# Patient Record
Sex: FEMALE | Race: WHITE | Employment: OTHER | URBAN - METROPOLITAN AREA
[De-identification: names, ages, dates, MRNs, and addresses within clinical notes are randomized per-mention and may not be internally consistent; named-entity substitution may affect disease eponyms.]

---

## 2017-01-11 ENCOUNTER — HOSPITAL ENCOUNTER (OUTPATIENT)
Dept: PREADMISSION TESTING | Age: 77
Discharge: HOME | End: 2017-01-11
Payer: MEDICARE

## 2017-01-11 VITALS
BODY MASS INDEX: 22.04 KG/M2 | DIASTOLIC BLOOD PRESSURE: 74 MMHG | WEIGHT: 132.28 LBS | SYSTOLIC BLOOD PRESSURE: 138 MMHG | HEIGHT: 65 IN | OXYGEN SATURATION: 99 % | RESPIRATION RATE: 17 BRPM | TEMPERATURE: 97.6 F

## 2017-01-11 LAB
ABO + RH BLD: NORMAL
ALBUMIN SERPL BCP-MCNC: 3.4 G/DL (ref 3.5–5)
ALBUMIN/GLOB SERPL: 0.9 {RATIO} (ref 1.1–2.2)
ALP SERPL-CCNC: 93 U/L (ref 45–117)
ALT SERPL-CCNC: 13 U/L (ref 12–78)
ANION GAP BLD CALC-SCNC: 9 MMOL/L (ref 5–15)
APPEARANCE UR: ABNORMAL
APTT PPP: 29.1 SEC (ref 22.1–32.5)
AST SERPL W P-5'-P-CCNC: 14 U/L (ref 15–37)
ATRIAL RATE: 67 BPM
BACTERIA URNS QL MICRO: NEGATIVE /HPF
BASOPHILS # BLD AUTO: 0 K/UL (ref 0–0.1)
BASOPHILS # BLD: 0 % (ref 0–1)
BILIRUB SERPL-MCNC: 0.3 MG/DL (ref 0.2–1)
BILIRUB UR QL CFM: NEGATIVE
BLOOD GROUP ANTIBODIES SERPL: NORMAL
BUN SERPL-MCNC: 17 MG/DL (ref 6–20)
BUN/CREAT SERPL: 22 (ref 12–20)
CALCIUM SERPL-MCNC: 9.3 MG/DL (ref 8.5–10.1)
CALCULATED P AXIS, ECG09: -25 DEGREES
CALCULATED R AXIS, ECG10: -21 DEGREES
CALCULATED T AXIS, ECG11: 158 DEGREES
CHLORIDE SERPL-SCNC: 102 MMOL/L (ref 97–108)
CO2 SERPL-SCNC: 28 MMOL/L (ref 21–32)
COLOR UR: ABNORMAL
CREAT SERPL-MCNC: 0.77 MG/DL (ref 0.55–1.02)
CRP SERPL HS-MCNC: 4.7 MG/L
DIAGNOSIS, 93000: NORMAL
EOSINOPHIL # BLD: 0.1 K/UL (ref 0–0.4)
EOSINOPHIL NFR BLD: 2 % (ref 0–7)
EPITH CASTS URNS QL MICRO: ABNORMAL /LPF
ERYTHROCYTE [DISTWIDTH] IN BLOOD BY AUTOMATED COUNT: 14.3 % (ref 11.5–14.5)
EST. AVERAGE GLUCOSE BLD GHB EST-MCNC: 103 MG/DL
GLOBULIN SER CALC-MCNC: 3.7 G/DL (ref 2–4)
GLUCOSE SERPL-MCNC: 81 MG/DL (ref 65–100)
GLUCOSE UR STRIP.AUTO-MCNC: NEGATIVE MG/DL
HBA1C MFR BLD: 5.2 % (ref 4.2–6.3)
HCT VFR BLD AUTO: 44.8 % (ref 35–47)
HGB BLD-MCNC: 15 G/DL (ref 11.5–16)
HGB UR QL STRIP: ABNORMAL
INR PPP: 1 (ref 0.9–1.1)
KETONES UR QL STRIP.AUTO: ABNORMAL MG/DL
LEUKOCYTE ESTERASE UR QL STRIP.AUTO: NEGATIVE
LYMPHOCYTES # BLD AUTO: 26 % (ref 12–49)
LYMPHOCYTES # BLD: 1.7 K/UL (ref 0.8–3.5)
MCH RBC QN AUTO: 29.9 PG (ref 26–34)
MCHC RBC AUTO-ENTMCNC: 33.5 G/DL (ref 30–36.5)
MCV RBC AUTO: 89.2 FL (ref 80–99)
MONOCYTES # BLD: 0.5 K/UL (ref 0–1)
MONOCYTES NFR BLD AUTO: 8 % (ref 5–13)
NEUTS SEG # BLD: 4.1 K/UL (ref 1.8–8)
NEUTS SEG NFR BLD AUTO: 64 % (ref 32–75)
NITRITE UR QL STRIP.AUTO: NEGATIVE
P-R INTERVAL, ECG05: 126 MS
PH UR STRIP: 5.5 [PH] (ref 5–8)
PLATELET # BLD AUTO: 203 K/UL (ref 150–400)
POTASSIUM SERPL-SCNC: 4.5 MMOL/L (ref 3.5–5.1)
PROT SERPL-MCNC: 7.1 G/DL (ref 6.4–8.2)
PROT UR STRIP-MCNC: NEGATIVE MG/DL
PROTHROMBIN TIME: 9.7 SEC (ref 9–11.1)
Q-T INTERVAL, ECG07: 486 MS
QRS DURATION, ECG06: 164 MS
QTC CALCULATION (BEZET), ECG08: 513 MS
RBC # BLD AUTO: 5.02 M/UL (ref 3.8–5.2)
RBC #/AREA URNS HPF: ABNORMAL /HPF (ref 0–5)
SODIUM SERPL-SCNC: 139 MMOL/L (ref 136–145)
SP GR UR REFRACTOMETRY: 1.02 (ref 1–1.03)
SPECIMEN EXP DATE BLD: NORMAL
THERAPEUTIC RANGE,PTTT: NORMAL SECS (ref 58–77)
UA: UC IF INDICATED,UAUC: ABNORMAL
UROBILINOGEN UR QL STRIP.AUTO: 0.2 EU/DL (ref 0.2–1)
VENTRICULAR RATE, ECG03: 67 BPM
WBC # BLD AUTO: 6.4 K/UL (ref 3.6–11)
WBC URNS QL MICRO: ABNORMAL /HPF (ref 0–4)

## 2017-01-11 PROCEDURE — 83036 HEMOGLOBIN GLYCOSYLATED A1C: CPT | Performed by: ORTHOPAEDIC SURGERY

## 2017-01-11 PROCEDURE — 36415 COLL VENOUS BLD VENIPUNCTURE: CPT | Performed by: ORTHOPAEDIC SURGERY

## 2017-01-11 PROCEDURE — 81001 URINALYSIS AUTO W/SCOPE: CPT | Performed by: ORTHOPAEDIC SURGERY

## 2017-01-11 PROCEDURE — 85730 THROMBOPLASTIN TIME PARTIAL: CPT | Performed by: ORTHOPAEDIC SURGERY

## 2017-01-11 PROCEDURE — 84466 ASSAY OF TRANSFERRIN: CPT | Performed by: ORTHOPAEDIC SURGERY

## 2017-01-11 PROCEDURE — 85025 COMPLETE CBC W/AUTO DIFF WBC: CPT | Performed by: ORTHOPAEDIC SURGERY

## 2017-01-11 PROCEDURE — 87086 URINE CULTURE/COLONY COUNT: CPT | Performed by: ORTHOPAEDIC SURGERY

## 2017-01-11 PROCEDURE — 86141 C-REACTIVE PROTEIN HS: CPT | Performed by: ORTHOPAEDIC SURGERY

## 2017-01-11 PROCEDURE — 93005 ELECTROCARDIOGRAM TRACING: CPT

## 2017-01-11 PROCEDURE — 80053 COMPREHEN METABOLIC PANEL: CPT | Performed by: ORTHOPAEDIC SURGERY

## 2017-01-11 PROCEDURE — 86900 BLOOD TYPING SEROLOGIC ABO: CPT | Performed by: ORTHOPAEDIC SURGERY

## 2017-01-11 PROCEDURE — 85610 PROTHROMBIN TIME: CPT | Performed by: ORTHOPAEDIC SURGERY

## 2017-01-11 RX ORDER — TRAMADOL HYDROCHLORIDE 50 MG/1
50 TABLET ORAL
COMMUNITY

## 2017-01-11 RX ORDER — PROMETHAZINE HYDROCHLORIDE 25 MG/1
25 TABLET ORAL
COMMUNITY

## 2017-01-11 RX ORDER — LANOLIN ALCOHOL/MO/W.PET/CERES
1000 CREAM (GRAM) TOPICAL DAILY
COMMUNITY

## 2017-01-11 RX ORDER — ZOLPIDEM TARTRATE 5 MG/1
5 TABLET ORAL
COMMUNITY

## 2017-01-11 RX ORDER — SUMATRIPTAN 50 MG/1
50 TABLET, FILM COATED ORAL
COMMUNITY

## 2017-01-11 NOTE — PERIOP NOTES
[facility]  PREOPERATIVE INSTRUCTIONS    Surgery Date:   1/30/2017  Surgery arrival time given by surgeon: NO   If [facility] staff will call you between 4 PM- 8 PM the day before surgery with your arrival time. If your surgery is on a Monday, we will call you the preceding Friday. Please call [phone] after 8 PM if you did not receive your arrival time. 1. Please report at the designated time to the [address] [place] [address] [facility]. Bring your insurance card, photo identification, and any copayment ( if applicable). 2. You must have a responsible adult to drive you home. You need to have a responsible adult to stay with you the first 24 hours after surgery if you are going home the same day of your surgery and you should not drive a car for 24 hours following your surgery. 3. Nothing to eat or drink after midnight the night before surgery. This includes no water, gum, mints, coffee, juice, etc.  Please note special instructions, if applicable, below for medications. 4. MEDICATIONS TO TAKE THE MORNING OF SURGERY WITH A SIP OF WATER: _______albuterol inhaler, coreg, Nexium,methadone, spiriva_______        If needed, your prescription pain medicine may be taken with a sip of water the morning of surgery  5. No alcoholic beverages 24 hours before or after your surgery. 6. If you are being admitted to the hospital, please leave personal belongings/luggage in your car until you have an assigned hospital room number. 7. Stop Aspirin and/or any non-steroidal anti-inflammatory drugs (i.e. Ibuprofen, Naproxen, Advil, Aleve) as directed by your surgeon. You may take Tylenol. 8. Stop herbal supplements 1 week prior to surgery. 9. If you are currently taking Plavix, Coumadin,or any other blood-thinning/anticoagulant medication contact your surgeon for instructions. 10. Please wear comfortable clothes. Wear your glasses instead of contacts. We ask that all money, jewelry and valuables be left at home.  Wear no make-up, particularly yoel, the day of surgery. 11.  All body piercings, rings and jewelry need to be removed and left at home. Please wear your hair loose or down. Please no pony-tails, buns, or any metal hair accessories. If you shower the morning of surgery, please do not apply any lotions, powders, or deodorants afterwards. Do not shave any body area within 24 hours of your surgery. 12. Please follow all instructions to avoid any potential surgical cancellation. 13. Should your physical condition change, (i.e. fever, cold, flu, etc.) please notify your surgeon as soon as possible. 14. It is important to be on time. If a situation occurs where you may be delayed, please call:  [phone] / [phone] on the day of surgery. 15. The Preadmission Testing staff can be reached at [phone]. Heydi Zaragoza 12. Bring your completed Medication Reconciliation sheet with your the morning of surgery  Special instructions:  See your lung doctor before surgery and fax notes from office visit to us  · Free  Parking between [address]  The patient was contacted  in person. She  verbalize  understanding of all instructions   Medications reviewed and med reconciliation sheets for prescriptions given to patient for review & return day of surgery. Special Instructions:  · Use Chlorhexidine Care Fusion wash and sponges 3 days prior to surgery as instructed. · Incentive spirometer given with instructions to practice at home and bring back to the hospital on the day of surgery. · [facility] will contact you if your Hemoglobin A1C is greater than 7.5. · Ensure/Glucerna  sample, nutritional information, and Ensure/Glucerna coupon given. · Pain pamphlet and Call Don't Fall reminder reviewed with patient.   ·  parking is complimentary Monday - Friday 7 am - 5 pm  · Bring PTA Medication list day of surgery with the last doses taken documented

## 2017-01-12 LAB
BACTERIA SPEC CULT: NORMAL
BACTERIA SPEC CULT: NORMAL
SERVICE CMNT-IMP: NORMAL

## 2017-01-12 NOTE — PERIOP NOTES
Left message on Lizzette's voice mail in Dr Leonardo Ware office requesting her to check order against surgical  posting as they appear different  Requested new orders to match the surgical posting

## 2017-01-12 NOTE — PERIOP NOTES
Faxed request for ASA plan to Dr Claudia Lawler fax    Phone [phone]      [facility] to [facility] address his recommendations for staying on methadone during beatriz-operative/post op course of care or other recommendation for pain management per request of Dr Patrizia Mojica  Fax   Phone   Also called office and left message for Dr Cherry aBrney nurse to call to discuss this plan and receipt of pain plan request

## 2017-01-12 NOTE — PERIOP NOTES
Spoke c [facility] pain management clinic they are faxing their recommendation

## 2017-01-12 NOTE — PERIOP NOTES
Received recommendations for a pain management plan from [facility] pain management MD that sees Mrs. Farris -  See chart hard copy  Dr Zora Aguilar (anesthesia) reviewed the recommendations for  pain management and reconfirmed pt should take her methadone the morning of surgery as instructed.   Pharmacy([facility]- pharmacist) notified of pt's pending admission for 1/30/2017

## 2017-01-13 LAB
BACTERIA SPEC CULT: NORMAL
CC UR VC: NORMAL
SERVICE CMNT-IMP: NORMAL
TRANSFERRIN SERPL-MCNC: 260 MG/DL (ref 200–370)

## 2017-01-28 ENCOUNTER — ANESTHESIA EVENT (OUTPATIENT)
Dept: SURGERY | Age: 77
DRG: 463 | End: 2017-01-28
Payer: MEDICARE

## 2017-01-30 ENCOUNTER — ANESTHESIA (OUTPATIENT)
Dept: SURGERY | Age: 77
DRG: 463 | End: 2017-01-30
Payer: MEDICARE

## 2017-01-30 ENCOUNTER — APPOINTMENT (OUTPATIENT)
Dept: CT IMAGING | Age: 77
DRG: 463 | End: 2017-01-30
Attending: INTERNAL MEDICINE
Payer: MEDICARE

## 2017-01-30 ENCOUNTER — APPOINTMENT (OUTPATIENT)
Dept: GENERAL RADIOLOGY | Age: 77
DRG: 463 | End: 2017-01-30
Attending: NURSE PRACTITIONER
Payer: MEDICARE

## 2017-01-30 ENCOUNTER — HOSPITAL ENCOUNTER (INPATIENT)
Age: 77
LOS: 17 days | Discharge: NURSING, REHAB OR LONG-TERM CARE | DRG: 463 | End: 2017-02-16
Attending: ORTHOPAEDIC SURGERY | Admitting: ORTHOPAEDIC SURGERY
Payer: MEDICARE

## 2017-01-30 DIAGNOSIS — G89.18 POST-OP PAIN: ICD-10-CM

## 2017-01-30 DIAGNOSIS — M54.9 SPINE PAIN, MULTILEVEL: ICD-10-CM

## 2017-01-30 DIAGNOSIS — M25.552 LEFT HIP PAIN: ICD-10-CM

## 2017-01-30 DIAGNOSIS — R53.1 WEAKNESS: ICD-10-CM

## 2017-01-30 PROBLEM — T84.019A FAILED TOTAL JOINT REPLACEMENT (HCC): Status: ACTIVE | Noted: 2017-01-30

## 2017-01-30 PROBLEM — Z96.649 FAILED TOTAL HIP ARTHROPLASTY (HCC): Status: ACTIVE | Noted: 2017-01-30

## 2017-01-30 PROBLEM — T84.018A FAILED TOTAL HIP ARTHROPLASTY (HCC): Status: ACTIVE | Noted: 2017-01-30

## 2017-01-30 LAB
ALBUMIN SERPL BCP-MCNC: 2.2 G/DL (ref 3.5–5)
ALBUMIN SERPL BCP-MCNC: 3.3 G/DL (ref 3.5–5)
ALBUMIN/GLOB SERPL: 0.7 {RATIO} (ref 1.1–2.2)
ALP SERPL-CCNC: 60 U/L (ref 45–117)
ALP SERPL-CCNC: 61 U/L (ref 45–117)
ALT SERPL-CCNC: 12 U/L (ref 12–78)
ANION GAP BLD CALC-SCNC: 8 MMOL/L (ref 5–15)
APTT PPP: 22.8 SEC (ref 22.1–32.5)
AST SERPL W P-5'-P-CCNC: 19 U/L (ref 15–37)
BASOPHILS # BLD AUTO: 0 K/UL (ref 0–0.1)
BASOPHILS # BLD: 0 % (ref 0–1)
BILIRUB SERPL-MCNC: 0.2 MG/DL (ref 0.2–1)
BUN SERPL-MCNC: 16 MG/DL (ref 6–20)
BUN/CREAT SERPL: 30 (ref 12–20)
CALCIUM SERPL-MCNC: 8.4 MG/DL (ref 8.5–10.1)
CHLORIDE SERPL-SCNC: 109 MMOL/L (ref 97–108)
CK SERPL-CCNC: 99 U/L (ref 26–192)
CO2 SERPL-SCNC: 25 MMOL/L (ref 21–32)
CREAT SERPL-MCNC: 0.53 MG/DL (ref 0.55–1.02)
DAILY QC (YES/NO)?: YES
DIFFERENTIAL METHOD BLD: ABNORMAL
EOSINOPHIL # BLD: 0.1 K/UL (ref 0–0.4)
EOSINOPHIL NFR BLD: 2 % (ref 0–7)
ERYTHROCYTE [DISTWIDTH] IN BLOOD BY AUTOMATED COUNT: 14.8 % (ref 11.5–14.5)
GLOBULIN SER CALC-MCNC: 3 G/DL (ref 2–4)
GLUCOSE SERPL-MCNC: 110 MG/DL (ref 65–100)
HCT VFR BLD AUTO: 30.9 % (ref 35–47)
HCT VFR BLD AUTO: 33.5 % (ref 35–47)
HCT VFR BLD AUTO: 35.1 % (ref 35–47)
HGB BLD-MCNC: 10.4 G/DL (ref 11.5–16)
HGB BLD-MCNC: 11.1 G/DL (ref 11.5–16)
HGB BLD-MCNC: 12.3 G/DL (ref 11.5–16)
HGB BLD-MCNC: 9.7 G/DL (ref 11.5–16)
INR PPP: 1.1 (ref 0.9–1.1)
LACTATE SERPL-SCNC: 1.7 MMOL/L (ref 0.4–2)
LYMPHOCYTES # BLD AUTO: 28 % (ref 12–49)
LYMPHOCYTES # BLD: 1 K/UL (ref 0.8–3.5)
MAGNESIUM SERPL-MCNC: 1.4 MG/DL (ref 1.6–2.4)
MCH RBC QN AUTO: 28.8 PG (ref 26–34)
MCHC RBC AUTO-ENTMCNC: 31.6 G/DL (ref 30–36.5)
MCV RBC AUTO: 90.9 FL (ref 80–99)
MONOCYTES # BLD: 0.2 K/UL (ref 0–1)
MONOCYTES NFR BLD AUTO: 5 % (ref 5–13)
NEUTS SEG # BLD: 2.4 K/UL (ref 1.8–8)
NEUTS SEG NFR BLD AUTO: 65 % (ref 32–75)
PLATELET # BLD AUTO: 151 K/UL (ref 150–400)
POTASSIUM SERPL-SCNC: 4.2 MMOL/L (ref 3.5–5.1)
PROT SERPL-MCNC: 5.2 G/DL (ref 6.4–8.2)
PROTHROMBIN TIME: 10.7 SEC (ref 9–11.1)
RBC # BLD AUTO: 3.86 M/UL (ref 3.8–5.2)
RBC MORPH BLD: ABNORMAL
SODIUM SERPL-SCNC: 142 MMOL/L (ref 136–145)
THERAPEUTIC RANGE,PTTT: NORMAL SECS (ref 58–77)
TROPONIN I SERPL-MCNC: 0.04 NG/ML
WBC # BLD AUTO: 3.7 K/UL (ref 3.6–11)

## 2017-01-30 PROCEDURE — 85018 HEMOGLOBIN: CPT | Performed by: INTERNAL MEDICINE

## 2017-01-30 PROCEDURE — 87205 SMEAR GRAM STAIN: CPT | Performed by: ORTHOPAEDIC SURGERY

## 2017-01-30 PROCEDURE — 84075 ASSAY ALKALINE PHOSPHATASE: CPT | Performed by: ORTHOPAEDIC SURGERY

## 2017-01-30 PROCEDURE — 87077 CULTURE AEROBIC IDENTIFY: CPT | Performed by: ORTHOPAEDIC SURGERY

## 2017-01-30 PROCEDURE — 87186 SC STD MICRODIL/AGAR DIL: CPT | Performed by: ORTHOPAEDIC SURGERY

## 2017-01-30 PROCEDURE — 74011636320 HC RX REV CODE- 636/320: Performed by: RADIOLOGY

## 2017-01-30 PROCEDURE — 74011250636 HC RX REV CODE- 250/636: Performed by: ANESTHESIOLOGY

## 2017-01-30 PROCEDURE — 85610 PROTHROMBIN TIME: CPT | Performed by: ORTHOPAEDIC SURGERY

## 2017-01-30 PROCEDURE — 77030031139 HC SUT VCRL2 J&J -A: Performed by: ORTHOPAEDIC SURGERY

## 2017-01-30 PROCEDURE — 86920 COMPATIBILITY TEST SPIN: CPT | Performed by: ORTHOPAEDIC SURGERY

## 2017-01-30 PROCEDURE — 77030034848

## 2017-01-30 PROCEDURE — 77030007866 HC KT SPN ANES BBMI -B

## 2017-01-30 PROCEDURE — 77030020782 HC GWN BAIR PAWS FLX 3M -B

## 2017-01-30 PROCEDURE — 77030008467 HC STPLR SKN COVD -B: Performed by: ORTHOPAEDIC SURGERY

## 2017-01-30 PROCEDURE — 74011250636 HC RX REV CODE- 250/636: Performed by: NURSE PRACTITIONER

## 2017-01-30 PROCEDURE — 71275 CT ANGIOGRAPHY CHEST: CPT

## 2017-01-30 PROCEDURE — 74011000250 HC RX REV CODE- 250: Performed by: ANESTHESIOLOGY

## 2017-01-30 PROCEDURE — 36415 COLL VENOUS BLD VENIPUNCTURE: CPT | Performed by: INTERNAL MEDICINE

## 2017-01-30 PROCEDURE — 0S9B0ZX DRAINAGE OF LEFT HIP JOINT, OPEN APPROACH, DIAGNOSTIC: ICD-10-PCS | Performed by: ORTHOPAEDIC SURGERY

## 2017-01-30 PROCEDURE — 74011250637 HC RX REV CODE- 250/637: Performed by: NURSE PRACTITIONER

## 2017-01-30 PROCEDURE — 83735 ASSAY OF MAGNESIUM: CPT | Performed by: ORTHOPAEDIC SURGERY

## 2017-01-30 PROCEDURE — 82803 BLOOD GASES ANY COMBINATION: CPT | Performed by: ANESTHESIOLOGY

## 2017-01-30 PROCEDURE — 0SRB0JA REPLACEMENT OF LEFT HIP JOINT WITH SYNTHETIC SUBSTITUTE, UNCEMENTED, OPEN APPROACH: ICD-10-PCS | Performed by: ORTHOPAEDIC SURGERY

## 2017-01-30 PROCEDURE — 77030016547 HC BLD SAW SAG1 STRY -B: Performed by: ORTHOPAEDIC SURGERY

## 2017-01-30 PROCEDURE — P9045 ALBUMIN (HUMAN), 5%, 250 ML: HCPCS

## 2017-01-30 PROCEDURE — 85025 COMPLETE CBC W/AUTO DIFF WBC: CPT | Performed by: ORTHOPAEDIC SURGERY

## 2017-01-30 PROCEDURE — 93005 ELECTROCARDIOGRAM TRACING: CPT

## 2017-01-30 PROCEDURE — 85018 HEMOGLOBIN: CPT | Performed by: ORTHOPAEDIC SURGERY

## 2017-01-30 PROCEDURE — 85730 THROMBOPLASTIN TIME PARTIAL: CPT | Performed by: ORTHOPAEDIC SURGERY

## 2017-01-30 PROCEDURE — 82040 ASSAY OF SERUM ALBUMIN: CPT | Performed by: ORTHOPAEDIC SURGERY

## 2017-01-30 PROCEDURE — 83605 ASSAY OF LACTIC ACID: CPT | Performed by: INTERNAL MEDICINE

## 2017-01-30 PROCEDURE — 74011000250 HC RX REV CODE- 250

## 2017-01-30 PROCEDURE — 74011250636 HC RX REV CODE- 250/636

## 2017-01-30 PROCEDURE — 87075 CULTR BACTERIA EXCEPT BLOOD: CPT | Performed by: ORTHOPAEDIC SURGERY

## 2017-01-30 PROCEDURE — 77030002933 HC SUT MCRYL J&J -A: Performed by: ORTHOPAEDIC SURGERY

## 2017-01-30 PROCEDURE — 77030011640 HC PAD GRND REM COVD -A: Performed by: ORTHOPAEDIC SURGERY

## 2017-01-30 PROCEDURE — 76030000021 HC AMB SURG 2 TO 2.5 HR INTENSV-TIER 1: Performed by: ORTHOPAEDIC SURGERY

## 2017-01-30 PROCEDURE — 82550 ASSAY OF CK (CPK): CPT | Performed by: INTERNAL MEDICINE

## 2017-01-30 PROCEDURE — 93308 TTE F-UP OR LMTD: CPT

## 2017-01-30 PROCEDURE — 77030010507 HC ADH SKN DERMBND J&J -B: Performed by: ORTHOPAEDIC SURGERY

## 2017-01-30 PROCEDURE — 36600 WITHDRAWAL OF ARTERIAL BLOOD: CPT | Performed by: ANESTHESIOLOGY

## 2017-01-30 PROCEDURE — 80053 COMPREHEN METABOLIC PANEL: CPT | Performed by: ORTHOPAEDIC SURGERY

## 2017-01-30 PROCEDURE — 77030018788 HC NDL SUT ANCH -A: Performed by: ORTHOPAEDIC SURGERY

## 2017-01-30 PROCEDURE — C1776 JOINT DEVICE (IMPLANTABLE): HCPCS | Performed by: ORTHOPAEDIC SURGERY

## 2017-01-30 PROCEDURE — 74011000272 HC RX REV CODE- 272: Performed by: ORTHOPAEDIC SURGERY

## 2017-01-30 PROCEDURE — 77030018836 HC SOL IRR NACL ICUM -A: Performed by: ORTHOPAEDIC SURGERY

## 2017-01-30 PROCEDURE — 86900 BLOOD TYPING SEROLOGIC ABO: CPT | Performed by: ORTHOPAEDIC SURGERY

## 2017-01-30 PROCEDURE — 84484 ASSAY OF TROPONIN QUANT: CPT | Performed by: INTERNAL MEDICINE

## 2017-01-30 PROCEDURE — 65610000006 HC RM INTENSIVE CARE

## 2017-01-30 PROCEDURE — 74011000250 HC RX REV CODE- 250: Performed by: INTERNAL MEDICINE

## 2017-01-30 PROCEDURE — 77030004464 HC BUR DISP STRY -B: Performed by: ORTHOPAEDIC SURGERY

## 2017-01-30 PROCEDURE — 74011250637 HC RX REV CODE- 250/637: Performed by: ANESTHESIOLOGY

## 2017-01-30 PROCEDURE — 74011250636 HC RX REV CODE- 250/636: Performed by: INTERNAL MEDICINE

## 2017-01-30 PROCEDURE — 72170 X-RAY EXAM OF PELVIS: CPT

## 2017-01-30 PROCEDURE — 77030018547 HC SUT ETHBND1 J&J -B: Performed by: ORTHOPAEDIC SURGERY

## 2017-01-30 PROCEDURE — 0SPB0JZ REMOVAL OF SYNTHETIC SUBSTITUTE FROM LEFT HIP JOINT, OPEN APPROACH: ICD-10-PCS | Performed by: ORTHOPAEDIC SURGERY

## 2017-01-30 PROCEDURE — 76210000036 HC AMBSU PH I REC 1.5 TO 2 HR: Performed by: ORTHOPAEDIC SURGERY

## 2017-01-30 PROCEDURE — 76060000064 HC AMB SURG ANES 2 TO 2.5 HR: Performed by: ORTHOPAEDIC SURGERY

## 2017-01-30 PROCEDURE — 74011000250 HC RX REV CODE- 250: Performed by: ORTHOPAEDIC SURGERY

## 2017-01-30 PROCEDURE — 77030020788: Performed by: ORTHOPAEDIC SURGERY

## 2017-01-30 DEVICE — IMPLANTABLE DEVICE: Type: IMPLANTABLE DEVICE | Site: HIP | Status: FUNCTIONAL

## 2017-01-30 RX ORDER — SODIUM CHLORIDE 0.9 % (FLUSH) 0.9 %
5-10 SYRINGE (ML) INJECTION EVERY 8 HOURS
Status: DISCONTINUED | OUTPATIENT
Start: 2017-01-30 | End: 2017-01-30 | Stop reason: HOSPADM

## 2017-01-30 RX ORDER — LIDOCAINE HYDROCHLORIDE 10 MG/ML
0.1 INJECTION, SOLUTION EPIDURAL; INFILTRATION; INTRACAUDAL; PERINEURAL AS NEEDED
Status: DISCONTINUED | OUTPATIENT
Start: 2017-01-30 | End: 2017-01-30 | Stop reason: SDUPTHER

## 2017-01-30 RX ORDER — SODIUM CHLORIDE 0.9 % (FLUSH) 0.9 %
5-10 SYRINGE (ML) INJECTION AS NEEDED
Status: DISCONTINUED | OUTPATIENT
Start: 2017-01-30 | End: 2017-01-30 | Stop reason: HOSPADM

## 2017-01-30 RX ORDER — ONDANSETRON 2 MG/ML
4 INJECTION INTRAMUSCULAR; INTRAVENOUS AS NEEDED
Status: DISCONTINUED | OUTPATIENT
Start: 2017-01-30 | End: 2017-01-30 | Stop reason: HOSPADM

## 2017-01-30 RX ORDER — DIPHENHYDRAMINE HYDROCHLORIDE 50 MG/ML
12.5 INJECTION, SOLUTION INTRAMUSCULAR; INTRAVENOUS AS NEEDED
Status: DISCONTINUED | OUTPATIENT
Start: 2017-01-30 | End: 2017-01-30 | Stop reason: HOSPADM

## 2017-01-30 RX ORDER — FAMOTIDINE 20 MG/1
20 TABLET, FILM COATED ORAL 2 TIMES DAILY
Status: DISCONTINUED | OUTPATIENT
Start: 2017-01-30 | End: 2017-01-30

## 2017-01-30 RX ORDER — CEFAZOLIN SODIUM IN 0.9 % NACL 2 G/50 ML
2 INTRAVENOUS SOLUTION, PIGGYBACK (ML) INTRAVENOUS EVERY 8 HOURS
Status: COMPLETED | OUTPATIENT
Start: 2017-01-30 | End: 2017-02-04

## 2017-01-30 RX ORDER — SODIUM CHLORIDE, SODIUM LACTATE, POTASSIUM CHLORIDE, CALCIUM CHLORIDE 600; 310; 30; 20 MG/100ML; MG/100ML; MG/100ML; MG/100ML
100 INJECTION, SOLUTION INTRAVENOUS CONTINUOUS
Status: DISCONTINUED | OUTPATIENT
Start: 2017-01-30 | End: 2017-01-30 | Stop reason: HOSPADM

## 2017-01-30 RX ORDER — OXYCODONE HYDROCHLORIDE 5 MG/1
5 TABLET ORAL
Status: DISCONTINUED | OUTPATIENT
Start: 2017-01-30 | End: 2017-01-30 | Stop reason: HOSPADM

## 2017-01-30 RX ORDER — MAGNESIUM SULFATE HEPTAHYDRATE 40 MG/ML
2 INJECTION, SOLUTION INTRAVENOUS ONCE
Status: COMPLETED | OUTPATIENT
Start: 2017-01-30 | End: 2017-02-04

## 2017-01-30 RX ORDER — CEFAZOLIN SODIUM IN 0.9 % NACL 2 G/50 ML
2 INTRAVENOUS SOLUTION, PIGGYBACK (ML) INTRAVENOUS ONCE
Status: COMPLETED | OUTPATIENT
Start: 2017-01-30 | End: 2017-01-30

## 2017-01-30 RX ORDER — PROPOFOL 10 MG/ML
INJECTION, EMULSION INTRAVENOUS
Status: DISCONTINUED | OUTPATIENT
Start: 2017-01-30 | End: 2017-01-31 | Stop reason: HOSPADM

## 2017-01-30 RX ORDER — FENTANYL CITRATE 50 UG/ML
INJECTION, SOLUTION INTRAMUSCULAR; INTRAVENOUS AS NEEDED
Status: DISCONTINUED | OUTPATIENT
Start: 2017-01-30 | End: 2017-01-31 | Stop reason: HOSPADM

## 2017-01-30 RX ORDER — SUMATRIPTAN 25 MG/1
50 TABLET, FILM COATED ORAL
Status: DISCONTINUED | OUTPATIENT
Start: 2017-01-30 | End: 2017-02-16 | Stop reason: HOSPADM

## 2017-01-30 RX ORDER — NALOXONE HYDROCHLORIDE 0.4 MG/ML
0.4 INJECTION, SOLUTION INTRAMUSCULAR; INTRAVENOUS; SUBCUTANEOUS AS NEEDED
Status: DISCONTINUED | OUTPATIENT
Start: 2017-01-30 | End: 2017-02-16 | Stop reason: HOSPADM

## 2017-01-30 RX ORDER — PANTOPRAZOLE SODIUM 40 MG/1
40 TABLET, DELAYED RELEASE ORAL
Status: DISCONTINUED | OUTPATIENT
Start: 2017-01-31 | End: 2017-01-30

## 2017-01-30 RX ORDER — ACETAMINOPHEN 325 MG/1
650 TABLET ORAL EVERY 6 HOURS
Status: DISCONTINUED | OUTPATIENT
Start: 2017-01-30 | End: 2017-02-16 | Stop reason: HOSPADM

## 2017-01-30 RX ORDER — POLYETHYLENE GLYCOL 3350 17 G/17G
17 POWDER, FOR SOLUTION ORAL DAILY
Status: DISCONTINUED | OUTPATIENT
Start: 2017-01-31 | End: 2017-02-16 | Stop reason: HOSPADM

## 2017-01-30 RX ORDER — TRAMADOL HYDROCHLORIDE 50 MG/1
100 TABLET ORAL
Status: DISCONTINUED | OUTPATIENT
Start: 2017-01-30 | End: 2017-02-01

## 2017-01-30 RX ORDER — AMOXICILLIN 250 MG
1 CAPSULE ORAL 2 TIMES DAILY
Status: DISCONTINUED | OUTPATIENT
Start: 2017-01-30 | End: 2017-02-16 | Stop reason: HOSPADM

## 2017-01-30 RX ORDER — BUPIVACAINE HYDROCHLORIDE 7.5 MG/ML
INJECTION, SOLUTION EPIDURAL; RETROBULBAR AS NEEDED
Status: DISCONTINUED | OUTPATIENT
Start: 2017-01-30 | End: 2017-01-31 | Stop reason: HOSPADM

## 2017-01-30 RX ORDER — LIDOCAINE HYDROCHLORIDE 10 MG/ML
0.1 INJECTION, SOLUTION EPIDURAL; INFILTRATION; INTRACAUDAL; PERINEURAL AS NEEDED
Status: DISCONTINUED | OUTPATIENT
Start: 2017-01-30 | End: 2017-01-30 | Stop reason: HOSPADM

## 2017-01-30 RX ORDER — FENTANYL CITRATE 50 UG/ML
25 INJECTION, SOLUTION INTRAMUSCULAR; INTRAVENOUS
Status: DISCONTINUED | OUTPATIENT
Start: 2017-01-30 | End: 2017-02-07

## 2017-01-30 RX ORDER — SODIUM CHLORIDE 0.9 % (FLUSH) 0.9 %
5-10 SYRINGE (ML) INJECTION AS NEEDED
Status: DISCONTINUED | OUTPATIENT
Start: 2017-01-30 | End: 2017-02-06

## 2017-01-30 RX ORDER — ENOXAPARIN SODIUM 100 MG/ML
40 INJECTION SUBCUTANEOUS DAILY
Status: DISCONTINUED | OUTPATIENT
Start: 2017-01-31 | End: 2017-01-31

## 2017-01-30 RX ORDER — CELECOXIB 100 MG/1
200 CAPSULE ORAL EVERY 12 HOURS
Status: DISCONTINUED | OUTPATIENT
Start: 2017-01-30 | End: 2017-02-16 | Stop reason: HOSPADM

## 2017-01-30 RX ORDER — ACETAMINOPHEN 325 MG/1
975 TABLET ORAL ONCE
Status: COMPLETED | OUTPATIENT
Start: 2017-01-30 | End: 2017-01-30

## 2017-01-30 RX ORDER — CARVEDILOL 3.12 MG/1
3.12 TABLET ORAL 2 TIMES DAILY WITH MEALS
Status: DISCONTINUED | OUTPATIENT
Start: 2017-01-30 | End: 2017-02-02

## 2017-01-30 RX ORDER — SODIUM CHLORIDE, SODIUM LACTATE, POTASSIUM CHLORIDE, CALCIUM CHLORIDE 600; 310; 30; 20 MG/100ML; MG/100ML; MG/100ML; MG/100ML
125 INJECTION, SOLUTION INTRAVENOUS CONTINUOUS
Status: DISCONTINUED | OUTPATIENT
Start: 2017-01-30 | End: 2017-01-30 | Stop reason: HOSPADM

## 2017-01-30 RX ORDER — SODIUM CHLORIDE, SODIUM LACTATE, POTASSIUM CHLORIDE, CALCIUM CHLORIDE 600; 310; 30; 20 MG/100ML; MG/100ML; MG/100ML; MG/100ML
INJECTION, SOLUTION INTRAVENOUS
Status: DISCONTINUED | OUTPATIENT
Start: 2017-01-30 | End: 2017-01-31 | Stop reason: HOSPADM

## 2017-01-30 RX ORDER — DIPHENHYDRAMINE HYDROCHLORIDE 50 MG/ML
12.5 INJECTION, SOLUTION INTRAMUSCULAR; INTRAVENOUS
Status: ACTIVE | OUTPATIENT
Start: 2017-01-30 | End: 2017-02-02

## 2017-01-30 RX ORDER — SODIUM CHLORIDE 9 MG/ML
75 INJECTION, SOLUTION INTRAVENOUS CONTINUOUS
Status: DISPENSED | OUTPATIENT
Start: 2017-01-30 | End: 2017-01-31

## 2017-01-30 RX ORDER — ONDANSETRON 2 MG/ML
4 INJECTION INTRAMUSCULAR; INTRAVENOUS
Status: ACTIVE | OUTPATIENT
Start: 2017-01-30 | End: 2017-02-02

## 2017-01-30 RX ORDER — ONDANSETRON 2 MG/ML
INJECTION INTRAMUSCULAR; INTRAVENOUS
Status: COMPLETED
Start: 2017-01-30 | End: 2017-01-30

## 2017-01-30 RX ORDER — KETOROLAC TROMETHAMINE 30 MG/ML
15 INJECTION, SOLUTION INTRAMUSCULAR; INTRAVENOUS
Status: DISCONTINUED | OUTPATIENT
Start: 2017-01-30 | End: 2017-01-30 | Stop reason: HOSPADM

## 2017-01-30 RX ORDER — OXYCODONE HYDROCHLORIDE 5 MG/1
10 TABLET ORAL
Status: DISCONTINUED | OUTPATIENT
Start: 2017-01-30 | End: 2017-02-08

## 2017-01-30 RX ORDER — SODIUM CHLORIDE 0.9 % (FLUSH) 0.9 %
5-10 SYRINGE (ML) INJECTION EVERY 8 HOURS
Status: DISCONTINUED | OUTPATIENT
Start: 2017-01-31 | End: 2017-02-06

## 2017-01-30 RX ORDER — ALBUMIN HUMAN 50 G/1000ML
SOLUTION INTRAVENOUS AS NEEDED
Status: DISCONTINUED | OUTPATIENT
Start: 2017-01-30 | End: 2017-01-31 | Stop reason: HOSPADM

## 2017-01-30 RX ORDER — CALCIUM CARBONATE 200(500)MG
400 TABLET,CHEWABLE ORAL
Status: DISCONTINUED | OUTPATIENT
Start: 2017-01-30 | End: 2017-02-16 | Stop reason: HOSPADM

## 2017-01-30 RX ORDER — ALBUTEROL SULFATE 90 UG/1
2 AEROSOL, METERED RESPIRATORY (INHALATION)
Status: DISCONTINUED | OUTPATIENT
Start: 2017-01-30 | End: 2017-02-16 | Stop reason: HOSPADM

## 2017-01-30 RX ORDER — FENTANYL CITRATE 50 UG/ML
25 INJECTION, SOLUTION INTRAMUSCULAR; INTRAVENOUS
Status: DISCONTINUED | OUTPATIENT
Start: 2017-01-30 | End: 2017-01-30 | Stop reason: HOSPADM

## 2017-01-30 RX ORDER — MORPHINE SULFATE 2 MG/ML
2 INJECTION, SOLUTION INTRAMUSCULAR; INTRAVENOUS
Status: DISCONTINUED | OUTPATIENT
Start: 2017-01-30 | End: 2017-01-31

## 2017-01-30 RX ORDER — METHADONE HYDROCHLORIDE 10 MG/1
5 TABLET ORAL EVERY 8 HOURS
Status: DISCONTINUED | OUTPATIENT
Start: 2017-01-30 | End: 2017-02-16 | Stop reason: HOSPADM

## 2017-01-30 RX ORDER — MIDAZOLAM HYDROCHLORIDE 1 MG/ML
INJECTION, SOLUTION INTRAMUSCULAR; INTRAVENOUS AS NEEDED
Status: DISCONTINUED | OUTPATIENT
Start: 2017-01-30 | End: 2017-01-31 | Stop reason: HOSPADM

## 2017-01-30 RX ORDER — ZOLPIDEM TARTRATE 5 MG/1
5 TABLET ORAL
Status: DISCONTINUED | OUTPATIENT
Start: 2017-01-30 | End: 2017-02-16 | Stop reason: HOSPADM

## 2017-01-30 RX ORDER — OXYCODONE HYDROCHLORIDE 5 MG/1
15 TABLET ORAL
Status: DISCONTINUED | OUTPATIENT
Start: 2017-01-30 | End: 2017-02-16 | Stop reason: HOSPADM

## 2017-01-30 RX ORDER — FACIAL-BODY WIPES
10 EACH TOPICAL DAILY PRN
Status: DISCONTINUED | OUTPATIENT
Start: 2017-02-01 | End: 2017-02-16 | Stop reason: HOSPADM

## 2017-01-30 RX ADMIN — CEFAZOLIN 2 G: 1 INJECTION, POWDER, FOR SOLUTION INTRAMUSCULAR; INTRAVENOUS; PARENTERAL at 20:40

## 2017-01-30 RX ADMIN — SODIUM CHLORIDE 500 ML: 900 INJECTION, SOLUTION INTRAVENOUS at 17:00

## 2017-01-30 RX ADMIN — FENTANYL CITRATE 50 MCG: 50 INJECTION, SOLUTION INTRAMUSCULAR; INTRAVENOUS at 11:35

## 2017-01-30 RX ADMIN — FENTANYL CITRATE 25 MCG: 50 INJECTION, SOLUTION INTRAMUSCULAR; INTRAVENOUS at 21:13

## 2017-01-30 RX ADMIN — PROPOFOL 100 MCG/KG/MIN: 10 INJECTION, EMULSION INTRAVENOUS at 12:23

## 2017-01-30 RX ADMIN — OXYCODONE HYDROCHLORIDE 10 MG: 5 TABLET ORAL at 23:28

## 2017-01-30 RX ADMIN — SODIUM CHLORIDE, SODIUM LACTATE, POTASSIUM CHLORIDE, AND CALCIUM CHLORIDE 100 ML/HR: 600; 310; 30; 20 INJECTION, SOLUTION INTRAVENOUS at 11:26

## 2017-01-30 RX ADMIN — SODIUM CHLORIDE, SODIUM LACTATE, POTASSIUM CHLORIDE, AND CALCIUM CHLORIDE: 600; 310; 30; 20 INJECTION, SOLUTION INTRAVENOUS at 12:41

## 2017-01-30 RX ADMIN — METHYLPREDNISOLONE SODIUM SUCCINATE 40 MG: 40 INJECTION, POWDER, FOR SOLUTION INTRAMUSCULAR; INTRAVENOUS at 17:39

## 2017-01-30 RX ADMIN — ONDANSETRON 4 MG: 2 INJECTION INTRAMUSCULAR; INTRAVENOUS at 15:15

## 2017-01-30 RX ADMIN — METHYLPREDNISOLONE SODIUM SUCCINATE 40 MG: 40 INJECTION, POWDER, FOR SOLUTION INTRAMUSCULAR; INTRAVENOUS at 23:29

## 2017-01-30 RX ADMIN — SODIUM CHLORIDE 125 ML/HR: 900 INJECTION, SOLUTION INTRAVENOUS at 16:58

## 2017-01-30 RX ADMIN — ACETAMINOPHEN 650 MG: 325 TABLET ORAL at 23:29

## 2017-01-30 RX ADMIN — FENTANYL CITRATE 25 MCG: 50 INJECTION, SOLUTION INTRAMUSCULAR; INTRAVENOUS at 14:32

## 2017-01-30 RX ADMIN — SODIUM CHLORIDE, SODIUM LACTATE, POTASSIUM CHLORIDE, CALCIUM CHLORIDE: 600; 310; 30; 20 INJECTION, SOLUTION INTRAVENOUS at 14:12

## 2017-01-30 RX ADMIN — SODIUM CHLORIDE 75 ML/HR: 900 INJECTION, SOLUTION INTRAVENOUS at 23:38

## 2017-01-30 RX ADMIN — CEFAZOLIN 2 G: 1 INJECTION, POWDER, FOR SOLUTION INTRAMUSCULAR; INTRAVENOUS; PARENTERAL at 12:22

## 2017-01-30 RX ADMIN — CELECOXIB 200 MG: 100 CAPSULE ORAL at 11:26

## 2017-01-30 RX ADMIN — ACETAMINOPHEN 975 MG: 325 TABLET ORAL at 11:26

## 2017-01-30 RX ADMIN — MIDAZOLAM HYDROCHLORIDE 2 MG: 1 INJECTION, SOLUTION INTRAMUSCULAR; INTRAVENOUS at 12:18

## 2017-01-30 RX ADMIN — MIDAZOLAM HYDROCHLORIDE 2 MG: 1 INJECTION, SOLUTION INTRAMUSCULAR; INTRAVENOUS at 11:35

## 2017-01-30 RX ADMIN — LIDOCAINE HYDROCHLORIDE 0.1 ML: 10 INJECTION, SOLUTION EPIDURAL; INFILTRATION; INTRACAUDAL; PERINEURAL at 11:26

## 2017-01-30 RX ADMIN — BUPIVACAINE HYDROCHLORIDE 2.4 ML: 7.5 INJECTION, SOLUTION EPIDURAL; RETROBULBAR at 11:44

## 2017-01-30 RX ADMIN — FAMOTIDINE 20 MG: 10 INJECTION, SOLUTION INTRAVENOUS at 20:40

## 2017-01-30 RX ADMIN — Medication 2 MG: at 19:29

## 2017-01-30 RX ADMIN — CELECOXIB 200 MG: 100 CAPSULE ORAL at 23:28

## 2017-01-30 RX ADMIN — ALBUMIN HUMAN 250 ML: 50 SOLUTION INTRAVENOUS at 14:14

## 2017-01-30 RX ADMIN — METHADONE HYDROCHLORIDE 5 MG: 10 TABLET ORAL at 17:32

## 2017-01-30 RX ADMIN — SODIUM CHLORIDE, SODIUM LACTATE, POTASSIUM CHLORIDE, AND CALCIUM CHLORIDE: 600; 310; 30; 20 INJECTION, SOLUTION INTRAVENOUS at 14:21

## 2017-01-30 RX ADMIN — FENTANYL CITRATE 25 MCG: 50 INJECTION, SOLUTION INTRAMUSCULAR; INTRAVENOUS at 18:18

## 2017-01-30 RX ADMIN — FENTANYL CITRATE 25 MCG: 50 INJECTION, SOLUTION INTRAMUSCULAR; INTRAVENOUS at 14:38

## 2017-01-30 RX ADMIN — ACETAMINOPHEN 650 MG: 325 TABLET ORAL at 17:32

## 2017-01-30 RX ADMIN — PHENYLEPHRINE HYDROCHLORIDE 20 MCG/MIN: 10 INJECTION INTRAVENOUS at 17:57

## 2017-01-30 RX ADMIN — IOPAMIDOL 68 ML: 755 INJECTION, SOLUTION INTRAVENOUS at 18:00

## 2017-01-30 RX ADMIN — MAGNESIUM SULFATE HEPTAHYDRATE 2 G: 40 INJECTION, SOLUTION INTRAVENOUS at 18:57

## 2017-01-30 NOTE — INTERVAL H&P NOTE
H&P Update:  Nikki Garcia was seen and examined. History and physical has been reviewed. The patient has been examined.  There have been no significant clinical changes since the completion of the originally dated History and Physical.    Signed By: Hank Canela MD     January 30, 2017 5:49 AM

## 2017-01-30 NOTE — CONSULTS
[facility]  Pulmonary, Critical Care, and Sleep Medicine  Name: Yoselyn Harry MRN: [id number]   : 1940 Hospital: [address] [facility]   Date: 2017        Impression Plan   1. Hypotension- due to drug rxn vs. PE vs cardiac event  2. Lip and tongue swelling  3. Surgical site bleeding  4. Hx of CHF  5. Hx of COPD  6. Hx of DVT after right hip surgery  7. FEN               · Start neosynephrine with goal MAP 65  · Serial hgb  · Stat CTA chest  · Solumedrol IV  · Wound care  · Will likely need blood transfusion tonight due to active bleeding  · decrease fluids due CHF  · Wean O2 as tolerated  · Famotidine  · No AC due to bleeding         Pt is critically ill. Critical care time spent with pt exclusive of procedures was 51 minutes    Radiology  ( personally reviewed) No cxr available   ABG No results for input(s): PHI, PO2I, PCO2I in the last 72 hours. Subjective     This patient has been seen and evaluated at the request of Dr. Rafael Veliz for hypotension. Patient is a 68 y.o. female with PMHx of hip arthritis who was undergoing a total hip arthoplasty today when she developed hypotension and bradycardia. Fascia was quickly closed and pt given fluids and will and transferred to ICU. Pt lost approx 800cc of blood in OR. Currently BP in the 80s. Pt has noticed lip and tongue swelling. States she is allergic to dilaudid and almonds. Review of Systems:  A comprehensive review of systems was negative except for that written in the HPI.     Past Medical History   Diagnosis Date    Autoimmune disease ([facility] [address]      psoriasis    Beta-blocker therapy     CAD (coronary artery disease)      non ischemic cardiomyopathy    Chronic obstructive pulmonary disease (HCC)     Chronic pain      curvature of spinie    GERD (gastroesophageal reflux disease)     Heart failure (HCC)     Hypercholesterolemia     Hypertension     Ill-defined condition      hx aortic stenosis & mitral regurg    Smoker     Thromboembolus ([facility] [address]      right leg after hip surgery      Past Surgical History   Procedure Laterality Date    Hx gyn       miscarriage    Pr abdomen surgery proc unlisted  2010     Gallbladder    Pr abdomen surgery proc unlisted  2000     hiatal hernia repair    Hx orthopaedic       Right Knee, Left Foot    Hx orthopaedic  04/05/2015     left hip replacement 2015    Hx orthopaedic  06/24/2016     rmoval bone fragment left hip    Hx orthopaedic  2013     right hip replacement    Hx orthopaedic  2001     right knee replacement    Hx heent       Tonsilectomy    Hx heent       cataract removed bilateral eyes      Prior to Admission medications    Medication Sig Start Date End Date Taking? Authorizing Provider   SUMAtriptan (IMITREX) 50 mg tablet Take 50 mg by mouth once as needed for Migraine. Yes Historical Provider   carvedilol (COREG) 3.125 mg tablet Take 3.125 mg by mouth two (2) times daily (with meals). Takes c breakfast and dinner   Yes Historical Provider   DULCOLAX, BISACODYL, PO Take 1 Tab by mouth daily as needed. Yes Historical Provider   promethazine (PHENERGAN) 25 mg tablet Take 25 mg by mouth every six (6) hours as needed for Nausea. Historical Provider   cyanocobalamin 1,000 mcg tablet Take 1,000 mcg by mouth daily. Historical Provider   traMADol (ULTRAM) 50 mg tablet Take 50 mg by mouth every eight (8) hours as needed for Pain. Historical Provider   linaclotide Tracy Putt) 290 mcg cap capsule Take 290 mcg by mouth Daily (before breakfast). Historical Provider   zolpidem (AMBIEN) 5 mg tablet Take 5 mg by mouth nightly as needed for Sleep. Historical Provider   tiotropium (SPIRIVA WITH HANDIHALER) 18 mcg inhalation capsule Take 1 Cap by inhalation daily. Historical Provider   albuterol (PROVENTIL HFA) 90 mcg/actuation inhaler Take 2 Puffs by inhalation every six (6) hours as needed.     Historical Provider   methadone (DOLOPHINE) 5 mg tablet Take 5 mg by mouth every eight (8) hours. Historical Provider   omega-3 fatty acids-vitamin e (FISH OIL) 1,000 mg cap Take 1 Cap by mouth. Historical Provider   aspirin delayed-release 81 mg tablet Take 81 mg by mouth daily. Historical Provider   cholecalciferol, vitamin d3, (VITAMIN D3) 400 unit cap Take 1 Tab by mouth daily. Historical Provider   esomeprazole (NEXIUM) 40 mg capsule Take  by mouth daily. Historical Provider   polyethylene glycol (MIRALAX) 17 gram packet Take 17 g by mouth every other day. Historical Provider   CALCIUM CARBONATE/MAG HYDROX (ROLAIDS EXTRA STRENGTH PO) Take 2 Tabs by mouth three (3) times daily (with meals).     Historical Provider     Current Facility-Administered Medications   Medication Dose Route Frequency    celecoxib (CELEBREX) capsule 200 mg  200 mg Oral Q12H    acetaminophen (TYLENOL) tablet 650 mg  650 mg Oral Q6H    calcium carbonate (TUMS) chewable tablet 400 mg [elemental]  400 mg Oral TID WITH MEALS    carvedilol (COREG) tablet 3.125 mg  3.125 mg Oral BID WITH MEALS    [START ON 1/31/2017] pantoprazole (PROTONIX) tablet 40 mg  40 mg Oral ACB    [START ON 1/31/2017] linaclotide (LINZESS) capsule 290 mcg  290 mcg Oral ACB    methadone (DOLOPHINE) tablet 5 mg  5 mg Oral Q8H    [START ON 1/31/2017] umeclidinium (INCRUSE ELLIPTA) 62.5 mcg/actuation  1 Puff Inhalation DAILY    0.9% sodium chloride infusion  125 mL/hr IntraVENous CONTINUOUS    [START ON 1/31/2017] sodium chloride (NS) flush 5-10 mL  5-10 mL IntraVENous Q8H    famotidine (PEPCID) tablet 20 mg  20 mg Oral BID    senna-docusate (PERICOLACE) 8.6-50 mg per tablet 1 Tab  1 Tab Oral BID    [START ON 1/31/2017] polyethylene glycol (MIRALAX) packet 17 g  17 g Oral DAILY    ceFAZolin in 0.9% NS (ANCEF) IVPB soln 2 g  2 g IntraVENous Q8H    [START ON 1/31/2017] enoxaparin (LOVENOX) injection 40 mg  40 mg SubCUTAneous DAILY    PHENYLephrine (NEOSYNEPHRINE) 30,000 mcg in 0.9% sodium chloride 250 mL infusion  mcg/min IntraVENous TITRATE    methylPREDNISolone (PF) (SOLU-MEDROL) injection 40 mg  40 mg IntraVENous Q6H     Allergies   Allergen Reactions    [place] Angioedema    Codeine Other (comments)     \"Years ago gave me hives but lately I havetaken it without problem\"    Dilaudid [Hydromorphone (Bulk)] Shortness of Breath    Lyrica [Pregabalin] Nausea and Vomiting      Social History   Substance Use Topics    Smoking status: Current Every Day Smoker     Packs/day: 0.50     Years: 60.00    Smokeless tobacco: Never Used    Alcohol use No      Comment: 0      Family History   Problem Relation Age of Onset    Cancer Mother     Stroke Father           Laboratory: I have personally reviewed the critical care flowsheet and labs.      Recent Labs      01/30/17   1426   WBC  3.7   HGB  11.1*   HCT  35.1   PLT  151     Recent Labs      01/30/17   1519  01/30/17   1426  01/30/17   1112   NA   --   142   --    K   --   4.2   --    CL   --   109*   --    CO2   --   25   --    GLU   --   110*   --    BUN   --   16   --    CREA   --   0.53*   --    CA   --   8.4*   --    MG   --   1.4*   --    ALB   --   2.2*  3.3*   SGOT   --   19   --    ALT   --   12   --    INR  1.1   --    --        Objective:     Mode Rate Tidal Volume Pressure FiO2 PEEP                    Vital Signs:     TMAX(24)      Intake/Output:   Last shift:         Last 3 shifts: 01/30 0701 - 01/30 1900  In: 3100 [I.V.:3100]  Out: 800 RRIOLAST3  Intake/Output Summary (Last 24 hours) at 01/30/17 1730  Last data filed at 01/30/17 1600   Gross per 24 hour   Intake             3100 ml   Output              800 ml   Net             2300 ml     EXAM:   GENERAL: in pain, mentating slowly but AOx3, HEENT:  PERRL, EOMI, no alar flaring or epistaxis, oral mucosa moist, tongue swelling and lip swelling, NECK:  no jugular vein distention, no retractions, no thyromegaly or masses, LUNGS: CTA, no w/r/r, HEART:  Regular rate and rhythm with no MGR; no edema is present, ABDOMEN:  soft with no tenderness, bowel sounds present, EXTREMITIES:  warm with no cyanosis, SKIN: Bright red blood in saturated dressing left hip, pale NEUROLOGIC:  alert and oriented x2, moving all extremeties     Marcella Nava MD  [facility]

## 2017-01-30 NOTE — ANESTHESIA PROCEDURE NOTES
Spinal Block    Start time: 1/30/2017 11:35 AM  End time: 1/30/2017 11:45 AM  Performed by: Lesia Ryan  Authorized by: Ashwin Saavedra     Pre-procedure:   Indications: at surgeon's request and primary anesthetic  Preanesthetic Checklist: patient identified, risks and benefits discussed, anesthesia consent, site marked, patient being monitored and timeout performed    Timeout Time: 11:35          Spinal Block:   Patient Position:  Seated  Prep Region:  Lumbar  Prep: DuraPrep      Location:  L3-4  Technique:  Single shot        Needle:   Needle Type:  Pencan  Needle Gauge:  25 G  Attempts:  1      Events: CSF confirmed, no blood with aspiration and no paresthesia        Assessment:  Insertion:  Uncomplicated  Patient tolerance:  Patient tolerated the procedure well with no immediate complications

## 2017-01-30 NOTE — PROGRESS NOTES
POST ANESTHESIA CARE DISCHARGE NOTE    Sujey Neal was   transfered      via     Bed   To  hospital room ICU 14  . Patient was escorted by   nurse  . Patient verbalized   appreciation and was very pleased with care received   throughout their stay. Patient was discharged in   pleasant mood      Pain at discharge/transfer was     0 /10. All personal belongings have been returned to patient, and patient/family verbally confirm receiving belongings as all present. TRANSFER - OUT REPORT:    Verbal report given to [facility] RN on Sujey Neal  being transferred to      ICU    for routine post - op       Report consisted of patients Situation, Background, Assessment and   Recommendations(SBAR). Information from the following report(s) SBAR, OR Summary, MAR, Recent Results and Cardiac Rhythm LBBB was reviewed with the receiving nurse. Opportunity for questions and clarification was provided.       Cristal FIGUEROA RN-BC

## 2017-01-30 NOTE — IP AVS SNAPSHOT
Current Discharge Medication List  
  
Take these medications at their scheduled times Dose & Instructions Dispensing Information Comments Morning Noon Evening Bedtime  
 aspirin delayed-release 81 mg tablet Your next dose is: Today, Tomorrow Other:  ____________ Dose:  81 mg Take 81 mg by mouth daily. Refills:  0  
     
   
   
   
  
 atorvastatin 10 mg tablet Commonly known as:  LIPITOR Your next dose is: Today, Tomorrow Other:  ____________ Dose:  10 mg Take 1 Tab by mouth daily. Quantity:  30 Tab Refills:  3  
     
   
   
   
  
 * bisacodyl 10 mg suppository Commonly known as:  DULCOLAX (BISACODYL) Your next dose is: Today, Tomorrow Other:  ____________ Dose:  10 mg Insert 10 mg into rectum daily. Quantity:  2 Suppository Refills:  0 COREG 3.125 mg tablet Generic drug:  carvedilol Your next dose is: Today, Tomorrow Other:  ____________ Dose:  3.125 mg Take 3.125 mg by mouth two (2) times daily (with meals). Takes c breakfast and dinner Refills:  0  
     
   
   
   
  
 cyanocobalamin 1,000 mcg tablet Your next dose is: Today, Tomorrow Other:  ____________ Dose:  1000 mcg Take 1,000 mcg by mouth daily. Refills:  0  
     
   
   
   
  
 enoxaparin 40 mg/0.4 mL Commonly known as:  LOVENOX Your next dose is: Today, Tomorrow Other:  ____________ Dose:  40 mg  
0.4 mL by SubCUTAneous route daily. Quantity:  30 Syringe Refills:  0 LINZESS 290 mcg Cap capsule Generic drug:  linaclotide Your next dose is: Today, Tomorrow Other:  ____________ Dose:  290 mcg Take 290 mcg by mouth Daily (before breakfast). Refills:  0  
     
   
   
   
  
 lisinopril 10 mg tablet Commonly known as:  Komal Mayorga  
   
 Your next dose is: Today, Tomorrow Other:  ____________ Dose:  10 mg Take 1 Tab by mouth daily. Quantity:  30 Tab Refills:  3  
     
   
   
   
  
 methadone 5 mg tablet Commonly known as:  DOLOPHINE Your next dose is: Today, Tomorrow Other:  ____________ Dose:  5 mg Take 5 mg by mouth every eight (8) hours. Refills:  0 MIRALAX 17 gram packet Generic drug:  polyethylene glycol Your next dose is: Today, Tomorrow Other:  ____________ Dose:  17 g Take 17 g by mouth every other day. Refills:  0 NexIUM 40 mg capsule Generic drug:  esomeprazole Your next dose is: Today, Tomorrow Other:  ____________ Take  by mouth daily. Refills:  0  
     
   
   
   
  
 ROLAIDS EXTRA STRENGTH PO Your next dose is: Today, Tomorrow Other:  ____________ Dose:  2 Tab Take 2 Tabs by mouth three (3) times daily (with meals). Refills:  0 SPIRIVA WITH HANDIHALER 18 mcg inhalation capsule Generic drug:  tiotropium Your next dose is: Today, Tomorrow Other:  ____________ Dose:  1 Cap Take 1 Cap by inhalation daily. Refills:  0  
     
   
   
   
  
 VITAMIN D3 400 unit Cap Generic drug:  cholecalciferol (vitamin d3) Your next dose is: Today, Tomorrow Other:  ____________ Dose:  1 Tab Take 1 Tab by mouth daily. Refills:  0  
     
   
   
   
  
 * Notice: This list has 1 medication(s) that are the same as other medications prescribed for you. Read the directions carefully, and ask your doctor or other care provider to review them with you. Take these medications as needed Dose & Instructions Dispensing Information Comments Morning Noon Evening Bedtime AMBIEN 5 mg tablet Generic drug:  zolpidem Your next dose is: Today, Tomorrow Other:  ____________ Dose:  5 mg Take 5 mg by mouth nightly as needed for Sleep. Refills:  0  
     
   
   
   
  
 * DULCOLAX (BISACODYL) PO Your next dose is: Today, Tomorrow Other:  ____________ Dose:  1 Tab Take 1 Tab by mouth daily as needed. Refills:  0 IMITREX 50 mg tablet Generic drug:  SUMAtriptan Your next dose is: Today, Tomorrow Other:  ____________ Dose:  50 mg Take 50 mg by mouth once as needed for Migraine. Refills:  0  
     
   
   
   
  
 ondansetron 8 mg disintegrating tablet Commonly known as:  ZOFRAN ODT Your next dose is: Today, Tomorrow Other:  ____________ Dose:  4 mg Take 0.5 Tabs by mouth every eight (8) hours as needed for Nausea. Quantity:  30 Tab Refills:  0  
     
   
   
   
  
 oxyCODONE IR 15 mg immediate release tablet Commonly known as:  OXY-IR Your next dose is: Today, Tomorrow Other:  ____________ Dose:  15 mg Take 1 Tab by mouth every three (3) hours as needed. Max Daily Amount: 120 mg.  
 Quantity:  75 Tab Refills:  0  
     
   
   
   
  
 promethazine 25 mg tablet Commonly known as:  PHENERGAN Your next dose is: Today, Tomorrow Other:  ____________ Dose:  25 mg Take 25 mg by mouth every six (6) hours as needed for Nausea. Refills:  0 PROVENTIL HFA 90 mcg/actuation inhaler Generic drug:  albuterol Your next dose is: Today, Tomorrow Other:  ____________ Dose:  2 Puff Take 2 Puffs by inhalation every six (6) hours as needed. Refills:  0  
     
   
   
   
  
 traMADol 50 mg tablet Commonly known as:  ULTRAM  
   
Your next dose is: Today, Tomorrow Other:  ____________ Dose:  50 mg Take 50 mg by mouth every eight (8) hours as needed for Pain. Refills:  0 * Notice: This list has 1 medication(s) that are the same as other medications prescribed for you. Read the directions carefully, and ask your doctor or other care provider to review them with you. Take these medications as directed Dose & Instructions Dispensing Information Comments Morning Noon Evening Bedtime FISH OIL 1,000 mg Cap Generic drug:  omega-3 fatty acids-vitamin e Your next dose is: Today, Tomorrow Other:  ____________ Dose:  1 Cap Take 1 Cap by mouth. Refills:  0 Where to Get Your Medications Information about where to get these medications is not yet available ! Ask your nurse or doctor about these medications  
  atorvastatin 10 mg tablet  
 bisacodyl 10 mg suppository  
 enoxaparin 40 mg/0.4 mL  
 lisinopril 10 mg tablet  
 ondansetron 8 mg disintegrating tablet  
 oxyCODONE IR 15 mg immediate release tablet

## 2017-01-30 NOTE — PROGRESS NOTES
Orders received. Patient in ICU following THR due to hypotension and bradycardia. Will see POD #1. Thanks.

## 2017-01-30 NOTE — PROGRESS NOTES
1640: Pt hypotensive and L hip completely saturated with blood. NSR on the monitor at this time. Pt lethargic and complaining of pain but oriented to place, time, self, and situation. PERRLA. Reinforced dressing, padding under pt pooled with blood. Notified Dr. Anisa Herring about bleeding and hypotension, will consult intensivist. Dr. Santoro Room called, will come evaluate pt at the bedside. Relayed Mg result of 1.4, no new orders at this time yet. 1830: Pt resting comfortably at this time. L hip continues to bleed and pool under patient. Will reinforce dressing again and change pad under pt.

## 2017-01-30 NOTE — ANESTHESIA PREPROCEDURE EVALUATION
Anesthetic History   No history of anesthetic complications            Review of Systems / Medical History  Patient summary reviewed, nursing notes reviewed and pertinent labs reviewed    Pulmonary  Within defined limits  COPD               Neuro/Psych   Within defined limits           Cardiovascular  Within defined limits        CHF (non ischemic)    CAD (stable)         GI/Hepatic/Renal  Within defined limits   GERD           Endo/Other  Within defined limits           Other Findings              Physical Exam    Airway  Mallampati: II  TM Distance: 4 - 6 cm  Neck ROM: normal range of motion   Mouth opening: Normal     Cardiovascular    Rhythm: regular  Rate: normal         Dental  No notable dental hx  Dentition: Edentulous     Pulmonary  Breath sounds clear to auscultation               Abdominal         Other Findings            Anesthetic Plan    ASA: 3  Anesthesia type: spinal            Anesthetic plan and risks discussed with: Patient

## 2017-01-30 NOTE — CONSULTS
Jose Nguyen MD    [address] [place], [address] [facility]    Office [phone],ILY [phone]  Pager [phone]    Date of  Admission: 1/30/2017 10:16 AM  PCP- Guillaume Moser MD    Mague Proctor is a 68 y.o. female admitted for LEFT HIP BIPOLAR;Failed total joint replacement ([facility] [address] [place]*. Consult requested by Lorean Hogan MD    Assessment/Plan    S/p R Hip surgery  Hx of non ischemic CMP/Hx of CHF  Hx of LBBB  Hx of Aortic sclerosis/MR    Plan:  Patient had an episode of hypotension and bradycardia which required pressors towards the end of her hip surgery. EKG was similar to her prior EKGs of left bundle branch block. Check electrolytes/troponin/echocardiogram.  Patient is currently being weaned off pressors. She does not complain of any chest pain. She is recovering from anesthesia. Telemetry bed. Records from her cardiologist.               I appreciate the opportunity to be involved in Ms. Arciniega. See below note for details. Please do not hesitate to contact us with questions or concerns. Jose Nguyen MD    Cardiac Testing/ Procedures: A. Cardiac Cath/PCI:    B.ECHO/RIAZ:    C.StressNuclear/Stress ECHO/Stress test:    D.Vascular:    E. EP:    F. Miscellaneous:    Care Plan discussed with: Patient and Nursing Staff/Anesthesia    Subjective:  Patient is a 51-year-old  female who was undergoing hip surgery when she started developing hypotension and bradycardia. Pressors were started. Her EKG was thought to have ST elevation by OR staff but review of her prior EKG indicates that she has had left bundle branch block with ST-T changes and her present EKG is similar to her prior EKGs. Patient is currently being weaned off pressors as her systolic blood pressures in the 120-130 range. She also does not complain of chest pain but is still recovering from her anesthesia. Unable to give detailed history.   Her chart indicates that she has a history of nonischemic cardiomyopathy, aortic sclerosis. Past Medical History   Diagnosis Date    Autoimmune disease ([address]      psoriasis    Beta-blocker therapy     CAD (coronary artery disease)      non ischemic cardiomyopathy    Chronic obstructive pulmonary disease (HCC)     Chronic pain      curvature of spinie    GERD (gastroesophageal reflux disease)     Heart failure (HCC)     Hypercholesterolemia     Hypertension     Ill-defined condition      hx aortic stenosis & mitral regurg    Smoker     Thromboembolus ([address]      right leg after hip surgery      Past Surgical History   Procedure Laterality Date    Hx gyn       miscarriage    Pr abdomen surgery proc unlisted  2010     Gallbladder    Pr abdomen surgery proc unlisted  2000     hiatal hernia repair    Hx orthopaedic       Right Knee, Left Foot    Hx orthopaedic  04/05/2015     left hip replacement 2015    Hx orthopaedic  06/24/2016     rmoval bone fragment left hip    Hx orthopaedic  2013     right hip replacement    Hx orthopaedic  2001     right knee replacement    Hx heent       Tonsilectomy    Hx heent       cataract removed bilateral eyes     Allergies   Allergen Reactions    [place] Angioedema    Codeine Other (comments)     \"Years ago gave me hives but lately I havetaken it without problem\"    Dilaudid [Hydromorphone (Bulk)] Shortness of Breath    Lyrica [Pregabalin] Nausea and Vomiting     Family History   Problem Relation Age of Onset    Cancer Mother     Stroke Father       Social History   Substance Use Topics    Smoking status: Current Every Day Smoker     Packs/day: 0.50     Years: 60.00    Smokeless tobacco: Never Used    Alcohol use No      Comment: 0          Medications:  Prescriptions Prior to Admission   Medication Sig    SUMAtriptan (IMITREX) 50 mg tablet Take 50 mg by mouth once as needed for Migraine.  carvedilol (COREG) 3.125 mg tablet Take 3.125 mg by mouth two (2) times daily (with meals).  Takes c breakfast and dinner    DULCOLAX, BISACODYL, PO Take 1 Tab by mouth daily as needed.  promethazine (PHENERGAN) 25 mg tablet Take 25 mg by mouth every six (6) hours as needed for Nausea.  cyanocobalamin 1,000 mcg tablet Take 1,000 mcg by mouth daily.  traMADol (ULTRAM) 50 mg tablet Take 50 mg by mouth every eight (8) hours as needed for Pain.  linaclotide (LINZESS) 290 mcg cap capsule Take 290 mcg by mouth Daily (before breakfast).  zolpidem (AMBIEN) 5 mg tablet Take 5 mg by mouth nightly as needed for Sleep.  tiotropium (SPIRIVA WITH HANDIHALER) 18 mcg inhalation capsule Take 1 Cap by inhalation daily.  albuterol (PROVENTIL HFA) 90 mcg/actuation inhaler Take 2 Puffs by inhalation every six (6) hours as needed.  methadone (DOLOPHINE) 5 mg tablet Take 5 mg by mouth every eight (8) hours.  omega-3 fatty acids-vitamin e (FISH OIL) 1,000 mg cap Take 1 Cap by mouth.  aspirin delayed-release 81 mg tablet Take 81 mg by mouth daily.  cholecalciferol, vitamin d3, (VITAMIN D3) 400 unit cap Take 1 Tab by mouth daily.  esomeprazole (NEXIUM) 40 mg capsule Take  by mouth daily.  polyethylene glycol (MIRALAX) 17 gram packet Take 17 g by mouth every other day.  CALCIUM CARBONATE/MAG HYDROX (ROLAIDS EXTRA STRENGTH PO) Take 2 Tabs by mouth three (3) times daily (with meals).      Current Facility-Administered Medications   Medication Dose Route Frequency    celecoxib (CELEBREX) capsule 200 mg  200 mg Oral Q12H     Facility-Administered Medications Ordered in Other Encounters   Medication Dose Route Frequency    midazolam (VERSED) injection   IntraVENous PRN    fentaNYL citrate (PF) injection    PRN    bupivacaine (PF) (MARCAINE) 0.75 % (7.5 mg/mL) injection   Intrathecal PRN    propofol (DIPRIVAN) 10 mg/mL injection   IntraVENous CONTINUOUS    ePHEDrine (MISTOLE) 50 mg/mL injection   IntraVENous PRN    albumin human 5% (BUMINATE) solution   IntraVENous PRN    PHENYLephrine (NEOSYNEPHRINE) 10,000 mcg in 0.9% sodium chloride 100 mL infusion  10,000 mcg IntraVENous CONTINUOUS    lactated ringers infusion   IntraVENous CONTINUOUS         Review of Systems:  (bold if positive, if negative)  Limited - as in HPI      Physical Exam:  Visit Vitals    /78    Pulse 99    Temp 98.2 °F (36.8 °C)    Resp 22    Ht 5' 5\" (1.651 m)    Wt 135 lb 9.3 oz (61.5 kg)    SpO2 (!) 87%    BMI 22.56 kg/m2         Telemetry: SR    Gen: Well-developed, frail, elderly  HEENT:  Pink conjunctivae, hearing intact to voice, moist mucous membranes  Neck: No JVD, No Carotid Bruit,   Resp: No accessory muscle use, Clear breath sounds ant, No rales or rhonchi  Card: Regular Rate,Rythm,Normal S1, S2, sys murmur+; No rubs or gallop. No thrills.    Abd:  Soft, non-tender, non-distended,   MSK: No cyanosis   Skin: No rashes   LE: No edema  Vascular:Radial Pulses 2+ and symmetric        EKG: Sinus tach/LBBB      Cxray:    LABS:        Lab Results   Component Value Date/Time    WBC 6.4 01/11/2017 03:45 PM    HGB 15.0 01/11/2017 03:45 PM    HCT 44.8 01/11/2017 03:45 PM    PLATELET 219 40/56/1402 03:45 PM    MCV 89.2 01/11/2017 03:45 PM     Lab Results   Component Value Date/Time    Sodium 142 01/30/2017 02:26 PM    Potassium 4.2 01/30/2017 02:26 PM    Chloride 109 01/30/2017 02:26 PM    CO2 25 01/30/2017 02:26 PM    Anion gap 8 01/30/2017 02:26 PM    Glucose 110 01/30/2017 02:26 PM    BUN 16 01/30/2017 02:26 PM    Creatinine 0.53 01/30/2017 02:26 PM    BUN/Creatinine ratio 30 01/30/2017 02:26 PM    GFR est AA >60 01/30/2017 02:26 PM    GFR est non-AA >60 01/30/2017 02:26 PM    Calcium 8.4 01/30/2017 02:26 PM     Lab Results   Component Value Date/Time    CK 99 01/30/2017 02:36 PM    Troponin-I, Qt. 0.04 01/30/2017 02:36 PM     Lab Results   Component Value Date/Time    aPTT 29.1 01/11/2017 03:45 PM     Lab Results   Component Value Date/Time    INR 1.0 01/11/2017 03:45 PM    Prothrombin time 9.7 01/11/2017 03:45 PM No results found for: BNP, BNPP, XBNPT     CC time >30 min      Drew Elizondo MD

## 2017-01-30 NOTE — PROGRESS NOTES
Consult received for Palliative Medicine, pt is on the OR currently, chart has been reviewed.  Please call if needed [phone]  Juan Gaitan NP

## 2017-01-30 NOTE — ANESTHESIA POSTPROCEDURE EVALUATION
Post-Anesthesia Evaluation and Assessment    Patient: Kalli Knott MRN: [id number]  SSN: [id number]    YOB: 1940  Age: 68 y.o. Sex: female       Cardiovascular Function/Vital Signs  Visit Vitals    /71    Pulse 87    Temp 36.8 °C (98.2 °F)    Resp 14    Ht 5' 5\" (1.651 m)    Wt 61.5 kg (135 lb 9.3 oz)    SpO2 95%    BMI 22.56 kg/m2       Patient is status post spinal anesthesia for Procedure(s):  LEFT TOTAL HIP ARTHROPLASTY CONVERSION. Nausea/Vomiting: None    Postoperative hydration reviewed and adequate. Pain:  Pain Scale 1: Numeric (0 - 10) (01/30/17 1448)  Pain Intensity 1: 0 (01/30/17 1448)   Managed    Neurological Status:   Neuro (WDL): Exceptions to WDL (01/30/17 1448)  Neuro  Neurologic State: Drowsy (01/30/17 1448)  LLE Motor Response: Pharmacologically paralyzed (01/30/17 1448)  RLE Motor Response: Pharmacologically paralyzed (01/30/17 1448)   At baseline    Mental Status and Level of Consciousness: Arousable    Pulmonary Status:   O2 Device: Oxygen mask (01/30/17 1448)   Adequate oxygenation and airway patent    Complications related to anesthesia: None    Post-anesthesia assessment completed.  No concerns    Signed By: Ambrosio Vázquez MD     January 30, 2017

## 2017-01-30 NOTE — H&P
Orthopaedic PRE-OP Admission History and Physical    Past Medical History   Diagnosis Date    Autoimmune disease ([facility] [address]      psoriasis    Beta-blocker therapy     CAD (coronary artery disease)      non ischemic cardiomyopathy    Chronic obstructive pulmonary disease (HCC)     Chronic pain      curvature of spinie    GERD (gastroesophageal reflux disease)     Heart failure (HCC)     Hypercholesterolemia     Hypertension     Ill-defined condition      hx aortic stenosis & mitral regurg    Smoker     Thromboembolus ([address]      right leg after hip surgery      Past Surgical History   Procedure Laterality Date    Hx gyn       miscarriage    Pr abdomen surgery proc unlisted  2010     Gallbladder    Pr abdomen surgery proc unlisted  2000     hiatal hernia repair    Hx orthopaedic       Right Knee, Left Foot    Hx orthopaedic  04/05/2015     left hip replacement 2015    Hx orthopaedic  06/24/2016     rmoval bone fragment left hip    Hx orthopaedic  2013     right hip replacement    Hx orthopaedic  2001     right knee replacement    Hx heent       Tonsilectomy    Hx heent       cataract removed bilateral eyes      Prior to Admission medications    Medication Sig Start Date End Date Taking? Authorizing Provider   SUMAtriptan (IMITREX) 50 mg tablet Take 50 mg by mouth once as needed for Migraine. Historical Provider   promethazine (PHENERGAN) 25 mg tablet Take 25 mg by mouth every six (6) hours as needed for Nausea. Historical Provider   cyanocobalamin 1,000 mcg tablet Take 1,000 mcg by mouth daily. Historical Provider   traMADol (ULTRAM) 50 mg tablet Take 50 mg by mouth every eight (8) hours as needed for Pain. Historical Provider   linaclotide [place]) 290 mcg cap capsule Take 290 mcg by mouth Daily (before breakfast). Historical Provider   zolpidem (AMBIEN) 5 mg tablet Take 5 mg by mouth nightly as needed for Sleep.     Historical Provider   tiotropium (SPIRIVA WITH HANDIHALER) 18 mcg inhalation capsule Take 1 Cap by inhalation daily. Historical Provider   albuterol (PROVENTIL HFA) 90 mcg/actuation inhaler Take 2 Puffs by inhalation every six (6) hours as needed. Historical Provider   methadone (DOLOPHINE) 5 mg tablet Take 5 mg by mouth every eight (8) hours. Historical Provider   carvedilol (COREG) 3.125 mg tablet Take 3.125 mg by mouth two (2) times daily (with meals). Takes c breakfast and dinner    Historical Provider   omega-3 fatty acids-vitamin e (FISH OIL) 1,000 mg cap Take 1 Cap by mouth. Historical Provider   aspirin delayed-release 81 mg tablet Take 81 mg by mouth daily. Historical Provider   cholecalciferol, vitamin d3, (VITAMIN D3) 400 unit cap Take 1 Tab by mouth daily. Historical Provider   esomeprazole (NEXIUM) 40 mg capsule Take  by mouth daily. Historical Provider   polyethylene glycol (MIRALAX) 17 gram packet Take 17 g by mouth every other day. Historical Provider   CALCIUM CARBONATE/MAG HYDROX (ROLAIDS EXTRA STRENGTH PO) Take 2 Tabs by mouth three (3) times daily (with meals). Historical Provider   DULCOLAX, BISACODYL, PO Take 1 Tab by mouth daily as needed. Historical Provider     No current facility-administered medications for this encounter. Current Outpatient Prescriptions   Medication Sig    SUMAtriptan (IMITREX) 50 mg tablet Take 50 mg by mouth once as needed for Migraine.  promethazine (PHENERGAN) 25 mg tablet Take 25 mg by mouth every six (6) hours as needed for Nausea.  cyanocobalamin 1,000 mcg tablet Take 1,000 mcg by mouth daily.  traMADol (ULTRAM) 50 mg tablet Take 50 mg by mouth every eight (8) hours as needed for Pain.  linaclotide (LINZESS) 290 mcg cap capsule Take 290 mcg by mouth Daily (before breakfast).  zolpidem (AMBIEN) 5 mg tablet Take 5 mg by mouth nightly as needed for Sleep.  tiotropium (SPIRIVA WITH HANDIHALER) 18 mcg inhalation capsule Take 1 Cap by inhalation daily.     albuterol (PROVENTIL HFA) 90 mcg/actuation inhaler Take 2 Puffs by inhalation every six (6) hours as needed.  methadone (DOLOPHINE) 5 mg tablet Take 5 mg by mouth every eight (8) hours.  carvedilol (COREG) 3.125 mg tablet Take 3.125 mg by mouth two (2) times daily (with meals). Takes c breakfast and dinner    omega-3 fatty acids-vitamin e (FISH OIL) 1,000 mg cap Take 1 Cap by mouth.  aspirin delayed-release 81 mg tablet Take 81 mg by mouth daily.  cholecalciferol, vitamin d3, (VITAMIN D3) 400 unit cap Take 1 Tab by mouth daily.  esomeprazole (NEXIUM) 40 mg capsule Take  by mouth daily.  polyethylene glycol (MIRALAX) 17 gram packet Take 17 g by mouth every other day.  CALCIUM CARBONATE/MAG HYDROX (ROLAIDS EXTRA STRENGTH PO) Take 2 Tabs by mouth three (3) times daily (with meals).  DULCOLAX, BISACODYL, PO Take 1 Tab by mouth daily as needed. Allergies   Allergen Reactions    [place] Angioedema    Codeine Other (comments)     \"Years ago gave me hives but lately I havetaken it without problem\"    Dilaudid [Hydromorphone (Bulk)] Shortness of Breath    Lyrica [Pregabalin] Nausea and Vomiting        Review of Systems  Review of systems was documented in PAT and also in the HPI. Physical Exam  Gen: No acute distress   Resp: No accessory muscle use, no acute distress, conversant without gasping, clear lung fields. Card: No abnormalities detected, RRR- See PAT exam if available. Abd: Soft, non-tender, non-distended  Lymph: No palpable lymph nodes of the affected extremity  Skin: No skin breakdown noted. Labs: No results for input(s): WBC, HGB, HCT, K, CREA, GLU, CRP, HGBEXT, HCTEXT in the last 72 hours. No lab exists for component: ESR    [facility] Note - Subjective / Exam / Yani Ibarra / Plan      Chief Complaint    ct left hip   SUBJECTIVE :       The patient returns in follow-up today for continued left hip pain. Since the previous visit the patient notes continued pain over the left hip. The patient is status post bipolar replacement for femoral neck fracture, then revision surgery. The patient did have a possible infection, infection labs have been drawn. The patient does have multiple medical issues. The patient continues to have severe left hip pain. This pain has become lifestyle limiting. The patient walks with a walker, and uses a wheelchair. PREVIOUS TREATMENT HISTORY :      History of a bipolar, possible infection then revision. OBJECTIVE :     Left hip evaluation demonstrates a well-healed surgical incision, hip range of motion from 90° of flexion, 0° of internal rotation, 30° of external rotation. The patient does have pain with hip flexion and resistance, pain with axial loading of the femur. RADIOGRAPHIC EVALUATION :      CT scan demonstrates lucency around the cement mantle, possible loosening of the stem, large diaphysis. The patient also has acetabular changes consistent with degenerative changes. Laboratory:  Normal CRP an ESR     ASSESSMENT :     Patient with multiple medical issues, loose femoral stem by CT scan, and acetabular changes. PLAN:      DISCUSSION :  The patient would require evaluation by cardiology Dr. Priyanka Lyn, evaluation by pulmonology, the patient also has chronic pain and would require greater than normal pain medication following surgery. The patient is treated by [facility] pain management. They can be reached at [phone]. The patient does have a history of a DVT, and will require Lovenox for DVT prophylaxis after surgery. The patient is a current smoker, it is requested that she stop smoking prior to surgery. MEDICAL CONSIDERATIONS :  Cardiac, pulmonary, chronic pain issues. The patient is also at risk for malnutrition. Albumin, and transferrin should be drawn prior to surgery, if the albumin is less than 3 I would cancel surgery.       PHYSICAL THERAPY :  None     MEDICATIONS :  None, the patient is seen by pain management FOLLOW-UP :  The patient will follow up for cardiology clearance, pulmonary clearance, clearance from her primary care provider. The patient was given a packet for surgery. SPECIAL SURGICAL CONSIDERATIONS :      Revision diaphyseal femoral stem. Long curettes will be needed for cement remova the operative report for the bipolar should be obtained, to facilitate removal.  The revision acetabular component should also be available. SOCIAL CONSIDERATIONS :      The patient lives with her daughter and son, there both smokers, we did discuss smoking cessation at this visit. COUNSELING :     I have discussed the planned surgery with the patient in detail. We have discussed the risks, alternatives, and benefits of the surgery. Risks of surgery include infection, bleeding, damage to neurovascular structures, the need for further surgery, and the risk associated with anesthesia. Specific risks of the surgery were also discussed including heart failure, myocardial infarction, stroke, continued hip pain, deep infection. The patient may require primary care clearance for surgery. Please do not hesitate to contact my office at  if you have any concerns. Active Problems   Left hip pain   (M25.552). Current Meds   Carvedilol 3.125 MG Oral Tablet;; RPT  Linzess 290 MCG Oral Capsule;; RPT  Methadone HCl - 5 MG Oral Tablet;; RPT  SUMAtriptan Succinate 50 MG Oral Tablet;; RPT  Zolpidem Tartrate 5 MG Oral Tablet;TAKE 1 TAB BY MOUTH AT BEDTIME AS NEEDED FOR SLEEP. NEEDS APPT; RPT  Voltaren 1 % Transdermal Gel (Diclofenac Sodium);; RPT  Ventolin  (90 Base) MCG/ACT Inhalation Aerosol Solution;; RPT  TraMADol HCl - 50 MG Oral Tablet;; RPT. Signature   Electronically signed by : Fanta Ureña MD, MD; 12/18/2016 2:33 PM EST; Author. Surgical Counseling   After a thorough discussion we will proceed with surgical intervention without contraindications.  I discussed surgical indications and alternatives with the patient. Risks including infection, bleeding, damage to other structures, need for further surgery and the risks of anesthesia (DVT, PE, Stroke, Heart Attack and Death) have been discussed with the patient. Verbal and written consent were obtained. For MRSA positive nasal cultures identified prior to surgery Vanco may be given at the discretion of the surgeon based on allergies.        Macarena Dubose MD  [facility]  Pager [phone]

## 2017-01-30 NOTE — PERIOP NOTES
PACU IN REPORT FROM ANESTHESIA    Verbal report received from Dr Aura Clifton   [facility] CRNA  following General for Procedure(s) (LRB):  LEFT TOTAL HIP ARTHROPLASTY CONVERSION (Left). Note the anesthesia record for medications given intraoperatively. Brief Initial Visual Assessment:    Care for pt began in OR as call to OR for STAT EKG to rule out MI     Airway is:   Patent    Respiratory pattern is:   Even, slight labored and shallow and tachnyptic. Patient is:    alert and oriented x 1 (Person.)    Skin is:  Pink, Cool and Dry. Membranes are:    Pink and Moist and lips and tongue display significant swelling  Patient is in:    No Acute Discomfort despite acute even unfolding. Note assessments documented in flowsheets;any assessment variants to be found in comments or narrative perioperative nurse notes.        Post-anesthesia care now assumed, record signed by Joseph FIGUEROA, RN-BC

## 2017-01-30 NOTE — PROGRESS NOTES
Events Review: On first evaluation around 14:00 patient was found to have mild hypotension, which seemed to associated with copious surgical suctioning. Asked to return to room around 14:15 and patient in acute distress. SaO2 = 60s%  ETCO2 = 16 HR = upper 30s with significant ST elevation. Informed surgeon of events;  treating with vasoconstrictors. Possible embolic event due to timetable. Discussed case with cardiology who is following.

## 2017-01-30 NOTE — PROGRESS NOTES
TRANSFER - IN REPORT:    Verbal report received from Kenyetta Caceres RN(name) on [employer]  being received from [employer]) for routine post - op      Report consisted of patients Situation, Background, Assessment and   Recommendations(SBAR). Information from the following report(s) SBAR, Kardex, OR Summary, Procedure Summary, Intake/Output, MAR, Recent Results and Cardiac Rhythm SR LBBB was reviewed with the receiving nurse. Opportunity for questions and clarification was provided. Assessment completed upon patients arrival to unit and care assumed.

## 2017-01-30 NOTE — OP NOTES
OPERATIVE REPORT    Admit Date: 1/30/2017  Admit Diagnosis: LEFT HIP BIPOLAR  Failed total joint replacement ([address]    Date of Procedure: 1/30/2017   Preoperative Diagnosis: LEFT HIP BIPOLAR  Postoperative Diagnosis: left hip bipolar with stem removal and conversion to an uncemented bipolar. Gross signs of infection noted. Procedure: 1. Removal of old cemented stem 2. CELINA stem placement, multiple cultures obtained, conversion to bipolar, emergent MI during the case. Surgeon: Jeffery Norton MD  Assistant(s): Muna Zhang  Anesthesia: General   Estimated Blood Loss: 600cc  Specimens:   ID Type Source Tests Collected by Time Destination   1 : left hip intra-articular fluid Joint Fluid Joint, Hip AEROBIC/ANAEROBIC CULTURE, GRAM STAIN, WOUND CULTURE Jeffery Norton MD 1/30/2017 1255 Microbiology   2 : interior capsule left hip  Tissue Hip AEROBIC/ANAEROBIC CULTURE Jeffery Notron MD 1/30/2017 1257 Microbiology   3 : left hip posterior capsule  Tissue Hip AEROBIC/ANAEROBIC CULTURE Jfefery Norton MD 1/30/2017 1313 Microbiology   4 : bipolar hip replacement head from left hip Other  WOUND CULTURE Jeffery Norton MD 1/30/2017 1308 Microbiology   5 : left hip anterior capsule tissue Tissue Hip WOUND CULTURE Jeffery Norton MD 1/30/2017 1323 Microbiology   6 : capsule left hip Tissue Hip WOUND CULTURE Jeffery Norton MD 1/30/2017 1334 Microbiology   7 : femoral stem #2 left hip Tissue Hip WOUND CULTURE Jeffery Norton MD 1/30/2017 1334 Microbiology   8 : acetabulum left hip for culture  Tissue Hip WOUND CULTURE Jeffery Norton MD 1/30/2017 1412 Microbiology   9 : acetabulum #2 left hip for culture Tissue Hip WOUND CULTURE Jeffery Norton MD 1/30/2017 1414 Microbiology      Complications: None      INDICATIONS:  Colton Velázquez is a patient with failed bipolar x 2 and was indicated for surgery for conversion from a bipolar to a MIRIAM. We discussed risks, alternatives and expectations prior to surgery.  The patient was seen for medical clearance. The patient did have multiple medical issues and extensive time preop was spent on counseling and discussion the risks of surgery and anesthesia. PROCEDURE IN DETAIL:   The patient had the proper site initialized and their questions were answered prior to surgery. The patient was seen and cleared for surgery by primary care. The patient underwent general endotracheal anesthesia, was positioned on the operating room table in the lateral decubitus position. The limb was prepped and draped in a sterile fashion. Prior to the start of the procedure, an appropriate timeout was performed. Utilizing the previous incision, this was taken down through skin and subcutaneous tissue. There was marked scar tissue noted. Both the ITB and the deeper fascial layers required mobilization. The iliotibial band and gluteus fascia was identified and sharply incised in-line with the incision. The abductors were identified and tagged. Cultures were obtained and the anterior capsular tissue was removed. There was noted to be near complete avulsion of the abductors from the greater trochanter. Exposure was obtained and the bipolar head gently dislocated. The bipolar head components were disengaged from the trunion. The stem was circumferentially exposed and using thin flexible osteotomes the stem was gently mobilized in a circumferential fashion. This was ultimately removed with a generic stem extraction device. Cultures were obtained and the remaining cement was removed. The cortical bone was noted to be quite thin. A single cerclage wire was passed around the diaphysis as prophylaxis to prevent fracture. The canal was irrigated with 5L of NS. There was no further evidence of infection, cultures from the canal were obtained. Sequential reaming was performed with very good cortical chatter noted. The diaphyseal portion of the implant was impacted in place. The conical body reaming was performed.  At 27mm the anterior cortical bone was noted to be quite thin and reaming was stopped. Acetabular exposure was then obtained. There was clearly torn labrum and this was excised. The cartilage of the acetabulum demonstrated some moderate wear and breakdown in areas. There was pseudocapsule noted which was sent for pathology. During acetabular evaluation the patient was noted to be unstable from an anesthesia standpoint. The patient pressure had dropped but then returned. I understood the patient was stable, but there was a marked change with her vitals consistent with a possible infarction. A decision was made to forgo any further acetabular work, impact the final implants and proceed quickly with closure. The cone body was placed, a 48mm head +0 neck was placed and the hip reduced. The abductors were repaired with a running #1 Vicryl, the ITB was repaired with a Stratafix and  Staples for the skin. The wound was copiously irrigated both the acetabulum and canal. A sterile dressing was applied and the patient was placed in a supine position. The patient was stable and responsive. IMPLANTS :   CELINA Stem - 21mm x 195mm diaphysis, 27 +0 cone body, 48mm, 0 Neck head. JUSTIFICATION FOR SURGICAL ASSISTANT:   Surgical AssistantSarath (YULI) was requried and necessary in this case, to help with soft tissue retraction, extremity positioning, equiment management, implant management, and wound closure.     Mayda Huber MD  Pager [phone]

## 2017-01-30 NOTE — IP AVS SNAPSHOT
[address] [facility] 104 1007 [facility] 
[phone] Patient: Chuck Sahu MRN: [id number] You are allergic to the following Allergen Reactions [place] Angioedema Codeine Other (comments) \"Years ago gave me hives but lately I havetaken it without problem\" Dilaudid (Hydromorphone (Bulk)) Shortness of Breath Lyrica (Pregabalin) Nausea and Vomiting Recent Documentation Height Weight BMI OB Status Smoking Status 1.651 m 63.4 kg 23.26 kg/m2 Postmenopausal Current Every Day Smoker Unresulted Labs Order Current Status SAMPLE TO BLOOD BANK In process SAMPLE TO BLOOD BANK In process SAMPLE TO BLOOD BANK In process CULTURE, ANAEROBIC Preliminary result CULTURE, ANAEROBIC Preliminary result CULTURE, BODY FLUID W GRAM STAIN Preliminary result CULTURE, WOUND W GRAM STAIN Preliminary result Emergency Contacts Name Discharge Info Relation Home Work Mobile Yoana Farris DISCHARGE CAREGIVER [3] Daughter [21] [phone] [place] DISCHARGE CAREG[facility] [3] Son [22] [phone] About your hospitalization You were admitted on:  January 30, 2017 You last received care in the:  [facility] 4M POST SURG ORT 1 You were discharged on:  February 16, 2017 Unit phone number:  [phone] Why you were hospitalized Your primary diagnosis was:  Not on File Your diagnoses also included:  Failed Total Joint Replacement (Hcc), Failed Total Hip Arthroplasty (Hcc), Nstemi (Non-St Elevated Myocardial Infarction) (Hcc), Htn (Hypertension), Anemia, Thrombocytopenia (Hcc), Systolic Chf, Chronic ([facility]) Providers Seen During Your Hospitalizations Provider Role Specialty Primary office phone Nancie Fabian MD Attending Provider Orthopedic Surgery [phone] Your Primary Care Physician (PCP) Primary Care Physician Office Phone Office Fax OTHER, PHYS ** None ** ** None ** Follow-up Information Follow up With Details Comments Contact Info Neelima Adam MD On 2/9/2017 11:15 Post-Op Follow-Up 310 [facility] [address] 1007 [facility] 
[phone] Linda Castillo MD In 6 weeks once IV abx are completed  [address] 172 [facility] 
[phone] Alma Rosa Dominguez MD On 2/21/2017 11:20 am  [address] [facility] [address] 1007 [facility] 
[phone] Phys MD Shanti   Patient can only remember the practice name and not the physician [facility] ([address])   [address] [place] [address] [address] [place] [id number] [phone] Your Appointments Tuesday February 21, 2017 11:20 AM EST HOSPITAL DISCHARGE with Alma Rosa Dominguez MD  
[facility] ([facility]) [address] [facility] [address] 1007 [facility]  
[phone] Current Discharge Medication List  
  
START taking these medications Dose & Instructions Dispensing Information Comments Morning Noon Evening Bedtime  
 atorvastatin 10 mg tablet Commonly known as:  LIPITOR Your next dose is: Today, Tomorrow Other:  _________ Dose:  10 mg Take 1 Tab by mouth daily. Quantity:  30 Tab Refills:  3  
     
   
   
   
  
 enoxaparin 40 mg/0.4 mL Commonly known as:  LOVENOX Your next dose is: Today, Tomorrow Other:  _________ Dose:  40 mg  
0.4 mL by SubCUTAneous route daily. Quantity:  30 Syringe Refills:  0  
     
   
   
   
  
 lisinopril 10 mg tablet Commonly known as:  Andrzej Economy Your next dose is: Today, Tomorrow Other:  _________ Dose:  10 mg Take 1 Tab by mouth daily. Quantity:  30 Tab Refills:  3  
     
   
   
   
  
 ondansetron 8 mg disintegrating tablet Commonly known as:  ZOFRAN ODT  
 Your next dose is: Today, Tomorrow Other:  _________ Dose:  4 mg Take 0.5 Tabs by mouth every eight (8) hours as needed for Nausea. Quantity:  30 Tab Refills:  0  
     
   
   
   
  
 oxyCODONE IR 15 mg immediate release tablet Commonly known as:  OXY-IR Your next dose is: Today, Tomorrow Other:  _________ Dose:  15 mg Take 1 Tab by mouth every three (3) hours as needed. Max Daily Amount: 120 mg.  
 Quantity:  75 Tab Refills:  0 CONTINUE these medications which have CHANGED Dose & Instructions Dispensing Information Comments Morning Noon Evening Bedtime * DULCOLAX (BISACODYL) PO What changed:  Another medication with the same name was added. Make sure you understand how and when to take each. Your next dose is: Today, Tomorrow Other:  _________ Dose:  1 Tab Take 1 Tab by mouth daily as needed. Refills:  0  
     
   
   
   
  
 * bisacodyl 10 mg suppository Commonly known as:  DULCOLAX (BISACODYL) What changed: You were already taking a medication with the same name, and this prescription was added. Make sure you understand how and when to take each. Your next dose is: Today, Tomorrow Other:  _________ Dose:  10 mg Insert 10 mg into rectum daily. Quantity:  2 Suppository Refills:  0  
     
   
   
   
  
 * Notice: This list has 2 medication(s) that are the same as other medications prescribed for you. Read the directions carefully, and ask your doctor or other care provider to review them with you. CONTINUE these medications which have NOT CHANGED Dose & Instructions Dispensing Information Comments Morning Noon Evening Bedtime AMBIEN 5 mg tablet Generic drug:  zolpidem Your next dose is: Today, Tomorrow Other:  _________ Dose:  5 mg Take 5 mg by mouth nightly as needed for Sleep. Refills:  0 aspirin delayed-release 81 mg tablet Your next dose is: Today, Tomorrow Other:  _________ Dose:  81 mg Take 81 mg by mouth daily. Refills:  0 COREG 3.125 mg tablet Generic drug:  carvedilol Your next dose is: Today, Tomorrow Other:  _________ Dose:  3.125 mg Take 3.125 mg by mouth two (2) times daily (with meals). Takes c breakfast and dinner Refills:  0  
     
   
   
   
  
 cyanocobalamin 1,000 mcg tablet Your next dose is: Today, Tomorrow Other:  _________ Dose:  1000 mcg Take 1,000 mcg by mouth daily. Refills:  0  
     
   
   
   
  
 FISH OIL 1,000 mg Cap Generic drug:  omega-3 fatty acids-vitamin e Your next dose is: Today, Tomorrow Other:  _________ Dose:  1 Cap Take 1 Cap by mouth. Refills:  0 IMITREX 50 mg tablet Generic drug:  SUMAtriptan Your next dose is: Today, Tomorrow Other:  _________ Dose:  50 mg Take 50 mg by mouth once as needed for Migraine. Refills:  0 LINZESS 290 mcg Cap capsule Generic drug:  linaclotide Your next dose is: Today, Tomorrow Other:  _________ Dose:  290 mcg Take 290 mcg by mouth Daily (before breakfast). Refills:  0  
     
   
   
   
  
 methadone 5 mg tablet Commonly known as:  DOLOPHINE Your next dose is: Today, Tomorrow Other:  _________ Dose:  5 mg Take 5 mg by mouth every eight (8) hours. Refills:  0 MIRALAX 17 gram packet Generic drug:  polyethylene glycol Your next dose is: Today, Tomorrow Other:  _________ Dose:  17 g Take 17 g by mouth every other day. Refills:  0 NexIUM 40 mg capsule Generic drug:  esomeprazole Your next dose is: Today, Tomorrow Other:  _________ Take  by mouth daily. Refills:  0  
     
   
   
   
  
 promethazine 25 mg tablet Commonly known as:  PHENERGAN Your next dose is: Today, Tomorrow Other:  _________ Dose:  25 mg Take 25 mg by mouth every six (6) hours as needed for Nausea. Refills:  0 PROVENTIL HFA 90 mcg/actuation inhaler Generic drug:  albuterol Your next dose is: Today, Tomorrow Other:  _________ Dose:  2 Puff Take 2 Puffs by inhalation every six (6) hours as needed. Refills:  0  
     
   
   
   
  
 ROLAIDS EXTRA STRENGTH PO Your next dose is: Today, Tomorrow Other:  _________ Dose:  2 Tab Take 2 Tabs by mouth three (3) times daily (with meals). Refills:  0 SPIRIVA WITH HANDIHALER 18 mcg inhalation capsule Generic drug:  tiotropium Your next dose is: Today, Tomorrow Other:  _________ Dose:  1 Cap Take 1 Cap by inhalation daily. Refills:  0  
     
   
   
   
  
 traMADol 50 mg tablet Commonly known as:  ULTRAM  
   
Your next dose is: Today, Tomorrow Other:  _________ Dose:  50 mg Take 50 mg by mouth every eight (8) hours as needed for Pain. Refills:  0  
     
   
   
   
  
 VITAMIN D3 400 unit Cap Generic drug:  cholecalciferol (vitamin d3) Your next dose is: Today, Tomorrow Other:  _________ Dose:  1 Tab Take 1 Tab by mouth daily. Refills:  0 Where to Get Your Medications Information on where to get these meds will be given to you by the nurse or doctor. ! Ask your nurse or doctor about these medications  
  atorvastatin 10 mg tablet  
 bisacodyl 10 mg suppository  
 enoxaparin 40 mg/0.4 mL  
 lisinopril 10 mg tablet  
 ondansetron 8 mg disintegrating tablet  
 oxyCODONE IR 15 mg immediate release tablet Discharge Instructions TOTAL HIP DISCHARGE INSTRUCTIONS Patient: Oliva Mckeon MRN: [id number] SSN: [id number] Please take the time to review the following instructions before you leave the hospital and use them as guidelines during your recovery from surgery. If you have any questions you may contact my office at [phone]. After hours or during the weekend you may reach me personally at [phone] if there is an emergency. SPECIAL INSTRUCTIONS :  
1. Do not bend greater than 90 degrees at the hip for 4 weeks following your discharge 2. Avoid exercises or activities which bring the leg out or away from the mid-line of the body. The surgical repair involves this muscle and it will require 4 weeks to heal. You may disregard these instructions for a direct anterior approach. 3. You may walk as tolerated and are encouraged to work daily on progressing your activities with a walker initially. 4. You may transition to a cane for walking 5-7 days from surgery once you feel safe. You may use a walker for longer periods if you feel unstable. Wound Care/ Dressing Changes: DRESSING :  
 
 
[place] Dressing : Change as needed. monitor for increased drainage. If noted contact Dr. Eliseo Rodgers office. Showering/ Bathing: If your incision is dry without drainage you may shower following your discharge. Please change your dressing if it becomes saturated after showering or begins to peel off. It is fine to have water run over the incision. Do not vigorously scrub your incision. Apply a clean, dry dressing after you have dried your incision, if your dressing peels off. Do not take a bath or get into a swimming pool / [employer] until you follow up with Dr. Anisa Herring. Do not soak your incision under water. If there is continued drainage or you are concerned contact Dr Eliseo Rodgers office prior to showering [phone] ext [phone].   
 
 
 
Diet: 
 You may advance to your regular diet as tolerated. Increase your clear liquid intake for the next 2-3 days. Nutrition Recommendations for Discharge: 
 
Continue Oral Nutrition Supplements at discharge: Ensure Enlive or Ensure Plus 1-2 bottles/day for 30 days unless otherwise directed by your Primary Care Physician. This product can be purchased at your local grocery store or drug store and online. Lanie Umanzor, RD 
 _ Medication: 
 
 
1. You will be given prescriptions for pain medication when you are discharged from the hospital. The side effects of these medications can be substantial and the narcotic medications are not mandatory. You may substitute these medications with Tylenol or Alleve / Motrin. 2.  Please use the medications as prescribed. Pain medications may cause constipation- Colace twice daily and Miralax two scoops 2-3 times a day while taking the narcotic medication should help prevent constipation. Other possible side effects of pain medication are dizziness, headache, nausea, vomiting, and urinary retention. Discontinue the pain medication if you develop itching, rash, shortness of breath, or difficulties swallowing. If these symptoms become severe or are not relieved by discontinuing the medication, you should seek immediate medical attention. 3. Refills of pain medication are authorized during office hours only (8 AM- 5 PM  Monday thru Friday). Many of these medication will require you or a family member to pick-up a physical prescription at the office. 4. Medications other than antiinflammatories will not be called into the pharmacy after business hours. 5. Do not take Tylenol/Acetaminophen in addition to your pain medication as most pain medications already contain this ingredient. Do not exceed 4000mg of Tylenol/Acetaminophen per day. 6. You may resume the medication(s) you were taking prior to your surgery. Narcotics may change the effects of some antidepressant medication(s). If you have any questions about possible interactions between your regular medications and the pain medication, you should ask the pharmacist or contact the prescribing physician. 7. If you have constipation which is not improved by oral stool softeners then a Ducolax suppository should be purchased over the counter. 8. Continue the blood thinner (Aspirin or Lovenox) for a total of 30 days following surgery. Follow up appointment: 
 
Please call our office at [phone] for your follow up appointment. This should be scheduled 14 days following the date of surgery. Physical Therapy / Nursing: 
Physical Therapy per protocol at [facility]. Important Signs and Symptoms: 
 
If any of the following signs or symptoms occur, you should contact Dr. Norman River office. Please be advised if a problem arises which you feel requires immediate medical attention or you are unable to contact Dr. Norman River office you should seek immediate medical attention at the ER or other health care facility you have access to. 
 
1. A sudden increase in swelling and/or redness or warmth at the area your surgery was performed which isnt relieved by rest, ice, and elevation. 2. Oral temperature greater than 101 degrees for 12 hours or more which isnt relieved by an increase in fluid intake and taking 2 Tylenol every 4-6 hours. 3. Excessive drainage from your incisions, or drainage which hasnt stopped by 72 hours after your surgery. 4. Fever, chills, shortness of breath, chest pain, nausea, vomiting or other signs and symptoms which are of concern to you. frequently asked questions ? ? When should I call for advice regarding my pain? 
o After 12 hours on the above regimen, if nothing is working call the office [phone] or [phone]) or call my cell after hours [phone]. 
 
? Can I get refills? o Narcotic refills are provided for the first 6 weeks following surgery. ? I will generally try to taper down to a single narcotic medication by your two week appointment. o Try Tylenol 650mg along with Alleve 220mg or Motrin 200mg during the majority of the day. ? Save the narcotic pain medications for physical therapy (1 hour prior) and before sleeping at night. ? Keep in mind you need to discontinue these medications prior to returning to driving. ? Is swelling normal? 
o Almost everyone has some degree of swelling following surgery. o Following hip and knee replacement surgery, swelling can be normal below the incision for the first 1-2 weeks. ? This swelling peaks around 5-7 days after surgery. ? You may have some bruising around the back of the thigh, calf and even into the foot. ? What should I do for the swelling? 
o Keep the limb elevated. o Apply compression socks (knee high for total knees and up to the mid-thigh for total hips.  
o Heat or ice may be applied, choose the modality that makes you the most comfortable. ? How long should I remain on blood thinners following surgery? 
o Thirty days ? When can I drive? 
o Once you have stopped using regular narcotic pain medications (Percocet, Lortab, etc.) and can safely apply the brakes without hesitation. ? When can I shower? o 72hours following surgery if you have no drainage 
o No submersion of the incision, bathing or swimming for 14 days following surgery or until cleared by Dr Donnie Kimbrough. ? What do I do with the dressing when I shower? 
o The dressing can be worn in the shower. Please remove the dressing only if it becomes saturated or begins to peel off.   
o The incision is sealed with Dermabond (Biologic glue) and except for wounds which are draining should be watertight. ? How active should I be following surgery? o Progress activities in moderation at your own pace. o Walk each day and set progressive goals with small increments (1st week  ½ block of walking, 2nd week  1 block, 3rd week  2 blocks, etc.) Please do not hesitate to call me at [phone] (cell phone) for questions following surgery - Gus Quiroz MD 
 
MyChart Activation Thank you for enrolling in [address] Please follow the instructions below to securely access your online medical record. Peel allows you to send messages to your doctor, view your test results, renew your prescriptions, schedule appointments, and more. How Do I Sign Up? 1. In your internet browser, go to [email or web address]. 2. Click on the First Time User? Click Here link in the Sign In box. You will see the New Member Sign Up page. 3. Enter your Peel Access Code exactly as it appears below. You will not need to use this code after youve completed the sign-up process. If you do not sign up before the expiration date, you must request a new code. [employer] Access Code: Activation code not generated Current Peel Status: Patient Declined 4. Enter the last four digits of your Social Security Number (xxxx) and Date of Birth (mm/dd/yyyy) as indicated and click Submit. You will be taken to the next sign-up page. 5. Create a [employer] ID. This will be your Peel login ID and cannot be changed, so think of one that is secure and easy to remember. 6. Create a Peel password. You can change your password at any time. 7. Enter your Password Reset Question and Answer. This can be used at a later time if you forget your password. 8. Enter your e-mail address. You will receive e-mail notification when new information is available in [address] 9. Click Sign Up. You can now view your medical record. Additional Information Remember, Peel is NOT to be used for urgent needs. For medical emergencies, dial 911. Now available from your iPhone and Android! Discharge Instructions Attachments/References HEART ATTACK: MYOCARDIAL INFARCTION OR ACUTE CORONARY SYNDROME (ENGLISH) HEART FAILURE: AVOIDING TRIGGERS (ENGLISH) SMOKING: STOPPING (ENGLISH) ATORVASTATIN (BY MOUTH) (ENGLISH) LISINOPRIL (BY MOUTH) (ENGLISH) ENOXAPARIN (BY INJECTION) (ENGLISH) ONDANSETRON (BY MOUTH, INTO THE MOUTH) (ENGLISH) Discharge Orders None [facility] Announcement We are excited to announce that we are making your provider's discharge notes available to you in [email or web address]. You will see these notes when they are completed and signed by the physician that discharged you from your recent hospital stay. If you have any questions or concerns about any information you see in [email or web address], please call the Health Information Department where you were seen or reach out to your Primary Care Provider for more information about your plan of care. General Information Please provide this summary of care documentation to your next provider. Patient Signature:  ____________________________________________________________ Date:  ____________________________________________________________  
  
Elly Sierra Provider Signature:  ____________________________________________________________ Date:  ____________________________________________________________ More Information Heart Attack: Care Instructions Your Care Instructions A heart attack (myocardial infarction, or MI) occurs when one or more of the coronary arteries, which supply the heart with oxygen-rich blood, is blocked. A blockage usually occurs when plaque inside the artery breaks open and a blood clot forms in the artery. After a heart attack, you may be worried about your future.  Over the next several weeks, your heart will start to heal. Though it can be hard to break old habits, you can prevent another heart attack by making some lifestyle changes and by taking medicines. You may use this information for ideas about what to do at home to speed your recovery. Follow-up care is a key part of your treatment and safety. Be sure to make and go to all appointments, and call your doctor if you are having problems. It's also a good idea to know your test results and keep a list of the medicines you take. How can you care for yourself at home? Activity · Until your doctor says it is okay, do not do strenuous exercise. And do not lift, pull, or push anything heavy. Ask your doctor what types of activities are safe for you. · If your doctor has not set you up with a cardiac rehabilitation (rehab) program, talk to him or her about whether that is right for you. Cardiac rehab includes supervised exercise. It also includes help with diet and lifestyle changes and emotional support. It may reduce your risk of future heart problems. · Increase your activities slowly. Take short rest breaks when you get tired. · If your doctor recommends it, get more exercise. Walking is a good choice. Bit by bit, increase the amount you walk every day. Try for at least 30 minutes on most days of the week. You also may want to swim, bike, or do other activities. Talk with your doctor before you start an exercise program to make sure it is safe for you. · Ask your doctor when you can drive, go back to work, and do other daily activities again. · You can have sex as soon as you feel ready for it. Often this means when you can easily walk around or climb stairs. Talk with your doctor if you have any concerns. If you are taking nitroglycerin, do not take erection-enhancing medicine such as sildenafil (Viagra), tadalafil (Cialis), or vardenafil (Levitra) . Lifestyle changes · Do not smoke. Smoking increases your risk of another heart attack.  If you need help quitting, talk to your doctor about stop-smoking programs and medicines. These can increase your chances of quitting for good. · Eat a heart-healthy diet that is low in saturated fat and salt, and is full of fruits, vegetables and whole-grains. Eat at least two servings of fish each week. You may get more details about how to eat healthy. But these tips can help you get started. · Stay at a healthy weight, or lose weight if you need to. Medicines · Be safe with medicines. Take your medicines exactly as prescribed. Call your doctor if you think you are having a problem with your medicine. You will get more details on the specific medicines your doctor prescribes. Do not stop taking your medicine unless your doctor tells you to. Not taking your medicine might raise your risk of having another heart attack. · You may need several medicines to help lower your risk of another heart attack. These include: ¨ Blood pressure medicines such as angiotensin-converting enzyme (ACE) inhibitors, ARBs (angiotensin II receptor blockers), and beta-blockers. ¨ Cholesterol medicine called statins. ¨ Aspirin and other blood thinners. These prevent blood clots that can cause a heart attack. · If your doctor has given you nitroglycerin, keep it with you at all times. If you have angina symptoms, such as chest pain or pressure, sit down and rest. Take the first dose of nitroglycerin as directed. If symptoms get worse or are not getting better within 5 minutes, call 911 right away. Stay on the phone. The emergency  will tell you what to do. · Do not take any over-the-counter medicines, vitamins, or herbal products without talking to your doctor first. 
Staying healthy · Manage other health conditions such as high blood pressure and diabetes. · Avoid colds and flu. Get a pneumococcal vaccine shot. If you have had one before, ask your doctor whether you need another dose. Get the flu vaccine every year. If you must be around people with colds or flu, wash your hands often. · Be sure to tell your doctor about any angina symptoms you have had, even if they went away. Pay attention to your angina symptoms. Know what is typical for you and learn how to control it. Know when to call for help. · Talk to your family, friends, or a counselor about your feelings. It is normal to feel frightened, angry, hopeless, helpless, and even guilty. Talking openly about bad feelings can help you cope. If you have symptoms of depression, talk to your doctor. When should you call for help? Call 911 anytime you think you may need emergency care. For example, call if: 
· You have symptoms of a heart attack. These may include: ¨ Chest pain or pressure, or a strange feeling in the chest. 
¨ Sweating. ¨ Shortness of breath. ¨ Nausea or vomiting. ¨ Pain, pressure, or a strange feeling in the back, neck, jaw, or upper belly or in one or both shoulders or arms. ¨ Lightheadedness or sudden weakness. ¨ A fast or irregular heartbeat. After you call 911, the  may tell you to chew 1 adult-strength or 2 to 4 low-dose aspirin. Wait for an ambulance. Do not try to drive yourself. · You have angina symptoms (such as chest pain or pressure) that do not go away with rest or are not getting better within 5 minutes after you take a dose of nitroglycerin. · You passed out (lost consciousness). · You feel like you are having another heart attack. Call your doctor now or seek immediate medical care if: 
· You are having angina symptoms, such as chest pain or pressure, more often than usual, or the symptoms are different or worse than usual. 
· You have new or increased shortness of breath. · You are dizzy or lightheaded, or you feel like you may faint. Watch closely for changes in your health, and be sure to contact your doctor if you have any problems. Where can you learn more? Go to http://melanie-mich.info/.  
Enter [id number] in the search box to learn more about \"Heart Attack: Care Instructions. \" Current as of: January 27, 2016 Content Version: 11.1 © 4330-4907 [employer]. Care instructions adapted under license by [employer] (which disclaims liability or warranty for this information). If you have questions about a medical condition or this instruction, always ask your healthcare professional. [address] any warranty or liability for your use of this information. Avoiding Triggers With Heart Failure: Care Instructions Your Care Instructions Triggers are anything that make your heart failure flare up. A flare-up is also called \"sudden heart failure\" or \"acute heart failure. \" When you have a flare-up, fluid builds up in your lungs, and you have problems breathing. You might need to go to the hospital. By watching for changes in your condition and avoiding triggers, you can prevent heart failure flare-ups. Follow-up care is a key part of your treatment and safety. Be sure to make and go to all appointments, and call your doctor if you are having problems. It's also a good idea to know your test results and keep a list of the medicines you take. How can you care for yourself at home? Watch for changes in your weight and condition · Weigh yourself without clothing at the same time each day. Record your weight. Call your doctor if you gain 3 pounds or more in 2 to 3 days. A sudden weight gain may mean that your heart failure is getting worse. · Keep a daily record of your symptoms. Write down any changes in how you feel, such as new shortness of breath, cough, or problems eating. Also record if your ankles are more swollen than usual and if you have to urinate in the night more often. Note anything that you ate or did that could have triggered these changes. Limit sodium Sodium causes your body to hold on to water, making it harder for your heart to pump. People get most of their sodium from processed foods.  Fast food and restaurant meals also tend to be very high in sodium. · Your doctor may suggest that you limit sodium to 2,000 milligrams (mg) a day or less. That is less than 1 teaspoon of salt a day, including all the salt you eat in cooking or in packaged foods. · Read food labels on cans and food packages. They tell you how much sodium you get in one serving. Check the serving size. If you eat more than one serving, you are getting more sodium. · Be aware that sodium can come in forms other than salt, including monosodium glutamate (MSG), sodium citrate, and sodium bicarbonate (baking soda). MSG is often added to Asian food. You can sometimes ask for food without MSG or salt. · Slowly reducing salt will help you adjust to the taste. Take the salt shaker off the table. · Flavor your food with garlic, lemon juice, onion, vinegar, herbs, and spices instead of salt. Do not use soy sauce, steak sauce, onion salt, garlic salt, mustard, or ketchup on your food, unless it is labeled \"low-sodium\" or \"low-salt. \" 
· Make your own salad dressings, sauces, and ketchup without adding salt. · Use fresh or frozen ingredients, instead of canned ones, whenever you can. Choose low-sodium canned goods. · Eat less processed food and food from restaurants, including fast food. Exercise as directed Moderate, regular exercise is very good for your heart. It improves your blood flow and helps control your weight. But too much exercise can stress your heart and cause a heart failure flare-up. · Check with your doctor before you start an exercise program. 
· Walking is an easy way to get exercise. Start out slowly. Gradually increase the length and pace of your walk. Swimming, riding a bike, and using a treadmill are also good forms of exercise. · When you exercise, watch for signs that your heart is working too hard.  You are pushing yourself too hard if you cannot talk while you are exercising. If you become short of breath or dizzy or have chest pain, stop, sit down, and rest. 
· Do not exercise when you do not feel well. Take medicines correctly · Take your medicines exactly as prescribed. Call your doctor if you think you are having a problem with your medicine. · Make a list of all the medicines you take. Include those prescribed to you by other doctors and any over-the-counter medicines, vitamins, or supplements you take. Take this list with you when you go to any doctor. · Take your medicines at the same time every day. It may help you to post a list of all the medicines you take every day and what time of day you take them. · Make taking your medicine as simple as you can. Plan times to take your medicines when you are doing other things, such as eating a meal or getting ready for bed. This will make it easier to remember to take your medicines. · Get organized. Use helpful tools, such as daily or weekly pill containers. When should you call for help? Call 911 if you have symptoms of sudden heart failure such as: 
· You have severe trouble breathing. · You cough up pink, foamy mucus. · You have a new irregular or rapid heartbeat. Call your doctor now or seek immediate medical care if: 
· You have new or increased shortness of breath. · You are dizzy or lightheaded, or you feel like you may faint. · You have sudden weight gain, such as 3 pounds or more in 2 to 3 days. · You have increased swelling in your legs, ankles, or feet. · You are suddenly so tired or weak that you cannot do your usual activities. Watch closely for changes in your health, and be sure to contact your doctor if you develop new symptoms. Where can you learn more? Go to http://melanie-mich.info/. Enter O505 in the search box to learn more about \"Avoiding Triggers With Heart Failure: Care Instructions. \" Current as of: April 27, 2016 Content Version: 11.1 © 3593-8681 [employer]. Care instructions adapted under license by [employer] (which disclaims liability or warranty for this information). If you have questions about a medical condition or this instruction, always ask your healthcare professional. [address] any warranty or liability for your use of this information. Stopping Smoking: Care Instructions Your Care Instructions Cigarette smokers crave the nicotine in cigarettes. Giving it up is much harder than simply changing a habit. Your body has to stop craving the nicotine. It is hard to quit, but you can do it. There are many tools that people use to quit smoking. You may find that combining tools works best for you. There are several steps to quitting. First you get ready to quit. Then you get support to help you. After that, you learn new skills and behaviors to become a nonsmoker. For many people, a necessary step is getting and using medicine. Your doctor will help you set up the plan that best meets your needs. You may want to attend a smoking cessation program to help you quit smoking. When you choose a program, look for one that has proven success. Ask your doctor for ideas. You will greatly increase your chances of success if you take medicine as well as get counseling or join a cessation program. 
Some of the changes you feel when you first quit tobacco are uncomfortable. Your body will miss the nicotine at first, and you may feel short-tempered and grumpy. You may have trouble sleeping or concentrating. Medicine can help you deal with these symptoms. You may struggle with changing your smoking habits and rituals. The last step is the tricky one: Be prepared for the smoking urge to continue for a time. This is a lot to deal with, but keep at it. You will feel better. Follow-up care is a key part of your treatment and safety.  Be sure to make and go to all appointments, and call your doctor if you are having problems. Its also a good idea to know your test results and keep a list of the medicines you take. How can you care for yourself at home? · Ask your family, friends, and coworkers for support. You have a better chance of quitting if you have help and support. · Join a support group, such as Nicotine Anonymous, for people who are trying to quit smoking. · Consider signing up for a smoking cessation program, such as the American [employer] Association's Freedom from Smoking program. 
· Set a quit date. Pick your date carefully so that it is not right in the middle of a big deadline or stressful time. Once you quit, do not even take a puff. Get rid of all ashtrays and lighters after your last cigarette. Clean your house and your clothes so that they do not smell of smoke. · Learn how to be a nonsmoker. Think about ways you can avoid those things that make you reach for a cigarette. ¨ Avoid situations that put you at greatest risk for smoking. For some people, it is hard to have a drink with friends without smoking. For others, they might skip a coffee break with coworkers who smoke. ¨ Change your daily routine. Take a different route to work or eat a meal in a different place. · Cut down on stress. Calm yourself or release tension by doing an activity you enjoy, such as reading a book, taking a hot bath, or gardening. · Talk to your doctor or pharmacist about nicotine replacement therapy, which replaces the nicotine in your body. You still get nicotine but you do not use tobacco. Nicotine replacement products help you slowly reduce the amount of nicotine you need. These products come in several forms, many of them available over-the-counter: ¨ Nicotine patches ¨ Nicotine gum and lozenges ¨ Nicotine inhaler · Ask your doctor about bupropion (Wellbutrin) or varenicline (Chantix), which are prescription medicines. They do not contain nicotine. They help you by reducing withdrawal symptoms, such as stress and anxiety. · Some people find hypnosis, acupuncture, and massage helpful for ending the smoking habit. · Eat a healthy diet and get regular exercise. Having healthy habits will help your body move past its craving for nicotine. · Be prepared to keep trying. Most people are not successful the first few times they try to quit. Do not get mad at yourself if you smoke again. Make a list of things you learned and think about when you want to try again, such as next week, next month, or next year. Where can you learn more? Go to http://melanieSecurus Medical Groupmich.info/. Enter H224 in the search box to learn more about \"Stopping Smoking: Care Instructions. \" Current as of: May 26, 2016 Content Version: 11.1 © 0573-1597 [employer]. Care instructions adapted under license by [employer] (which disclaims liability or warranty for this information). If you have questions about a medical condition or this instruction, always ask your healthcare professional. [address] any warranty or liability for your use of this information. Atorvastatin (By mouth) Atorvastatin (a-tor-va-STAT-in) Treats high cholesterol and triglyceride levels. Reduces the risk of angina, stroke, heart attack, or certain heart and blood vessel problems. This medicine is a statin. Brand Name(s):Lipitor There may be other brand names for this medicine. When This Medicine Should Not Be Used: This medicine is not right for everyone. Do not use it if you had an allergic reaction to atorvastatin, if you have active liver disease, or if you are pregnant or breastfeeding. How to Use This Medicine:  
Tablet · Take your medicine as directed. Your dose may need to be changed several times to find what works best for you. · Take this medicine at the same time each day. · Swallow the tablet whole. Do not break, crush, or chew it. · Missed dose: Take a dose as soon as you remember. If it is less than 12 hours until your next dose, skip the missed dose and take the next dose at the regular time. Do not take 2 doses of this medicine within 12 hours. · Read and follow the patient instructions that come with this medicine. Talk to your doctor or pharmacist if you have any questions. · Store the medicine in a closed container at room temperature, away from heat, moisture, and direct light. Drugs and Foods to Avoid: Ask your doctor or pharmacist before using any other medicine, including over-the-counter medicines, vitamins, and herbal products. · Some medicines can affect how atorvastatin works. Tell your doctor if you also use birth control pills, boceprevir, cimetidine, colchicine, cyclosporine, digoxin, niacin, rifampin, spironolactone, telaprevir, medicine to treat an infection, or medicine to treat HIV/AIDS. Warnings While Using This Medicine: · It is not safe to take this medicine during pregnancy. It could harm an unborn baby. Tell your doctor right away if you become pregnant. · Tell your doctor if you have kidney disease, diabetes, muscle pain or weakness, thyroid problems, have recently had a stroke or TIA (transient ischemic attack), or have a history of liver disease. Tell your doctor if you drink grapefruit juice or drink alcohol regularly. · This medicine can cause muscle problems, which can lead to kidney problems. · Tell any doctor or dentist who treats you that you use this medicine. You may need to stop using it if you have surgery, have an injury, or develop serious health problems. · Your doctor will do lab tests at regular visits to check on the effects of this medicine. Keep all appointments. · Keep all medicine out of the reach of children. Never share your medicine with anyone. Possible Side Effects While Using This Medicine:  
Call your doctor right away if you notice any of these side effects: · Allergic reaction: Itching or hives, swelling in your face or hands, swelling or tingling in your mouth or throat, chest tightness, trouble breathing · Blistering, peeling, red skin rash · Change in how much or how often you urinate · Dark urine or pale stools, nausea, vomiting, loss of appetite, stomach pain, yellow skin or eyes · Fever · Muscle pain, tenderness, or weakness · Unusual tiredness If you notice these less serious side effects, talk with your doctor: · Diarrhea · Joint pain If you notice other side effects that you think are caused by this medicine, tell your doctor. Call your doctor for medical advice about side effects. You may report side effects to FDA at 2-455-FDA-0696 © 2016 [address] is for End User's use only and may not be sold, redistributed or otherwise used for commercial purposes. The above information is an  only. It is not intended as medical advice for individual conditions or treatments. Talk to your doctor, nurse or pharmacist before following any medical regimen to see if it is safe and effective for you. Lisinopril (By mouth) Lisinopril (lye-SIN-oh-pril) Treats high blood pressure and heart failure. Also given to reduce the risk of death after a heart attack. This medicine is an ACE inhibitor. Brand Name(s):Prinivil, Zestril There may be other brand names for this medicine. When This Medicine Should Not Be Used: This medicine is not right for everyone. Do not use it if you had an allergic reaction to lisinopril or another ACE inhibitor, or if you are pregnant. How to Use This Medicine:  
Tablet · Take your medicine as directed. Your dose may need to be changed several times to find what works best for you. · Missed dose: Take a dose as soon as you remember.  If it is almost time for your next dose, wait until then and take a regular dose. Do not take extra medicine to make up for a missed dose. · Store the medicine in a closed container at room temperature, away from heat, moisture, and direct light. Drugs and Foods to Avoid: Ask your doctor or pharmacist before using any other medicine, including over-the-counter medicines, vitamins, and herbal products. · Do not use this medicine together with aliskiren if you have diabetes. · Some foods and medicines may affect how lisinopril works. Tell your doctor if you are using any of the following: ¨ Aliskiren, everolimus, lithium, sirolimus, temsirolimus ¨ Another blood pressure medicine, including an angiotensin receptor blocker (ARB) ¨ Insulin or diabetes medicine ¨ Diuretic (water pills, including amiloride, spironolactone, triamterene) ¨ NSAID pain or arthritis medicine (including aspirin, celecoxib, diclofenac, ibuprofen, naproxen) · Ask your doctor before you use any medicine, supplement, or salt substitute that contains potassium. Warnings While Using This Medicine: · It is not safe to take this medicine during pregnancy. It could harm an unborn baby. Tell your doctor right away if you become pregnant. · Tell your doctor if you are breastfeeding, or if you have kidney disease, liver disease, diabetes, or heart or blood vessel disease. · This medicine may cause the following problems: ¨ Angioedema (severe swelling) ¨ Kidney problems ¨ Serious liver problems · This medicine could lower your blood pressure too much, especially when you first use it or if you are dehydrated. Stand or sit up slowly if you feel lightheaded or dizzy. · Do not stop using this medicine without asking your doctor, even if you feel well. This medicine will not cure your high blood pressure, but it will help keep it in a normal range. You may have to take blood pressure medicine for the rest of your life. · Tell any doctor or dentist who treats you that you are using this medicine. · Your doctor will do lab tests at regular visits to check on the effects of this medicine. Keep all appointments. · Keep all medicine out of the reach of children. Never share your medicine with anyone. Possible Side Effects While Using This Medicine:  
Call your doctor right away if you notice any of these side effects: · Allergic reaction: Itching or hives, swelling in your face or hands, swelling or tingling in your mouth or throat, chest tightness, trouble breathing · Blistering, peeling, or red skin rash · Change in how much or how often you urinate · Confusion, weakness, uneven heartbeat, trouble breathing, numbness or tingling in your hands, feet, or lips · Dark urine or pale stools, nausea, vomiting, loss of appetite, stomach pain, yellow skin or eyes · Fever, chills, sore throat, body aches · Lightheadedness, dizziness, fainting · Severe stomach pain (with or without nausea or vomiting) If you notice these less serious side effects, talk with your doctor: · Dry cough If you notice other side effects that you think are caused by this medicine, tell your doctor. Call your doctor for medical advice about side effects. You may report side effects to FDA at 1-762-FDA-6704 © [address] is for End User's use only and may not be sold, redistributed or otherwise used for commercial purposes. The above information is an  only. It is not intended as medical advice for individual conditions or treatments. Talk to your doctor, nurse or pharmacist before following any medical regimen to see if it is safe and effective for you. Enoxaparin (By injection) Enoxaparin (ee-nox-a-PAR-in) Prevents and treats blood clots. Also treats heart attacks. This medicine is a blood thinner.   
Brand Name(s):Amerinet Choice Enoxaparin Sodium, Lovenox, Novaplus Enoxaparin Sodium, PremierPro Rx Lovenox There may be other brand names for this medicine. When This Medicine Should Not Be Used: You should not use this medicine if you have had an allergic reaction to enoxaparin, heparin, benzyl alcohol, or products made from pork. You should not use enoxaparin if you have bleeding disorders or any active bleeding. How to Use This Medicine:  
Injectable · Your doctor will prescribe your exact dose and tell you how often it should be given. This medicine is given as a shot under your skin. · A nurse or other health provider will give you this medicine. It may also be given by a home health caregiver. · You may be taught how to give your medicine at home. Make sure you understand all instructions before giving yourself an injection. Do not use more medicine or use it more often than your doctor tells you to. · You will be shown the body areas where this shot can be given. Use a different body area each time you give yourself a shot. Keep track of where you give each shot to make sure you rotate body areas. · Use a new needle and syringe each time you inject your medicine. If a dose is missed:  
· You must use this medicine on a fixed schedule. Call your doctor or pharmacist if you miss a dose. How to Store and Dispose of This Medicine: · If you store this medicine at home, keep it at room temperature, away from heat and direct light. · If you were given a bottle of medicine to use with your syringes, you must use the medicine within 28 days after the first shot. Throw away the unused medicine in the bottle after 28 days. · Throw away used needles in a hard, closed container that the needles cannot poke through. Keep this container away from children and pets. · Ask your pharmacist, doctor, or health caregiver about the best way to dispose of any leftover medicine, containers, and other supplies. Throw away old medicine after the expiration date has passed. · Keep all medicine out of the reach of children. Never share your medicine with anyone. Drugs and Foods to Avoid: Ask your doctor or pharmacist before using any other medicine, including over-the-counter medicines, vitamins, and herbal products. · Make sure your doctor knows if you are also using blood thinners (such as clopidogrel, warfarin, or Coumadin®). Tell your doctor if you are also using dipyridamole (Persantine®), ketorolac (Toradol®), or sulfinpyrazone (Anturane®). · Make sure your doctor knows if you are using pain or arthritis medicine (such as aspirin, Advil®, Aleve®, Motrin®, Orudis®, Dolobid®, West hu, Indocin®, Relafen®, or Voltaren®). Avoid taking aspirin or medicines that contain aspirin, unless your doctor tells you to. Warnings While Using This Medicine: · Make sure your doctor knows if you are pregnant or breastfeeding, or if you have liver disease, kidney disease, blood vessel problems, diabetes, a heart infection, uncontrolled high blood pressure, a stomach ulcer or bleeding, or a bleeding disorder such as hemophilia. Tell your doctor if you have a bleeding disorder caused by heparin. · Make sure your doctor knows if you have recently had a stroke, or surgery on your eyes, brain, or spine. Tell your doctor if you have had a heart valve replaced. · This medicine may cause bleeding or bruising. This risk is higher if you have a catheter in your back for pain medicine or anesthesia (sometimes called an \"epidural\"), or if you have kidney problems. The risk of bleeding increases if your kidney problems get worse. Discuss this with your doctor if you are concerned. · You may bleed and bruise more easily while you are using this medicine. Be extra careful to avoid injuries until the effects of the medicine have worn off. Stay away from rough sports or other situations where you could be bruised, cut, or injured. Brush and floss your teeth gently.  Be careful when using sharp objects, including razors and fingernail clippers. Avoid picking your nose. If you need to blow your nose, blow it gently. · Watch for any bleeding from open areas such as around the injection site. Also check for blood in your urine or stool. If you have any bleeding or injuries, tell your doctor right away. · Tell any doctor or dentist who treats you that you are using this medicine. You may need to stop using this medicine several days before you have surgery or medical tests. · Your doctor will do lab tests at regular visits to check on the effects of this medicine. Keep all appointments. Possible Side Effects While Using This Medicine:  
Call your doctor right away if you notice any of these side effects: · Allergic reaction: Itching or hives, swelling in your face or hands, swelling or tingling in your mouth or throat, chest tightness, trouble breathing · Blood in your urine. · Bloody or black, tarry stools. · Chest pain, shortness of breath, or coughing up blood. · Fever. · Large, flat, blue or purplish patches in the skin. · Numbness or weakness in your arm or leg, or on one side of your body. · Pain in your lower leg (calf). · Sudden or severe headache, problems with vision, speech, or walking. · Swelling in your hands, ankles, or feet. · Uneven heartbeat. · Unusual bleeding or bruising. · Vomiting blood or material that looks like coffee grounds. · Warmth or redness in your face, neck, arms, or upper chest. 
If you notice these less serious side effects, talk with your doctor: · Confusion. · Nausea or diarrhea. · Pain, redness, bruising, swelling, or a lump under your skin where the shot was given. If you notice other side effects that you think are caused by this medicine, tell your doctor. Call your doctor for medical advice about side effects. You may report side effects to FDA at 7-827-FYB-2602 © 2016 [address] is for End User's use only and may not be sold, redistributed or otherwise used for commercial purposes. The above information is an  only. It is not intended as medical advice for individual conditions or treatments. Talk to your doctor, nurse or pharmacist before following any medical regimen to see if it is safe and effective for you. Ondansetron (By mouth, Into the mouth) Ondansetron (on-DAN-se-kieran) Prevents nausea and vomiting. Brand Name(s):Zofran, Zofran ODT, Gwynda Rebecca There may be other brand names for this medicine. When This Medicine Should Not Be Used: This medicine is not right for everyone. Do not use it if you had an allergic reaction to ondansetron. How to Use This Medicine: Thin Sheet, Liquid, Tablet, Dissolving Tablet · Your doctor will tell you how much medicine to use. Do not use more than directed. · Measure the oral liquid medicine with a marked measuring spoon, oral syringe, or medicine cup. · To use the disintegrating tablet:  
¨ Do not open the blister pack that contains the tablet until you are ready to take it. ¨ Make sure your hands are dry. Peel back the foil, then remove the tablet from the blister pack. Do not push the tablet through the foil. ¨ Place the tablet on top of your tongue where it will dissolve in seconds. After the tablet has melted, swallow or take a sip of water. · To use the soluble film: ¨ Make sure your hands are clean and dry. ¨ Fold the pouch along the dotted line. ¨ While still folded, tear the pouch carefully along the edge. Remove the film from the pouch. ¨ Place the film on top of your tongue. It will dissolve in 4 to 20 seconds. Do not chew or swallow the film whole. ¨ After the film has dissolved, you may swallow with or without water. · Read and follow the patient instructions that come with this medicine. Talk to your doctor or pharmacist if you have any questions. · Missed dose: Take a dose as soon as you remember. If it is almost time for your next dose, wait until then and take a regular dose. Do not take extra medicine to make up for a missed dose. · Store the medicine in a closed container at room temperature, away from heat, moisture, and direct light. Keep the soluble film in the foil pouch until you ready to use it. Drugs and Foods to Avoid: Ask your doctor or pharmacist before using any other medicine, including over-the-counter medicines, vitamins, and herbal products. · Do not use this medicine together with apomorphine. · Some medicines may affect how ondansetron works. Tell your doctor if you are using tramadol, diuretics (water pills), or any other medicine for nausea and vomiting. Warnings While Using This Medicine: · Tell your doctor if you are pregnant or breastfeeding, or if you have liver disease, congestive heart failure, heart rhythm problems (such as prolonged QT interval, slow heartbeat), low magnesium or potassium levels, stomach or bowel problems, or phenylketonuria (PKU). · This medicine may cause heart rhythm problems (such as QT prolongation). · This medicine may make you dizzy. Do not drive or do anything else that could be dangerous until you know how this medicine affects you. · Keep all medicine out of the reach of children. Never share your medicine with anyone. Possible Side Effects While Using This Medicine:  
Call your doctor right away if you notice any of these side effects: · Allergic reaction: Itching or hives, swelling in your face or hands, swelling or tingling in your mouth or throat, chest tightness, trouble breathing · Fainting, dizziness, or lightheadedness · Fast, pounding, or uneven heartbeat · Trouble breathing If you notice these less serious side effects, talk with your doctor: · Constipation or diarrhea 
· Headache · Tiredness or weakness If you notice other side effects that you think are caused by this medicine, tell your doctor. Call your doctor for medical advice about side effects. You may report side effects to FDA at 0-948-GKS-3054 © 2016 [address] is for End User's use only and may not be sold, redistributed or otherwise used for commercial purposes. The above information is an  only. It is not intended as medical advice for individual conditions or treatments. Talk to your doctor, nurse or pharmacist before following any medical regimen to see if it is safe and effective for you.

## 2017-01-31 ENCOUNTER — APPOINTMENT (OUTPATIENT)
Dept: GENERAL RADIOLOGY | Age: 77
DRG: 463 | End: 2017-01-31
Attending: RADIOLOGY
Payer: MEDICARE

## 2017-01-31 ENCOUNTER — APPOINTMENT (OUTPATIENT)
Dept: INTERVENTIONAL RADIOLOGY/VASCULAR | Age: 77
DRG: 463 | End: 2017-01-31
Attending: INTERNAL MEDICINE
Payer: MEDICARE

## 2017-01-31 LAB
ANION GAP BLD CALC-SCNC: 6 MMOL/L (ref 5–15)
ARTERIAL PATENCY WRIST A: YES
ATRIAL RATE: 100 BPM
BASE DEFICIT BLDA-SCNC: 6.6 MMOL/L
BDY SITE: ABNORMAL
BUN SERPL-MCNC: 18 MG/DL (ref 6–20)
BUN/CREAT SERPL: 25 (ref 12–20)
CALCIUM SERPL-MCNC: 7.9 MG/DL (ref 8.5–10.1)
CALCULATED R AXIS, ECG10: -34 DEGREES
CALCULATED T AXIS, ECG11: 128 DEGREES
CHLORIDE SERPL-SCNC: 110 MMOL/L (ref 97–108)
CO2 SERPL-SCNC: 26 MMOL/L (ref 21–32)
CREAT SERPL-MCNC: 0.72 MG/DL (ref 0.55–1.02)
DIAGNOSIS, 93000: NORMAL
GAS FLOW.O2 O2 DELIVERY SYS: 5 L/MIN
GLUCOSE SERPL-MCNC: 132 MG/DL (ref 65–100)
HCO3 BLDA-SCNC: 19 MMOL/L (ref 22–26)
HCT VFR BLD AUTO: 25.4 % (ref 35–47)
HCT VFR BLD AUTO: 26.2 % (ref 35–47)
HCT VFR BLD AUTO: 28.6 % (ref 35–47)
HGB BLD-MCNC: 7.8 G/DL (ref 11.5–16)
HGB BLD-MCNC: 8.4 G/DL (ref 11.5–16)
HGB BLD-MCNC: 9.2 G/DL (ref 11.5–16)
MAGNESIUM SERPL-MCNC: 2 MG/DL (ref 1.6–2.4)
P-R INTERVAL, ECG05: 128 MS
PCO2 BLDA: 37 MMHG (ref 35–45)
PH BLDA: 7.32 [PH] (ref 7.35–7.45)
PHOSPHATE SERPL-MCNC: 5 MG/DL (ref 2.6–4.7)
PO2 BLDA: 67 MMHG (ref 80–100)
POTASSIUM SERPL-SCNC: 4.6 MMOL/L (ref 3.5–5.1)
Q-T INTERVAL, ECG07: 434 MS
QRS DURATION, ECG06: 170 MS
QTC CALCULATION (BEZET), ECG08: 559 MS
SAO2 % BLD: 92 % (ref 92–97)
SAO2% DEVICE SAO2% SENSOR NAME: ABNORMAL
SERVICE CMNT-IMP: ABNORMAL
SODIUM SERPL-SCNC: 142 MMOL/L (ref 136–145)
SPECIMEN SITE: ABNORMAL
TROPONIN I SERPL-MCNC: 2 NG/ML
TROPONIN I SERPL-MCNC: 3.07 NG/ML
VENTRICULAR RATE, ECG03: 100 BPM

## 2017-01-31 PROCEDURE — 65610000006 HC RM INTENSIVE CARE

## 2017-01-31 PROCEDURE — 71010 XR CHEST PORT: CPT

## 2017-01-31 PROCEDURE — 30233N1 TRANSFUSION OF NONAUTOLOGOUS RED BLOOD CELLS INTO PERIPHERAL VEIN, PERCUTANEOUS APPROACH: ICD-10-PCS | Performed by: INTERNAL MEDICINE

## 2017-01-31 PROCEDURE — 74011250637 HC RX REV CODE- 250/637: Performed by: ANESTHESIOLOGY

## 2017-01-31 PROCEDURE — 76450000000

## 2017-01-31 PROCEDURE — C1751 CATH, INF, PER/CENT/MIDLINE: HCPCS

## 2017-01-31 PROCEDURE — 74011250636 HC RX REV CODE- 250/636: Performed by: INTERNAL MEDICINE

## 2017-01-31 PROCEDURE — 84100 ASSAY OF PHOSPHORUS: CPT | Performed by: INTERNAL MEDICINE

## 2017-01-31 PROCEDURE — 80048 BASIC METABOLIC PNL TOTAL CA: CPT | Performed by: INTERNAL MEDICINE

## 2017-01-31 PROCEDURE — 85018 HEMOGLOBIN: CPT | Performed by: INTERNAL MEDICINE

## 2017-01-31 PROCEDURE — 74011000250 HC RX REV CODE- 250: Performed by: INTERNAL MEDICINE

## 2017-01-31 PROCEDURE — 76937 US GUIDE VASCULAR ACCESS: CPT

## 2017-01-31 PROCEDURE — C1894 INTRO/SHEATH, NON-LASER: HCPCS

## 2017-01-31 PROCEDURE — 77010033678 HC OXYGEN DAILY

## 2017-01-31 PROCEDURE — 83735 ASSAY OF MAGNESIUM: CPT | Performed by: INTERNAL MEDICINE

## 2017-01-31 PROCEDURE — 36415 COLL VENOUS BLD VENIPUNCTURE: CPT | Performed by: INTERNAL MEDICINE

## 2017-01-31 PROCEDURE — 74011250636 HC RX REV CODE- 250/636: Performed by: NURSE PRACTITIONER

## 2017-01-31 PROCEDURE — 36430 TRANSFUSION BLD/BLD COMPNT: CPT

## 2017-01-31 PROCEDURE — P9016 RBC LEUKOCYTES REDUCED: HCPCS | Performed by: ORTHOPAEDIC SURGERY

## 2017-01-31 PROCEDURE — 74011250637 HC RX REV CODE- 250/637: Performed by: NURSE PRACTITIONER

## 2017-01-31 PROCEDURE — 84484 ASSAY OF TROPONIN QUANT: CPT | Performed by: ORTHOPAEDIC SURGERY

## 2017-01-31 PROCEDURE — 77030029131 HC ADMN ST IV BLD N DEHP ICUM -B

## 2017-01-31 RX ORDER — CEFAZOLIN SODIUM IN 0.9 % NACL 2 G/50 ML
2 INTRAVENOUS SOLUTION, PIGGYBACK (ML) INTRAVENOUS EVERY 8 HOURS
Status: COMPLETED | OUTPATIENT
Start: 2017-01-31 | End: 2017-02-04

## 2017-01-31 RX ORDER — SODIUM CHLORIDE 9 MG/ML
250 INJECTION, SOLUTION INTRAVENOUS AS NEEDED
Status: DISCONTINUED | OUTPATIENT
Start: 2017-01-31 | End: 2017-02-16 | Stop reason: HOSPADM

## 2017-01-31 RX ADMIN — LINACLOTIDE 290 MCG: 145 CAPSULE, GELATIN COATED ORAL at 08:00

## 2017-01-31 RX ADMIN — ACETAMINOPHEN 650 MG: 325 TABLET ORAL at 05:19

## 2017-01-31 RX ADMIN — ACETAMINOPHEN 650 MG: 325 TABLET ORAL at 11:51

## 2017-01-31 RX ADMIN — FENTANYL CITRATE 25 MCG: 50 INJECTION, SOLUTION INTRAMUSCULAR; INTRAVENOUS at 16:08

## 2017-01-31 RX ADMIN — Medication 1 TABLET: at 08:01

## 2017-01-31 RX ADMIN — OXYCODONE HYDROCHLORIDE 10 MG: 5 TABLET ORAL at 05:45

## 2017-01-31 RX ADMIN — FAMOTIDINE 20 MG: 10 INJECTION, SOLUTION INTRAVENOUS at 20:09

## 2017-01-31 RX ADMIN — CALCIUM CARBONATE (ANTACID) CHEW TAB 500 MG 400 MG: 500 CHEW TAB at 17:48

## 2017-01-31 RX ADMIN — METHYLPREDNISOLONE SODIUM SUCCINATE 40 MG: 40 INJECTION, POWDER, FOR SOLUTION INTRAMUSCULAR; INTRAVENOUS at 21:53

## 2017-01-31 RX ADMIN — ACETAMINOPHEN 650 MG: 325 TABLET ORAL at 17:48

## 2017-01-31 RX ADMIN — Medication 10 ML: at 05:23

## 2017-01-31 RX ADMIN — FAMOTIDINE 20 MG: 10 INJECTION, SOLUTION INTRAVENOUS at 08:01

## 2017-01-31 RX ADMIN — VANCOMYCIN HYDROCHLORIDE 1000 MG: 1 INJECTION, POWDER, LYOPHILIZED, FOR SOLUTION INTRAVENOUS at 17:47

## 2017-01-31 RX ADMIN — CEFAZOLIN 2 G: 1 INJECTION, POWDER, FOR SOLUTION INTRAMUSCULAR; INTRAVENOUS; PARENTERAL at 04:13

## 2017-01-31 RX ADMIN — OXYCODONE HYDROCHLORIDE 15 MG: 5 TABLET ORAL at 15:04

## 2017-01-31 RX ADMIN — CEFAZOLIN 2 G: 1 INJECTION, POWDER, FOR SOLUTION INTRAMUSCULAR; INTRAVENOUS; PARENTERAL at 11:53

## 2017-01-31 RX ADMIN — UMECLIDINIUM 1 PUFF: 62.5 AEROSOL, POWDER ORAL at 09:00

## 2017-01-31 RX ADMIN — OXYCODONE HYDROCHLORIDE 15 MG: 5 TABLET ORAL at 09:48

## 2017-01-31 RX ADMIN — CALCIUM CARBONATE (ANTACID) CHEW TAB 500 MG 400 MG: 500 CHEW TAB at 08:01

## 2017-01-31 RX ADMIN — OXYCODONE HYDROCHLORIDE 10 MG: 5 TABLET ORAL at 03:01

## 2017-01-31 RX ADMIN — OXYCODONE HYDROCHLORIDE 10 MG: 5 TABLET ORAL at 20:57

## 2017-01-31 RX ADMIN — OXYCODONE HYDROCHLORIDE 15 MG: 5 TABLET ORAL at 18:08

## 2017-01-31 RX ADMIN — METHYLPREDNISOLONE SODIUM SUCCINATE 40 MG: 40 INJECTION, POWDER, FOR SOLUTION INTRAMUSCULAR; INTRAVENOUS at 05:22

## 2017-01-31 RX ADMIN — METHADONE HYDROCHLORIDE 5 MG: 10 TABLET ORAL at 08:01

## 2017-01-31 RX ADMIN — METHADONE HYDROCHLORIDE 5 MG: 10 TABLET ORAL at 17:47

## 2017-01-31 RX ADMIN — Medication 1 TABLET: at 17:48

## 2017-01-31 RX ADMIN — METHADONE HYDROCHLORIDE 5 MG: 10 TABLET ORAL at 03:01

## 2017-01-31 RX ADMIN — Medication 10 ML: at 21:54

## 2017-01-31 RX ADMIN — POLYETHYLENE GLYCOL 3350 17 G: 17 POWDER, FOR SOLUTION ORAL at 08:01

## 2017-01-31 RX ADMIN — CELECOXIB 200 MG: 100 CAPSULE ORAL at 11:52

## 2017-01-31 RX ADMIN — CELECOXIB 200 MG: 100 CAPSULE ORAL at 22:42

## 2017-01-31 RX ADMIN — METHYLPREDNISOLONE SODIUM SUCCINATE 40 MG: 40 INJECTION, POWDER, FOR SOLUTION INTRAMUSCULAR; INTRAVENOUS at 11:51

## 2017-01-31 RX ADMIN — CEFAZOLIN 2 G: 1 INJECTION, POWDER, FOR SOLUTION INTRAMUSCULAR; INTRAVENOUS; PARENTERAL at 20:09

## 2017-01-31 NOTE — ADVANCED PRACTICE NURSE
CDMP query: No cardiogenic shock. Pressor support due to hypovolemia from excessive post op bleeding.

## 2017-01-31 NOTE — PROGRESS NOTES
Occupational Therapy Note:  Orders acknowledged and chart reviewed. Patient requiring will for BP support. Will hold per recommendation of the team.  Will continue to follow.     Juan Waddell OTR/L

## 2017-01-31 NOTE — PROGRESS NOTES
0700: [facility] SB[facility], Intake/Output, MAR, Recent Results and Cardiac Rhythm NSR/BBB reviewed. 1030: Suzie, wound care RN, into evalute pt's L hip site. Dressing taken down, which is saturated with blood. Staples and incision intact. When pressure is applied to posterior of the incision the proximal and medial portion of the incision oozes blood. Dr. Merritt Collier NP notified and examined. Will follow up with Dr. Cinthia Marie. Gauze and abd pads placed on pt's incision. Ice pack applied. Pt turned off L hip. 1130: [facility], Dr. Merritt Collier NP, into replace dressing with a spika dressing. Old dressing taken down and new guaze, abd pads and 6in ace wrap applied. \    1230: Pt's H&H results. 9.2 to 7.8hgb. Notified Dr. Krysten Swan. Orders received to transfuse 2 units of PRBCs. Madi gtts at 30 currently. Will continue to monitor closely. 1900: [facility] SBAR, Kardex, Procedure Summary, Intake/Output, MAR, Recent Results and Cardiac Rhythm NSR/BBB reviewed.

## 2017-01-31 NOTE — PROGRESS NOTES
TOTAL HIP REVISION ARTHROPLASTY DAILY NOTE     ASSESSMENT / PLAN :   1. Pain Control : Stable, Palliative medicine consult, on Methadone, pain control may be an issue. Dilaudid allergy. 2. Overnight Issues : Stable, pressor support, CTA negative  3. Wound or incisional issue : Marked drainage following surgery, seems to be improving. Wound care consult for an incision wound vac if bleeding continues. 4. Therapy / Weight Bearing Recommendations : WBAT, hip precautions. Will need abduction pillow when in bed. High risk of dislocation, deficient abductors noted. 5. DVT Prophylaxis : Mechanical and Lovenox - Hold if bleeding continues. 6. Disposition : Likely rehab vs snf palcement  7. Medical Concerns : Cardiac vs Pulmonary event during surgery, unclear etiology, appreciate ICU / intensivist support and management. Smoking, nicotine patch. Avoid transfusion if possible, stable hb.   8. Comments : Stable, mobilize once medically improving. POD  1 Day Post-Op s/p Procedure(s):  LEFT TOTAL HIP ARTHROPLASTY CONVERSION     SUBJECTIVE :     Patient notes the following issues : moderate pain, questions answered. OBJECTIVE :     Patient Vitals for the past 12 hrs:   BP Temp Pulse Resp SpO2   01/31/17 0515 99/53 - 63 - 100 %   01/31/17 0400 98/49 98.5 °F (36.9 °C) 64 20 100 %   01/31/17 0302 (!) 89/39 - 63 - -   01/31/17 0300 (!) 89/39 - 62 - 100 %   01/31/17 0100 91/54 - 64 - 100 %   01/31/17 0000 109/62 98.6 °F (37 °C) 66 18 100 %   01/30/17 2347 - - 67 - -   01/30/17 2200 100/76 - 76 20 100 %   01/30/17 2043 119/72 - 65 - -   01/30/17 2030 119/72 - 66 21 100 %   01/30/17 2005 119/60 - - - -   01/30/17 2000 98/44 98.5 °F (36.9 °C) 64 13 100 %   01/30/17 1945 121/59 - 66 13 100 %       Alert and oriented x3. Examination of the left Hip reveals that the dressing is clean, dry and intact.  There is some dried blood over the dressing exterior and ice pack  Motor Exam is intact and normal  Sensation is intact to light touch. No calf pain. Labs:  Recent Labs      01/31/17   0517   01/30/17   1519  01/30/17   1426   HGB  9.2*   < >   --   11.1*   HCT  28.6*   < >   --   35.1   INR   --    --   1.1   --    NA   --    --    --   142   K   --    --    --   4.2   CL   --    --    --   109*   CO2   --    --    --   25   BUN   --    --    --   16   CREA   --    --    --   0.53*   GLU   --    --    --   110*    < > = values in this interval not displayed.        CULTURES :  No results found for: [facility]  Lab Results   Component Value Date/Time    Culture result: PENDING 01/30/2017 02:14 PM    Culture result: PENDING 01/30/2017 02:12 PM    Culture result: PENDING 01/30/2017 01:20 PM    Culture result: PENDING 01/30/2017 01:20 PM    Culture result: PENDING 01/30/2017 01:11 PM    Culture result: PENDING 01/30/2017 01:11 PM    Culture result: PENDING 01/30/2017 01:11 PM          Patient mobility           Elizabeth Yu MD  Cell [phone]  Nurse [address] [place] [address] [phone]  Medical Staff : Suhbham Avendano / Jalyn Chase  Office : [phone] ext  [phone]

## 2017-01-31 NOTE — PROGRESS NOTES
VAT    Pt chart reviewed for PICC line order. Spoke with pt's RN, and with Dr Melvin Conte. Per Dr Melvin Conte, he would like central access as a precaution in case pt does not improve or declines on current pressers. Also pt's current plan is most likely SNF v rehab without long term abx. Based on this, VAT's recommendation is a triple lumen IJ for pt's current status. Order changed under Dr Mlevin Conte, VAT will follow along as necessary.

## 2017-01-31 NOTE — CARDIO/PULMONARY
Cardiac Rehab: 67 yo female admitted from SNF for hip surgery redo. S/P Lt total hip arthroplasty conversion (7/45), complicated by hypotension & bradycardia, requiring vasopressors. Per Dr Melvin Conte, dx includes: Shock, blood loss anemia, and NSTEMI, with peak troponin 3.07. Cardiac hx significant for chronic CHF. LVEF 35-40% by echo (1/30/17), with mild MR,     1/31/2017 Received consult for cardiac teaching on LV dysfunction and chronic CHF. Patient in CCU is not amenable to teaching at this time. Info attached to AVS on MI and avoiding CHF triggers. 2/2/2017 See CR nurse note by ANGELES Gibson. Pt received CHF packet & teaching. Lives with oldest son in [place]. Does not plan to have cath during this admission. Not eligible for outpatient cardiac rehab at this time. Wound care following for continued hip wound drainage. Dr Anisa Herring recommending SNF.   2/9/2017 See CR nurse notes by ANGELES Gibson.  2/11/2017 Per Dr Anisa Herring, plan to return to OR for I&D of Lt hip wound on Monday, if wound continues to drain. Met with patient, who was sitting up in bed on 4th floor med-surg unit, with FAREED wound drainage system to left hip. Purpose of visit was to answer questions and reinforce previous cardiac teaching. Pt indicated awareness of problem with wound drainage & plan for possible I&D on Monday. Also aware of plan for inpatient rehab at [facility]. Core Measures:  ACE/ARB - started lisinopril  BB - carvedilol   Statin - started atorvastatin  ASA - Restarted 81 mg (2/3/17). Prior to admission meds also included: fish oil. Diet Education: 1/31/2017 Hgb A1c 5.2 (1/11/17). BMI 22.6. Currently on Regular High Protein/Calorie diet, with Ensure Enlive supplements TID. 2/3/2017 Handouts on Low Sodium diet and Sodium Food/Beverage Zones were provided. 2/11/2017 Pt did not eat her lunch lasagna. Encouraged pt to at least drink her Ensure Enlive for quality protein intake. Plan for SNF where pt will receive prepared meals. Fluid Intake Education:   Daily Weights: 2/3/2017 Pt has a scale at home. 2/11/2017 Pt indicated awareness daily wts are being done to monitor for fluid wt gain. Plan for SNF where staff will monitor pt's fluid balance. Smoking Cessation: Current smoker. Info attached to AVS on smoking cessation. Medication Education: 2/3/2017 No difficulties obtaining meds have been reported. Pt uses weekly pill box. 2/11/2017 Reinforced med compliance. Plan for SNF where staff will administer all pt's meds.     New Scheduled PO Meds this admission: 2/3/2017 Pt has received teaching on new PO meds: lisinopril & atorvastatin. Concern for Knowledge Deficit: 2/11/2017 Pt verbalized basic understanding. She had no questions. Would benefit from reinforcement, especially on purpose of meds. 1.  During this hospital stay did staff take your preferences and those of your family (or caregiver) into account in deciding your individual heart failure needs, and in deciding when you left the hospital?  2.  Do you have a good understanding of the things you are responsible for in managing heart failure?   3.  Do you clearly understand the purpose for taking each medication?

## 2017-01-31 NOTE — PROGRESS NOTES
Palliative Medicine Consult  [phone]    Patient Name: Jp Vidal  YOB: 1940      Date of Initial Consult: 1-30-17  Reason for Consult: care decisions, end stage disease, overwhelming symptoms, psychosocial distress, chronic pain, HTN, GERD  Requesting Physician: Dr. Matilde Melton   Primary Care Physician: Guillaume Moser MD          Summary:   Jp Vidal is a 68y.o. year old with a past history of CAD, psoriasis, COPD,  CHF, LBBB who was admitted on 1/30/2017 from home with a diagnosis of left hip repair arthroplasty. Current medical issues leading to Palliative Medicine involvement include: s/p left hip repair, pain in the left hip, pain in the spine, weakness, hypotension, bradycardia. Palliative Diagnoses:   1. Weakness   2. Pain in the spine  3. Pain in the left hip  4. Hypotension  5. Bradycardia        Plan:     1. Pain/symptom management: continue methadone 5 mg Q 8 hrs, this is her long standing pain medication for degenerative spine disease for which she sees a pain specialist. Pt reports pain in her left hip 7-8/10 and she is s/p left hip surgery. She reports her stable pain in the spine thoracic and lumbar is 5-6/10  2. Tramadol is used prn for breakthrough pain, continue with tramadol  3. She is on pressors, had a NSTEMI in the OR  4. Continue miralax and pericolace   5. Coordination of care: Palliative IDT, primary nurse, case management  6. Psychosocial and emotional aspects of care: provided emotional support,         active listening and non-abandonment  7.  Disposition: to be determined      Goals of Care:          TREATMENT PREFERENCES:   Code Status: Full Code    Advance Care Planning:  Advance Care Planning 1/30/2017   Patient's Healthcare Decision Maker is: Legal Next of Kin   Primary Decision Maker Name [id number]   Primary Decision Maker Phone Number [phone]   Confirm Advance Directive None       Other:    The palliative care team has discussed with patient / health care proxy about goals of care / treatment preferences for patient.  [====Goals of Care====]           Resuscitation Status: Full Code   Durable DNR Status: na  Initial Consult Note routed to PCP? [x] Yes   [] No    [] No PCP listed          Thank you for including Palliative Medicine in this patient's care. Bia Vargas NP           HISTORY:     History obtained from: chart, physician, nursing    CHIEF COMPLAINT: pain int he left hip    HPI/SUBJECTIVE: pt is with post op pain, hypotension, degenerative spine disease with chronic pain syndrome. The patient is: [x] Verbal / [] Nonverbal / [] Unresponsive                FUNCTIONAL ASSESSMENT:     Palliative Performance Scale (PPS):  PPS: 50         PSYCHOSOCIAL/SPIRITUAL SCREENING:     Advance Care Planning:  Advance Care Planning 1/30/2017   Patient's Healthcare Decision Maker is: Legal Next of Allan Schmitz   Primary Decision Maker Name [id number]   Primary Decision Maker Phone Number [phone]   Confirm Advance Directive None        Any spiritual / Voodoo concerns:  [] Yes /  [x] No    Caregiver Burnout:  [] Yes /  [x] No /  [] No Caregiver Present      Anticipatory grief assessment:   [x] Normal  / [] Maladaptive       ESAS Anxiety: Anxiety: 0    ESAS Depression: Depression: 0                 REVIEW OF SYSTEMS:     The following systems were [x] reviewed / [] unable to be reviewed  Systems: constitutional, ears/nose/mouth/throat, respiratory, gastrointestinal, musculoskeletal, neurologic, psychiatric, endocrine. Positive findings noted below.   Modified ESAS Completed by: provider   Fatigue: 5 Drowsiness: 0   Depression: 0 Pain: 6   Anxiety: 0 Nausea: 0   Anorexia: 3 Dyspnea: 0     Constipation: No                    Functional Assessment Staging (FAST) for dementia: na                    Confusion Assessment Method Diagnostic Algorithm (CAM):    CAM Score: 0    [++++ Clinical Pain Assessment++++]  Location (including laterality):  Quality: Severity:  Acuity (acute/chronic): Tolerable (yes/no):  Etiology, if known:  Impact (functional, psychological): Other:  [++++ Clinical Pain Assessment++++]    Clinical Pain Assessment (nonverbal scale for severity on nonverbal patients):   [++++ Clinical Pain Assessment++++]  [++++Pain Severity++++]: Pain: 6  [++++Pain Character++++]:   [++++Pain Duration++++]:   [++++Pain Effect++++]:   [++++Pain Factors++++]:   [++++Pain Frequency++++]:   [++++Pain Location++++]:   [++++ Clinical Pain Assessment++++]       PHYSICAL EXAM:     Wt Readings from Last 3 Encounters:   01/30/17 135 lb 9.3 oz (61.5 kg)   01/11/17 132 lb 4.4 oz (60 kg)   04/22/14 120 lb (54.4 kg)     Blood pressure 138/59, pulse (!) 56, temperature 98.4 °F (36.9 °C), resp. rate 20, height 5' 5\" (1.651 m), weight 135 lb 9.3 oz (61.5 kg), SpO2 100 %.   Pain:  Pain Scale 1: Numeric (0 - 10)  Pain Intensity 1: 0  Pain Onset 1: surgical  Pain Location 1: Hip  Pain Orientation 1: Left  Pain Description 1: Aching  Pain Intervention(s) 1: Medication (see MAR)  Last bowel movement:         Last bowel movement: none    Constitutional: pt is awake and alert, she complains of pain mainly int he left hip  Eyes: pupils equal, anicteric  ENMT: no nasal discharge, moist mucous membranes  Cardiovascular: regular rhythm, distal pulses intact  Respiratory: breathing not labored, symmetric  Gastrointestinal: soft non-tender, +bowel sounds  Musculoskeletal: no deformity, no tenderness to palpation  Skin: warm, dry  Neurologic:pt is able to follow commands, pt is moving all extremities  Psychiatric: full affect         HISTORY:     Active Problems:    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)      Failed total hip arthroplasty ([facility] (1/30/2017)      Past Medical History   Diagnosis Date    Autoimmune disease ([facility]      psoriasis    Beta-blocker therapy     CAD (coronary artery disease)      non ischemic cardiomyopathy    Chronic obstructive pulmonary disease ([facility]     Chronic pain      curvature of spinie    GERD (gastroesophageal reflux disease)     Heart failure (HCC)     Hypercholesterolemia     Hypertension     Ill-defined condition      hx aortic stenosis & mitral regurg    Smoker     Thromboembolus ([facility]      right leg after hip surgery      Past Surgical History   Procedure Laterality Date    Hx gyn       miscarriage    Pr abdomen surgery proc unlisted  2010     Gallbladder    Pr abdomen surgery proc unlisted  2000     hiatal hernia repair    Hx orthopaedic       Right Knee, Left Foot    Hx orthopaedic  04/05/2015     left hip replacement 2015    Hx orthopaedic  06/24/2016     rmoval bone fragment left hip    Hx orthopaedic  2013     right hip replacement    Hx orthopaedic  2001     right knee replacement    Hx heent       Tonsilectomy    Hx heent       cataract removed bilateral eyes      Family History   Problem Relation Age of Onset    Cancer Mother     Stroke Father       Social History   Substance Use Topics    Smoking status: Current Every Day Smoker     Packs/day: 0.50     Years: 60.00    Smokeless tobacco: Never Used    Alcohol use No      Comment: 0     Allergies   Allergen Reactions    [place] Angioedema    Codeine Other (comments)     \"Years ago gave me hives but lately I havetaken it without problem\"    Dilaudid [Hydromorphone (Bulk)] Shortness of Breath    Lyrica [Pregabalin] Nausea and Vomiting      Current Facility-Administered Medications   Medication Dose Route Frequency    celecoxib (CELEBREX) capsule 200 mg  200 mg Oral Q12H    acetaminophen (TYLENOL) tablet 650 mg  650 mg Oral Q6H    albuterol (PROVENTIL HFA, VENTOLIN HFA, PROAIR HFA) inhaler 2 Puff  2 Puff Inhalation Q6H PRN    calcium carbonate (TUMS) chewable tablet 400 mg [elemental]  400 mg Oral TID WITH MEALS    carvedilol (COREG) tablet 3.125 mg  3.125 mg Oral BID WITH MEALS    linaclotide (LINZESS) capsule 290 mcg  290 mcg Oral ACB    methadone (DOLOPHINE) tablet 5 mg  5 mg Oral Q8H    SUMAtriptan (IMITREX) tablet 50 mg  50 mg Oral ONCE PRN    umeclidinium (INCRUSE ELLIPTA) 62.5 mcg/actuation  1 Puff Inhalation DAILY    zolpidem (AMBIEN) tablet 5 mg  5 mg Oral QHS PRN    0.9% sodium chloride infusion  75 mL/hr IntraVENous CONTINUOUS    sodium chloride (NS) flush 5-10 mL  5-10 mL IntraVENous Q8H    sodium chloride (NS) flush 5-10 mL  5-10 mL IntraVENous PRN    oxyCODONE IR (ROXICODONE) tablet 10 mg  10 mg Oral Q3H PRN    naloxone (NARCAN) injection 0.4 mg  0.4 mg IntraVENous PRN    ondansetron (ZOFRAN) injection 4 mg  4 mg IntraVENous Q4H PRN    diphenhydrAMINE (BENADRYL) injection 12.5 mg  12.5 mg IntraVENous Q6H PRN    senna-docusate (PERICOLACE) 8.6-50 mg per tablet 1 Tab  1 Tab Oral BID    polyethylene glycol (MIRALAX) packet 17 g  17 g Oral DAILY    [START ON 2/1/2017] bisacodyl (DULCOLAX) suppository 10 mg  10 mg Rectal DAILY PRN    oxyCODONE IR (ROXICODONE) tablet 15 mg  15 mg Oral Q3H PRN    ceFAZolin in 0.9% NS (ANCEF) IVPB soln 2 g  2 g IntraVENous Q8H    enoxaparin (LOVENOX) injection 40 mg  40 mg SubCUTAneous DAILY    traMADol (ULTRAM) tablet 100 mg  100 mg Oral Q6H PRN    PHENYLephrine (NEOSYNEPHRINE) 30,000 mcg in 0.9% sodium chloride 250 mL infusion   mcg/min IntraVENous TITRATE    methylPREDNISolone (PF) (SOLU-MEDROL) injection 40 mg  40 mg IntraVENous Q6H    fentaNYL citrate (PF) injection 25 mcg  25 mcg IntraVENous Q4H PRN    famotidine (PF) (PEPCID) 20 mg in sodium chloride 0.9 % 10 mL injection  20 mg IntraVENous Q12H          LAB AND IMAGING FINDINGS:     Lab Results   Component Value Date/Time    WBC 3.7 01/30/2017 02:26 PM    HGB 9.2 01/31/2017 05:17 AM    PLATELET 914 02/68/3094 02:26 PM     Lab Results   Component Value Date/Time    Sodium 142 01/31/2017 08:11 AM    Potassium 4.6 01/31/2017 08:11 AM    Chloride 110 01/31/2017 08:11 AM    CO2 26 01/31/2017 08:11 AM    BUN 18 01/31/2017 08:11 AM Creatinine 0.72 01/31/2017 08:11 AM    Calcium 7.9 01/31/2017 08:11 AM    Magnesium 2.0 01/31/2017 08:11 AM    Phosphorus 5.0 01/31/2017 08:11 AM      Lab Results   Component Value Date/Time    AST 19 01/30/2017 02:26 PM    Alk. phosphatase 61 01/30/2017 02:26 PM    Alk. phosphatase 60 01/30/2017 02:26 PM    Protein, total 5.2 01/30/2017 02:26 PM    Albumin 2.2 01/30/2017 02:26 PM    Globulin 3.0 01/30/2017 02:26 PM     Lab Results   Component Value Date/Time    INR 1.1 01/30/2017 03:19 PM    Prothrombin time 10.7 01/30/2017 03:19 PM    aPTT 22.8 01/30/2017 03:19 PM      No results found for: IRON, FE, TIBC, IBCT, PSAT, FERR   No results found for: PH, PCO2, PO2  No components found for: [place]   Lab Results   Component Value Date/Time    CK 99 01/30/2017 02:36 PM                Total time: 70  Counseling / coordination time: 45  > 50% counseling / coordination?: y      Prolonged service was provided for  []30 min   []75 min in face to face time in the presence of the patient. Time Start:   Time End:   Note: this can only be billed with 94965 (initial) or 58062 (follow up). If multiple start / stop times, list each separately.

## 2017-01-31 NOTE — PROGRESS NOTES
Problem: Surgical Pathway Day of Surgery  Goal: *No signs and symptoms of infection or wound complications  Outcome: Not Progressing Towards Goal  Variance: Patient Condition  Comments: Surgical wound is having to be reinforced every 2 hours due to excess drainage. Goal: *Hemodynamically stable  Outcome: Not Progressing Towards Goal  Variance: Patient Condition  Comments: Patient is on Madi to maintain blood pressure.

## 2017-01-31 NOTE — PROGRESS NOTES
Nutrition Assessment:    RECOMMENDATIONS/INTERVENTION(S):   Continue Regular, liberalized diet  Add Ensure Enlive TID--encourage to drink between meals  MVI daily if not already ordered  Continue to monitor appetite/PO intake, diet tolerance, and acceptance of oral supplement    ASSESSMENT:   Noted consult. Chart reviewed. This is a 69 yo female s/p left hip arthroplasty, possible NSTEMI in OR. Hx CAD, CHF, COPD, HTN. Diet advanced to Regular/High [place]/High Protein. Tolerating diet. Labs/Meds reviewed. IVF infusing, on pressor, Solumedrol. Mg repleted. No pressure injury, + surgical incision to left hip. Wound care consulted for possible wound vac. No recent, significant weight loss. Observed food on bedside table brought by family from outside of facility. SUBJECTIVE/OBJECTIVE:     Diet Order: Regular (High [place]rence/High Pro)  % Eaten:  No data found. Pertinent Medications: [x] Reviewed    Chemistries:  Lab Results   Component Value Date/Time    Sodium 142 01/31/2017 08:11 AM    Potassium 4.6 01/31/2017 08:11 AM    Chloride 110 01/31/2017 08:11 AM    CO2 26 01/31/2017 08:11 AM    Anion gap 6 01/31/2017 08:11 AM    Glucose 132 01/31/2017 08:11 AM    BUN 18 01/31/2017 08:11 AM    Creatinine 0.72 01/31/2017 08:11 AM    BUN/Creatinine ratio 25 01/31/2017 08:11 AM    GFR est AA >60 01/31/2017 08:11 AM    GFR est non-AA >60 01/31/2017 08:11 AM    Calcium 7.9 01/31/2017 08:11 AM    ALT 12 01/30/2017 02:26 PM    AST 19 01/30/2017 02:26 PM    Alk. phosphatase 61 01/30/2017 02:26 PM    Alk. phosphatase 60 01/30/2017 02:26 PM    Protein, total 5.2 01/30/2017 02:26 PM    Albumin 2.2 01/30/2017 02:26 PM    Globulin 3.0 01/30/2017 02:26 PM    A-G Ratio 0.7 01/30/2017 02:26 PM      Anthropometrics: Height: 5' 5\" (165.1 cm) Weight: 61.5 kg (135 lb 9.3 oz)    IBW (%IBW): 56.8 kg (125 lb 3.5 oz) ( ) UBW (%UBW):   (  %)    BMI: Body mass index is 22.56 kg/(m^2).     This BMI is indicative of:   [x] Underweight for age [] Normal    [] Overweight    []  Obesity    []  Extreme Obesity (BMI>40)  Estimated Nutrition Needs (Based on): 1438 Kcals/day (BMR (1106) x 1.3 AF ) , 74 g (1.2 g/Kg body wt) Protein  Carbohydrate: At Least 130 g/day  Fluids: 1450 mL/day    Last BM: 1/29   [x]Active     []Hyperactive  []Hypoactive       [] Absent   BS  Skin:    [] Intact   [x] Incision--left hip incision  [] Breakdown   [] DTI   [] Tears/Excoriation/Abrasion  []Edema [] Other: Wt Readings from Last 30 Encounters:   01/30/17 61.5 kg (135 lb 9.3 oz)   01/11/17 60 kg (132 lb 4.4 oz)   04/22/14 54.4 kg (120 lb)      NUTRITION DIAGNOSES:   Problem:  Increased protein needs     Etiology: related to post-op wound healing     Signs/Symptoms: as evidenced by s/p left hip arthroplasty      NUTRITION INTERVENTIONS:  Meals/Snacks: General/healthful diet   Supplements: Commercial supplement              GOAL:   Pt will consume >50% meals and supplements in next 3-5 days    Cultural, Hinduism, or Ethnic Dietary Needs: None     LEARNING NEEDS (Diet, Food/Nutrient-Drug Interaction):    [x] None Identified   [] Identified and Education Provided/Documented   [] Identified and Pt declined/was not appropriate      [] Interdisciplinary Care Plan Reviewed/Documented    [x] Discharge Needs:  See d/c order    [] No Nutrition Related Discharge Needs    NUTRITION RISK:   Pt Is At Nutrition Risk  [x]     No Nutrition Risk Identified  []       PT SEEN FOR:    [x]  MD Consult: []Calorie Count      []Diabetic Diet Education        []Diet Education     []Electrolyte Management     [x]General Nutrition Management and Supplements     []Management of Tube Feeding     []TPN Recommendations    []  RN Referral:  []MST score >=2     []Enteral/Parenteral Nutrition PTA     []Pregnant: Gestational DM or Multigestation                 [] Pressure Ulcer  []  Low BMI  []  Length of Stay; Dysphagia Diet          Laine Lind, [address] [place] [address]   Pager [phone]

## 2017-01-31 NOTE — ANESTHESIA POSTPROCEDURE EVALUATION
Post-Anesthesia Evaluation and Assessment    Patient: Dariana Lopez MRN: [id number]  SSN: [id number]    YOB: 1940  Age: 68 y.o. Sex: female       Cardiovascular Function/Vital Signs  Visit Vitals    /59    Pulse 61    Temp 37.2 °C (99 °F)    Resp 18    Ht 5' 5\" (1.651 m)    Wt 61.5 kg (135 lb 9.3 oz)    SpO2 91%    BMI 22.56 kg/m2       Patient is status post spinal anesthesia for Procedure(s):  LEFT TOTAL HIP ARTHROPLASTY CONVERSION. Nausea/Vomiting: None    Postoperative hydration reviewed and adequate. Pain:  Pain Scale 1: Numeric (0 - 10) (01/31/17 1215)  Pain Intensity 1: 6 (01/31/17 1215)   Managed    Neurological Status:   Neuro (WDL): Within Defined Limits (01/30/17 1607)  Neuro  Neurologic State: Alert (01/31/17 1798)  Orientation Level: Oriented X4 (01/31/17 1979)  Cognition: Appropriate decision making; Appropriate for age attention/concentration; Appropriate safety awareness; Follows commands (01/31/17 3812)  Speech: Clear (01/31/17 5405)  Assessment L Pupil: Brisk;Round (01/31/17 0821)  Size L Pupil (mm): 3 (01/31/17 0821)  Assessment R Pupil: Brisk;Round (01/31/17 0821)  Size R Pupil (mm): 3 (01/31/17 0821)  LUE Motor Response: Purposeful (01/31/17 0821)  LLE Motor Response: Purposeful;Weak (01/31/17 0821)  RUE Motor Response: Purposeful (01/31/17 8925)  RLE Motor Response: Purposeful (01/31/17 0821)   At baseline    Mental Status and Level of Consciousness: Alert and oriented     Pulmonary Status:   O2 Device: Room air (01/31/17 1215)   Adequate oxygenation and airway patent    Complications related to anesthesia: None    Post-anesthesia assessment completed. Discussed events of surgery with patient.     Signed By: Joseph Jean DO     January 31, 2017

## 2017-01-31 NOTE — CDMP QUERY
1.  Please clarify if this patient is being treated/managed for:    =>Chronic systolic heart failure in the setting of history of non-ischemic cardiomyopathy with recent echocardiogram showing EF of 35-40 being treated with continuation of home medication of po carvedilol 3.25 mg bid  =>Other Explanation of clinical findings  =>Unable to Determine (no explanation of clinical findings)    The medical record reflects the following clinical findings, treatment, and risk factors:    Risk Factors: elderly female, history of non-ischemic cardiomyopathy, chart lists history of \"heart failure\"    Clinical Indicators: elderly female for elective hip revision. Chart lists history of non-ischemic cardiomyopathy and \"heart failure\". Echo of this admission shows EF of 35-40. Treatment: continuation of home medication of po carvedilol 3.25 mg po bid    Please clarify and document your clinical opinion in the progress notes and discharge summary including the definitive and/or presumptive diagnosis, (suspected or probable), related to the above clinical findings. Please include clinical findings supporting your diagnosis. Thanks for your time.     Armando Slade RN, BSN  [phone]

## 2017-01-31 NOTE — PROGRESS NOTES
Shift In Report:  Bedside and Verbal shift change report received from Sujatha Muro RN (offgoing nurse) by Yadira Ceron RN (oncoming). Report included the following information SBAR, Kardex, Intake/Output, MAR and Recent Results. Shift Summary:  1929: VS stable with patient on Madi. Patient assessed. Dressing saturated and reinforced at this time. Onofre Catheter placed per MD order. 2200: Patient went down to CT accompanied by RN. Patient tolerated procedure well. Dressing still in tact. VS remain stable on Madi.  0000: Spoke to Dr Nenita Looney regarding results of CT exam and Hgb. No new orders at this time. VS remain stable on Madi drip. Patient repositioned at this time. 0300: Patient repositioned. Dressing dry and intact with some breakthrough drainage. Patient medicated at this time with pain medication for pain 5/10. VS remain stable on Madi drip.  0500: Labs Drawn. Patient repositioned. VS remain stable. 0645: Dr Jennifer Rockwell at bedside. Received orders for Wound Care to come evaluate wound for possible wound vac placement if drainage increases. Also received order for add on troponin. Shift Out Report: Bedside and Verbal shift change report given to Sujatha Muro RN  (oncoming nurse) by Yadira Ceron RN (offgoing nurse). Report included the following information SBAR, Kardex, Intake/Output, MAR and Recent Results.

## 2017-01-31 NOTE — PROGRESS NOTES
I met with pt,her son ,and grandson to discuss future discharge planning. In the past ,pt was in SNF @ the [facility] in [facility]. Pt lives in [facility] which is near the [facility]. Pt wants to go for short-term rehab @ the [facility] @ the [facility]. Order with supporting clinicals and inpatient order faxed via [employer] to the [facility].   Gabi Patel

## 2017-01-31 NOTE — CDMP QUERY
2.    Please clarify if this patient is being treated/managed for:    =>Possible cardiogenic and/or hypovolemic shock in the setting of patient who is post-op elective hip revision and suspected possible NSTEMI being treated with admission to ICU and IV phenylephrine. =>Other Explanation of clinical findings  =>Unable to Determine (no explanation of clinical findings)    The medical record reflects the following clinical findings, treatment, and risk factors:    Risk Factors: elderly female, recent surgery with nurses notes of large amount of blood pooling under pad below dressing, post op bradycardia and hypotension. Suspected possible NSTEMI during surgery    Clinical Indicators: 69 y/o female for elective hip revision. Near the end of surgery physician notes reflect that \"further work was terminated due to bradycardia and hypotension, suspect possible infarct). Nurses notes in PACU reflect L hip dressing saturated with blood and large amount of blood pooled on pad under patient. Systolic BP 61'L to 07'U over 63'K diastolic. Heart rate upper 30's to low 40's. Treatment: Pt admitted to ICU, phenylephrine drip started at 5-50 ml to titrate    Please clarify and document your clinical opinion in the progress notes and discharge summary including the definitive and/or presumptive diagnosis, (suspected or probable), related to the above clinical findings. Please include clinical findings supporting your diagnosis. Thanks for your time.     Susan Anderson RN, BSN  [phone]

## 2017-01-31 NOTE — PROGRESS NOTES
Cardiology Progress Note    ICU      NAME:  Shelli Jean Baptiste   :   1940   MRN:   [id number]     Assessment/Plan:   1. NSTEMI/demand ischemia/hypovolemia secondary to blood loss: supportive care. ASA if ok with surgery. Cont on Madi for BP support. Holding BB due to hypotension. Trop 3.07.   2.  POD # 1:  L hip arthroplasty: per ortho. 3.  Hx of non ischemic CMP/Hx of systolic CHF. Not in acute decompensation: LVEF 35-40% I have asked for records from Dr Tammie Bland her cardiologist in [place]. 4. Hx of LBBB  5. Hx of Aortic sclerosis/MR       Pt personally seen and examined. Chart reviewed. Agree with advanced NP's history, exam and  A/P with changes/additons  Reviewed primary cardiolgist ( Dr Ceci Almaraz) records -     16 - Belkis nuc: No ischemia/WMA sec to LBBB/EF 37%  6/15/16 - ECHO - LVH/ EF 40-45%/Mild AS/Mild MR/Mild TR    Radha Esquivel MD, [facility]      Subjective:   Patient is a 68-year-old  female who was undergoing hip surgery when she started developing hypotension and bradycardia. Pressors were started. Her EKG was thought to have ST elevation by OR staff but review of her prior EKG indicates that she has had left bundle branch block with ST-T changes and her present EKG is similar to her prior EKGs. Patient is currently being weaned off pressors as her systolic blood pressures in the 120-130 range. She also does not complain of chest pain but is still recovering from her anesthesia. Unable to give detailed history. Her chart indicates that she has a history of nonischemic cardiomyopathy, aortic sclerosis.         Cardiac ROS: Patient denies any exertional chest pain, dyspnea, palpitations, syncope, orthopnea, edema or paroxysmal nocturnal dyspnea. Overnight events:  hgb 9.2  Remains on madi to keep MAP > 65.    Pain L hip  No chest pain  NO SOB  K 4.6  Mg+ 2.0     Previous Cardiac Eval  ECHO 17   Left ventricle: Systolic function was moderately reduced. Ejection  fraction was estimated in the range of 35 % to 40 %. Ventricular septum: There was dyssynergic motion. There was sigmoid septal  appearance. These changes are consistent with LBBB. Mitral valve: There was mild regurgitation. Aortic valve: Leaflets exhibited mild calcification, normal cuspal  separation, and sclerosis. Review of Systems: No nausea, indigestion, vomiting, pain, cough, sputum. No bleeding. Taking sips liquids. Objective:     Visit Vitals    /54 (BP 1 Location: Right arm, BP Patient Position: At rest)    Pulse (!) 59    Temp 98.4 °F (36.9 °C)    Resp 20    Ht 5' 5\" (1.651 m)    Wt 135 lb 9.3 oz (61.5 kg)    SpO2 100%    BMI 22.56 kg/m2    O2 Flow Rate (L/min): 3 l/min O2 Device: Nasal cannula    Temp (24hrs), Av.2 °F (36.8 °C), Min:97.5 °F (36.4 °C), Max:98.6 °F (37 °C)      701 - 1900  In: 118.8 [I.V.:118.8]  Out: -      190 -  0700  In: 4224.6 [I.V.:4224.6]  Out: 1430 [Urine:630]  TELE:  SR     General: AAOx3 cooperative, no acute distress. HEENT: Atraumatic. Pink and moist.  Anicteric sclerae. Neck : Supple, no thyromegaly. Lungs: CTA bilaterally. No wheezing/rhonchi/rales. Heart: Regular rhythm, no murmur, no rubs, no gallops. No JVD. No carotid bruits. Abdomen: Soft, non-distended, non-tender. + Bowel sounds. No bruits. Extremities: L hip dressing saturated with blood. TEDs on. No calf tenderness  Neurologic: Grossly intact. Alert and oriented X 3. No acute neurological distress. Psych: Good insight. Not anxious or agitated.       Care Plan discussed with:    Comments   Patient x    Family      RN x    Care Manager                    Consultant:          Data Review:     No lab exists for component: ITNL   Recent Labs      17   0811  17   1436   CPK   --   99   TROIQ  3.07*  0.04     Recent Labs      17   0811  17   0517  17   2330  17   1745  17   1426 01/30/17   1112   NA  142   --    --    --   142   --    --    K  4.6   --    --    --   4.2   --    --    CL  110*   --    --    --   109*   --    --    CO2  26   --    --    --   25   --    --    BUN  18   --    --    --   16   --    --    CREA  0.72   --    --    --   0.53*   --    --    GLU  132*   --    --    --   110*   --    --    PHOS  5.0*   --    --    --    --    --    --    MG  2.0   --    --    --   1.4*   --    --    ALB   --    --    --    --   2.2*   --   3.3*   WBC   --    --    --    --   3.7   --    --    HGB   --   9.2*  9.7*  10.4*  11.1*   < >   --    HCT   --   28.6*  30.9*  33.5*  35.1   < >   --    PLT   --    --    --    --   151   --    --     < > = values in this interval not displayed.      Recent Labs      01/30/17   1519   INR  1.1   PTP  10.7   APTT  22.8       Medications reviewed  Current Facility-Administered Medications   Medication Dose Route Frequency    celecoxib (CELEBREX) capsule 200 mg  200 mg Oral Q12H    acetaminophen (TYLENOL) tablet 650 mg  650 mg Oral Q6H    albuterol (PROVENTIL HFA, VENTOLIN HFA, PROAIR HFA) inhaler 2 Puff  2 Puff Inhalation Q6H PRN    calcium carbonate (TUMS) chewable tablet 400 mg [elemental]  400 mg Oral TID WITH MEALS    carvedilol (COREG) tablet 3.125 mg  3.125 mg Oral BID WITH MEALS    linaclotide (LINZESS) capsule 290 mcg  290 mcg Oral ACB    methadone (DOLOPHINE) tablet 5 mg  5 mg Oral Q8H    SUMAtriptan (IMITREX) tablet 50 mg  50 mg Oral ONCE PRN    umeclidinium (INCRUSE ELLIPTA) 62.5 mcg/actuation  1 Puff Inhalation DAILY    zolpidem (AMBIEN) tablet 5 mg  5 mg Oral QHS PRN    0.9% sodium chloride infusion  75 mL/hr IntraVENous CONTINUOUS    sodium chloride (NS) flush 5-10 mL  5-10 mL IntraVENous Q8H    sodium chloride (NS) flush 5-10 mL  5-10 mL IntraVENous PRN    oxyCODONE IR (ROXICODONE) tablet 10 mg  10 mg Oral Q3H PRN    naloxone (NARCAN) injection 0.4 mg  0.4 mg IntraVENous PRN    ondansetron (ZOFRAN) injection 4 mg  4 mg IntraVENous Q4H PRN    diphenhydrAMINE (BENADRYL) injection 12.5 mg  12.5 mg IntraVENous Q6H PRN    senna-docusate (PERICOLACE) 8.6-50 mg per tablet 1 Tab  1 Tab Oral BID    polyethylene glycol (MIRALAX) packet 17 g  17 g Oral DAILY    [START ON 2/1/2017] bisacodyl (DULCOLAX) suppository 10 mg  10 mg Rectal DAILY PRN    morphine injection 2 mg  2 mg IntraVENous Q3H PRN    oxyCODONE IR (ROXICODONE) tablet 15 mg  15 mg Oral Q3H PRN    ceFAZolin in 0.9% NS (ANCEF) IVPB soln 2 g  2 g IntraVENous Q8H    enoxaparin (LOVENOX) injection 40 mg  40 mg SubCUTAneous DAILY    traMADol (ULTRAM) tablet 100 mg  100 mg Oral Q6H PRN    PHENYLephrine (NEOSYNEPHRINE) 30,000 mcg in 0.9% sodium chloride 250 mL infusion   mcg/min IntraVENous TITRATE    methylPREDNISolone (PF) (SOLU-MEDROL) injection 40 mg  40 mg IntraVENous Q6H    fentaNYL citrate (PF) injection 25 mcg  25 mcg IntraVENous Q4H PRN    famotidine (PF) (PEPCID) 20 mg in sodium chloride 0.9 % 10 mL injection  20 mg IntraVENous Q12H         Germania Dominguez NP

## 2017-01-31 NOTE — PROGRESS NOTES
Physical Therapy orders acknowledged, chart reviewed and discussed with nurse patient still on Madi, we will defer therapy for now until off Madi. We will continue to follow up with the patient for therapy. Thank you.

## 2017-01-31 NOTE — CDMP QUERY
3.    Thank you for documenting blood loss anemia for this patient. If possible, in your clinical opinion can this be further specified as     =>Acute blood loss anemia as expected in a post surgical patient being treated with transfusion of 2 units of packed red blood cells and serial monitoring of hemoglobin levels  =>Other Explanation of clinical findings  =>Unable to Determine (no explanation of clinical findings)    The medical record reflects the following clinical findings, treatment, and risk factors:    Risk Factors: Recent surgery for hip revision. Clinical Indicators: 69 y/o female for L hip revision. Nurses note in PACU notes \"L hip dressing saturated with blood and pad underneath patient noted with large amount of pooling blood\". Pre op hemoglobin note at 15. First post op reading is 11.1 with subsequent values of 10.4, 9.7, 9.2 and 7.8. Intensivist note of 1/31 states \"blood loss anemia will transfuse two units\"    Treatment: transfusion of 2 units of packed red blood cells, serial monitoring of hemoglobin levels. Please clarify and document your clinical opinion in the progress notes and discharge summary including the definitive and/or presumptive diagnosis, (suspected or probable), related to the above clinical findings. Please include clinical findings supporting your diagnosis. Thanks for your time.     Darci Nunes RN, BSN  [phone]

## 2017-01-31 NOTE — CONSULTS
[facility]  Pulmonary, Critical Care, and Sleep Medicine  Name: Oliva Mckeon MRN: [id number]   : 1940 Hospital: [address] [facility]   Date: 2017        Impression Plan   1. Shock: likely combination of hemorrhagic/hypovolemic and allergic drug reaction  2. Lip and tongue swelling: improving  3. Blood Loss Anemia  4. NSTEMI  5. Surgical site bleeding  6. 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 Hx of CHF  8. Hx of COPD  9. Hx of DVT after right hip surgery  10. FEN               · Wean Neosynephrine to keep SBP > 90: MAPs are falsely low due to chronically low DBPs  · Continue gentle IVF  · Will transfuse 2 units PRBCs now  · Ortho following surgical wound  · Continue Pepcid  · Will wean Solumedrol due to active bleeding  · NSTEMI management per Cardiology recs  · Will d/c Lovenox due to bleeding  · DVT prophylaxis: Bilateral SCDs       Pt is critically ill requiring vasopressor support for shock. See my orders for details    My assessment/plan was discussed with: Patient/Family, Ortho, and Nursing     Total critical care time spent with pt exclusive of procedures: 45 minutes    Radiology  ( personally reviewed) No cxr available   ABG No results for input(s): PHI, PO2I, PCO2I in the last 72 hours. Subjective     This patient has been seen and evaluated at the request of Dr. Anisa Herring for hypotension. Patient is a 68 y.o. female with PMHx of hip arthritis who was undergoing a total hip arthoplasty today when she developed hypotension and bradycardia. Fascia was quickly closed and pt given fluids and will and transferred to ICU. Pt lost approx 800cc of blood in OR. Currently BP in the 80s. Pt has noticed lip and tongue swelling. States she is allergic to dilaudid and almonds.      Overnight events reviewed:  Afebrile overnight  Hgb now 7.8 from 11 post-op  Still has bleeding from surgical site  On Neosynephrine at 40 mcgs/min  Lip and tongue swelling much improved  Tolerating PO diet    Review of Systems: hip pain, generalized malaise    Past Medical History   Diagnosis Date    Autoimmune disease ([address]      psoriasis    Beta-blocker therapy     CAD (coronary artery disease)      non ischemic cardiomyopathy    Chronic obstructive pulmonary disease (HCC)     Chronic pain      curvature of spinie    GERD (gastroesophageal reflux disease)     Heart failure (HCC)     Hypercholesterolemia     Hypertension     Ill-defined condition      hx aortic stenosis & mitral regurg    Smoker     Thromboembolus ([address]      right leg after hip surgery      Past Surgical History   Procedure Laterality Date    Hx gyn       miscarriage    Pr abdomen surgery proc unlisted  2010     Gallbladder    Pr abdomen surgery proc unlisted  2000     hiatal hernia repair    Hx orthopaedic       Right Knee, Left Foot    Hx orthopaedic  04/05/2015     left hip replacement 2015    Hx orthopaedic  06/24/2016     rmoval bone fragment left hip    Hx orthopaedic  2013     right hip replacement    Hx orthopaedic  2001     right knee replacement    Hx heent       Tonsilectomy    Hx heent       cataract removed bilateral eyes      Prior to Admission medications    Medication Sig Start Date End Date Taking? Authorizing Provider   SUMAtriptan (IMITREX) 50 mg tablet Take 50 mg by mouth once as needed for Migraine. Yes Historical Provider   carvedilol (COREG) 3.125 mg tablet Take 3.125 mg by mouth two (2) times daily (with meals). Takes c breakfast and dinner   Yes Historical Provider   DULCOLAX, BISACODYL, PO Take 1 Tab by mouth daily as needed. Yes Historical Provider   promethazine (PHENERGAN) 25 mg tablet Take 25 mg by mouth every six (6) hours as needed for Nausea. Historical Provider   cyanocobalamin 1,000 mcg tablet Take 1,000 mcg by mouth daily. Historical Provider   traMADol (ULTRAM) 50 mg tablet Take 50 mg by mouth every eight (8) hours as needed for Pain. Historical Provider   linaclotide Noah Paola) 290 mcg cap capsule Take 290 mcg by mouth Daily (before breakfast). Historical Provider   zolpidem (AMBIEN) 5 mg tablet Take 5 mg by mouth nightly as needed for Sleep. Historical Provider   tiotropium (SPIRIVA WITH HANDIHALER) 18 mcg inhalation capsule Take 1 Cap by inhalation daily. Historical Provider   albuterol (PROVENTIL HFA) 90 mcg/actuation inhaler Take 2 Puffs by inhalation every six (6) hours as needed. Historical Provider   methadone (DOLOPHINE) 5 mg tablet Take 5 mg by mouth every eight (8) hours. Historical Provider   omega-3 fatty acids-vitamin e (FISH OIL) 1,000 mg cap Take 1 Cap by mouth. Historical Provider   aspirin delayed-release 81 mg tablet Take 81 mg by mouth daily. Historical Provider   cholecalciferol, vitamin d3, (VITAMIN D3) 400 unit cap Take 1 Tab by mouth daily. Historical Provider   esomeprazole (NEXIUM) 40 mg capsule Take  by mouth daily. Historical Provider   polyethylene glycol (MIRALAX) 17 gram packet Take 17 g by mouth every other day. Historical Provider   CALCIUM CARBONATE/MAG HYDROX (ROLAIDS EXTRA STRENGTH PO) Take 2 Tabs by mouth three (3) times daily (with meals).     Historical Provider     Current Facility-Administered Medications   Medication Dose Route Frequency    PHENYLephrine (NEOSYNEPHRINE) 30,000 mcg in 0.9% sodium chloride 250 mL infusion   mcg/min IntraVENous TITRATE    celecoxib (CELEBREX) capsule 200 mg  200 mg Oral Q12H    acetaminophen (TYLENOL) tablet 650 mg  650 mg Oral Q6H    calcium carbonate (TUMS) chewable tablet 400 mg [elemental]  400 mg Oral TID WITH MEALS    carvedilol (COREG) tablet 3.125 mg  3.125 mg Oral BID WITH MEALS    linaclotide (LINZESS) capsule 290 mcg  290 mcg Oral ACB    methadone (DOLOPHINE) tablet 5 mg  5 mg Oral Q8H    umeclidinium (INCRUSE ELLIPTA) 62.5 mcg/actuation  1 Puff Inhalation DAILY    0.9% sodium chloride infusion  75 mL/hr IntraVENous CONTINUOUS    sodium chloride (NS) flush 5-10 mL  5-10 mL IntraVENous Q8H    senna-docusate (PERICOLACE) 8.6-50 mg per tablet 1 Tab  1 Tab Oral BID    polyethylene glycol (MIRALAX) packet 17 g  17 g Oral DAILY    ceFAZolin in 0.9% NS (ANCEF) IVPB soln 2 g  2 g IntraVENous Q8H    enoxaparin (LOVENOX) injection 40 mg  40 mg SubCUTAneous DAILY    methylPREDNISolone (PF) (SOLU-MEDROL) injection 40 mg  40 mg IntraVENous Q6H    famotidine (PF) (PEPCID) 20 mg in sodium chloride 0.9 % 10 mL injection  20 mg IntraVENous Q12H     Allergies   Allergen Reactions    [place] Angioedema    Codeine Other (comments)     \"Years ago gave me hives but lately I havetaken it without problem\"    Dilaudid [Hydromorphone (Bulk)] Shortness of Breath    Lyrica [Pregabalin] Nausea and Vomiting      Social History   Substance Use Topics    Smoking status: Current Every Day Smoker     Packs/day: 0.50     Years: 60.00    Smokeless tobacco: Never Used    Alcohol use No      Comment: 0      Family History   Problem Relation Age of Onset    Cancer Mother     Stroke Father           Laboratory: I have personally reviewed the critical care flowsheet and labs.      Recent Labs      01/31/17   0517  01/30/17   2330  01/30/17   1745  01/30/17   1426   WBC   --    --    --   3.7   HGB  9.2*  9.7*  10.4*  11.1*   HCT  28.6*  30.9*  33.5*  35.1   PLT   --    --    --   151     Recent Labs      01/31/17   0811  01/30/17   1519  01/30/17   1426  01/30/17   1112   NA  142   --   142   --    K  4.6   --   4.2   --    CL  110*   --   109*   --    CO2  26   --   25   --    GLU  132*   --   110*   --    BUN  18   --   16   --    CREA  0.72   --   0.53*   --    CA  7.9*   --   8.4*   --    MG  2.0   --   1.4*   --    PHOS  5.0*   --    --    --    ALB   --    --   2.2*  3.3*   SGOT   --    --   19   --    ALT   --    --   12   --    INR   --   1.1   --    --        Objective:     Mode Rate Tidal Volume Pressure FiO2 PEEP                    Vital Signs:     TMAX(24)      Intake/Output:   Last shift:         Last 3 shifts: 01/31 0701 - 01/31 1900  In: 118.8 [I.V.:118.8]  Out: - RRIOLAST3    Intake/Output Summary (Last 24 hours) at 01/31/17 1216  Last data filed at 01/31/17 0715   Gross per 24 hour   Intake          4343.33 ml   Output             1430 ml   Net          2913.33 ml     EXAM:   GENERAL: in pain, mentating well, AAOx3, HEENT:  PERRL, EOMI, no alar flaring or epistaxis,   oral mucosa moist, tongue swelling and lip swelling, NECK:  no jugular vein distention, no retractions,   no thyromegaly or masses, LUNGS: CTA, no w/r/r, HEART:  Regular rate and rhythm with no MGR;   no edema is present, ABDOMEN:  soft with no tenderness, bowel sounds present, EXTREMITIES:    warm with no cyanosis, SKIN: Bright red blood in saturated dressing left hip, pale NEUROLOGIC:    alert and oriented x 3, moving all extremities     Johan Kirk MD, [facility]  [facility]

## 2017-01-31 NOTE — WOUND CARE
Wound care  Initial visit to assess the left hip surgical incision for possible wound vac placement. S/ P left hip revision on 1-30 per Dr Leoncio Lopez. Seen in the ICU with staff nurse, cardiac complications inter op. Alert, no distress. Assessment:  Excessive bleeding from incision post op, reinforced many times in last 12 hours. Dressing and pad saturated, ice removed. All dressings removed to assess the incision, well approximated with staples. Large amount of active shahana blood from the proximal incision and mid incision. Increased when pressure applied, no redness or edema noted. Mary Reyna NP for ortho called to assess at bedside. Plan of care discussed, Jimmie Moreno to communicate assessment to Dr Leoncio Lopez. Will maintain the dressing and frequent assessment to evaluate bleeding at this time. Dry dressings and ice applied. Repositioned in bed for comfort. No other areas of skin breakdown noted. Will follow, reconsult as needed.   Rebecca Briceño

## 2017-02-01 LAB
ABO + RH BLD: NORMAL
ALBUMIN SERPL BCP-MCNC: 2.2 G/DL (ref 3.5–5)
ALBUMIN/GLOB SERPL: 0.8 {RATIO} (ref 1.1–2.2)
ALP SERPL-CCNC: 45 U/L (ref 45–117)
ALT SERPL-CCNC: 10 U/L (ref 12–78)
ANION GAP BLD CALC-SCNC: 8 MMOL/L (ref 5–15)
AST SERPL W P-5'-P-CCNC: 16 U/L (ref 15–37)
BACTERIA SPEC CULT: NORMAL
BASOPHILS # BLD AUTO: 0 K/UL (ref 0–0.1)
BASOPHILS # BLD: 0 % (ref 0–1)
BILIRUB SERPL-MCNC: 0.2 MG/DL (ref 0.2–1)
BLD PROD TYP BPU: NORMAL
BLD PROD TYP BPU: NORMAL
BLOOD GROUP ANTIBODIES SERPL: NORMAL
BPU ID: NORMAL
BPU ID: NORMAL
BUN SERPL-MCNC: 21 MG/DL (ref 6–20)
BUN/CREAT SERPL: 26 (ref 12–20)
CALCIUM SERPL-MCNC: 7.4 MG/DL (ref 8.5–10.1)
CHLORIDE SERPL-SCNC: 111 MMOL/L (ref 97–108)
CO2 SERPL-SCNC: 26 MMOL/L (ref 21–32)
CREAT SERPL-MCNC: 0.8 MG/DL (ref 0.55–1.02)
CROSSMATCH RESULT,%XM: NORMAL
CROSSMATCH RESULT,%XM: NORMAL
EOSINOPHIL # BLD: 0 K/UL (ref 0–0.4)
EOSINOPHIL NFR BLD: 0 % (ref 0–7)
ERYTHROCYTE [DISTWIDTH] IN BLOOD BY AUTOMATED COUNT: 15 % (ref 11.5–14.5)
GLOBULIN SER CALC-MCNC: 2.9 G/DL (ref 2–4)
GLUCOSE SERPL-MCNC: 133 MG/DL (ref 65–100)
HCT VFR BLD AUTO: 25.9 % (ref 35–47)
HCT VFR BLD AUTO: 26 % (ref 35–47)
HCT VFR BLD AUTO: 26.4 % (ref 35–47)
HCT VFR BLD AUTO: 26.5 % (ref 35–47)
HGB BLD-MCNC: 8.3 G/DL (ref 11.5–16)
HGB BLD-MCNC: 8.3 G/DL (ref 11.5–16)
HGB BLD-MCNC: 8.5 G/DL (ref 11.5–16)
HGB BLD-MCNC: 8.5 G/DL (ref 11.5–16)
LYMPHOCYTES # BLD AUTO: 8 % (ref 12–49)
LYMPHOCYTES # BLD: 0.5 K/UL (ref 0.8–3.5)
MAGNESIUM SERPL-MCNC: 2 MG/DL (ref 1.6–2.4)
MCH RBC QN AUTO: 29.1 PG (ref 26–34)
MCHC RBC AUTO-ENTMCNC: 32.2 G/DL (ref 30–36.5)
MCV RBC AUTO: 90.4 FL (ref 80–99)
MONOCYTES # BLD: 0.3 K/UL (ref 0–1)
MONOCYTES NFR BLD AUTO: 4 % (ref 5–13)
NEUTS SEG # BLD: 5.8 K/UL (ref 1.8–8)
NEUTS SEG NFR BLD AUTO: 88 % (ref 32–75)
PHOSPHATE SERPL-MCNC: 3.2 MG/DL (ref 2.6–4.7)
PLATELET # BLD AUTO: 96 K/UL (ref 150–400)
POTASSIUM SERPL-SCNC: 4.5 MMOL/L (ref 3.5–5.1)
PROT SERPL-MCNC: 5.1 G/DL (ref 6.4–8.2)
RBC # BLD AUTO: 2.92 M/UL (ref 3.8–5.2)
RBC MORPH BLD: ABNORMAL
SERVICE CMNT-IMP: NORMAL
SODIUM SERPL-SCNC: 145 MMOL/L (ref 136–145)
SPECIMEN EXP DATE BLD: NORMAL
STATUS OF UNIT,%ST: NORMAL
STATUS OF UNIT,%ST: NORMAL
TROPONIN I SERPL-MCNC: 1.22 NG/ML
UNIT DIVISION, %UDIV: 0
UNIT DIVISION, %UDIV: 0
WBC # BLD AUTO: 6.6 K/UL (ref 3.6–11)

## 2017-02-01 PROCEDURE — 97116 GAIT TRAINING THERAPY: CPT

## 2017-02-01 PROCEDURE — 83735 ASSAY OF MAGNESIUM: CPT | Performed by: INTERNAL MEDICINE

## 2017-02-01 PROCEDURE — 74011250636 HC RX REV CODE- 250/636: Performed by: NURSE PRACTITIONER

## 2017-02-01 PROCEDURE — 74011250637 HC RX REV CODE- 250/637: Performed by: NURSE PRACTITIONER

## 2017-02-01 PROCEDURE — 84484 ASSAY OF TROPONIN QUANT: CPT | Performed by: INTERNAL MEDICINE

## 2017-02-01 PROCEDURE — 74011250636 HC RX REV CODE- 250/636: Performed by: INTERNAL MEDICINE

## 2017-02-01 PROCEDURE — 97165 OT EVAL LOW COMPLEX 30 MIN: CPT

## 2017-02-01 PROCEDURE — 84100 ASSAY OF PHOSPHORUS: CPT | Performed by: INTERNAL MEDICINE

## 2017-02-01 PROCEDURE — 97161 PT EVAL LOW COMPLEX 20 MIN: CPT

## 2017-02-01 PROCEDURE — 80053 COMPREHEN METABOLIC PANEL: CPT | Performed by: INTERNAL MEDICINE

## 2017-02-01 PROCEDURE — 85025 COMPLETE CBC W/AUTO DIFF WBC: CPT | Performed by: INTERNAL MEDICINE

## 2017-02-01 PROCEDURE — 97530 THERAPEUTIC ACTIVITIES: CPT

## 2017-02-01 PROCEDURE — 85018 HEMOGLOBIN: CPT | Performed by: INTERNAL MEDICINE

## 2017-02-01 PROCEDURE — 65270000029 HC RM PRIVATE

## 2017-02-01 PROCEDURE — 97535 SELF CARE MNGMENT TRAINING: CPT

## 2017-02-01 PROCEDURE — 74011000250 HC RX REV CODE- 250: Performed by: INTERNAL MEDICINE

## 2017-02-01 PROCEDURE — 97110 THERAPEUTIC EXERCISES: CPT

## 2017-02-01 PROCEDURE — 74011250637 HC RX REV CODE- 250/637: Performed by: ANESTHESIOLOGY

## 2017-02-01 PROCEDURE — 36415 COLL VENOUS BLD VENIPUNCTURE: CPT | Performed by: INTERNAL MEDICINE

## 2017-02-01 RX ORDER — SODIUM CHLORIDE 9 MG/ML
25 INJECTION, SOLUTION INTRAVENOUS CONTINUOUS
Status: DISCONTINUED | OUTPATIENT
Start: 2017-02-01 | End: 2017-02-16 | Stop reason: HOSPADM

## 2017-02-01 RX ORDER — CYCLOBENZAPRINE HCL 10 MG
5 TABLET ORAL
Status: DISCONTINUED | OUTPATIENT
Start: 2017-02-01 | End: 2017-02-16 | Stop reason: HOSPADM

## 2017-02-01 RX ADMIN — METHADONE HYDROCHLORIDE 5 MG: 10 TABLET ORAL at 00:02

## 2017-02-01 RX ADMIN — METHYLPREDNISOLONE SODIUM SUCCINATE 40 MG: 40 INJECTION, POWDER, FOR SOLUTION INTRAMUSCULAR; INTRAVENOUS at 05:37

## 2017-02-01 RX ADMIN — CEFAZOLIN 2 G: 1 INJECTION, POWDER, FOR SOLUTION INTRAMUSCULAR; INTRAVENOUS; PARENTERAL at 04:47

## 2017-02-01 RX ADMIN — METHADONE HYDROCHLORIDE 5 MG: 10 TABLET ORAL at 09:05

## 2017-02-01 RX ADMIN — CALCIUM CARBONATE (ANTACID) CHEW TAB 500 MG 400 MG: 500 CHEW TAB at 17:05

## 2017-02-01 RX ADMIN — OXYCODONE HYDROCHLORIDE 10 MG: 5 TABLET ORAL at 03:08

## 2017-02-01 RX ADMIN — METHADONE HYDROCHLORIDE 5 MG: 10 TABLET ORAL at 17:06

## 2017-02-01 RX ADMIN — VANCOMYCIN HYDROCHLORIDE 1000 MG: 1 INJECTION, POWDER, LYOPHILIZED, FOR SOLUTION INTRAVENOUS at 05:38

## 2017-02-01 RX ADMIN — VANCOMYCIN HYDROCHLORIDE 1000 MG: 1 INJECTION, POWDER, LYOPHILIZED, FOR SOLUTION INTRAVENOUS at 17:31

## 2017-02-01 RX ADMIN — CELECOXIB 200 MG: 100 CAPSULE ORAL at 11:20

## 2017-02-01 RX ADMIN — SODIUM CHLORIDE 100 ML/HR: 900 INJECTION, SOLUTION INTRAVENOUS at 01:10

## 2017-02-01 RX ADMIN — Medication 1 TABLET: at 17:05

## 2017-02-01 RX ADMIN — Medication 10 ML: at 22:29

## 2017-02-01 RX ADMIN — Medication 1 TABLET: at 09:05

## 2017-02-01 RX ADMIN — OXYCODONE HYDROCHLORIDE 15 MG: 5 TABLET ORAL at 11:20

## 2017-02-01 RX ADMIN — Medication 10 ML: at 05:38

## 2017-02-01 RX ADMIN — OXYCODONE HYDROCHLORIDE 10 MG: 5 TABLET ORAL at 05:46

## 2017-02-01 RX ADMIN — OXYCODONE HYDROCHLORIDE 15 MG: 5 TABLET ORAL at 22:52

## 2017-02-01 RX ADMIN — ACETAMINOPHEN 650 MG: 325 TABLET ORAL at 05:37

## 2017-02-01 RX ADMIN — CALCIUM CARBONATE (ANTACID) CHEW TAB 500 MG 400 MG: 500 CHEW TAB at 11:19

## 2017-02-01 RX ADMIN — CARVEDILOL 3.12 MG: 3.12 TABLET, FILM COATED ORAL at 07:33

## 2017-02-01 RX ADMIN — CYCLOBENZAPRINE HYDROCHLORIDE 5 MG: 10 TABLET, FILM COATED ORAL at 20:01

## 2017-02-01 RX ADMIN — ACETAMINOPHEN 650 MG: 325 TABLET ORAL at 11:20

## 2017-02-01 RX ADMIN — OXYCODONE HYDROCHLORIDE 10 MG: 5 TABLET ORAL at 17:31

## 2017-02-01 RX ADMIN — ACETAMINOPHEN 650 MG: 325 TABLET ORAL at 17:05

## 2017-02-01 RX ADMIN — Medication 10 ML: at 17:09

## 2017-02-01 RX ADMIN — CALCIUM CARBONATE (ANTACID) CHEW TAB 500 MG 400 MG: 500 CHEW TAB at 07:33

## 2017-02-01 RX ADMIN — FAMOTIDINE 20 MG: 10 INJECTION, SOLUTION INTRAVENOUS at 20:01

## 2017-02-01 RX ADMIN — CEFAZOLIN 2 G: 1 INJECTION, POWDER, FOR SOLUTION INTRAMUSCULAR; INTRAVENOUS; PARENTERAL at 13:20

## 2017-02-01 RX ADMIN — METHYLPREDNISOLONE SODIUM SUCCINATE 20 MG: 40 INJECTION, POWDER, FOR SOLUTION INTRAMUSCULAR; INTRAVENOUS at 22:28

## 2017-02-01 RX ADMIN — LINACLOTIDE 290 MCG: 145 CAPSULE, GELATIN COATED ORAL at 07:33

## 2017-02-01 RX ADMIN — POLYETHYLENE GLYCOL 3350 17 G: 17 POWDER, FOR SOLUTION ORAL at 09:05

## 2017-02-01 RX ADMIN — FAMOTIDINE 20 MG: 10 INJECTION, SOLUTION INTRAVENOUS at 09:05

## 2017-02-01 RX ADMIN — CELECOXIB 200 MG: 100 CAPSULE ORAL at 22:52

## 2017-02-01 RX ADMIN — ZOLPIDEM TARTRATE 5 MG: 5 TABLET, FILM COATED ORAL at 22:52

## 2017-02-01 RX ADMIN — FENTANYL CITRATE 25 MCG: 50 INJECTION, SOLUTION INTRAMUSCULAR; INTRAVENOUS at 00:10

## 2017-02-01 RX ADMIN — CARVEDILOL 3.12 MG: 3.12 TABLET, FILM COATED ORAL at 17:05

## 2017-02-01 RX ADMIN — METHYLPREDNISOLONE SODIUM SUCCINATE 20 MG: 40 INJECTION, POWDER, FOR SOLUTION INTRAMUSCULAR; INTRAVENOUS at 13:20

## 2017-02-01 RX ADMIN — OXYCODONE HYDROCHLORIDE 15 MG: 5 TABLET ORAL at 20:01

## 2017-02-01 RX ADMIN — UMECLIDINIUM 1 PUFF: 62.5 AEROSOL, POWDER ORAL at 09:10

## 2017-02-01 RX ADMIN — SODIUM CHLORIDE 25 ML/HR: 900 INJECTION, SOLUTION INTRAVENOUS at 20:14

## 2017-02-01 RX ADMIN — ACETAMINOPHEN 650 MG: 325 TABLET ORAL at 00:02

## 2017-02-01 NOTE — PROGRESS NOTES
Pt culture positive coag negative staph. ID consult today and will continue w/ Vanc and Ancef until sensitives have been posted. Given medical issues, no plans for further surgery. PICC line w/ component retention and IV antibiotics then oral suppression. LABS :  Recent Labs      01/31/17   2244   01/31/17   0811   01/30/17   1519   HGB  8.4*   < >   --    < >   --    HCT  26.2*   < >   --    < >   --    INR   --    --    --    --   1.1   NA   --    --   142   --    --    K   --    --   4.6   --    --    CL   --    --   110*   --    --    CO2   --    --   26   --    --    BUN   --    --   18   --    --    CREA   --    --   0.72   --    --    GLU   --    --   132*   --    --     < > = values in this interval not displayed.        CULTURES :  No results found for: [facility]  Lab Results   Component Value Date/Time    Culture result:  01/30/2017 02:14 PM     FEW  PROBABLE  STAPHYLOCOCCUS SPECIES, COAGULASE NEGATIVE      Culture result:  01/30/2017 02:12 PM     FEW  PROBABLE  STAPHYLOCOCCUS SPECIES, COAGULASE NEGATIVE      Culture result:  01/30/2017 01:20 PM     FEW  PROBABLE  STAPHYLOCOCCUS SPECIES, COAGULASE NEGATIVE      Culture result:  01/30/2017 01:20 PM     SCANT  PROBABLE  STAPHYLOCOCCUS SPECIES, COAGULASE NEGATIVE      Culture result:  01/30/2017 01:11 PM     FEW  PROBABLE  STAPHYLOCOCCUS SPECIES, COAGULASE NEGATIVE      Culture result:  01/30/2017 01:11 PM     FEW  PROBABLE  STAPHYLOCOCCUS SPECIES, COAGULASE NEGATIVE      Culture result:  01/30/2017 01:11 PM     SCANT  PROBABLE  STAPHYLOCOCCUS SPECIES, COAGULASE NEGATIVE

## 2017-02-01 NOTE — PROGRESS NOTES
TOTAL HIP REVISION ARTHROPLASTY DAILY NOTE     ASSESSMENT / PLAN :   1. Pain Control : Stable, palliative medicine consult, appreciate input  2. Overnight Issues : moderate pain, decreased UOP, bleeding resolving, s/p transfusion 2U PRBC  3. Wound or incisional issue : stable, no active bleeding, leave hip spica in place  4. Therapy / Weight Bearing Recommendations : WBAT short distances with walker to allow for bony ingrowth. Hip precautions  5. DVT Prophylaxis : Mechanical and Lovenox (held due to bleeding), previous DVT, need to consider IVF if this needs to be held for more than 24hrs. 6. Disposition : Snf placement likely   7. Medical Concerns : MMP, infection - ID consult. 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 Plan on component retention and long term IV antibx and oral antibx. 8. Comments : improving. POD  2 Days Post-Op s/p Procedure(s):  LEFT TOTAL HIP ARTHROPLASTY CONVERSION     SUBJECTIVE :     Patient notes the following issues : moderate pain. OBJECTIVE :     Patient Vitals for the past 12 hrs:   BP Temp Pulse Resp SpO2   02/01/17 0733 148/73 - 68 - -   02/01/17 0700 151/68 - 64 - 96 %   02/01/17 0600 129/63 - 63 - 96 %   02/01/17 0500 136/70 - 60 - 95 %   02/01/17 0400 131/67 98.4 °F (36.9 °C) 65 18 96 %   02/01/17 0300 117/59 - 65 - 93 %   02/01/17 0200 100/57 - 68 - 94 %   02/01/17 0100 110/48 - 71 - 95 %   02/01/17 0020 - - 78 - -   02/01/17 0000 120/55 98.8 °F (37.1 °C) 75 18 95 %   01/31/17 2300 118/70 - 77 - 96 %   01/31/17 2200 126/58 - 67 - 96 %   01/31/17 2145 97/52 99 °F (37.2 °C) 66 18 93 %   01/31/17 2045 103/68 99 °F (37.2 °C) 72 18 94 %   01/31/17 1945 101/50 98.8 °F (37.1 °C) 70 18 94 %       Alert and oriented x3. Examination of the left Hip reveals that the dressing is clean, dry and intact. Motor Exam is intact and normal  Sensation is intact to light touch. No calf pain.      Labs:  Recent Labs      02/01/17   0447   01/30/17   8718 HGB  8.5*   < >   --    HCT  26.4*   < >   --    INR   --    --   1.1   NA  145   < >   --    K  4.5   < >   --    CL  111*   < >   --    CO2  26   < >   --    BUN  21*   < >   --    CREA  0.80   < >   --    GLU  133*   < >   --     < > = values in this interval not displayed.        CULTURES :  No results found for: [facility]  Lab Results   Component Value Date/Time    Culture result:  01/30/2017 02:14 PM     FEW  PROBABLE  STAPHYLOCOCCUS SPECIES, COAGULASE NEGATIVE      Culture result:  01/30/2017 02:12 PM     FEW  PROBABLE  STAPHYLOCOCCUS SPECIES, COAGULASE NEGATIVE      Culture result:  01/30/2017 01:20 PM     FEW  PROBABLE  STAPHYLOCOCCUS SPECIES, COAGULASE NEGATIVE      Culture result:  01/30/2017 01:20 PM     SCANT  PROBABLE  STAPHYLOCOCCUS SPECIES, COAGULASE NEGATIVE      Culture result:  01/30/2017 01:11 PM     FEW  PROBABLE  STAPHYLOCOCCUS SPECIES, COAGULASE NEGATIVE      Culture result:  01/30/2017 01:11 PM     FEW  PROBABLE  STAPHYLOCOCCUS SPECIES, COAGULASE NEGATIVE      Culture result:  01/30/2017 01:11 PM     SCANT  PROBABLE  STAPHYLOCOCCUS SPECIES, [address] [place] [address] Nw NEGATIVE            Patient mobility           Freddy Leon MD  Cell [phone]  Nurse [address] [place] [address] [phone]  Medical Staff : Alex Cheatham / Jean-Claude Conteh  Office : [phone] ext  [phone]

## 2017-02-01 NOTE — PROGRESS NOTES
Shift In Report:  Bedside and Verbal shift change report received from Kimi Fisher RN (offgoing nurse) by Jennifer Daugherty RN (oncoming). Report included the following information SBAR, Kardex, Intake/Output, MAR and Recent Results. Shift Summary:  2045: VS Stable. Blood Transfusion complete at this time. 2245: H/H drawn. Vs Stable. 0000: Vs Stable. No change in assessment at this time. 0100: Called and spoke to Dr. Tito Rudolph, regarding patients low urine output and received orders for Normal Saline @ 100 ml/hr. 0500: Labs drawn. VS stable. NO change in assessment. Shift Out Report: Bedside and Verbal shift change report given to Mary Aguilera RN  (oncoming nurse) by Jennifer Daugherty RN (offgoing nurse). Report included the following information SBAR, Kardex, Intake/Output, MAR and Recent Results.

## 2017-02-01 NOTE — PROGRESS NOTES
Problem: Mobility Impaired (Adult and Pediatric)  Goal: *Acute Goals and Plan of Care (Insert Text)  Physical Therapy Goals  Initiated 2/1/2017    1. Patient will move from supine to sit and sit to supine , scoot up and down and roll side to side in bed with modified independence within 4 days. 2. Patient will perform sit to stand with modified independence within 4 days. 3. Patient will ambulate with modified independence for 200 feet with the least restrictive device within 4 days. 4. Patient will verbalize and demonstrate understanding of lateral precautions per protocol within 4 days. 5. Patient will perform all home exercise program per protocol with independence within 4 days. PHYSICAL THERAPY TREATMENT  Patient: Anuj Haile (65 y.o. female)  Date: 2/1/2017  Diagnosis: LEFT HIP BIPOLAR  Failed total joint replacement (HCC)  Failed total hip arthroplasty ([facility] [address] <principal problem not specified>  Procedure(s) (LRB):  LEFT TOTAL HIP ARTHROPLASTY CONVERSION (Left) 2 Days Post-Op  Precautions:  WBAT on the left LE short distance only. ASSESSMENT:  Based on the objective data described below, patient tolerated treatment very well. Patient just got back to bed by nurse and just change her dressing. Patient asked to just do exercise on bed for now. Performed some active range of motion exercise on right LE all planes. Ankle pumps on the left and some quad sets. Patient reports some pain on the left hip when doing the exercise. Reposition patient to comfort and notified nurse who agreed to monitor patient. Progression toward goals:  [X]      Improving appropriately and progressing toward goals  [ ]      Improving slowly and progressing toward goals  [ ]      Not making progress toward goals and plan of care will be adjusted       PLAN:  Patient continues to benefit from skilled intervention to address the above impairments. Continue treatment per established plan of care.   Discharge Recommendations: Skilled Nursing Facility  Further Equipment Recommendations for Discharge:  TBD       SUBJECTIVE:   Patient stated I just got back to bed.   The patient stated 3/3 hip precautions. Reviewed all 3 with patient. OBJECTIVE DATA SUMMARY:   Critical Behavior:  Neurologic State: Alert  Orientation Level: Oriented X4  Cognition: Appropriate decision making, Appropriate for age attention/concentration, Appropriate safety awareness, Follows commands  Safety/Judgement: Awareness of environment, Good awareness of safety precautions  Functional Mobility Training:  Bed Mobility:  Rolling: Moderate assistance; Additional time;Assist x2  Supine to Sit: Moderate assistance;Assist x2; Additional time  Sit to Supine:  (pt remained up at end of tx)  Scooting: Minimum assistance;Assist x2        Transfers:  Sit to Stand: Minimum assistance;Assist x2  Stand to Sit: Minimum assistance;Assist x2  Stand Pivot Transfers: Assist x2     Bed to Chair: Minimum assistance;Assist x2                    Balance:  Sitting: Intact  Standing: Impaired  Standing - Static: Fair  Standing - Dynamic : Fair  Ambulation/Gait Training:  Distance (ft): 5 Feet (ft)  Assistive Device: Walker, rolling;Gait belt  Ambulation - Level of Assistance: Minimal assistance; Additional time;Assist x2     Gait Description (WDL): Exceptions to WDL  Gait Abnormalities: Antalgic; Path deviations; Step to gait     Left Side Weight Bearing: As tolerated (Left lateral hip precautions WBAT for short distance only)  Base of Support: Narrowed     Speed/Jeanie: Slow                                  Stairs:               Therapeutic Exercises:   SUPINE  EXERCISES   Sets   Reps   Active Active Assist   Passive Self ROM   Comments   Ankle Pumps 2 10 [X]                                           [ ]                                           [ ]                                           [ ]                                               Quad Sets 2 10 [X] [ ]                                           [ ]                                           [ ]                                               Rafaelnathan Booth     [ ]                                           [ ]                                           [ ]                                           [ ]                                               Hip Abduction     [ ]                                           [ ]                                           [ ]                                           [ ]                                               Hip External Rotation     [ ]                                           [ ]                                           [ ]                                           [ ]                                               Glut Sets 1 5 [X]                                           [ ]                                           [ ]                                           [ ]                                                     [ ]                                           [ ]                                           [ ]                                           [ ]                                                     [ ]                                           [ ]                                           [ ]                                           [ ]                                                  Pain:  Pain Scale 1: Numeric (0 - 10)  Pain Intensity 1: 3  Pain Location 1: Hip  Pain Orientation 1: Left  Pain Description 1: Aching  Pain Intervention(s) 1: Cold pack;Back rub; Distraction;Position;Relaxation technique;Repositioned;Rest;Therapeutic presence; Therapeutic touch  Activity Tolerance:   Good. Please refer to the flowsheet for vital signs taken during this treatment.   After treatment:   [ ]  Patient left in no apparent distress sitting up in chair  [X]  Patient left in no apparent distress in bed  [X]  Call bell left within reach  [X]  Nursing notified  [ ] Caregiver present  [ ]  Bed alarm activated      COMMUNICATION/COLLABORATION:   The patients plan of care was discussed with: Registered Nurse and patient     Darci Steele PT,WCC.    Time Calculation: 22 mins

## 2017-02-01 NOTE — PROGRESS NOTES
1500 Assumed care of pt. Assessed. Pt standing up. Lt hip dsg saturated with light bloody drainage. Dsg changed. Back to bed with 1 assist and walker. Cold pack applied to Lt hip. Turned, repositioned. Gown changed. 1730 C/0 Lt hip aching, rates 6. Medicated with Roxicodone 10mg po. No further bleeding from Lt hip. Abductor in place. Repositioned. 1800 Pt stated Lt hip pain is better, now rates 3. Eating dinner at this time. 1900 Bedside and Verbal shift change report given to LORENE Garcia (oncoming nurse) by Dakota Monte RN (offgoing nurse). Report included the following information SBAR, Kardex, OR Summary, Intake/Output, MAR, Recent Results and Cardiac Rhythm sr bbb.

## 2017-02-01 NOTE — PROGRESS NOTES
Problem: Mobility Impaired (Adult and Pediatric)  Goal: *Acute Goals and Plan of Care (Insert Text)  Physical Therapy Goals  Initiated 2/1/2017    1. Patient will move from supine to sit and sit to supine , scoot up and down and roll side to side in bed with modified independence within 4 days. 2. Patient will perform sit to stand with modified independence within 4 days. 3. Patient will ambulate with modified independence for 200 feet with the least restrictive device within 4 days. 4. Patient will verbalize and demonstrate understanding of lateral precautions per protocol within 4 days. 5. Patient will perform all home exercise program per protocol with independence within 4 days. PHYSICAL THERAPY EVALUATION  Patient: Annelise Rosas (49 y.o. female)  Date: 2/1/2017  Primary Diagnosis: LEFT HIP BIPOLAR  Failed total joint replacement (HCC)  Failed total hip arthroplasty ([facility] [address]  Procedure(s) (LRB):  LEFT TOTAL HIP ARTHROPLASTY CONVERSION (Left) 2 Days Post-Op   Precautions: WBAT on the left LE short distance only, Lateral hip precautions         ASSESSMENT :  Based on the objective data described below, the patient presents with difficulty with ambualtion. Patient transferred to ICU after surgery and now cleared for therapy. Sit on the edge of bed with mod assist x 2, sit to stand mod assist x 2, ambulate with rolling walker mod assist x 2 WBAT on the left LE to and from the bedside commode. Reviewed lateral hip precautions and exercise protocol per Dr. Aleksander Marc. OOB to chair as tolerated performed some ankle pumps. No complains of any dizziness, change ice on the ice pack and notified nurse who agreed to monitor patient. Patient will benefit from skilled intervention to address the above impairments.   Patients rehabilitation potential is considered to be Excellent  Factors which may influence rehabilitation potential include:   [ ]         None noted  [ ]         Mental ability/status  [X]         Medical condition  [ ]         Home/family situation and support systems  [X]         Safety awareness  [X]         Pain tolerance/management  [ ]         Other:        PLAN :  Recommendations and Planned Interventions:  [X]           Bed Mobility Training             [ ]    Neuromuscular Re-Education  [X]           Transfer Training                   [ ]    Orthotic/Prosthetic Training  [X]           Gait Training                         [ ]    Modalities  [X]           Therapeutic Exercises           [ ]    Edema Management/Control  [X]           Therapeutic Activities            [ ]    Patient and Family Training/Education  [ ]           Other (comment):     Frequency/Duration: Patient will be followed by physical therapy  twice daily to address goals. Discharge Recommendations: SNF  Further Equipment Recommendations for Discharge: TBD       SUBJECTIVE:   Patient stated Ok I want to try to sit on the commode.       OBJECTIVE DATA SUMMARY:   HISTORY:    Past Medical History   Diagnosis Date    Autoimmune disease ([facility] [address]         psoriasis    Beta-blocker therapy      CAD (coronary artery disease)         non ischemic cardiomyopathy    Chronic obstructive pulmonary disease (HCC)      Chronic pain         curvature of spinie    GERD (gastroesophageal reflux disease)      Heart failure (HCC)      Hypercholesterolemia      Hypertension      Ill-defined condition         hx aortic stenosis & mitral regurg    Smoker      Thromboembolus ([facility] [address]         right leg after hip surgery     Past Surgical History   Procedure Laterality Date    Hx gyn           miscarriage    Pr abdomen surgery proc unlisted   2010       Gallbladder    Pr abdomen surgery proc unlisted   2000       hiatal hernia repair    Hx orthopaedic           Right Knee, Left Foot    Hx orthopaedic   04/05/2015       left hip replacement 2015    Hx orthopaedic   06/24/2016       rmoval bone fragment left hip    Hx orthopaedic   2013       right hip replacement    Hx orthopaedic   2001       right knee replacement    Hx heent           Tonsilectomy    Hx heent           cataract removed bilateral eyes     Prior Level of Function/Home Situation: modified independent with rolling walker and rollator walker  Personal factors and/or comorbidities impacting plan of care:      Home Situation  Home Environment: Private residence  # Steps to Enter: 0  Wheelchair Ramp: Yes  One/Two Story Residence: One story  Living Alone: No  Support Systems: Child(sandra), Family member(s)  Patient Expects to be Discharged to[de-identified] Rehabilitation facility  Current DME Used/Available at Home: [employer] Earing, rollator, Shower chair, Grab bars  Tub or Shower Type: Tub/Shower combination     EXAMINATION/PRESENTATION/DECISION MAKING:   Critical Behavior:  Neurologic State: Alert  Orientation Level: Oriented X4  Cognition: Follows commands, Appropriate for age attention/concentration, Appropriate safety awareness        Strength:    Strength: Generally decreased, functional (Left lateral hip precautions)                    Tone & Sensation:                                  Range Of Motion:  AROM: Generally decreased, functional (Left lateral hip precautions)           PROM: Generally decreased, functional (Left lateral hip precautions)           Coordination:  Coordination: Within functional limits     Functional Mobility:  Bed Mobility:  Rolling: Moderate assistance; Additional time;Assist x2  Supine to Sit: Moderate assistance; Additional time;Assist x2     Scooting: Moderate assistance; Additional time;Assist x2  Transfers:  Sit to Stand: Moderate assistance; Additional time;Assist x2  Stand to Sit: Moderate assistance; Additional time;Assist x2  Stand Pivot Transfers: Assist x2     Bed to Chair: Additional time;Assist x2              Balance:   Sitting: Intact  Standing: Impaired;Pull to stand; With support  Standing - Static: Fair  Standing - Dynamic : Poor  Ambulation/Gait Training:  Distance (ft): 5 Feet (ft)  Assistive Device: Walker, rolling;Gait belt  Ambulation - Level of Assistance: Minimal assistance; Additional time;Assist x2     Gait Description (WDL): Exceptions to WDL  Gait Abnormalities: Antalgic; Path deviations; Step to gait     Left Side Weight Bearing: As tolerated (Left lateral hip precautions WBAT for short distance only)  Base of Support: Narrowed     Speed/Jeanie: Slow                                      Therapeutic Exercises:   Per DR. Jennifer Rockwell exercise protocol     Functional Measure:  Tinetti test:      Sitting Balance: 1  Arises: 0  Attempts to Rise: 0  Immediate Standing Balance: 1  Standing Balance: 1  Nudged: 0  Eyes Closed: 0  Turn 360 Degrees - Continuous/Discontinuous: 0  Turn 360 Degrees - Steady/Unsteady: 1  Sitting Down: 1  Balance Score: 5  Indication of Gait: 1  R Step Length/Height: 0  L Step Length/Height: 0  R Foot Clearance: 1  L Foot Clearance: 1  Step Symmetry: 0  Step Continuity: 0  Path: 1  Trunk: 1  Walking Time: 0  Gait Score: 5  Total Score: 10         Tinetti Test and G-code impairment scale:  Percentage of Impairment CH     0%    CI     1-19% CJ     20-39% CK     40-59% CL     60-79% CM     80-99% CN      100%   Tinetti  Score 0-28 28 23-27 17-22 12-16 6-11 1-5 0          Tinetti Tool Score Risk of Falls  <19 = High Fall Risk  19-24 = Moderate Fall Risk  25-28 = Low Fall Risk  Tinetti ME. Performance-Oriented Assessment of Mobility Problems in Elderly Patients. [address] 66; [id number]. (Scoring Description: PT Bulletin Feb. 10, 1993)     Older adults: Luis Manuel Sargent et al, 2009; n = 1000 [facility] elderly evaluated with ABC, HORTENCIA, ADL, and IADL)  · Mean HORTENCIA score for males aged 69-68 years = 26.21(3.40)  · Mean HORTENCIA score for females age 69-68 years = 25.16(4.30)  · Mean HORTENCIA score for males over 80 years = 23.29(6.02)  · Mean HORTENCIA score for females over 80 years = 17.20(8.32)         G codes:   In compliance with CMSs Claims Based Outcome Reporting, the following G-code set was chosen for this patient based on their primary functional limitation being treated: The outcome measure chosen to determine the severity of the functional limitation was the Tinetti with a score of 10/28 which was correlated with the impairment scale. · Mobility - Walking and Moving Around:               - CURRENT STATUS:    CL - 60%-79% impaired, limited or restricted               - GOAL STATUS:           CK - 40%-59% impaired, limited or restricted               - D/C STATUS:                       ---------------To be determined---------------      Physical Therapy Evaluation Charge Determination   History Examination Presentation Decision-Making   MEDIUM  Complexity : 1-2 comorbidities / personal factors will impact the outcome/ POC  MEDIUM Complexity : 3 Standardized tests and measures addressing body structure, function, activity limitation and / or participation in recreation  MEDIUM Complexity : Evolving with changing characteristics  Other outcome measures tinetti  HIGH       Based on the above components, the patient evaluation is determined to be of the following complexity level: MEDIUM     Pain:  Pain Scale 1: Numeric (0 - 10)  Pain Intensity 1: 0  Pain Location 1: Hip  Pain Orientation 1: Left  Pain Description 1: Aching;Dull  Pain Intervention(s) 1: Medication (see MAR)  Activity Tolerance:   Good. Please refer to the flowsheet for vital signs taken during this treatment. After treatment:   [X]         Patient left in no apparent distress sitting up in chair  [ ]         Patient left in no apparent distress in bed  [X]         Call bell left within reach  [X]         Nursing notified  [ ]         Caregiver present  [ ]         Bed alarm activated      COMMUNICATION/EDUCATION:   The patients plan of care was discussed with: Occupational Therapist, Registered Nurse,  and patient  .   [X]         Fall prevention education was provided and the patient/caregiver indicated understanding. [X]         Patient/family have participated as able in goal setting and plan of care. [X]         Patient/family agree to work toward stated goals and plan of care. [ ]         Patient understands intent and goals of therapy, but is neutral about his/her participation. [ ]         Patient is unable to participate in goal setting and plan of care. Thank you for this referral.  Maxine Alonso, PT,WCC.    Time Calculation: 29 mins

## 2017-02-01 NOTE — CONSULTS
[facility] Infectious Diseases Consult  Shani Milner MD,  AQUILINO Payton DO, NP     [address]     Office: [phone]       Fax: [phone]        Reason for consult: positive cultures intra-op (L hip)     Date of Consultation:  February 1, 2017  Date of Admission: 1/30/2017   Referring Physician: Melissa Fay      Impression:   Left hip wound - s/p left hip bipolar with stem removal and conversion to an uncemented bipolar; culture growing CoNS; blood cultures NGTD; patient will need long IV abx course - she is aware of this. Plan:   Start vancomycin  Follow cultures      This is a 68 y.o. female with PMHx of hip arthritis who was undergoing a total hip arthoplasty when  she developed hypotension and bradycardia. Fascia was quickly closed and pt given fluids and  Madi and transferred to ICU. States she is allergic to dilaudid and almonds. We are now asked to see this patient for positive intra-operative cultures.     Patient Active Problem List   Diagnosis Code    Failed total joint replacement ([facility] [address] T84.019A    Failed total hip arthroplasty ([facility] [address] T84.018A, Z96.649     Past Medical History   Diagnosis Date    Autoimmune disease ([facility] [address]      psoriasis    Beta-blocker therapy     CAD (coronary artery disease)      non ischemic cardiomyopathy    Chronic obstructive pulmonary disease (HCC)     Chronic pain      curvature of spinie    GERD (gastroesophageal reflux disease)     Heart failure ([facility] [address]     Hypercholesterolemia     Hypertension     Ill-defined condition      hx aortic stenosis & mitral regurg    Smoker     Thromboembolus ([facility] [address]      right leg after hip surgery      Family History   Problem Relation Age of Onset    Cancer Mother     Stroke Father       Social History   Substance Use Topics    Smoking status: Current Every Day Smoker     Packs/day: 0.50     Years: 60.00    Smokeless tobacco: Never Used    Alcohol use No      Comment: 0     Past Surgical History   Procedure Laterality Date    Hx gyn       miscarriage    Pr abdomen surgery proc unlisted  2010     Gallbladder    Pr abdomen surgery proc unlisted  2000     hiatal hernia repair    Hx orthopaedic       Right Knee, Left Foot    Hx orthopaedic  04/05/2015     left hip replacement 2015    Hx orthopaedic  06/24/2016     rmoval bone fragment left hip    Hx orthopaedic  2013     right hip replacement    Hx orthopaedic  2001     right knee replacement    Hx heent       Tonsilectomy    Hx heent       cataract removed bilateral eyes      Current Facility-Administered Medications   Medication Dose Route Frequency Last Dose    0.9% sodium chloride infusion  25 mL/hr IntraVENous CONTINUOUS 25 mL/hr at 02/01/17 0910    methylPREDNISolone (PF) (SOLU-MEDROL) injection 20 mg  20 mg IntraVENous Q8H 20 mg at 02/01/17 1320    cyclobenzaprine (FLEXERIL) tablet 5 mg  5 mg Oral TID PRN      PHENYLephrine (NEOSYNEPHRINE) 30,000 mcg in 0.9% sodium chloride 250 mL infusion   mcg/min IntraVENous TITRATE Stopped at 01/31/17 1455    0.9% sodium chloride infusion 250 mL  250 mL IntraVENous PRN      celecoxib (CELEBREX) capsule 200 mg  200 mg Oral Q12H 200 mg at 02/01/17 1120    acetaminophen (TYLENOL) tablet 650 mg  650 mg Oral Q6H 650 mg at 02/01/17 1120    albuterol (PROVENTIL HFA, VENTOLIN HFA, PROAIR HFA) inhaler 2 Puff  2 Puff Inhalation Q6H PRN      calcium carbonate (TUMS) chewable tablet 400 mg [elemental]  400 mg Oral TID WITH MEALS 400 mg at 02/01/17 1119    carvedilol (COREG) tablet 3.125 mg  3.125 mg Oral BID WITH MEALS 3.125 mg at 02/01/17 0733    linaclotide (LINZESS) capsule 290 mcg  290 mcg Oral  mcg at 02/01/17 0733    methadone (DOLOPHINE) tablet 5 mg  5 mg Oral Q8H 5 mg at 02/01/17 0905    SUMAtriptan (IMITREX) tablet 50 mg  50 mg Oral ONCE PRN      umeclidinium (INCRUSE ELLIPTA) 62.5 mcg/actuation  1 Puff Inhalation DAILY 1 Puff at 02/01/17 0910    zolpidem (AMBIEN) tablet 5 mg  5 mg Oral QHS PRN      sodium chloride (NS) flush 5-10 mL  5-10 mL IntraVENous Q8H 10 mL at 17 0538    sodium chloride (NS) flush 5-10 mL  5-10 mL IntraVENous PRN      oxyCODONE IR (ROXICODONE) tablet 10 mg  10 mg Oral Q3H PRN 10 mg at 17 0546    naloxone (NARCAN) injection 0.4 mg  0.4 mg IntraVENous PRN      ondansetron (ZOFRAN) injection 4 mg  4 mg IntraVENous Q4H PRN      diphenhydrAMINE (BENADRYL) injection 12.5 mg  12.5 mg IntraVENous Q6H PRN      senna-docusate (PERICOLACE) 8.6-50 mg per tablet 1 Tab  1 Tab Oral BID 1 Tab at 17 0905    polyethylene glycol (MIRALAX) packet 17 g  17 g Oral DAILY 17 g at 17 0905    bisacodyl (DULCOLAX) suppository 10 mg  10 mg Rectal DAILY PRN      oxyCODONE IR (ROXICODONE) tablet 15 mg  15 mg Oral Q3H PRN 15 mg at 17 1120    fentaNYL citrate (PF) injection 25 mcg  25 mcg IntraVENous Q4H PRN 25 mcg at 17 0010    famotidine (PF) (PEPCID) 20 mg in sodium chloride 0.9 % 10 mL injection  20 mg IntraVENous Q12H 20 mg at 17 2361     Allergies   Allergen Reactions    [place] Angioedema    Codeine Other (comments)     \"Years ago gave me hives but lately I havetaken it without problem\"    Dilaudid [Hydromorphone (Bulk)] Shortness of Breath    Lyrica [Pregabalin] Nausea and Vomiting        Review of Systems:      Other than noted in the HPI above, a 12 point review of systems is negative for any other constitutional, opthalmologic, ENT, cardiovascular, pulmonary, gastrointestinal, urinary, neurologic, psychiatric, lymphatic, hematologic, oncologic, integument or musculoskeletal issues. Exam:  Gen: NAD   Resp: CTAB   Card: RRR   Abd: Soft, NTND  Extr: left hip dressed - dressing stained  Neuro: A&O x3    Objective:   Blood pressure 154/66, pulse 70, temperature 98.3 °F (36.8 °C), resp. rate 16, height 5' 5\" (1.651 m), weight 61.5 kg (135 lb 9.3 oz), SpO2 99 %.   Temp (24hrs), Av.6 °F (37 °C), Min:97.9 °F (36.6 °C), Max:99 °F (37.2 °C)    Recent Labs      02/01/17   1127  02/01/17   0447  01/31/17   2244   01/31/17   0811   01/30/17   1426  01/30/17   1112   NA   --   145   --    --   142   --   142   --    K   --   4.5   --    --   4.6   --   4.2   --    CL   --   111*   --    --   110*   --   109*   --    CO2   --   26   --    --   26   --   25   --    BUN   --   21*   --    --   18   --   16   --    CREA   --   0.80   --    --   0.72   --   0.53*   --    GLU   --   133*   --    --   132*   --   110*   --    PHOS   --   3.2   --    --   5.0*   --    --    --    MG   --   2.0   --    --   2.0   --   1.4*   --    ALB   --   2.2*   --    --    --    --   2.2*  3.3*   WBC   --   6.6   --    --    --    --   3.7   --    HGB  8.3*  8.5*  8.4*   < >   --    < >  11.1*   --    HCT  25.9*  26.4*  26.2*   < >   --    < >  35.1   --    PLT   --   96*   --    --    --    --   151   --     < > = values in this interval not displayed. Microbiology:      Lab Results   Component Value Date/Time    Culture result:  01/30/2017 02:14 PM     FEW  PROBABLE  STAPHYLOCOCCUS SPECIES, COAGULASE NEGATIVE      Culture result:  01/30/2017 02:12 PM     FEW  PROBABLE  STAPHYLOCOCCUS SPECIES, COAGULASE NEGATIVE      Culture result:  01/30/2017 01:20 PM     FEW  PROBABLE  STAPHYLOCOCCUS SPECIES, COAGULASE NEGATIVE      Culture result:  01/30/2017 01:20 PM     SCANT  PROBABLE  STAPHYLOCOCCUS SPECIES, COAGULASE NEGATIVE         Studies:  reviewed      We appreciate your consult and the opportunity to participate in your patient's care. Please call us with any questions or concerns. Lakisha Dumas NP    Signed By: February 1, 2017 February 1, 2017

## 2017-02-01 NOTE — PROGRESS NOTES
Pt is transferring to telemetry today. The [facility] will need to obtain preauthorization for pt has pt has Helpr. When pt is nearing stability for discharge,attending please notify case management 2 to 3 days ahead of time so CM can notify the [facility] to begin preauthorization. Updated clinicals faxed to the [facility]. Hand-off given to [facility] . Thank you.   Chago Whalen

## 2017-02-01 NOTE — PROGRESS NOTES
Occupational Therapy Goals  Initiated 2/1/2017   1. Patient will perform lower body dressing using adaptive dressing aides at minimal assistance within 7 days. 2. Patient will perform toilet transfers at supervision/set-up level using rolling walker within 7 days. 3. Patient will perform all aspects of toileting at minimal assistance within 7 days. 4. Patient will demonstrate/maintain/recall all hip precautions with 100% accuracy without cues within 7 days. 5.  Patient will participate in upper extremity therapeutic exercise/activities with modified independence for 10 minutes within 7 day(s). 6.  Patient will utilize energy conservation techniques during functional activities with verbal cues within 7 day(s). Occupational Therapy EVALUATION  Patient: Colton Velázquez (34 y.o. female)  Date: 2/1/2017  Primary Diagnosis: LEFT HIP BIPOLAR  Failed total joint replacement (HCC)  Failed total hip arthroplasty ([facility] [address]  Procedure(s) (LRB):  LEFT TOTAL HIP ARTHROPLASTY CONVERSION (Left) 2 Days Post-Op   Precautions: fall, WBAT L LE- short distances with walker to allow for bony ingrowth; hip precautions (no bending > 90degrees,no crossing legs, and no turning foot in or out); Abduction pillow when in bed- high risk for dislocation, deficient abductors noted- Per Dr. Jake Beavers notes       ASSESSMENT :  Based on the objective data described below, the patient presents with decreased activity tolerance in completing ADLs and functional mobility following L MIRIAM conversion. Patient transferred to ICU post op for hypotension and bradycardia. She was on hold for therapy yesterday (1/31) as she was requiring pressor support; Now weaned off Madi and stable for activity. Additionally, patient with marked drainage following surgery however resolving; She was received with hip spica in place and instructed to leave on with therapy.   Palliative consulted for pain control and requested to this therapist to coordinate pain medication 30 minutes prior to activity; Patient with great pain control during activity, rated 4/10 and patient stating, \"whatever they gave me is working\". Education provided on all hip precautions (posterior + not turning foot out), adaptive ADL techniques, safety in completing functional transfers, and all restrictions/precautions as they specifically pertain to bed + OOB activity. Patient required mod A x2 for bed mobility and min A x2 for transfer to [facility] then to chair, taking a few steps each transfer; Patient with good tolerance of WB through L LE. Patient currently independent to setup level for UB ADLs and required max to total A for LB ADLs. Patient's BP remained stable, patient with c/o very mild dizziness with sitting however improved with time, and SpO2 remained >95% on room air with all activity. Patient lives with her daughter and son who she provides care for (daguther with broken ankle and son with MR, both in wheelchair), therefore will have no assistance at home; She will need rehab at discharge (SNF likely most appropriate). Patient would benefit from continued skilled OT to progress towards goals and improve overall independence. Patient will benefit from skilled intervention to address the above impairments.   Patients rehabilitation potential is considered to be Good  Factors which may influence rehabilitation potential include:   [x]             None noted  []             Mental ability/status  []             Medical condition  []             Home/family situation and support systems  []             Safety awareness  []             Pain tolerance/management  []             Other:      PLAN :  Recommendations and Planned Interventions:  [x]               Self Care Training                  [x]        Therapeutic Activities  [x]               Functional Mobility Training    []        Cognitive Retraining  [x]               Therapeutic Exercises           [x]        Endurance Activities  []               Balance Training                   []        Neuromuscular Re-Education  []               Visual/Perceptual Training     [x]   Home Safety Training  [x]               Patient Education                 [x]        Family Training/Education  []               Other (comment):    Frequency/Duration: Patient will be followed by occupational therapy 5 times a week to address goals. Discharge Recommendations: Skilled Nursing Facility  Further Equipment Recommendations for Discharge: none at this time     SUBJECTIVE:   Patient stated I am proud of myself; I did better than I thought I would.     OBJECTIVE DATA SUMMARY:   HISTORY:   Past Medical History   Diagnosis Date    Autoimmune disease ([facility] [address]      psoriasis    Beta-blocker therapy     CAD (coronary artery disease)      non ischemic cardiomyopathy    Chronic obstructive pulmonary disease (HCC)     Chronic pain      curvature of spinie    GERD (gastroesophageal reflux disease)     Heart failure (HCC)     Hypercholesterolemia     Hypertension     Ill-defined condition      hx aortic stenosis & mitral regurg    Smoker     Thromboembolus ([facility] [address]      right leg after hip surgery     Past Surgical History   Procedure Laterality Date    Hx gyn       miscarriage    Pr abdomen surgery proc unlisted  2010     Gallbladder    Pr abdomen surgery proc unlisted  2000     hiatal hernia repair    Hx orthopaedic       Right Knee, Left Foot    Hx orthopaedic  04/05/2015     left hip replacement 2015    Hx orthopaedic  06/24/2016     rmoval bone fragment left hip    Hx orthopaedic  2013     right hip replacement    Hx orthopaedic  2001     right knee replacement    Hx heent       Tonsilectomy    Hx heent       cataract removed bilateral eyes       Prior Level of Function/Home Situation: Patient lives with her daughter and son.   Patient reports independence with all personal care and provides caregiver assistance to daughter with a broken ankle and son with MR- son has a caregiver currently helping him while she is hospitalized. Home Situation  Home Environment: Private residence  # Steps to Enter: 0  Wheelchair Ramp: Yes  One/Two Story Residence: One story  Living Alone: No  Support Systems: Child(sandra), Family member(s)  Patient Expects to be Discharged to[de-identified] Rehabilitation facility  Current DME Used/Available at Home: Rheta Hark, rollator, [address] chair, Grab bars  Tub or Shower Type: Tub/Shower combination  [x]  Right hand dominant   []  Left hand dominant    EXAMINATION OF PERFORMANCE DEFICITS:  Cognitive/Behavioral Status:  Neurologic State: Alert  Orientation Level: Oriented X4  Cognition: Appropriate decision making; Appropriate for age attention/concentration; Appropriate safety awareness  Perception: Appears intact  Perseveration: No perseveration noted  Safety/Judgement: Awareness of environment;Good awareness of safety precautions  Skin: Intact in the uppers   Edema: None noted in the uppers  Vision/Perceptual:    Tracking: Able to track stimulus in all quadrants w/o difficulty    Diplopia: No    Acuity: Within Defined Limits       Range of Motion:  WDL in the uppers     Strength:  Decreased but functional in the uppers; 4/5 bilaterally     Coordination:  Fine Motor Skills-Upper: Left Intact; Right Intact    Gross Motor Skills-Upper: Left Intact; Right Intact     Tone & Sensation:  Tone: normal  Sensation: intact    Balance:  Sitting: Intact  Standing: Impaired  Standing - Static: Fair  Standing - Dynamic : Fair    Functional Mobility and Transfers for ADLs:  Bed Mobility:  Supine to Sit: Moderate assistance;Assist x2; Additional time  Sit to Supine:  (pt remained up at end of tx)  Scooting: Minimum assistance;Assist x2    Transfers:  Sit to Stand: Minimum assistance;Assist x2  Stand to Sit: Minimum assistance;Assist x2  Bed to Chair: Minimum assistance;Assist x2  Toilet Transfer : Minimum assistance;Assist x2 (stand-pivot transfers to [facility])    ADL Assessment:  Feeding: Independent    Oral Facial Hygiene/Grooming: Setup    Bathing: Total assistance    Upper Body Dressing: Setup    Lower Body Dressing: Total assistance    Toileting: Maximum assistance      Cognitive Retraining  Safety/Judgement: Awareness of environment;Good awareness of safety precautions     Functional Measure:  Barthel Index:    Bathin  Bladder: 0  Bowels: 5  Groomin  Dressin  Feeding: 10  Mobility: 0  Stairs: 0  Toilet Use: 0  Transfer (Bed to Chair and Back): 5  Total: 20       Barthel and G-code impairment scale:  Percentage of impairment CH  0% CI  1-19% CJ  20-39% CK  40-59% CL  60-79% CM  80-99% CN  100%   Barthel Score 0-100 100 99-80 79-60 59-40 20-39 1-19   0   Barthel Score 0-20 20 17-19 13-16 9-12 5-8 1-4 0      The Barthel ADL Index: Guidelines  1. The index should be used as a record of what a patient does, not as a record of what a patient could do. 2. The main aim is to establish degree of independence from any help, physical or verbal, however minor and for whatever reason. 3. The need for supervision renders the patient not independent. 4. A patient's performance should be established using the best available evidence. Asking the patient, friends/relatives and nurses are the usual sources, but direct observation and common sense are also important. However direct testing is not needed. 5. Usually the patient's performance over the preceding 24-48 hours is important, but occasionally longer periods will be relevant. 6. Middle categories imply that the patient supplies over 50 per cent of the effort. 7. Use of aids to be independent is allowed. Link ., Barthel, D.W. (2680). Functional evaluation: the Barthel Index. [address] [employer] (14)2. LILIA Wesley, Gabriel Lunsford., Kenya Burgess., Zane, [address] ().  Measuring the change indisability after inpatient rehabilitation; comparison of the responsiveness of the Barthel Index and Functional [place] Measure. Journal of Neurology, Neurosurgery, and Psychiatry, 66(4), 168-262. ALEJANDRO Mcallister, EHSAN Hernandez, & Ann Rojas M.A. (2004.) Assessment of post-stroke quality of life in cost-effectiveness studies: The usefulness of the Barthel Index and the EuroQoL-5D. Quality of Life Research, 13, 355-18       G codes: In compliance with CMSs Claims Based Outcome Reporting, the following G-code set was chosen for this patient based on their primary functional limitation being treated: The outcome measure chosen to determine the severity of the functional limitation was the Barthel Index with a score of 20/100 which was correlated with the impairment scale. ? Self Care:     - CURRENT STATUS: CL - 60%-79% impaired, limited or restricted    - GOAL STATUS: CK - 40%-59% impaired, limited or restricted    - D/C STATUS:  ---------------To be determined---------------     Occupational Therapy Evaluation Charge Determination   History Examination Decision-Making   LOW Complexity : Brief history review  LOW Complexity : 1-3 performance deficits relating to physical, cognitive , or psychosocial skils that result in activity limitations and / or participation restrictions  LOW Complexity : No comorbidities that affect functional and no verbal or physical assistance needed to complete eval tasks       Based on the above components, the patient evaluation is determined to be of the following complexity level: LOW   Pain:  Pain Scale 1: Numeric (0 - 10)  Pain Intensity 1: 4/10 with activity  Pain Location 1: Hip  Pain Orientation 1: Left  Pain Description 1: Aching;Dull  Pain Intervention(s) 1: Educated on positioning for comfort    Activity Tolerance:   Patient tolerated eval well. Please refer to the flowsheet for vital signs taken during this treatment.   After treatment:   [x] Patient left in no apparent distress sitting up in chair  [] Patient left in no apparent distress in bed  [x] Call bell left within reach  [x] Nursing notified  [] Caregiver present  [] Bed alarm activated    COMMUNICATION/EDUCATION:   The patients plan of care was discussed with: Physical Therapist, Registered Nurse and patient. .  [x] Home safety education was provided and the patient/caregiver indicated understanding. [x] Patient/family have participated as able in goal setting and plan of care. [x] Patient/family agree to work toward stated goals and plan of care. [] Patient understands intent and goals of therapy, but is neutral about his/her participation. [] Patient is unable to participate in goal setting and plan of care. This patients plan of care is appropriate for delegation to [facility].     Thank you for this referral.  Vanessa Mejia, OTR/L  Time Calculation: 43 mins

## 2017-02-01 NOTE — PROGRESS NOTES
4713: Bedside and Verbal shift change report given to Zaina Dubose, LORENE and Mari Rush RN (oncoming nurse) by Yadira Ceron RN (offgoing nurse). Report included the following information SBAR, Kardex, Procedure Summary, Intake/Output, MAR, Med Rec Status and Cardiac Rhythm SR with LBBB.   0749: Pt assessment completed. A&O x4, c/o left hip pain 3/10 declined pain medication at this time, repositioned, ice therapy. Left hip dressing dry and intact with ace wrap intact. Abductor wedge in place, bilateral pedal pulses palpable, skin warm, dry. Sensation present in BLE with bilateral compression stockings applied and SCDs in place. 3795: Pt repositioned to right side, VS stable, pain reassessed 3/10 left hip, pt declines pain medication at this time. Resting comfortably in bed with lights dimmed and tv on.  1120: Patient c/o left hip pain 8/10. Oxycodone 15mg PO given and repositioned. Decreased stimuli and dimmed lights for rest per her request. Dressing dry and intact abductor wedge in place. 1249: Pain well controlled. PT working with pt and in chair. VS stable no complaints or concerns.

## 2017-02-01 NOTE — PROGRESS NOTES
[facility] Dosing Services: Antimicrobial Stewardship Daily Doc    Consult for antibiotic dosing of Vancomycin by Dr. Venancio Beckham  Indication: Prosthetic hip infection  Day of Therapy 2    Ht Readings from Last 1 Encounters:   01/30/17 165.1 cm (65\")        Wt Readings from Last 1 Encounters:   01/30/17 61.5 kg (135 lb 9.3 oz)     Vancomycin therapy:  Current maintenance dose: 1000(mg) every 12 hours (frequency). Dose calculated to approximate a therapeutic trough of 15-20mcg/mL (treat as osteomyelitis)  Plan for level / Adjustment in Therapy: 2/2/17 prior to 1800 dose  Dose administration notes: Patient received doses for surgical prophylaxis which are equivalent to therapeutic dosing in this patient. Continuing every 12 hours and recommend drawing level prior to 1800 dose on 2/2/17    Other Antimicrobial   (not dosed by pharmacist) None   Cultures 1/30 Wound cultures show scant Coag (-) staph   Serum Creatinine Lab Results   Component Value Date/Time    Creatinine 0.80 02/01/2017 04:47 AM         Creatinine Clearance Estimated Creatinine Clearance: 53.8 mL/min (based on Cr of 0.8). Temp Temp: 98.3 °F (36.8 °C)       WBC Lab Results   Component Value Date/Time    WBC 6.6 02/01/2017 04:47 AM        H/H Lab Results   Component Value Date/Time    HGB 8.5 02/01/2017 05:04 PM        Platelets    Lab Results   Component Value Date/Time    PLATELET 96 96/60/9021 04:47 AM          With definitive cultures, can de-escalate therapy.     Alison Gagnon, PharmD, BCPS  Contact information:

## 2017-02-01 NOTE — CONSULTS
[facility]  Pulmonary, Critical Care, and Sleep Medicine  Name: Syd Tse MRN: [id number]   : 1940 Hospital: [address] [facility]   Date: 2017        Impression Plan   1. Shock: likely combination of hemorrhagic/hypovolemic and allergic drug reaction: resolved  2. Lip and tongue swelling: improving  3. Blood Loss Anemia  4. NSTEMI  5. Surgical site bleeding  6. Thrombocytopenia  7. 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 Hx of CHF  9. Hx of COPD  10. Hx of DVT after right hip surgery  11. FEN               · Off vasopressors  · Continue gentle IVF: Cardiology following  · Watch urine output  · Watch H&H closely and transfuse as needed  · Ortho following surgical wound  · Continue Pepcid  · Will wean Solumedrol further due to active bleeding  · NSTEMI management per Cardiology recs  · Watch platelets  · Nutrition: on PO diet  · DVT prophylaxis: Bilateral SCDs  · Dispo: ok to go out of the ICU to [facility] today       See my orders for details    My assessment/plan was discussed with: Patient/Family, Ortho, and Nursing    Radiology  ( personally reviewed) CXR: right IJ TLC, slightly larger cardiac silhouette, fairly clear lungs   ABG No results for input(s): PHI, PO2I, PCO2I in the last 72 hours. Subjective     This patient has been seen and evaluated at the request of Dr. Donnie Kimbrough for hypotension. Patient   is a 68 y.o. female with PMHx of hip arthritis who was undergoing a total hip arthoplasty when   she developed hypotension and bradycardia. Fascia was quickly closed and pt given fluids and   Maid and transferred to ICU. Pt lost approx 800cc of blood in OR. Currently BP in the 80s. Pt has   noticed lip and tongue swelling. States she is allergic to dilaudid and almonds.      Overnight events reviewed:  Afebrile overnight  Hgb improved after receiving 2 units PRBCs yesterday  Urine output slowing down  IVF decreased by Cardiology due to concern for CHF  Bleeding from surgical site has improved  Off Neosynephrine  Lip and tongue swelling continue to improve  Tolerating PO diet    Review of Systems: hip pain, generalized malaise    Past Medical History   Diagnosis Date    Autoimmune disease ([facility] [address]      psoriasis    Beta-blocker therapy     CAD (coronary artery disease)      non ischemic cardiomyopathy    Chronic obstructive pulmonary disease (HCC)     Chronic pain      curvature of spinie    GERD (gastroesophageal reflux disease)     Heart failure (HCC)     Hypercholesterolemia     Hypertension     Ill-defined condition      hx aortic stenosis & mitral regurg    Smoker     Thromboembolus ([facility] [address]      right leg after hip surgery      Past Surgical History   Procedure Laterality Date    Hx gyn       miscarriage    Pr abdomen surgery proc unlisted  2010     Gallbladder    Pr abdomen surgery proc unlisted  2000     hiatal hernia repair    Hx orthopaedic       Right Knee, Left Foot    Hx orthopaedic  04/05/2015     left hip replacement 2015    Hx orthopaedic  06/24/2016     rmoval bone fragment left hip    Hx orthopaedic  2013     right hip replacement    Hx orthopaedic  2001     right knee replacement    Hx heent       Tonsilectomy    Hx heent       cataract removed bilateral eyes      Prior to Admission medications    Medication Sig Start Date End Date Taking? Authorizing Provider   SUMAtriptan (IMITREX) 50 mg tablet Take 50 mg by mouth once as needed for Migraine. Yes Historical Provider   carvedilol (COREG) 3.125 mg tablet Take 3.125 mg by mouth two (2) times daily (with meals). Takes c breakfast and dinner   Yes Historical Provider   DULCOLAX, BISACODYL, PO Take 1 Tab by mouth daily as needed. Yes Historical Provider   promethazine (PHENERGAN) 25 mg tablet Take 25 mg by mouth every six (6) hours as needed for Nausea. Historical Provider   cyanocobalamin 1,000 mcg tablet Take 1,000 mcg by mouth daily.     Historical Provider traMADol (ULTRAM) 50 mg tablet Take 50 mg by mouth every eight (8) hours as needed for Pain. Historical Provider   linaclotide Minerva Sizer) 290 mcg cap capsule Take 290 mcg by mouth Daily (before breakfast). Historical Provider   zolpidem (AMBIEN) 5 mg tablet Take 5 mg by mouth nightly as needed for Sleep. Historical Provider   tiotropium (SPIRIVA WITH HANDIHALER) 18 mcg inhalation capsule Take 1 Cap by inhalation daily. Historical Provider   albuterol (PROVENTIL HFA) 90 mcg/actuation inhaler Take 2 Puffs by inhalation every six (6) hours as needed. Historical Provider   methadone (DOLOPHINE) 5 mg tablet Take 5 mg by mouth every eight (8) hours. Historical Provider   omega-3 fatty acids-vitamin e (FISH OIL) 1,000 mg cap Take 1 Cap by mouth. Historical Provider   aspirin delayed-release 81 mg tablet Take 81 mg by mouth daily. Historical Provider   cholecalciferol, vitamin d3, (VITAMIN D3) 400 unit cap Take 1 Tab by mouth daily. Historical Provider   esomeprazole (NEXIUM) 40 mg capsule Take  by mouth daily. Historical Provider   polyethylene glycol (MIRALAX) 17 gram packet Take 17 g by mouth every other day. Historical Provider   CALCIUM CARBONATE/MAG HYDROX (ROLAIDS EXTRA STRENGTH PO) Take 2 Tabs by mouth three (3) times daily (with meals).     Historical Provider     Current Facility-Administered Medications   Medication Dose Route Frequency    0.9% sodium chloride infusion  25 mL/hr IntraVENous CONTINUOUS    PHENYLephrine (NEOSYNEPHRINE) 30,000 mcg in 0.9% sodium chloride 250 mL infusion   mcg/min IntraVENous TITRATE    methylPREDNISolone (PF) (SOLU-MEDROL) injection 40 mg  40 mg IntraVENous Q8H    ceFAZolin in 0.9% NS (ANCEF) IVPB soln 2 g  2 g IntraVENous Q8H    celecoxib (CELEBREX) capsule 200 mg  200 mg Oral Q12H    acetaminophen (TYLENOL) tablet 650 mg  650 mg Oral Q6H    calcium carbonate (TUMS) chewable tablet 400 mg [elemental]  400 mg Oral TID WITH MEALS    carvedilol (COREG) tablet 3.125 mg  3.125 mg Oral BID WITH MEALS    linaclotide (LINZESS) capsule 290 mcg  290 mcg Oral ACB    methadone (DOLOPHINE) tablet 5 mg  5 mg Oral Q8H    umeclidinium (INCRUSE ELLIPTA) 62.5 mcg/actuation  1 Puff Inhalation DAILY    sodium chloride (NS) flush 5-10 mL  5-10 mL IntraVENous Q8H    senna-docusate (PERICOLACE) 8.6-50 mg per tablet 1 Tab  1 Tab Oral BID    polyethylene glycol (MIRALAX) packet 17 g  17 g Oral DAILY    famotidine (PF) (PEPCID) 20 mg in sodium chloride 0.9 % 10 mL injection  20 mg IntraVENous Q12H     Allergies   Allergen Reactions    [place] Angioedema    Codeine Other (comments)     \"Years ago gave me hives but lately I havetaken it without problem\"    Dilaudid [Hydromorphone (Bulk)] Shortness of Breath    Lyrica [Pregabalin] Nausea and Vomiting      Social History   Substance Use Topics    Smoking status: Current Every Day Smoker     Packs/day: 0.50     Years: 60.00    Smokeless tobacco: Never Used    Alcohol use No      Comment: 0      Family History   Problem Relation Age of Onset    Cancer Mother     Stroke Father           Laboratory: I have personally reviewed the critical care flowsheet and labs. Recent Labs      02/01/17 0447  01/31/17   2244  01/31/17   1201   01/30/17   1426   WBC  6.6   --    --    --   3.7   HGB  8.5*  8.4*  7.8*   < >  11.1*   HCT  26.4*  26.2*  25.4*   < >  35.1   PLT  96*   --    --    --   151    < > = values in this interval not displayed.      Recent Labs      02/01/17   0447  01/31/17   0811  01/30/17   1519  01/30/17   1426  01/30/17   1112   NA  145  142   --   142   --    K  4.5  4.6   --   4.2   --    CL  111*  110*   --   109*   --    CO2  26  26   --   25   --    GLU  133*  132*   --   110*   --    BUN  21*  18   --   16   --    CREA  0.80  0.72   --   0.53*   --    CA  7.4*  7.9*   --   8.4*   --    MG  2.0  2.0   --   1.4*   --    PHOS  3.2  5.0*   --    --    --    ALB  2.2*   --    --   2.2*  3.3* SGOT  16   --    --   19   --    ALT  10*   --    --   12   --    INR   --    --   1.1   --    --        Objective:     Mode Rate Tidal Volume Pressure FiO2 PEEP                    Vital Signs:     TMAX(24)      Intake/Output:   Last shift:         Last 3 shifts: 02/01 0701 - 02/01 1900  In: -   Out: 100 [Urine:100]RRIOLAST3    Intake/Output Summary (Last 24 hours) at 02/01/17 1021  Last data filed at 02/01/17 0749   Gross per 24 hour   Intake          2276.04 ml   Output              700 ml   Net          1576.04 ml     EXAM:   GENERAL: in pain, mentating well, AAOx3, HEENT:  PERRL, EOMI, no alar flaring or epistaxis,   oral mucosa moist, tongue swelling and lip swelling, NECK:  no jugular vein distention, no retractions,   no thyromegaly or masses, LUNGS: CTA, no w/r/r, HEART:  Regular rate and rhythm with no MGR;   no edema is present, ABDOMEN:  soft with no tenderness, bowel sounds present, EXTREMITIES:    warm with no cyanosis, SKIN: Bright red blood in saturated dressing left hip, pale NEUROLOGIC:    alert and oriented x 3, moving all extremities     Tin Cleaning MD, [facility]  [facility] [address]

## 2017-02-01 NOTE — PROGRESS NOTES
Cardiology Progress Note    ICU      NAME:  Ailyn Galindo   :   1940   MRN:   [id number]     Assessment/Plan:   1. NSTEMI/demand ischemia/hypovolemia secondary to blood loss: supportive care. ASA if ok with surgery. Holding BB due to hypotension. Consider resuming in am. Trop peak 3.07. Will need ischemia work up > cath once acute surgical issues resolve and safe from ID standpoint. D/W pt.   2.  POD # 1:  L hip arthroplasty: per ortho. 3.  Hx of non ischemic CMP/Hx of systolic CHF. Not in acute decompensation but is getting edematous. IV to [facility]. 4. Hx of LBBB  5. Hx of Aortic sclerosis/MR  6. Chronic L hip infection: ID consult. Pt personally seen and examined. Chart reviewed. Agree with advanced NP's history, exam and  A/P with changes/additons    Jose Guerra MD, [facility]      Subjective:   Patient is a 59-year-old  female who was undergoing hip surgery when she started developing hypotension and bradycardia. Pressors were started. Her EKG was thought to have ST elevation by OR staff but review of her prior EKG indicates that she has had left bundle branch block with ST-T changes and her present EKG is similar to her prior EKGs. Patient is currently being weaned off pressors as her systolic blood pressures in the 120-130 range. She also does not complain of chest pain but is still recovering from her anesthesia. Unable to give detailed history. Her chart indicates that she has a history of nonischemic cardiomyopathy, aortic sclerosis.         Cardiac ROS: Patient denies any exertional chest pain, dyspnea, palpitations, syncope, orthopnea, edema or paroxysmal nocturnal dyspnea. Overnight events:  Off Madi systolic   S/p 2 U PRBC's  Muscle spasm L leg  No chest pain  No SOB  K 4.5  Mg++ 2.0      Previous Cardiac Eval  ECHO 17   Left ventricle: Systolic function was moderately reduced.  Ejection  fraction was estimated in the range of 35 % to 40 %. Ventricular septum: There was dyssynergic motion. There was sigmoid septal  appearance. These changes are consistent with LBBB. Mitral valve: There was mild regurgitation. Aortic valve: Leaflets exhibited mild calcification, normal cuspal  separation, and sclerosis. Review of Systems: No nausea, indigestion, vomiting, pain, cough, sputum. No bleeding. Taking po diet. Objective:     Visit Vitals    /74    Pulse 63    Temp 98.4 °F (36.9 °C)    Resp 16    Ht 5' 5\" (1.651 m)    Wt 135 lb 9.3 oz (61.5 kg)    SpO2 93%    BMI 22.56 kg/m2    O2 Flow Rate (L/min): 3 l/min O2 Device: Room air    Temp (24hrs), Av.6 °F (37 °C), Min:97.9 °F (36.6 °C), Max:99.2 °F (37.3 °C)      701 - 1900  In: -   Out: 100 [Urine:100]    1901 -  07  In: 3519.4 [I.V.:2888.1]  Out: 8855 [FETEB:5735]  TELE:  SR     General: AAOx3 cooperative, no acute distress. HEENT: Atraumatic. Pink and moist.  Anicteric sclerae. Neck : Supple, no thyromegaly. Lungs: CTA bilaterally. No wheezing/rhonchi/rales. Heart: PMI non displaced. Regular rhythm, 2/6 systolic murmur, no rubs, no gallops. No JVD. Abdomen: Soft, non-distended, non-tender. + Bowel sounds. Extremities: UE edema , L hip wrap D/I. TEDs on. No calf tenderness  Neurologic: Grossly intact. Alert and oriented X 3. No acute neurological distress. Psych: Good insight. Not anxious or agitated.       Care Plan discussed with:    Comments   Patient x    Family      RN x    Care Manager x                   Consultant:  estuardo SMITH       Data Review:     No lab exists for component: ITNL   Recent Labs      17   0447  17   1802  17   0811  17   1436   CPK   --    --    --   99   TROIQ  1.22*  2.00*  3.07*  0.04     Recent Labs      17   0447  17   2244  17   1201  17   0811   17   1426  17   1112   NA  145   --    --   142   --   142   --    K  4.5   --    --   4.6   -- 4.2   --    CL  111*   --    --   110*   --   109*   --    CO2  26   --    --   26   --   25   --    BUN  21*   --    --   18   --   16   --    CREA  0.80   --    --   0.72   --   0.53*   --    GLU  133*   --    --   132*   --   110*   --    PHOS  3.2   --    --   5.0*   --    --    --    MG  2.0   --    --   2.0   --   1.4*   --    ALB  2.2*   --    --    --    --   2.2*  3.3*   WBC  6.6   --    --    --    --   3.7   --    HGB  8.5*  8.4*  7.8*   --    < >  11.1*   --    HCT  26.4*  26.2*  25.4*   --    < >  35.1   --    PLT  96*   --    --    --    --   151   --     < > = values in this interval not displayed.      Recent Labs      01/30/17   1519   INR  1.1   PTP  10.7   APTT  22.8       Medications reviewed  Current Facility-Administered Medications   Medication Dose Route Frequency    0.9% sodium chloride infusion  25 mL/hr IntraVENous CONTINUOUS    PHENYLephrine (NEOSYNEPHRINE) 30,000 mcg in 0.9% sodium chloride 250 mL infusion   mcg/min IntraVENous TITRATE    methylPREDNISolone (PF) (SOLU-MEDROL) injection 40 mg  40 mg IntraVENous Q8H    0.9% sodium chloride infusion 250 mL  250 mL IntraVENous PRN    ceFAZolin in 0.9% NS (ANCEF) IVPB soln 2 g  2 g IntraVENous Q8H    celecoxib (CELEBREX) capsule 200 mg  200 mg Oral Q12H    acetaminophen (TYLENOL) tablet 650 mg  650 mg Oral Q6H    albuterol (PROVENTIL HFA, VENTOLIN HFA, PROAIR HFA) inhaler 2 Puff  2 Puff Inhalation Q6H PRN    calcium carbonate (TUMS) chewable tablet 400 mg [elemental]  400 mg Oral TID WITH MEALS    carvedilol (COREG) tablet 3.125 mg  3.125 mg Oral BID WITH MEALS    linaclotide (LINZESS) capsule 290 mcg  290 mcg Oral ACB    methadone (DOLOPHINE) tablet 5 mg  5 mg Oral Q8H    SUMAtriptan (IMITREX) tablet 50 mg  50 mg Oral ONCE PRN    umeclidinium (INCRUSE ELLIPTA) 62.5 mcg/actuation  1 Puff Inhalation DAILY    zolpidem (AMBIEN) tablet 5 mg  5 mg Oral QHS PRN    sodium chloride (NS) flush 5-10 mL  5-10 mL IntraVENous Q8H  sodium chloride (NS) flush 5-10 mL  5-10 mL IntraVENous PRN    oxyCODONE IR (ROXICODONE) tablet 10 mg  10 mg Oral Q3H PRN    naloxone (NARCAN) injection 0.4 mg  0.4 mg IntraVENous PRN    ondansetron (ZOFRAN) injection 4 mg  4 mg IntraVENous Q4H PRN    diphenhydrAMINE (BENADRYL) injection 12.5 mg  12.5 mg IntraVENous Q6H PRN    senna-docusate (PERICOLACE) 8.6-50 mg per tablet 1 Tab  1 Tab Oral BID    polyethylene glycol (MIRALAX) packet 17 g  17 g Oral DAILY    bisacodyl (DULCOLAX) suppository 10 mg  10 mg Rectal DAILY PRN    oxyCODONE IR (ROXICODONE) tablet 15 mg  15 mg Oral Q3H PRN    traMADol (ULTRAM) tablet 100 mg  100 mg Oral Q6H PRN    fentaNYL citrate (PF) injection 25 mcg  25 mcg IntraVENous Q4H PRN    famotidine (PF) (PEPCID) 20 mg in sodium chloride 0.9 % 10 mL injection  20 mg IntraVENous Q12H         Yonatan Gutierrez NP

## 2017-02-01 NOTE — PROGRESS NOTES
Palliative Medicine Consult  [phone]    Patient Name: Syd Tse  YOB: 1940      Date of Initial Consult: 1-30-17  Reason for Consult: care decisions, end stage disease, overwhelming symptoms, psychosocial distress, chronic pain, HTN, GERD  Requesting Physician: Dr. Donnie Kimbrough   Primary Care Physician: Guillaume Moser MD          Summary:   Syd Tse is a 68y.o. year old with a past history of CAD, psoriasis, COPD,  CHF, LBBB who was admitted on 1/30/2017 from home with a diagnosis of left hip repair arthroplasty. Current medical issues leading to Palliative Medicine involvement include: s/p left hip repair, pain in the left hip, pain in the spine, weakness, hypotension, bradycardia. Palliative Diagnoses:   1. Weakness   2. Pain in the spine  3. Pain in the left hip  4. Hypotension  5. Bradycardia        Plan:     1. Pain/symptom management: continue methadone 5 mg Q 8 hrs, this is her long standing pain medication for degenerative spine disease for which she sees a pain specialist. Pt reports pain in her left hip is 6/10 and she is s/p left hip surgery. She reports her stable pain in the spine thoracic and lumbar is 5/10  2. dcd Tramadol    3. Added flexeril, low dose for severe muscle spasms  4. Pressors are off, she is complaining of spasms in the left hip, will consider a low dose muscle relaxer  5. Continue miralax and pericolace   6. Coordination of care: Palliative IDT, primary nurse, case management  7. Psychosocial and emotional aspects of care: provided emotional support, active listening and non-abandonment  8.  Disposition: to be determined      Goals of Care:          TREATMENT PREFERENCES:   Code Status: Full Code    Advance Care Planning:  Advance Care Planning 1/30/2017   Patient's Healthcare Decision Maker is: Legal Next of Allan 69   Primary Decision Maker Name [id number]   Primary Decision Maker Phone Number [phone]   Confirm Advance Directive None Other:    The palliative care team has discussed with patient / health care proxy about goals of care / treatment preferences for patient.  [====Goals of Care====]           Resuscitation Status: Full Code   Durable DNR Status: na  Initial Consult Note routed to PCP? [x] Yes   [] No    [] No PCP listed          Thank you for including Palliative Medicine in this patient's care. Lea Boas, NP           HISTORY:     History obtained from: chart, physician, nursing    CHIEF COMPLAINT: pain int he left hip    HPI/SUBJECTIVE: pt is with post op pain, hypotension, degenerative spine disease with chronic pain syndrome. The patient is: [x] Verbal / [] Nonverbal / [] Unresponsive                FUNCTIONAL ASSESSMENT:     Palliative Performance Scale (PPS):  PPS: 50         PSYCHOSOCIAL/SPIRITUAL SCREENING:     Advance Care Planning:  Advance Care Planning 1/30/2017   Patient's Healthcare Decision Maker is: Legal Next of Allan 69   Primary Decision Maker Name [id number]   Primary Decision Maker Phone Number [phone]   Confirm Advance Directive None        Any spiritual / Muslim concerns:  [] Yes /  [x] No    Caregiver Burnout:  [] Yes /  [x] No /  [] No Caregiver Present      Anticipatory grief assessment:   [x] Normal  / [] Maladaptive       ESAS Anxiety: Anxiety: 0    ESAS Depression: Depression: 0                 REVIEW OF SYSTEMS:     The following systems were [x] reviewed / [] unable to be reviewed  Systems: constitutional, ears/nose/mouth/throat, respiratory, gastrointestinal, musculoskeletal, neurologic, psychiatric, endocrine. Positive findings noted below.   Modified ESAS Completed by: provider   Fatigue: 5 Drowsiness: 0   Depression: 0 Pain: 6   Anxiety: 0 Nausea: 0   Anorexia: 3 Dyspnea: 0     Constipation: No                    Functional Assessment Staging (FAST) for dementia: na                    Confusion Assessment Method Diagnostic Algorithm (CAM):    CAM Score: 0    [++++ Clinical Pain Assessment++++]  Location (including laterality):  Quality:   Severity:  Acuity (acute/chronic): Tolerable (yes/no):  Etiology, if known:  Impact (functional, psychological): Other:  [++++ Clinical Pain Assessment++++]    Clinical Pain Assessment (nonverbal scale for severity on nonverbal patients):   [++++ Clinical Pain Assessment++++]  [++++Pain Severity++++]: Pain: 6  [++++Pain Character++++]:   [++++Pain Duration++++]:   [++++Pain Effect++++]:   [++++Pain Factors++++]:   [++++Pain Frequency++++]:   [++++Pain Location++++]:   [++++ Clinical Pain Assessment++++]       PHYSICAL EXAM:     Wt Readings from Last 3 Encounters:   01/30/17 135 lb 9.3 oz (61.5 kg)   01/11/17 132 lb 4.4 oz (60 kg)   04/22/14 120 lb (54.4 kg)     Blood pressure 136/74, pulse 70, temperature 98.1 °F (36.7 °C), resp. rate 16, height 5' 5\" (1.651 m), weight 135 lb 9.3 oz (61.5 kg), SpO2 95 %.   Pain:  Pain Scale 1: Numeric (0 - 10)  Pain Intensity 1: 3  Pain Onset 1: surgical   Pain Location 1: Hip  Pain Orientation 1: Left  Pain Description 1: Aching  Pain Intervention(s) 1: Environmental changes, Cold pack           Last bowel movement: none    Constitutional: pt is awake and alert, she complains of pain mainly in the left hip with severe muscle spasms  Eyes: pupils equal, anicteric  ENMT: no nasal discharge, moist mucous membranes  Cardiovascular: regular rhythm, distal pulses intact  Respiratory: breathing not labored, symmetric  Gastrointestinal: soft non-tender, +bowel sounds  Musculoskeletal: no deformity, no tenderness to palpation  Skin: warm, dry  Neurologic:pt is able to follow commands, pt is moving all extremities  Psychiatric: full affect         HISTORY:     Active Problems:    Failed total joint replacement ([facility]) (1/30/2017)      Failed total hip arthroplasty ([facility] (1/30/2017)      Past Medical History   Diagnosis Date    [facility]      psoriasis    Beta-blocker therapy     CAD (coronary artery disease) non ischemic cardiomyopathy    Chronic obstructive pulmonary disease (HCC)     Chronic pain      curvature of spinie    GERD (gastroesophageal reflux disease)     Heart failure (HCC)     Hypercholesterolemia     Hypertension     Ill-defined condition      hx aortic stenosis & mitral regurg    Smoker     Thromboembolus ([address]      right leg after hip surgery      Past Surgical History   Procedure Laterality Date    Hx gyn       miscarriage    Pr abdomen surgery proc unlisted  2010     Gallbladder    Pr abdomen surgery proc unlisted  2000     hiatal hernia repair    Hx orthopaedic       Right Knee, Left Foot    Hx orthopaedic  04/05/2015     left hip replacement 2015    Hx orthopaedic  06/24/2016     rmoval bone fragment left hip    Hx orthopaedic  2013     right hip replacement    Hx orthopaedic  2001     right knee replacement    Hx heent       Tonsilectomy    Hx heent       cataract removed bilateral eyes      Family History   Problem Relation Age of Onset    Cancer Mother     Stroke Father       Social History   Substance Use Topics    Smoking status: Current Every Day Smoker     Packs/day: 0.50     Years: 60.00    Smokeless tobacco: Never Used    Alcohol use No      Comment: 0     Allergies   Allergen Reactions    [place] Angioedema    Codeine Other (comments)     \"Years ago gave me hives but lately I havetaken it without problem\"    Dilaudid [Hydromorphone (Bulk)] Shortness of Breath    Lyrica [Pregabalin] Nausea and Vomiting      Current Facility-Administered Medications   Medication Dose Route Frequency    0.9% sodium chloride infusion  25 mL/hr IntraVENous CONTINUOUS    methylPREDNISolone (PF) (SOLU-MEDROL) injection 20 mg  20 mg IntraVENous Q8H    PHENYLephrine (NEOSYNEPHRINE) 30,000 mcg in 0.9% sodium chloride 250 mL infusion   mcg/min IntraVENous TITRATE    0.9% sodium chloride infusion 250 mL  250 mL IntraVENous PRN    ceFAZolin in 0.9% NS (ANCEF) IVPB soln 2 g  2 g IntraVENous Q8H    celecoxib (CELEBREX) capsule 200 mg  200 mg Oral Q12H    acetaminophen (TYLENOL) tablet 650 mg  650 mg Oral Q6H    albuterol (PROVENTIL HFA, VENTOLIN HFA, PROAIR HFA) inhaler 2 Puff  2 Puff Inhalation Q6H PRN    calcium carbonate (TUMS) chewable tablet 400 mg [elemental]  400 mg Oral TID WITH MEALS    carvedilol (COREG) tablet 3.125 mg  3.125 mg Oral BID WITH MEALS    linaclotide (LINZESS) capsule 290 mcg  290 mcg Oral ACB    methadone (DOLOPHINE) tablet 5 mg  5 mg Oral Q8H    SUMAtriptan (IMITREX) tablet 50 mg  50 mg Oral ONCE PRN    umeclidinium (INCRUSE ELLIPTA) 62.5 mcg/actuation  1 Puff Inhalation DAILY    zolpidem (AMBIEN) tablet 5 mg  5 mg Oral QHS PRN    sodium chloride (NS) flush 5-10 mL  5-10 mL IntraVENous Q8H    sodium chloride (NS) flush 5-10 mL  5-10 mL IntraVENous PRN    oxyCODONE IR (ROXICODONE) tablet 10 mg  10 mg Oral Q3H PRN    naloxone (NARCAN) injection 0.4 mg  0.4 mg IntraVENous PRN    ondansetron (ZOFRAN) injection 4 mg  4 mg IntraVENous Q4H PRN    diphenhydrAMINE (BENADRYL) injection 12.5 mg  12.5 mg IntraVENous Q6H PRN    senna-docusate (PERICOLACE) 8.6-50 mg per tablet 1 Tab  1 Tab Oral BID    polyethylene glycol (MIRALAX) packet 17 g  17 g Oral DAILY    bisacodyl (DULCOLAX) suppository 10 mg  10 mg Rectal DAILY PRN    oxyCODONE IR (ROXICODONE) tablet 15 mg  15 mg Oral Q3H PRN    traMADol (ULTRAM) tablet 100 mg  100 mg Oral Q6H PRN    fentaNYL citrate (PF) injection 25 mcg  25 mcg IntraVENous Q4H PRN    famotidine (PF) (PEPCID) 20 mg in sodium chloride 0.9 % 10 mL injection  20 mg IntraVENous Q12H          LAB AND IMAGING FINDINGS:     Lab Results   Component Value Date/Time    WBC 6.6 02/01/2017 04:47 AM    HGB 8.5 02/01/2017 04:47 AM    PLATELET 96 36/03/9766 04:47 AM     Lab Results   Component Value Date/Time    Sodium 145 02/01/2017 04:47 AM    Potassium 4.5 02/01/2017 04:47 AM    Chloride 111 02/01/2017 04:47 AM    CO2 26 02/01/2017 04:47 AM BUN 21 02/01/2017 04:47 AM    Creatinine 0.80 02/01/2017 04:47 AM    Calcium 7.4 02/01/2017 04:47 AM    Magnesium 2.0 02/01/2017 04:47 AM    Phosphorus 3.2 02/01/2017 04:47 AM      Lab Results   Component Value Date/Time    AST 16 02/01/2017 04:47 AM    Alk. phosphatase 45 02/01/2017 04:47 AM    Protein, total 5.1 02/01/2017 04:47 AM    Albumin 2.2 02/01/2017 04:47 AM    Globulin 2.9 02/01/2017 04:47 AM     Lab Results   Component Value Date/Time    INR 1.1 01/30/2017 03:19 PM    Prothrombin time 10.7 01/30/2017 03:19 PM    aPTT 22.8 01/30/2017 03:19 PM      No results found for: IRON, FE, TIBC, IBCT, PSAT, FERR   Lab Results   Component Value Date/Time    pH 7.32 01/30/2017 03:08 PM    PCO2 37 01/30/2017 03:08 PM    PO2 67 01/30/2017 03:08 PM     No components found for: [place]   Lab Results   Component Value Date/Time    CK 99 01/30/2017 02:36 PM                Total time: 35  Counseling / coordination time: 35  > 50% counseling / coordination?: y      Prolonged service was provided for  []30 min   []75 min in face to face time in the presence of the patient. Time Start:   Time End:   Note: this can only be billed with 74473 (initial) or 17437 (follow up). If multiple start / stop times, list each separately.

## 2017-02-02 PROBLEM — I50.22 SYSTOLIC CHF, CHRONIC (HCC): Status: ACTIVE | Noted: 2017-02-02

## 2017-02-02 PROBLEM — D69.6 THROMBOCYTOPENIA (HCC): Status: ACTIVE | Noted: 2017-02-02

## 2017-02-02 PROBLEM — I10 HTN (HYPERTENSION): Status: ACTIVE | Noted: 2017-02-02

## 2017-02-02 PROBLEM — D64.9 ANEMIA: Status: ACTIVE | Noted: 2017-02-02

## 2017-02-02 PROBLEM — I21.4 NSTEMI (NON-ST ELEVATED MYOCARDIAL INFARCTION) (HCC): Status: ACTIVE | Noted: 2017-02-02

## 2017-02-02 LAB
ALBUMIN SERPL BCP-MCNC: 2.4 G/DL (ref 3.5–5)
ALBUMIN/GLOB SERPL: 0.8 {RATIO} (ref 1.1–2.2)
ALP SERPL-CCNC: 48 U/L (ref 45–117)
ALT SERPL-CCNC: 12 U/L (ref 12–78)
ANION GAP BLD CALC-SCNC: 6 MMOL/L (ref 5–15)
AST SERPL W P-5'-P-CCNC: 18 U/L (ref 15–37)
BACTERIA SPEC CULT: NORMAL
BASOPHILS # BLD AUTO: 0 K/UL (ref 0–0.1)
BASOPHILS # BLD: 0 % (ref 0–1)
BILIRUB SERPL-MCNC: 0.2 MG/DL (ref 0.2–1)
BUN SERPL-MCNC: 21 MG/DL (ref 6–20)
BUN/CREAT SERPL: 33 (ref 12–20)
CALCIUM SERPL-MCNC: 8.2 MG/DL (ref 8.5–10.1)
CHLORIDE SERPL-SCNC: 111 MMOL/L (ref 97–108)
CHOLEST SERPL-MCNC: 114 MG/DL
CO2 SERPL-SCNC: 28 MMOL/L (ref 21–32)
CREAT SERPL-MCNC: 0.64 MG/DL (ref 0.55–1.02)
DATE LAST DOSE: ABNORMAL
EOSINOPHIL # BLD: 0 K/UL (ref 0–0.4)
EOSINOPHIL NFR BLD: 0 % (ref 0–7)
ERYTHROCYTE [DISTWIDTH] IN BLOOD BY AUTOMATED COUNT: 15.1 % (ref 11.5–14.5)
GLOBULIN SER CALC-MCNC: 3 G/DL (ref 2–4)
GLUCOSE SERPL-MCNC: 99 MG/DL (ref 65–100)
GRAM STN SPEC: NORMAL
HCT VFR BLD AUTO: 27.1 % (ref 35–47)
HCT VFR BLD AUTO: 29.7 % (ref 35–47)
HDLC SERPL-MCNC: 47 MG/DL
HDLC SERPL: 2.4 {RATIO} (ref 0–5)
HGB BLD-MCNC: 8.7 G/DL (ref 11.5–16)
HGB BLD-MCNC: 9.6 G/DL (ref 11.5–16)
LDLC SERPL CALC-MCNC: 51.8 MG/DL (ref 0–100)
LIPID PROFILE,FLP: NORMAL
LYMPHOCYTES # BLD AUTO: 12 % (ref 12–49)
LYMPHOCYTES # BLD: 0.8 K/UL (ref 0.8–3.5)
MAGNESIUM SERPL-MCNC: 2.1 MG/DL (ref 1.6–2.4)
MCH RBC QN AUTO: 29.3 PG (ref 26–34)
MCHC RBC AUTO-ENTMCNC: 32.1 G/DL (ref 30–36.5)
MCV RBC AUTO: 91.2 FL (ref 80–99)
MONOCYTES # BLD: 0.3 K/UL (ref 0–1)
MONOCYTES NFR BLD AUTO: 4 % (ref 5–13)
NEUTS SEG # BLD: 5.5 K/UL (ref 1.8–8)
NEUTS SEG NFR BLD AUTO: 84 % (ref 32–75)
PHOSPHATE SERPL-MCNC: 2.3 MG/DL (ref 2.6–4.7)
PLATELET # BLD AUTO: 108 K/UL (ref 150–400)
POTASSIUM SERPL-SCNC: 4.7 MMOL/L (ref 3.5–5.1)
PROT SERPL-MCNC: 5.4 G/DL (ref 6.4–8.2)
RBC # BLD AUTO: 2.97 M/UL (ref 3.8–5.2)
REPORTED DOSE,DOSE: ABNORMAL UNITS
REPORTED DOSE/TIME,TMG: ABNORMAL
SERVICE CMNT-IMP: NORMAL
SODIUM SERPL-SCNC: 145 MMOL/L (ref 136–145)
TRIGL SERPL-MCNC: 76 MG/DL (ref ?–150)
VANCOMYCIN TROUGH SERPL-MCNC: 12.1 UG/ML (ref 5–10)
VLDLC SERPL CALC-MCNC: 15.2 MG/DL
WBC # BLD AUTO: 6.5 K/UL (ref 3.6–11)

## 2017-02-02 PROCEDURE — 97535 SELF CARE MNGMENT TRAINING: CPT

## 2017-02-02 PROCEDURE — 83735 ASSAY OF MAGNESIUM: CPT | Performed by: INTERNAL MEDICINE

## 2017-02-02 PROCEDURE — 65660000000 HC RM CCU STEPDOWN

## 2017-02-02 PROCEDURE — 85018 HEMOGLOBIN: CPT | Performed by: INTERNAL MEDICINE

## 2017-02-02 PROCEDURE — 74011250637 HC RX REV CODE- 250/637: Performed by: NURSE PRACTITIONER

## 2017-02-02 PROCEDURE — 80061 LIPID PANEL: CPT | Performed by: INTERNAL MEDICINE

## 2017-02-02 PROCEDURE — 74011250637 HC RX REV CODE- 250/637: Performed by: INTERNAL MEDICINE

## 2017-02-02 PROCEDURE — 74011250636 HC RX REV CODE- 250/636: Performed by: INTERNAL MEDICINE

## 2017-02-02 PROCEDURE — 97530 THERAPEUTIC ACTIVITIES: CPT

## 2017-02-02 PROCEDURE — 74011250637 HC RX REV CODE- 250/637: Performed by: ANESTHESIOLOGY

## 2017-02-02 PROCEDURE — 36415 COLL VENOUS BLD VENIPUNCTURE: CPT | Performed by: INTERNAL MEDICINE

## 2017-02-02 PROCEDURE — 74011000250 HC RX REV CODE- 250: Performed by: INTERNAL MEDICINE

## 2017-02-02 PROCEDURE — 85025 COMPLETE CBC W/AUTO DIFF WBC: CPT | Performed by: INTERNAL MEDICINE

## 2017-02-02 PROCEDURE — 80202 ASSAY OF VANCOMYCIN: CPT | Performed by: INTERNAL MEDICINE

## 2017-02-02 PROCEDURE — 84100 ASSAY OF PHOSPHORUS: CPT | Performed by: INTERNAL MEDICINE

## 2017-02-02 PROCEDURE — 80053 COMPREHEN METABOLIC PANEL: CPT | Performed by: INTERNAL MEDICINE

## 2017-02-02 PROCEDURE — 97116 GAIT TRAINING THERAPY: CPT

## 2017-02-02 PROCEDURE — 74011250636 HC RX REV CODE- 250/636: Performed by: NURSE PRACTITIONER

## 2017-02-02 RX ORDER — RIFAMPIN 300 MG/1
600 CAPSULE ORAL
Status: DISCONTINUED | OUTPATIENT
Start: 2017-02-03 | End: 2017-02-16 | Stop reason: HOSPADM

## 2017-02-02 RX ORDER — CARVEDILOL 6.25 MG/1
6.25 TABLET ORAL 2 TIMES DAILY WITH MEALS
Status: DISCONTINUED | OUTPATIENT
Start: 2017-02-02 | End: 2017-02-16 | Stop reason: HOSPADM

## 2017-02-02 RX ORDER — HYDRALAZINE HYDROCHLORIDE 20 MG/ML
10 INJECTION INTRAMUSCULAR; INTRAVENOUS
Status: DISCONTINUED | OUTPATIENT
Start: 2017-02-02 | End: 2017-02-16 | Stop reason: HOSPADM

## 2017-02-02 RX ORDER — ENOXAPARIN SODIUM 100 MG/ML
40 INJECTION SUBCUTANEOUS EVERY 24 HOURS
Status: DISCONTINUED | OUTPATIENT
Start: 2017-02-02 | End: 2017-02-16 | Stop reason: HOSPADM

## 2017-02-02 RX ORDER — LISINOPRIL 20 MG/1
10 TABLET ORAL DAILY
Status: DISCONTINUED | OUTPATIENT
Start: 2017-02-03 | End: 2017-02-16 | Stop reason: HOSPADM

## 2017-02-02 RX ORDER — ATORVASTATIN CALCIUM 10 MG/1
10 TABLET, FILM COATED ORAL DAILY
Status: DISCONTINUED | OUTPATIENT
Start: 2017-02-02 | End: 2017-02-16 | Stop reason: HOSPADM

## 2017-02-02 RX ORDER — LISINOPRIL 5 MG/1
5 TABLET ORAL DAILY
Status: DISCONTINUED | OUTPATIENT
Start: 2017-02-02 | End: 2017-02-02

## 2017-02-02 RX ADMIN — CYCLOBENZAPRINE HYDROCHLORIDE 5 MG: 10 TABLET, FILM COATED ORAL at 06:32

## 2017-02-02 RX ADMIN — METHADONE HYDROCHLORIDE 5 MG: 10 TABLET ORAL at 19:10

## 2017-02-02 RX ADMIN — LINACLOTIDE 290 MCG: 145 CAPSULE, GELATIN COATED ORAL at 07:47

## 2017-02-02 RX ADMIN — ATORVASTATIN CALCIUM 10 MG: 10 TABLET, FILM COATED ORAL at 10:56

## 2017-02-02 RX ADMIN — ACETAMINOPHEN 650 MG: 325 TABLET ORAL at 06:32

## 2017-02-02 RX ADMIN — ENOXAPARIN SODIUM 40 MG: 40 INJECTION SUBCUTANEOUS at 22:17

## 2017-02-02 RX ADMIN — ACETAMINOPHEN 650 MG: 325 TABLET ORAL at 18:51

## 2017-02-02 RX ADMIN — CARVEDILOL 3.12 MG: 3.12 TABLET, FILM COATED ORAL at 09:07

## 2017-02-02 RX ADMIN — Medication 1 TABLET: at 09:07

## 2017-02-02 RX ADMIN — METHADONE HYDROCHLORIDE 5 MG: 10 TABLET ORAL at 01:44

## 2017-02-02 RX ADMIN — CALCIUM CARBONATE (ANTACID) CHEW TAB 500 MG 400 MG: 500 CHEW TAB at 12:00

## 2017-02-02 RX ADMIN — FAMOTIDINE 20 MG: 10 INJECTION, SOLUTION INTRAVENOUS at 09:07

## 2017-02-02 RX ADMIN — LISINOPRIL 5 MG: 5 TABLET ORAL at 12:00

## 2017-02-02 RX ADMIN — Medication 10 ML: at 14:00

## 2017-02-02 RX ADMIN — ACETAMINOPHEN 650 MG: 325 TABLET ORAL at 12:00

## 2017-02-02 RX ADMIN — FAMOTIDINE 20 MG: 10 INJECTION, SOLUTION INTRAVENOUS at 20:19

## 2017-02-02 RX ADMIN — METHYLPREDNISOLONE SODIUM SUCCINATE 20 MG: 40 INJECTION, POWDER, FOR SOLUTION INTRAMUSCULAR; INTRAVENOUS at 06:31

## 2017-02-02 RX ADMIN — VANCOMYCIN HYDROCHLORIDE 1000 MG: 1 INJECTION, POWDER, LYOPHILIZED, FOR SOLUTION INTRAVENOUS at 06:31

## 2017-02-02 RX ADMIN — Medication 10 ML: at 06:32

## 2017-02-02 RX ADMIN — UMECLIDINIUM 1 PUFF: 62.5 AEROSOL, POWDER ORAL at 09:00

## 2017-02-02 RX ADMIN — VANCOMYCIN HYDROCHLORIDE 1000 MG: 1 INJECTION, POWDER, LYOPHILIZED, FOR SOLUTION INTRAVENOUS at 18:51

## 2017-02-02 RX ADMIN — CALCIUM CARBONATE (ANTACID) CHEW TAB 500 MG 400 MG: 500 CHEW TAB at 19:11

## 2017-02-02 RX ADMIN — OXYCODONE HYDROCHLORIDE 10 MG: 5 TABLET ORAL at 19:08

## 2017-02-02 RX ADMIN — OXYCODONE HYDROCHLORIDE 15 MG: 5 TABLET ORAL at 04:41

## 2017-02-02 RX ADMIN — METHADONE HYDROCHLORIDE 5 MG: 10 TABLET ORAL at 18:52

## 2017-02-02 RX ADMIN — CALCIUM CARBONATE (ANTACID) CHEW TAB 500 MG 400 MG: 500 CHEW TAB at 18:52

## 2017-02-02 RX ADMIN — POLYETHYLENE GLYCOL 3350 17 G: 17 POWDER, FOR SOLUTION ORAL at 09:21

## 2017-02-02 RX ADMIN — ACETAMINOPHEN 650 MG: 325 TABLET ORAL at 01:43

## 2017-02-02 RX ADMIN — FENTANYL CITRATE 25 MCG: 50 INJECTION, SOLUTION INTRAMUSCULAR; INTRAVENOUS at 20:19

## 2017-02-02 RX ADMIN — METHADONE HYDROCHLORIDE 5 MG: 10 TABLET ORAL at 09:07

## 2017-02-02 RX ADMIN — ACETAMINOPHEN 650 MG: 325 TABLET ORAL at 19:10

## 2017-02-02 RX ADMIN — CARVEDILOL 6.25 MG: 6.25 TABLET, FILM COATED ORAL at 18:51

## 2017-02-02 RX ADMIN — CELECOXIB 200 MG: 100 CAPSULE ORAL at 10:56

## 2017-02-02 RX ADMIN — Medication 10 ML: at 22:18

## 2017-02-02 RX ADMIN — CALCIUM CARBONATE (ANTACID) CHEW TAB 500 MG 400 MG: 500 CHEW TAB at 09:07

## 2017-02-02 NOTE — PROGRESS NOTES
Problem: Self Care Deficits Care Plan (Adult)  Goal: *Acute Goals and Plan of Care (Insert Text)  Occupational Therapy Goals  Initiated 2/1/2017   1. Patient will perform lower body dressing using adaptive dressing aides at minimal assistance within 7 days. 2. Patient will perform toilet transfers at supervision/set-up level using rolling walker within 7 days. 3. Patient will perform all aspects of toileting at minimal assistance within 7 days. 4. Patient will demonstrate/maintain/recall all hip precautions with 100% accuracy without cues within 7 days. 5. Patient will participate in upper extremity therapeutic exercise/activities with modified independence for 10 minutes within 7 day(s). 6. Patient will utilize energy conservation techniques during functional activities with verbal cues within 7 day(s). OCCUPATIONAL THERAPY TREATMENT  Patient: Emily Jensen (49 y.o. female)  Date: 2/2/2017  Diagnosis: LEFT HIP BIPOLAR  Failed total joint replacement ([address]  Failed total hip arthroplasty ([address] <principal problem not specified>  Procedure(s) (LRB):  LEFT TOTAL HIP ARTHROPLASTY CONVERSION (Left) 3 Days Post-Op  Precautions:  fall, WBAT L LE- short distances with walker to allow for bony ingrowth; hip precautions (no bending > 90degrees,no crossing legs, and no turning foot in or out); Abduction pillow when in bed- high risk for dislocation, deficient abductors noted- Per Dr. Deion English notes        ASSESSMENT:  Ms. Estuardo Carter was received standing with wound care for dressing change; She was agreeable to additional activity. Patient continues to be highly motivated and engaged in all activity. Patient required CGA for transfer into the bathroom (short distance only) and onto commode for toileting. Education provided for adaptive toileting techniques with consideration of hip precautions. She demonstrated understanding and required CGA when shifting weight to ensure safety.   She was able to stand at the sink to wash hands. Patient requesting to return to bed; Min A required for sit to supine transfer and abductor pillow applied. Patient is making good progress towards goals and progressing appropriately with OT. Progression toward goals:  [X]       Improving appropriately and progressing toward goals  [ ]       Improving slowly and progressing toward goals  [ ]       Not making progress toward goals and plan of care will be adjusted       PLAN:  Patient continues to benefit from skilled intervention to address the above impairments. Continue treatment per established plan of care. Discharge Recommendations:  Skilled Nursing Facility  Further Equipment Recommendations for Discharge:  TBD        SUBJECTIVE:   Patient stated Orvan Spells will do whatever you think will help.       OBJECTIVE DATA SUMMARY:   Cognitive/Behavioral Status:  Neurologic State: Alert  Orientation Level: Oriented X4  Cognition: Appropriate decision making; Appropriate for age attention/concentration; Appropriate safety awareness  Perception: Appears intact  Perseveration: No perseveration noted  Safety/Judgement: Awareness of environment     Functional Mobility and Transfers for ADLs:  Bed Mobility:  Sit to Supine: Minimum assistance  Scooting: Contact guard assistance     Transfers:  Sit to Stand: Contact guard assistance  Stand to Sit: Contact guard assistance  Bed to Chair: Contact guard assistance  Toilet Transfer : Contact guard assistance     Balance:  Sitting: Intact  Standing: Intact; With support  Standing - Static: Good  Standing - Dynamic : Good     ADL Intervention:  Toileting  Toileting Assistance: Contact guard assistance  Bladder Hygiene: Contact guard assistance  Bowel Hygiene: Contact guard assistance  Clothing Management: Contact guard assistance  Cues: Verbal cues provided     Cognitive Retraining  Safety/Judgement: Awareness of environment     Pain:  Pain Scale 1: Numeric (0 - 10)  Pain Intensity 1: 4  Pain Location 1: Hip  Pain Orientation 1: Left  Pain Description 1: Other (comment) (spasms)  Pain Intervention(s) 1: Medication (see MAR); Repositioned      Activity Tolerance:   Patient tolerated eval well. After treatment:   [ ] Patient left in no apparent distress sitting up in chair  [X] Patient left in no apparent distress in bed  [X] Call bell left within reach  [X] Nursing notified  [ ] Caregiver present  [ ] Bed alarm activated      COMMUNICATION/COLLABORATION:   The patients plan of care was discussed with: Physical Therapist, Registered Nurse and patient.      Cassandra Pacheco OTR/L  Time Calculation: 26 mins

## 2017-02-02 NOTE — WOUND CARE
Wound care  Re consult to assess the left hip surgical incision for possible wound vac placement or [place].   S/ P left hip revision on 1-30 per Dr Aleena Null. Seen on [facility] with staff nurse, cardiac complications inter op. Alert, no distress.     Assessment:  Moderate amount of bleeding from incision post op, Spica dressing in place. Dressing and pad saturated, ice removed. All dressings removed to assess the incision, well approximated with staples. Moderate amount of active shahana blood from the mid incision. Increased when pressure applied, no redness or edema noted. Shivani Fish NP notified, and will assess patient at bedside.      Plan of care discussed, Will Kaufman to communicate assessment to Dr Aleena Null. Will maintain the dressing and frequent assessment to evaluate bleeding at this time.     Dry dressings with Spica applied and ice applied. Assisted to bathroom and back to bed, repositioned in bed for comfort. No other areas of skin breakdown noted. Will follow up in am 2/3 for possible [facility] placement.     Gwen Hidalgo RN

## 2017-02-02 NOTE — PROGRESS NOTES
1930-0700-Overnight update: Report received from [facility]. Bedside rounds and assessment completed. Pt given meds for pain and spasms overnight and stated pain was well-controlled and she was able to rest. L hip dsg saturated, changed and reinforced. Partial bath given, gown and pads changed. Q6H&H drawn, no acute drop noted. 0715-Report given to [facility] RN.

## 2017-02-02 NOTE — PROGRESS NOTES
Problem: Mobility Impaired (Adult and Pediatric)  Goal: *Acute Goals and Plan of Care (Insert Text)  Physical Therapy Goals  Initiated 2/1/2017    1. Patient will move from supine to sit and sit to supine , scoot up and down and roll side to side in bed with modified independence within 4 days. 2. Patient will perform sit to stand with modified independence within 4 days. 3. Patient will ambulate with modified independence for 200 feet with the least restrictive device within 4 days. 4. Patient will verbalize and demonstrate understanding of lateral precautions per protocol within 4 days. 5. Patient will perform all home exercise program per protocol with independence within 4 days. PHYSICAL THERAPY TREATMENT  Patient: Craig Dietz (52 y.o. female)  Date: 2/2/2017  Diagnosis: LEFT HIP BIPOLAR  Failed total joint replacement ([address]  Failed total hip arthroplasty ([address] <principal problem not specified>  Procedure(s) (LRB):  LEFT TOTAL HIP ARTHROPLASTY CONVERSION (Left) 3 Days Post-Op  Precautions:   WBAT short distance only      ASSESSMENT:  Based on the objective data described below, patient tolerated treatment very well. Sit on the edge of bed min assist, sit to stand CGA, sitting and standing balance good, tolerated standing with rolling walker CGA for over 10 minutes for dressing change by nurse, ambulate to and from the bath room CGA, OOB to chair as tolerated. Performed some active range of motion exercise on both LE all planes per Dr. Shivani Peraza exercise protocol. Notified nurse who agreed to monitor and assist back to chair when ask. Progression toward goals:  [X]    Improving appropriately and progressing toward goals  [ ]    Improving slowly and progressing toward goals  [ ]    Not making progress toward goals and plan of care will be adjusted       PLAN:  Patient continues to benefit from skilled intervention to address the above impairments.   Continue treatment per established plan of care.  Discharge Recommendations:  Skilled Nursing Facility  Further Equipment Recommendations for Discharge:  TBD       SUBJECTIVE:   Patient stated I would like to use the commode.       OBJECTIVE DATA SUMMARY:   Critical Behavior:  Neurologic State: Alert  Orientation Level: Oriented X4  Cognition: Appropriate decision making, Appropriate for age attention/concentration, Appropriate safety awareness, Follows commands  Safety/Judgement: Awareness of environment, Good awareness of safety precautions  Functional Mobility Training:  Bed Mobility:  Rolling: Minimum assistance  Supine to Sit: Minimum assistance  Sit to Supine: Minimum assistance  Scooting: Minimum assistance        Transfers:  Sit to Stand: Contact guard assistance  Stand to Sit: Contact guard assistance  Stand Pivot Transfers: Contact guard assistance     Bed to Chair: Contact guard assistance                    Balance:  Sitting: Intact  Standing: Impaired;Pull to stand; With support  Standing - Static: Good  Standing - Dynamic : Fair  Ambulation/Gait Training:  Distance (ft): 40 Feet (ft)  Assistive Device: Walker, rolling;Gait belt  Ambulation - Level of Assistance: Contact guard assistance     Gait Description (WDL): Exceptions to WDL  Gait Abnormalities: Antalgic     Left Side Weight Bearing: As tolerated (WBAT short distance only)  Base of Support: Narrowed     Speed/Jeanie: Slow           Therapeutic Exercises:    Instructed patient to continue active range of motion exercise on both legs while up on chair or on bed per Dr. Celestina Maxwell exercise protocol. Pain:  Pain Scale 1: Numeric (0 - 10)  Pain Intensity 1: 4  Pain Location 1: Hip  Pain Orientation 1: Left  Pain Description 1: Other (comment) (spasms)  Pain Intervention(s) 1: Medication (see MAR); Repositioned  Activity Tolerance:   Good. Please refer to the flowsheet for vital signs taken during this treatment.   After treatment:   [X]    Patient left in no apparent distress sitting up in chair  [ ]    Patient left in no apparent distress in bed  [X]    Call bell left within reach  [X]    Nursing notified  [ ]    Caregiver present  [ ]    Bed alarm activated      COMMUNICATION/COLLABORATION:   The patients plan of care was discussed with: Registered Nurse and patient     Bj Hopkins, PT,WCC.    Time Calculation: 26 mins

## 2017-02-02 NOTE — PROGRESS NOTES
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 Addition of rifampin can help abx penetrate/access hardware better. · Continue vanc  · Add rifampin           Subjective:     Pain controlled, no diarrhea. Objective:     Vitals:   Visit Vitals    /71    Pulse 64    Temp 98.2 °F (36.8 °C)    Resp 18    Ht 5' 5\" (1.651 m)    Wt 61.5 kg (135 lb 9.3 oz)    SpO2 95%    BMI 22.56 kg/m2     Temp (24hrs), Av °F (36.7 °C), Min:97.8 °F (36.6 °C), Max:98.2 °F (36.8 °C)    Recent Labs      17   0431  17   2317  17   1704   17   0447   17   0811   NA  145   --    --    --   145   --   142   K  4.7   --    --    --   4.5   --   4.6   CL  111*   --    --    --   111*   --   110*   CO2  28   --    --    --   26   --   26   BUN  21*   --    --    --   21*   --   18   CREA  0.64   --    --    --   0.80   --   0.72   GLU  99   --    --    --   133*   --   132*   PHOS  2.3*   --    --    --   3.2   --   5.0*   MG  2.1   --    --    --   2.0   --   2.0   ALB  2.4*   --    --    --   2.2*   --    --    WBC  6.5   --    --    --   6.6   --    --    HGB  8.7*  8.3*  8.5*   < >  8.5*   < >   --    HCT  27.1*  26.0*  26.5*   < >  26.4*   < >   --    PLT  108*   --    --    --   96*   --    --     < > = values in this interval not displayed.        Exam:    Gen: NAD   Resp: CTAB   Card: RRR   Abd: Soft, NTND  Extr: left hip dressed - dressing stained  Neuro: A&O x3      Cultures:     Lab Results   Component Value Date/Time    Culture result: LIGHT  STAPHYLOCOCCUS LUGDUNENSIS   01/30/2017 02:14 PM    Culture result: FEW  STAPHYLOCOCCUS LUGDUNENSIS   01/30/2017 02:12 PM    Culture result:  01/30/2017 01:20 PM     LIGHT  STAPHYLOCOCCUS LUGDUNENSIS  PLEASE REFER TO [facility] [id number] FOR SUSCEPTIBILITIES      Culture result:  01/30/2017 01:20 PM     FEW  STAPHYLOCOCCUS LUGDUNENSIS  PLEASE REFER TO [facility] [id number] FOR SUSCEPTIBILITIES      Culture result:  01/30/2017 01:20 PM     CHECKING FOR POSSIBLE  2ND   STAPHYLOCOCCUS SPECIES, COAGULASE NEGATIVE         Radiology: reviewed       Medications:        Current Facility-Administered Medications   Medication Dose Route Frequency Last Dose    atorvastatin (LIPITOR) tablet 10 mg  10 mg Oral DAILY 10 mg at 02/02/17 1056    [START ON 2/3/2017] lisinopril (PRINIVIL, ZESTRIL) tablet 10 mg  10 mg Oral DAILY      hydrALAZINE (APRESOLINE) 20 mg/mL injection 10 mg  10 mg IntraVENous Q2H PRN      carvedilol (COREG) tablet 6.25 mg  6.25 mg Oral BID WITH MEALS      Vancomycin- Trough Level at 530 PM on 2/2/17   Other ONCE      [START ON 2/3/2017] rifAMPin (RIFADIN) capsule 600 mg  600 mg Oral ACB      enoxaparin (LOVENOX) injection 40 mg  40 mg SubCUTAneous Q24H      0.9% sodium chloride infusion  25 mL/hr IntraVENous CONTINUOUS 25 mL/hr at 02/01/17 2014    cyclobenzaprine (FLEXERIL) tablet 5 mg  5 mg Oral TID PRN 5 mg at 02/02/17 7130    vancomycin (VANCOCIN) 1,000 mg in 0.9% sodium chloride (MBP/ADV) 250 mL  1 g IntraVENous Q12H 1,000 mg at 02/02/17 0631    PHENYLephrine (NEOSYNEPHRINE) 30,000 mcg in 0.9% sodium chloride 250 mL infusion   mcg/min IntraVENous TITRATE Stopped at 01/31/17 1455    0.9% sodium chloride infusion 250 mL  250 mL IntraVENous PRN      celecoxib (CELEBREX) capsule 200 mg  200 mg Oral Q12H 200 mg at 02/02/17 1056    acetaminophen (TYLENOL) tablet 650 mg  650 mg Oral Q6H 650 mg at 02/02/17 1200    albuterol (PROVENTIL HFA, VENTOLIN HFA, PROAIR HFA) inhaler 2 Puff  2 Puff Inhalation Q6H PRN      calcium carbonate (TUMS) chewable tablet 400 mg [elemental]  400 mg Oral TID WITH MEALS 400 mg at 02/02/17 1200    linaclotide (LINZESS) capsule 290 mcg  290 mcg Oral  mcg at 02/02/17 0747    methadone (DOLOPHINE) tablet 5 mg  5 mg Oral Q8H 5 mg at 02/02/17 0907    SUMAtriptan (IMITREX) tablet 50 mg  50 mg Oral ONCE PRN      umeclidinium (INCRUSE ELLIPTA) 62.5 mcg/actuation  1 Puff Inhalation DAILY 1 Puff at 02/01/17 0910    zolpidem (AMBIEN) tablet 5 mg  5 mg Oral QHS PRN 5 mg at 02/01/17 2252    sodium chloride (NS) flush 5-10 mL  5-10 mL IntraVENous Q8H 10 mL at 02/02/17 9829    sodium chloride (NS) flush 5-10 mL  5-10 mL IntraVENous PRN      oxyCODONE IR (ROXICODONE) tablet 10 mg  10 mg Oral Q3H PRN 10 mg at 02/01/17 1731    naloxone (NARCAN) injection 0.4 mg  0.4 mg IntraVENous PRN      senna-docusate (PERICOLACE) 8.6-50 mg per tablet 1 Tab  1 Tab Oral BID 1 Tab at 02/02/17 0907    polyethylene glycol (MIRALAX) packet 17 g  17 g Oral DAILY 17 g at 02/02/17 0921    bisacodyl (DULCOLAX) suppository 10 mg  10 mg Rectal DAILY PRN      oxyCODONE IR (ROXICODONE) tablet 15 mg  15 mg Oral Q3H PRN 15 mg at 02/02/17 0441    fentaNYL citrate (PF) injection 25 mcg  25 mcg IntraVENous Q4H PRN 25 mcg at 02/01/17 0010    famotidine (PF) (PEPCID) 20 mg in sodium chloride 0.9 % 10 mL injection  20 mg IntraVENous Q12H 20 mg at 02/02/17 2205         Case discussed with: patient          Lakisha WHEATMarlin Slater NP    Signed By: February 2, 2017 February 2, 2017

## 2017-02-02 NOTE — PROGRESS NOTES
Patient transferred out of the ICU and has no critical care issues at this time. We will sign off and be available as needed. Please call with questions.

## 2017-02-02 NOTE — PROGRESS NOTES
Problem: Mobility Impaired (Adult and Pediatric)  Goal: *Acute Goals and Plan of Care (Insert Text)  Physical Therapy Goals  Initiated 2/1/2017    1. Patient will move from supine to sit and sit to supine , scoot up and down and roll side to side in bed with modified independence within 4 days. 2. Patient will perform sit to stand with modified independence within 4 days. 3. Patient will ambulate with modified independence for 200 feet with the least restrictive device within 4 days. 4. Patient will verbalize and demonstrate understanding of lateral precautions per protocol within 4 days. 5. Patient will perform all home exercise program per protocol with independence within 4 days. PHYSICAL THERAPY TREATMENT  Patient: Shelli Jean Baptiste (79 y.o. female)  Date: 2/2/2017  Diagnosis: LEFT HIP BIPOLAR  Failed total joint replacement ([address]  Failed total hip arthroplasty ([address] <principal problem not specified>  Procedure(s) (LRB):  LEFT TOTAL HIP ARTHROPLASTY CONVERSION (Left) 3 Days Post-Op  Precautions:  WBAT on left LE short distance only      ASSESSMENT:  Based on the objective data described below, patient tolerated treatment very well. Sit on the edge of bed with CGA, sit to stand CGA, ambulate in the room with rolling walker CGA, decreased pace, no loss of balance steady sitting and standing balance. OOB to chair as tolerated, performed some active range of motion exercise on both LE all planes. Notified nurse who agreed to monitor patient. Progression toward goals:  [X]    Improving appropriately and progressing toward goals  [ ]    Improving slowly and progressing toward goals  [ ]    Not making progress toward goals and plan of care will be adjusted       PLAN:  Patient continues to benefit from skilled intervention to address the above impairments. Continue treatment per established plan of care.   Discharge Recommendations:  Skilled Nursing Facility  Further Equipment Recommendations for Discharge:  TBD SUBJECTIVE:   Patient stated Ok we sit up on the chair again.       OBJECTIVE DATA SUMMARY:   Critical Behavior:  Neurologic State: Alert  Orientation Level: Oriented X4  Cognition: Appropriate decision making, Appropriate for age attention/concentration, Appropriate safety awareness, Follows commands  Safety/Judgement: Awareness of environment, Good awareness of safety precautions  Functional Mobility Training:  Bed Mobility:  Rolling: Contact guard assistance  Supine to Sit: Contact guard assistance  Sit to Supine: Contact guard assistance  Scooting: Contact guard assistance        Transfers:  Sit to Stand: Contact guard assistance  Stand to Sit: Contact guard assistance  Stand Pivot Transfers: Contact guard assistance     Bed to Chair: Contact guard assistance                    Balance:  Sitting: Intact  Standing: Intact;Pull to stand; With support  Standing - Static: Good  Standing - Dynamic : Good  Ambulation/Gait Training:  Distance (ft): 30 Feet (ft)  Assistive Device: Walker, rolling;Gait belt  Ambulation - Level of Assistance: Contact guard assistance     Gait Description (WDL): Exceptions to WDL  Gait Abnormalities: Antalgic     Left Side Weight Bearing: As tolerated (short distance only)  Base of Support: Narrowed     Speed/Jeanie: Slow                 Therapeutic Exercises:    Instructed patient to continue active range of motion exercise on both legs while up on chair or on bed. Pain:  Pain Scale 1: Numeric (0 - 10)  Pain Intensity 1: 4  Pain Location 1: Hip  Pain Orientation 1: Left  Pain Description 1: Other (comment) (spasms)  Pain Intervention(s) 1: Medication (see MAR); Repositioned  Activity Tolerance:   Good. Please refer to the flowsheet for vital signs taken during this treatment.   After treatment:   [X]    Patient left in no apparent distress sitting up in chair  [ ]    Patient left in no apparent distress in bed  [X]    Call bell left within reach  [X]    Nursing notified  [ ] Caregiver present  [ ]    Bed alarm activated      COMMUNICATION/COLLABORATION:   The patients plan of care was discussed with: Occupational Therapist, Registered Nurse and patient     Lorna Ly PT,HERMANC.    Time Calculation: 23 mins

## 2017-02-02 NOTE — PROGRESS NOTES
TOTAL HIP REVISION ARTHROPLASTY DAILY NOTE     ASSESSMENT / PLAN :   1. Pain Control : Stable, appreciate palliative medicine consult  2. Overnight Issues : stable, some bleeding, remains in the ICU  3. Wound or incisional issue : Continued moderate drainage. Hb is up this am and stable. Please contact me if considering a transfusion. 4. Therapy / Weight Bearing Recommendations : WBAT w/ walker  5. DVT Prophylaxis : Mechanical and Lovenox once the patients bleeding decreases. 6. Disposition : snf placement will likely be needed. 7. Medical Concerns : MMP, positive cultures, stabilizing in the ICU. Appreciate the intensivist management. Will consult the hospitalist for management once the patient is transferred out of the ICU. ID consult w/ recs for XoopitLEY. 8. Comments : slowly improving. POD  3 Days Post-Op s/p Procedure(s):  LEFT TOTAL HIP ARTHROPLASTY CONVERSION     SUBJECTIVE :     Patient notes the following issues : stable. OBJECTIVE :     Patient Vitals for the past 12 hrs:   BP Temp Pulse Resp SpO2   02/02/17 0600 150/76 - 66 - 93 %   02/02/17 0500 154/64 - 64 - 99 %   02/02/17 0400 154/86 98.2 °F (36.8 °C) 63 18 97 %   02/02/17 0300 145/89 - 65 - 92 %   02/02/17 0200 148/80 - 66 - 91 %   02/02/17 0100 144/80 - 60 - 95 %   02/02/17 0000 157/76 - 63 - 96 %   02/01/17 2300 153/70 98.1 °F (36.7 °C) 63 - 95 %   02/01/17 2201 125/73 - 62 - 97 %   02/01/17 2100 (!) 109/91 - 62 - 97 %   02/01/17 2000 (!) 130/109 97.8 °F (36.6 °C) 73 20 95 %       Alert and oriented x3. Examination of the left Hip reveals that the dressing is partially saturated with sanguinous drainage. No active bleeding. Motor Exam is intact and normal  Sensation is intact to light touch. No calf pain.      Labs:  Recent Labs      02/02/17   0431   01/30/17   1519   HGB  8.7*   < >   --    HCT  27.1*   < >   --    INR   --    --   1.1   NA  145   < >   --    K  4.7   < >   --    CL  111*   < >   --    CO2  28   < >   -- BUN  21*   < >   --    CREA  0.64   < >   --    GLU  99   < >   --     < > = values in this interval not displayed.        CULTURES :  No results found for: SDES  Lab Results   Component Value Date/Time    Culture result:  01/30/2017 02:14 PM     FEW  PROBABLE  STAPHYLOCOCCUS SPECIES, COAGULASE NEGATIVE      Culture result:  01/30/2017 02:12 PM     FEW  PROBABLE  STAPHYLOCOCCUS SPECIES, COAGULASE NEGATIVE      Culture result:  01/30/2017 01:20 PM     FEW  PROBABLE  STAPHYLOCOCCUS SPECIES, COAGULASE NEGATIVE      Culture result:  01/30/2017 01:20 PM     SCANT  PROBABLE  STAPHYLOCOCCUS SPECIES, COAGULASE NEGATIVE      Culture result:  01/30/2017 01:11 PM     FEW  PROBABLE  STAPHYLOCOCCUS SPECIES, COAGULASE NEGATIVE      Culture result:  01/30/2017 01:11 PM     FEW  PROBABLE  STAPHYLOCOCCUS SPECIES, COAGULASE NEGATIVE      Culture result: NO ANAEROBES ISOLATED 01/30/2017 01:11 PM    Culture result:  01/30/2017 01:11 PM     SCANT  PROBABLE  STAPHYLOCOCCUS SPECIES, COAGULASE NEGATIVE            Patient mobility  Gait  Base of Support: Narrowed  Speed/Jeanie: Slow  Gait Abnormalities: Antalgic, Path deviations, Step to gait  Ambulation - Level of Assistance: Minimal assistance, Additional time, Assist x2  Distance (ft): 5 Feet (ft)  Assistive Device: Walker, rolling, Gait belt        Carol Arnold MD  Cell [phone]  Nurse [address] [place] [address] [phone]  Medical Staff : Sanchez Haley / Itzel Burn  Office : [phone] ext  [phone]

## 2017-02-02 NOTE — CONSULTS
[address]  [address] [place] [address]  [phone]    Hospitalist Consult Note      NAME:  Nikki Garcia   :   1940   MRN:  [id number]     Requesting Physician Hank Canela MD   Reason for Consult:  Medical management      PCP:  Tato Ballard MD     Date/Time:  2017 1:42 PM       Assessment and plan:   1. 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 Wound culture shows staph coag negative. Evaluated by ID and started on vancomycin long term. Needs PICC line. 2.  NSTEMI (non-ST elevated myocardial infarction) ([facility] [address] (2017). Evaluated by cardiology. Resume ASA when OK with surgery. Continue coreg. 3.  HTN (hypertension) (2017). BP high. Increased coreg and lisinopril. 4.  Chronic systolic CHF. EF is 30%. Compensated. Continue coreg. 5.   Anemia (2017) likely secondary to blood loss. Continue to monitor and transfuse for Hb < 7.     6.  Thrombocytopenia ([facility] [address] (2017). Unclear if this is chronic. Will continue to monitor. Thank you for the consult. Will follow along with you. Subjective:     CHIEF COMPLAINT: hip pain      HISTORY OF PRESENT ILLNESS:     Ms. Solis Avila is a 68 y.o.  female who is admitted to the orthopedics Service with infected hip joint. We are asked to evaluate for medical managment. Ms. Solis Avila with PMH of CAD, systolic CHF, GERD, hyperlipidemia, HTN, DVT was admitted for elective revision of the hip. Pt is S/P bipolar hip replacement for femoral neck fracture in the past, then revision. Pt continued to have severe LT hip pain, constant worse with movement.           Past Medical History   Diagnosis Date    Autoimmune disease ([facility] [address]      psoriasis    Beta-blocker therapy     CAD (coronary artery disease)      non ischemic cardiomyopathy    Chronic obstructive pulmonary disease (HCC)     Chronic pain      curvature of spinie    GERD (gastroesophageal reflux disease)     Heart failure ([facility] [address]     Hypercholesterolemia     Hypertension     Ill-defined condition      hx aortic stenosis & mitral regurg    Smoker     Thromboembolus ([facility] [address] [facility]      right leg after hip surgery        Past Surgical History   Procedure Laterality Date    Hx gyn       miscarriage    Pr abdomen surgery proc unlisted  2010     Gallbladder    Pr abdomen surgery proc unlisted  2000     hiatal hernia repair    Hx orthopaedic       Right Knee, Left Foot    Hx orthopaedic  04/05/2015     left hip replacement 2015    Hx orthopaedic  06/24/2016     rmoval bone fragment left hip    Hx orthopaedic  2013     right hip replacement    Hx orthopaedic  2001     right knee replacement    Hx heent       Tonsilectomy    Hx heent       cataract removed bilateral eyes       Social History   Substance Use Topics    Smoking status: Current Every Day Smoker     Packs/day: 0.50     Years: 60.00    Smokeless tobacco: Never Used    Alcohol use No      Comment: 0        Family History   Problem Relation Age of Onset    Cancer Mother     Stroke Father         Allergies   Allergen Reactions    Villanueva Angioedema    Codeine Other (comments)     \"Years ago gave me hives but lately I havetaken it without problem\"    Dilaudid [Hydromorphone (Bulk)] Shortness of Breath    Lyrica [Pregabalin] Nausea and Vomiting        Prior to Admission medications    Medication Sig Start Date End Date Taking? Authorizing Provider   SUMAtriptan (IMITREX) 50 mg tablet Take 50 mg by mouth once as needed for Migraine. Yes Historical Provider   carvedilol (COREG) 3.125 mg tablet Take 3.125 mg by mouth two (2) times daily (with meals). Takes c breakfast and dinner   Yes Historical Provider   DULCOLAX, BISACODYL, PO Take 1 Tab by mouth daily as needed. Yes Historical Provider   promethazine (PHENERGAN) 25 mg tablet Take 25 mg by mouth every six (6) hours as needed for Nausea.     Historical Provider cyanocobalamin 1,000 mcg tablet Take 1,000 mcg by mouth daily. Historical Provider   traMADol (ULTRAM) 50 mg tablet Take 50 mg by mouth every eight (8) hours as needed for Pain. Historical Provider   linaclotide Lexi Mcdermott) 290 mcg cap capsule Take 290 mcg by mouth Daily (before breakfast). Historical Provider   zolpidem (AMBIEN) 5 mg tablet Take 5 mg by mouth nightly as needed for Sleep. Historical Provider   tiotropium (SPIRIVA WITH HANDIHALER) 18 mcg inhalation capsule Take 1 Cap by inhalation daily. Historical Provider   albuterol (PROVENTIL HFA) 90 mcg/actuation inhaler Take 2 Puffs by inhalation every six (6) hours as needed. Historical Provider   methadone (DOLOPHINE) 5 mg tablet Take 5 mg by mouth every eight (8) hours. Historical Provider   omega-3 fatty acids-vitamin e (FISH OIL) 1,000 mg cap Take 1 Cap by mouth. Historical Provider   aspirin delayed-release 81 mg tablet Take 81 mg by mouth daily. Historical Provider   cholecalciferol, vitamin d3, (VITAMIN D3) 400 unit cap Take 1 Tab by mouth daily. Historical Provider   esomeprazole (NEXIUM) 40 mg capsule Take  by mouth daily. Historical Provider   polyethylene glycol (MIRALAX) 17 gram packet Take 17 g by mouth every other day. Historical Provider   CALCIUM CARBONATE/MAG HYDROX (ROLAIDS EXTRA STRENGTH PO) Take 2 Tabs by mouth three (3) times daily (with meals).     Historical Provider         Current Facility-Administered Medications:     atorvastatin (LIPITOR) tablet 10 mg, 10 mg, Oral, DAILY, Reynaldo Benites MD, 10 mg at 02/02/17 1056    lisinopril (PRINIVIL, ZESTRIL) tablet 5 mg, 5 mg, Oral, DAILY, Santy Huang NP    0.9% sodium chloride infusion, 25 mL/hr, IntraVENous, CONTINUOUS, Santy Huang NP, Last Rate: 25 mL/hr at 02/01/17 2014, 25 mL/hr at 02/01/17 2014    methylPREDNISolone (PF) (SOLU-MEDROL) injection 20 mg, 20 mg, IntraVENous, Q8H, Wade Molina MD, 20 mg at 02/02/17 0631   cyclobenzaprine (FLEXERIL) tablet 5 mg, 5 mg, Oral, TID PRN, Kiesha Lopez, NP, 5 mg at 02/02/17 7904    vancomycin (VANCOCIN) 1,000 mg in 0.9% sodium chloride (MBP/ADV) 250 mL, 1 g, IntraVENous, Q12H, Erin Patterson MD, Last Rate: 125 mL/hr at 02/02/17 0631, 1,000 mg at 02/02/17 0631    PHENYLephrine (NEOSYNEPHRINE) 30,000 mcg in 0.9% sodium chloride 250 mL infusion,  mcg/min, IntraVENous, TITRATE, Johan Kirk MD, Stopped at 01/31/17 1455    0.9% sodium chloride infusion 250 mL, 250 mL, IntraVENous, PRN, Johan Kirk MD    celecoxib (CELEBREX) capsule 200 mg, 200 mg, Oral, Q12H, Chelsea Anaya MD, 200 mg at 02/02/17 1056    acetaminophen (TYLENOL) tablet 650 mg, 650 mg, Oral, Q6H, Chelsea Anaya MD, 650 mg at 02/02/17 3854    albuterol (PROVENTIL HFA, VENTOLIN HFA, PROAIR HFA) inhaler 2 Puff, 2 Puff, Inhalation, Q6H PRN, Philip Clarke NP    calcium carbonate (TUMS) chewable tablet 400 mg [elemental], 400 mg, Oral, TID WITH MEALS, Philip Clarke NP, 400 mg at 02/02/17 0907    carvedilol (COREG) tablet 3.125 mg, 3.125 mg, Oral, BID WITH MEALS, Philip Clarke NP, 3.125 mg at 02/02/17 0907    linaclotide (LINZESS) capsule 290 mcg, 290 mcg, Oral, ACB, Philip Clarke NP, 290 mcg at 02/02/17 0747    methadone (DOLOPHINE) tablet 5 mg, 5 mg, Oral, Q8H, Chani Borden NP, 5 mg at 02/02/17 0907    SUMAtriptan (IMITREX) tablet 50 mg, 50 mg, Oral, ONCE PRN, Philip Clarke NP    umeclidinium (INCRUSE ELLIPTA) 62.5 mcg/actuation, 1 Puff, Inhalation, DAILY, Margareth Borden NP, 1 Puff at 02/01/17 0910    zolpidem (AMBIEN) tablet 5 mg, 5 mg, Oral, QHS PRN, Philip Clarke NP, 5 mg at 02/01/17 2252    sodium chloride (NS) flush 5-10 mL, 5-10 mL, IntraVENous, Q8H, Chani Borden, YULI, 10 mL at 02/02/17 5907    sodium chloride (NS) flush 5-10 mL, 5-10 mL, IntraVENous, PRN, Philip Clarke NP    oxyCODONE IR (ROXICODONE) tablet 10 mg, 10 mg, Oral, Q3H PRN, Philip Clarke NP, 10 mg at 02/01/17 1731    naloxone (NARCAN) injection 0.4 mg, 0.4 mg, IntraVENous, PRN, Corazon Jigna, NP    ondansetron (ZOFRAN) injection 4 mg, 4 mg, IntraVENous, Q4H PRN, Corazon Jigna, NP    diphenhydrAMINE (BENADRYL) injection 12.5 mg, 12.5 mg, IntraVENous, Q6H PRN, Corazon Jigna, NP    senna-docusate (PERICOLACE) 8.6-50 mg per tablet 1 Tab, 1 Tab, Oral, BID, Corazon Jigna, NP, 1 Tab at 02/02/17 0907    polyethylene glycol (MIRALAX) packet 17 g, 17 g, Oral, DAILY, Corazon Jigna, NP, 17 g at 02/02/17 2181    bisacodyl (DULCOLAX) suppository 10 mg, 10 mg, Rectal, DAILY PRN, Corazon Jigna, NP    oxyCODONE IR (ROXICODONE) tablet 15 mg, 15 mg, Oral, Q3H PRN, Corazon Jigna, NP, 15 mg at 02/02/17 0441    fentaNYL citrate (PF) injection 25 mcg, 25 mcg, IntraVENous, Q4H PRN, Bright Peoples MD, 25 mcg at 02/01/17 0010    famotidine (PF) (PEPCID) 20 mg in sodium chloride 0.9 % 10 mL injection, 20 mg, IntraVENous, Q12H, Bright Peoples MD, 20 mg at 02/02/17 3418      Review of Systems:  (bold if positive, if negative)    Gen:  Eyes:  ENT:  CVS:  Pulm:  GI:    :    MS:  Skin:  Psych:  Endo:    Hem:  Renal:    Neuro:            Objective:      VITALS:    Vital signs reviewed; most recent are:    Visit Vitals    BP (!) 168/99 (BP 1 Location: Left arm, BP Patient Position: At rest)    Pulse 99    Temp 98.2 °F (36.8 °C)    Resp 18    Ht 5' 5\" (1.651 m)    Wt 61.5 kg (135 lb 9.3 oz)    SpO2 95%    BMI 22.56 kg/m2     SpO2 Readings from Last 6 Encounters:   02/02/17 95%   01/11/17 99%   04/22/14 95%    O2 Flow Rate (L/min): 2 l/min     Intake/Output Summary (Last 24 hours) at 02/02/17 1342  Last data filed at 02/02/17 0631   Gross per 24 hour   Intake          1225.42 ml   Output             1785 ml   Net          -559.58 ml            Exam:     Physical Exam:    Gen:  Well-developed, well-nourished, in no acute distress  HEENT:  Pink conjunctivae, PERRL, hearing intact to voice, moist mucous membranes  Neck:  Supple, without masses, thyroid non-tender  Resp:  No accessory muscle use, clear breath sounds without wheezes rales or rhonchi  Card:  No murmurs, normal S1, S2 without thrills, bruits or peripheral edema  Abd:  Soft, non-tender, non-distended, normoactive bowel sounds are present, no palpable organomegaly and no detectable hernias  Lymph:  No cervical or inguinal adenopathy  Musc:  No cyanosis or clubbing  Skin:  No rashes or ulcers, skin turgor is good  Neuro:  Cranial nerves are grossly intact, no focal motor weakness, follows commands appropriately  Psych:  Good insight, oriented to person, place and time, alert       Labs:    Recent Labs      02/02/17   0431   WBC  6.5   HGB  8.7*   HCT  27.1*   PLT  108*     Recent Labs      02/02/17   0431   NA  145   K  4.7   CL  111*   CO2  28   GLU  99   BUN  21*   CREA  0.64   CA  8.2*   MG  2.1   PHOS  2.3*   ALB  2.4*   TBILI  0.2   SGOT  18   ALT  12     No results found for: GLUCPOC  Recent Labs      01/30/17   1508   PH  7.32*   PCO2  37   PO2  67*   HCO3  19*     Recent Labs      01/30/17   1519   INR  1.1       Telemetry reviewed:        Risk of deterioration: high      Total time spent with patient: [address] discussed with: Patient, Nursing Staff and >50% of time spent in counseling and coordination of care    Discussed:  Care Plan       ___________________________________________________    Attending Physician: Antonette Mir MD

## 2017-02-02 NOTE — PROGRESS NOTES
Updated clinicals sent to [facility] in [employer], and relayed need for IV abx for 6 more weeks. Will send final IV abx orders when completed.  Thanks JOSEFA Ibrahim

## 2017-02-02 NOTE — PROGRESS NOTES
Cardiology Progress Note    ICU      NAME:  Colton Velázquez   :   1940   MRN:   [id number]     Assessment/Plan:   1. NSTEMI/demand ischemia/hypovolemic shock secondary to blood loss: supportive care. ASA when ok with surgery. On Coreg. Uptitrate dose as tolerated. Trop peak 3.07. Will need ischemia work up > cath once acute surgical issues resolve and safe from ID standpoint. Add statin. 2.  POD # 3:  L hip arthroplasty: per ortho. 3.  Hx of non ischemic CMP/Hx of systolic CHF: BB , Add ACE-I today. Resume ASA if ok with surgery. 4. Hx of LBBB:   5. Hx of Aortic sclerosis/MR:   6. Septic arthritis L hip: On IV Vanc      Pt personally seen and examined. Chart reviewed. Agree with advanced NP's history, exam and  A/P with changes/additons    Marva Argueta MD, [facility]          Subjective:   Patient is a 60-year-old  female who was undergoing hip surgery when she started developing hypotension and bradycardia. Pressors were started. Her EKG was thought to have ST elevation by OR staff but review of her prior EKG indicates that she has had left bundle branch block with ST-T changes and her present EKG is similar to her prior EKGs. Patient is currently being weaned off pressors as her systolic blood pressures in the 120-130 range. She also does not complain of chest pain but is still recovering from her anesthesia. Unable to give detailed history. Her chart indicates that she has a history of nonischemic cardiomyopathy, aortic sclerosis.         Cardiac ROS: Patient denies any exertional chest pain, dyspnea, palpitations, syncope, orthopnea, edema or paroxysmal nocturnal dyspnea. Overnight events:  transferred to [facility]  Less pain in L leg  Hgb 8.7 post 2 U PRBC's       Previous Cardiac Eval  ECHO 17   Left ventricle: Systolic function was moderately reduced. Ejection  fraction was estimated in the range of 35 % to 40 %.     Ventricular septum: There was dyssynergic motion. There was sigmoid septal  appearance. These changes are consistent with LBBB. Mitral valve: There was mild regurgitation. Aortic valve: Leaflets exhibited mild calcification, normal cuspal  separation, and sclerosis. Per primary cardiolgist ( Dr Marko Griffin) records -      16 - Belkis nuc: No ischemia/WMA sec to LBBB/EF 37%  6/15/16 - ECHO - LVH/ EF 40-45%/Mild AS/Mild MR/Mild TR       Review of Systems: No nausea, indigestion, vomiting, pain, cough, sputum. No bleeding. Taking po diet. Working with PT.           Objective:     Visit Vitals    BP (!) 168/99 (BP 1 Location: Left arm, BP Patient Position: At rest)    Pulse 99    Temp 98.2 °F (36.8 °C)    Resp 18    Ht 5' 5\" (1.651 m)    Wt 135 lb 9.3 oz (61.5 kg)    SpO2 95%    BMI 22.56 kg/m2    O2 Flow Rate (L/min): 2 l/min O2 Device: Room air    Temp (24hrs), Av.1 °F (36.7 °C), Min:97.8 °F (36.6 °C), Max:98.3 °F (36.8 °C)            1901 -  0700  In: 2827.6 [P.O.:300; I.V.:2221.3]  Out: 0199 [Urine:2405]  TELE:  SR     General: AAOx3 cooperative, no acute distress. HEENT: Atraumatic. Pink and moist.  Anicteric sclerae. Neck : Supple, no thyromegaly. Lungs: CTA bilaterally. No wheezing/rhonchi/rales. Heart: PMI non displaced. Regular rhythm, 2/6 systolic murmur, no rubs, no gallops. No JVD. Abdomen: Soft, non-distended, non-tender. + Bowel sounds. Extremities:  L hip dressing with fresh blood. TERESSA' s on. No calf tenderness  Neurologic: Grossly intact. Alert and oriented X 3. No acute neurological distress. Psych: Good insight. Not anxious or agitated.       Care Plan discussed with:    Comments   Patient x    Family      RN x    Care Manager                    Consultant:          Data Review:     No lab exists for component: ITNL   Recent Labs      17   0447  17   1802  17   0811  17   1436   CPK   --    --    --   99   TROIQ  1.22*  2.00*  3.07*  0.04     Recent Labs      02/02/17   0431  02/01/17   2317  02/01/17   1704   02/01/17   0447   01/31/17   0811   01/30/17   1426   NA  145   --    --    --   145   --   142   --   142   K  4.7   --    --    --   4.5   --   4.6   --   4.2   CL  111*   --    --    --   111*   --   110*   --   109*   CO2  28   --    --    --   26   --   26   --   25   BUN  21*   --    --    --   21*   --   18   --   16   CREA  0.64   --    --    --   0.80   --   0.72   --   0.53*   GLU  99   --    --    --   133*   --   132*   --   110*   PHOS  2.3*   --    --    --   3.2   --   5.0*   --    --    MG  2.1   --    --    --   2.0   --   2.0   --   1.4*   ALB  2.4*   --    --    --   2.2*   --    --    --   2.2*   WBC  6.5   --    --    --   6.6   --    --    --   3.7   HGB  8.7*  8.3*  8.5*   < >  8.5*   < >   --    < >  11.1*   HCT  27.1*  26.0*  26.5*   < >  26.4*   < >   --    < >  35.1   PLT  108*   --    --    --   96*   --    --    --   151    < > = values in this interval not displayed.      Recent Labs      01/30/17   1519   INR  1.1   PTP  10.7   APTT  22.8       Medications reviewed  Current Facility-Administered Medications   Medication Dose Route Frequency    0.9% sodium chloride infusion  25 mL/hr IntraVENous CONTINUOUS    methylPREDNISolone (PF) (SOLU-MEDROL) injection 20 mg  20 mg IntraVENous Q8H    cyclobenzaprine (FLEXERIL) tablet 5 mg  5 mg Oral TID PRN    vancomycin (VANCOCIN) 1,000 mg in 0.9% sodium chloride (MBP/ADV) 250 mL  1 g IntraVENous Q12H    PHENYLephrine (NEOSYNEPHRINE) 30,000 mcg in 0.9% sodium chloride 250 mL infusion   mcg/min IntraVENous TITRATE    0.9% sodium chloride infusion 250 mL  250 mL IntraVENous PRN    celecoxib (CELEBREX) capsule 200 mg  200 mg Oral Q12H    acetaminophen (TYLENOL) tablet 650 mg  650 mg Oral Q6H    albuterol (PROVENTIL HFA, VENTOLIN HFA, PROAIR HFA) inhaler 2 Puff  2 Puff Inhalation Q6H PRN    calcium carbonate (TUMS) chewable tablet 400 mg [elemental]  400 mg Oral TID WITH MEALS  carvedilol (COREG) tablet 3.125 mg  3.125 mg Oral BID WITH MEALS    linaclotide (LINZESS) capsule 290 mcg  290 mcg Oral ACB    methadone (DOLOPHINE) tablet 5 mg  5 mg Oral Q8H    SUMAtriptan (IMITREX) tablet 50 mg  50 mg Oral ONCE PRN    umeclidinium (INCRUSE ELLIPTA) 62.5 mcg/actuation  1 Puff Inhalation DAILY    zolpidem (AMBIEN) tablet 5 mg  5 mg Oral QHS PRN    sodium chloride (NS) flush 5-10 mL  5-10 mL IntraVENous Q8H    sodium chloride (NS) flush 5-10 mL  5-10 mL IntraVENous PRN    oxyCODONE IR (ROXICODONE) tablet 10 mg  10 mg Oral Q3H PRN    naloxone (NARCAN) injection 0.4 mg  0.4 mg IntraVENous PRN    ondansetron (ZOFRAN) injection 4 mg  4 mg IntraVENous Q4H PRN    diphenhydrAMINE (BENADRYL) injection 12.5 mg  12.5 mg IntraVENous Q6H PRN    senna-docusate (PERICOLACE) 8.6-50 mg per tablet 1 Tab  1 Tab Oral BID    polyethylene glycol (MIRALAX) packet 17 g  17 g Oral DAILY    bisacodyl (DULCOLAX) suppository 10 mg  10 mg Rectal DAILY PRN    oxyCODONE IR (ROXICODONE) tablet 15 mg  15 mg Oral Q3H PRN    fentaNYL citrate (PF) injection 25 mcg  25 mcg IntraVENous Q4H PRN    famotidine (PF) (PEPCID) 20 mg in sodium chloride 0.9 % 10 mL injection  20 mg IntraVENous Q12H         John Keys NP

## 2017-02-02 NOTE — CARDIO/PULMONARY
Cardac Rehab:  2/2/2017 @ 1630:  Met with patient sitting up in chair on [facility]. Explained educational role of Cardiac Rehab RN. Discussed pt's understanding of current health issus and tx plan. Pt reported she came in on Monday for hip repair and had problems during surgery with low HR and BP. She is aware she had heart attach and that she need cardiac cath but not going to do right now. Pt would not be eligible for OP cardiac rehab at this time b/c she will ne PT for hip first. Reviewed LVEF of 35%-40%, NSTEMI and CHF goals for home health promotion. Pt reported she lives in [place] with oldest son. Gave/reviewed CHF/MI  teaching packet. Attached avoiding triggers HF, MI, and smoking cessation to AVS.  2/4/2017 @ 1310:   Met with pt lying in bed on 4th floor, M/S unit. Purpose of visit was to f/u with MI education and answer any questions. Pt reported she was having \"leg spasms\"  And requested ice packet. Deferred any education at this time. Spoke with staff nurse Gloria Georgiana) to relay pt concerns with leg spasm and need for ice packet. 2/9/2017 @ 1200:  Pt remains in hospital 2/2 post-op hip rep-wound healing with vac, pain management with Palliative care consult and need for SNF placement. Pt has been accepted at [facility]. Cardiology has signed off and will f/u as OP as needed. 2/15/2017 @ 1240:  Chart Review - Pt to OR yesterday for I&D hip with washout & closure - 2/2 deep wound infection per Dr. Sneha Swan notes. 2/16/2017 @ 0845:  Met with pt sitting up in chair on 5th floor. Pt is for d/c today to [facility]. Pt reported \"I do not remember much from the early days in the hospital.\"  She remember being told recently she had a heart attack but does not remember this CR RN visits. Pt has MI packet at bedside. Reviewed MI info. Also Discussed f/u appt with Dr. Justina Mendez on 2/21/17.   Pt did not have cath or stress due to orthopedic issues and discussed Dr. Justina Mendez will address those issues on f/u appt. Pt was very familiar with cardiac hx of LBBB, AS/MR and CHF. Core Measures (if not met include reason why)  ACE/ARB -  Started on lisinoril  BB - carvedilol   Statin - started on atorvastatin   ASA - ASA (on hold for surgery - to be restarted when appropriate from ortho). Prior to admission cardiac meds include: fish oil  Diet education: Provided handout on Low Sodium diet (<1500 mg Na/day) and sodium food and beverage zone and Avoiding hidden sodium. Discussed importance of following heart healthy/low Na diet management, reading labels, portion control and foods to avoid. 2/16/2017:  Reviewed importance of following heart healthy/low Na diet management, reading labels, portion control and foods to avoid. [facility] should following this diet as well as she once d/c from SNF. Fluid intake education:   Discussed importance of adequate fluid intake to prevent dehydration and avoid FVO  Daily Weights: Pt has scale but has not been weighing daily. Discussed importance of following daily weights and recording to monitor for fluid volume overload. Discussed need to call MD if gains 3 lbs in one day, or 5 lbs in one week. 2/16/2017:  Reviewed importance of following daily weights and recording to monitor for fluid volume overload and need to call MD if gains 3 lbs in one day, or 5 lbs in one week. Activity/Excersie:  Pt reported she has been able to ambulate prior to surgery but has much pain due to \"back problems. \"  Pt will need OP Rehab for recovery from hip surgery. Smoking cessation: Current smoker, 1/2 ppd. Has been smoking 50 years. She reported she did quit once for 1 1/2 years but started back b/c \"people were smoking around me. \"  Discussed identifying triggers and working on strategies to quit. She reported her son does smoke and encourage her to have son quit with her or smoke outside.   2/16/2017:  Pt has not been able to smoke while here and will not be able to smoke at [facility]. .  She reported   She feels she will be able to remain smoke free. Medication Education: Provided copy of med rec hx. Reviewed meds. Pt is aware of her meds and reported she has pill container to organize her med. She reported she has no problem with obtaining her meds. 2/16/2017:  Reviewed meds. Pt is aware of her meds and reported she has not problem obtaining meds. New PO meds this admission: Lisinopril and atorvastatin . Discussed purpose and side effect of each. Attached info for new meds to AVS.   2/16/2017:  Atorvastatin, Lovenox (sugq), lisinopril and Zofran. Discussed purpose and side effects of each meds. Info on new meds placed on AVS.     Concern for knowledge deficit: 2/2/2017  Pt verbalized understanding of information provided. All question answered. 2/16/2017:  Pt verbalized understanding of information provided. All question answered. 1. During this hospital stay did staff take your preferences and those of your family (or caregiver) into account in deciding your individual heart failure needs, and in deciding when you left the hospital? 2/4/17: YES  2. Do you have a good understanding of the things you are responsible for in managing heart failure? 2/4/17:  Much better. 3. Do you clearly understand the purpose for taking each medication? 2/4/17:  YES- however need reinforcement of meds.  2/16/2017:  Pt is aware of her meds

## 2017-02-03 ENCOUNTER — APPOINTMENT (OUTPATIENT)
Dept: GENERAL RADIOLOGY | Age: 77
DRG: 463 | End: 2017-02-03
Attending: ORTHOPAEDIC SURGERY
Payer: MEDICARE

## 2017-02-03 LAB
ALBUMIN SERPL BCP-MCNC: 2.2 G/DL (ref 3.5–5)
ANION GAP BLD CALC-SCNC: 4 MMOL/L (ref 5–15)
BACTERIA SPEC CULT: NORMAL
BASOPHILS # BLD AUTO: 0 K/UL (ref 0–0.1)
BASOPHILS # BLD: 0 % (ref 0–1)
BUN SERPL-MCNC: 25 MG/DL (ref 6–20)
BUN/CREAT SERPL: 35 (ref 12–20)
CALCIUM SERPL-MCNC: 7.9 MG/DL (ref 8.5–10.1)
CHLORIDE SERPL-SCNC: 109 MMOL/L (ref 97–108)
CO2 SERPL-SCNC: 31 MMOL/L (ref 21–32)
CREAT SERPL-MCNC: 0.71 MG/DL (ref 0.55–1.02)
EOSINOPHIL # BLD: 0.1 K/UL (ref 0–0.4)
EOSINOPHIL NFR BLD: 1 % (ref 0–7)
ERYTHROCYTE [DISTWIDTH] IN BLOOD BY AUTOMATED COUNT: 15.1 % (ref 11.5–14.5)
GLUCOSE SERPL-MCNC: 78 MG/DL (ref 65–100)
GRAM STN SPEC: NORMAL
HCT VFR BLD AUTO: 27.5 % (ref 35–47)
HCT VFR BLD AUTO: 28.2 % (ref 35–47)
HCT VFR BLD AUTO: 29 % (ref 35–47)
HGB BLD-MCNC: 8.8 G/DL (ref 11.5–16)
HGB BLD-MCNC: 9.1 G/DL (ref 11.5–16)
HGB BLD-MCNC: 9.1 G/DL (ref 11.5–16)
LYMPHOCYTES # BLD AUTO: 25 % (ref 12–49)
LYMPHOCYTES # BLD: 1.7 K/UL (ref 0.8–3.5)
MCH RBC QN AUTO: 29.1 PG (ref 26–34)
MCHC RBC AUTO-ENTMCNC: 32 G/DL (ref 30–36.5)
MCV RBC AUTO: 91.1 FL (ref 80–99)
MONOCYTES # BLD: 0.5 K/UL (ref 0–1)
MONOCYTES NFR BLD AUTO: 7 % (ref 5–13)
NEUTS SEG # BLD: 4.5 K/UL (ref 1.8–8)
NEUTS SEG NFR BLD AUTO: 67 % (ref 32–75)
PHOSPHATE SERPL-MCNC: 1.7 MG/DL (ref 2.6–4.7)
PLATELET # BLD AUTO: 118 K/UL (ref 150–400)
POTASSIUM SERPL-SCNC: 4.4 MMOL/L (ref 3.5–5.1)
RBC # BLD AUTO: 3.02 M/UL (ref 3.8–5.2)
SERVICE CMNT-IMP: NORMAL
SODIUM SERPL-SCNC: 144 MMOL/L (ref 136–145)
WBC # BLD AUTO: 6.7 K/UL (ref 3.6–11)

## 2017-02-03 PROCEDURE — 65270000029 HC RM PRIVATE

## 2017-02-03 PROCEDURE — 74011250636 HC RX REV CODE- 250/636: Performed by: INTERNAL MEDICINE

## 2017-02-03 PROCEDURE — 36569 INSJ PICC 5 YR+ W/O IMAGING: CPT | Performed by: ORTHOPAEDIC SURGERY

## 2017-02-03 PROCEDURE — 74011250637 HC RX REV CODE- 250/637: Performed by: INTERNAL MEDICINE

## 2017-02-03 PROCEDURE — 36415 COLL VENOUS BLD VENIPUNCTURE: CPT | Performed by: INTERNAL MEDICINE

## 2017-02-03 PROCEDURE — 74011250637 HC RX REV CODE- 250/637: Performed by: ANESTHESIOLOGY

## 2017-02-03 PROCEDURE — 80048 BASIC METABOLIC PNL TOTAL CA: CPT | Performed by: INTERNAL MEDICINE

## 2017-02-03 PROCEDURE — 85025 COMPLETE CBC W/AUTO DIFF WBC: CPT | Performed by: INTERNAL MEDICINE

## 2017-02-03 PROCEDURE — 74011250637 HC RX REV CODE- 250/637: Performed by: NURSE PRACTITIONER

## 2017-02-03 PROCEDURE — 77030018786 HC NDL GD F/USND BARD -B

## 2017-02-03 PROCEDURE — 97116 GAIT TRAINING THERAPY: CPT

## 2017-02-03 PROCEDURE — 71010 XR CHEST PORT: CPT

## 2017-02-03 PROCEDURE — 97530 THERAPEUTIC ACTIVITIES: CPT

## 2017-02-03 PROCEDURE — C1751 CATH, INF, PER/CENT/MIDLINE: HCPCS

## 2017-02-03 PROCEDURE — 82040 ASSAY OF SERUM ALBUMIN: CPT | Performed by: ORTHOPAEDIC SURGERY

## 2017-02-03 PROCEDURE — 73502 X-RAY EXAM HIP UNI 2-3 VIEWS: CPT

## 2017-02-03 PROCEDURE — 84100 ASSAY OF PHOSPHORUS: CPT | Performed by: ORTHOPAEDIC SURGERY

## 2017-02-03 PROCEDURE — 74011000250 HC RX REV CODE- 250: Performed by: INTERNAL MEDICINE

## 2017-02-03 PROCEDURE — 77030018719 HC DRSG PTCH ANTIMIC J&J -A

## 2017-02-03 PROCEDURE — 84466 ASSAY OF TRANSFERRIN: CPT | Performed by: ORTHOPAEDIC SURGERY

## 2017-02-03 PROCEDURE — 85018 HEMOGLOBIN: CPT | Performed by: INTERNAL MEDICINE

## 2017-02-03 PROCEDURE — 74011250636 HC RX REV CODE- 250/636: Performed by: NURSE PRACTITIONER

## 2017-02-03 RX ORDER — SODIUM CHLORIDE 0.9 % (FLUSH) 0.9 %
10-40 SYRINGE (ML) INJECTION EVERY 8 HOURS
Status: DISCONTINUED | OUTPATIENT
Start: 2017-02-03 | End: 2017-02-16 | Stop reason: HOSPADM

## 2017-02-03 RX ORDER — SODIUM CHLORIDE 0.9 % (FLUSH) 0.9 %
10 SYRINGE (ML) INJECTION EVERY 24 HOURS
Status: DISCONTINUED | OUTPATIENT
Start: 2017-02-03 | End: 2017-02-07

## 2017-02-03 RX ORDER — GUAIFENESIN 100 MG/5ML
81 LIQUID (ML) ORAL DAILY
Status: DISCONTINUED | OUTPATIENT
Start: 2017-02-03 | End: 2017-02-16 | Stop reason: HOSPADM

## 2017-02-03 RX ORDER — SODIUM CHLORIDE 0.9 % (FLUSH) 0.9 %
20 SYRINGE (ML) INJECTION EVERY 24 HOURS
Status: DISCONTINUED | OUTPATIENT
Start: 2017-02-03 | End: 2017-02-06

## 2017-02-03 RX ORDER — SODIUM CHLORIDE 0.9 % (FLUSH) 0.9 %
10 SYRINGE (ML) INJECTION AS NEEDED
Status: DISCONTINUED | OUTPATIENT
Start: 2017-02-03 | End: 2017-02-06

## 2017-02-03 RX ORDER — SODIUM CHLORIDE 0.9 % (FLUSH) 0.9 %
10-30 SYRINGE (ML) INJECTION AS NEEDED
Status: DISCONTINUED | OUTPATIENT
Start: 2017-02-03 | End: 2017-02-16 | Stop reason: HOSPADM

## 2017-02-03 RX ADMIN — METHADONE HYDROCHLORIDE 5 MG: 10 TABLET ORAL at 17:30

## 2017-02-03 RX ADMIN — ENOXAPARIN SODIUM 40 MG: 40 INJECTION SUBCUTANEOUS at 20:39

## 2017-02-03 RX ADMIN — FAMOTIDINE 20 MG: 10 INJECTION, SOLUTION INTRAVENOUS at 09:39

## 2017-02-03 RX ADMIN — OXYCODONE HYDROCHLORIDE 10 MG: 5 TABLET ORAL at 16:22

## 2017-02-03 RX ADMIN — CELECOXIB 200 MG: 100 CAPSULE ORAL at 13:36

## 2017-02-03 RX ADMIN — Medication 1 TABLET: at 17:30

## 2017-02-03 RX ADMIN — ACETAMINOPHEN 650 MG: 325 TABLET ORAL at 17:30

## 2017-02-03 RX ADMIN — LISINOPRIL 10 MG: 20 TABLET ORAL at 09:40

## 2017-02-03 RX ADMIN — CALCIUM CARBONATE (ANTACID) CHEW TAB 500 MG 400 MG: 500 CHEW TAB at 09:40

## 2017-02-03 RX ADMIN — ACETAMINOPHEN 650 MG: 325 TABLET ORAL at 01:01

## 2017-02-03 RX ADMIN — CARVEDILOL 6.25 MG: 6.25 TABLET, FILM COATED ORAL at 17:30

## 2017-02-03 RX ADMIN — Medication 20 ML: at 15:02

## 2017-02-03 RX ADMIN — VANCOMYCIN HYDROCHLORIDE 1250 MG: 10 INJECTION, POWDER, LYOPHILIZED, FOR SOLUTION INTRAVENOUS at 18:08

## 2017-02-03 RX ADMIN — CARVEDILOL 6.25 MG: 6.25 TABLET, FILM COATED ORAL at 09:40

## 2017-02-03 RX ADMIN — CALCIUM CARBONATE (ANTACID) CHEW TAB 500 MG 400 MG: 500 CHEW TAB at 11:29

## 2017-02-03 RX ADMIN — UMECLIDINIUM 1 PUFF: 62.5 AEROSOL, POWDER ORAL at 09:39

## 2017-02-03 RX ADMIN — ACETAMINOPHEN 650 MG: 325 TABLET ORAL at 07:47

## 2017-02-03 RX ADMIN — ATORVASTATIN CALCIUM 10 MG: 10 TABLET, FILM COATED ORAL at 09:40

## 2017-02-03 RX ADMIN — RIFAMPIN 600 MG: 300 CAPSULE ORAL at 07:47

## 2017-02-03 RX ADMIN — METHADONE HYDROCHLORIDE 5 MG: 10 TABLET ORAL at 01:02

## 2017-02-03 RX ADMIN — FENTANYL CITRATE 25 MCG: 50 INJECTION, SOLUTION INTRAMUSCULAR; INTRAVENOUS at 11:30

## 2017-02-03 RX ADMIN — CALCIUM CARBONATE (ANTACID) CHEW TAB 500 MG 400 MG: 500 CHEW TAB at 17:29

## 2017-02-03 RX ADMIN — CELECOXIB 200 MG: 100 CAPSULE ORAL at 23:03

## 2017-02-03 RX ADMIN — ZOLPIDEM TARTRATE 5 MG: 5 TABLET, FILM COATED ORAL at 23:04

## 2017-02-03 RX ADMIN — OXYCODONE HYDROCHLORIDE 15 MG: 5 TABLET ORAL at 07:44

## 2017-02-03 RX ADMIN — ASPIRIN 81 MG CHEWABLE TABLET 81 MG: 81 TABLET CHEWABLE at 11:29

## 2017-02-03 RX ADMIN — LINACLOTIDE 290 MCG: 145 CAPSULE, GELATIN COATED ORAL at 07:47

## 2017-02-03 RX ADMIN — Medication 1 TABLET: at 09:40

## 2017-02-03 RX ADMIN — METHADONE HYDROCHLORIDE 5 MG: 10 TABLET ORAL at 09:39

## 2017-02-03 RX ADMIN — FENTANYL CITRATE 25 MCG: 50 INJECTION, SOLUTION INTRAMUSCULAR; INTRAVENOUS at 06:04

## 2017-02-03 RX ADMIN — Medication 10 ML: at 23:04

## 2017-02-03 RX ADMIN — CYCLOBENZAPRINE HYDROCHLORIDE 5 MG: 10 TABLET, FILM COATED ORAL at 06:13

## 2017-02-03 RX ADMIN — CYCLOBENZAPRINE HYDROCHLORIDE 5 MG: 10 TABLET, FILM COATED ORAL at 17:49

## 2017-02-03 RX ADMIN — Medication 10 ML: at 06:04

## 2017-02-03 RX ADMIN — ACETAMINOPHEN 650 MG: 325 TABLET ORAL at 11:29

## 2017-02-03 RX ADMIN — VANCOMYCIN HYDROCHLORIDE 1250 MG: 10 INJECTION, POWDER, LYOPHILIZED, FOR SOLUTION INTRAVENOUS at 07:47

## 2017-02-03 RX ADMIN — CELECOXIB 200 MG: 100 CAPSULE ORAL at 01:01

## 2017-02-03 RX ADMIN — FAMOTIDINE 20 MG: 10 INJECTION, SOLUTION INTRAVENOUS at 20:39

## 2017-02-03 NOTE — PROGRESS NOTES
Attempted to work with the patient for afternoon Physical Therapy, chart reviewed and discussed with nurse  patient on bed rest for an hour s/p double lumen PICC placement. We will continue to follow up with the patient for therapy. Thank you.

## 2017-02-03 NOTE — PROGRESS NOTES
Nutrition Assessment:    RECOMMENDATIONS/INTERVENTION(S):   Continue Regular, liberalized diet  Continue Ensure Enlive TID (chocolate)--encourage to drink between meals  MVI daily if not already ordered  Continue to monitor wt, appetite/PO intake, diet tolerance, and acceptance of oral supplement    ASSESSMENT:   2/2: Follow-up. Visited pt. Tolerating diet w/ no problems. Reports of good appetite and intake, pt is ordering all meals. Request for chocolate Ensure. Betito Lopez continues to follow for left hip wound. Wt hx reviewed - desirable increase noted. Labs & meds reviewed. Practitioner notes IV abx x 8 wks d/t dx.     1/31: Noted consult. Chart reviewed. This is a 69 yo female s/p left hip arthroplasty, possible NSTEMI in OR. Hx CAD, CHF, COPD, HTN. Diet advanced to Regular/High Clarence/High Protein. Tolerating diet. Labs/Meds reviewed. IVF infusing, on pressor, Solumedrol. Mg repleted. No pressure injury, + surgical incision to left hip. Wound care consulted for possible wound vac. No recent, significant weight loss. Observed food on bedside table brought by family from outside of facility. SUBJECTIVE/OBJECTIVE:     Diet Order: Regular (High Clarence/High Pro); Ensure Enlive TID  % Eaten:  %    Pertinent Medications: [x] Reviewed    Chemistries:  Lab Results   Component Value Date/Time    Sodium 144 02/03/2017 01:14 AM    Potassium 4.4 02/03/2017 01:14 AM    Chloride 109 02/03/2017 01:14 AM    CO2 31 02/03/2017 01:14 AM    Anion gap 4 02/03/2017 01:14 AM    Glucose 78 02/03/2017 01:14 AM    BUN 25 02/03/2017 01:14 AM    Creatinine 0.71 02/03/2017 01:14 AM    BUN/Creatinine ratio 35 02/03/2017 01:14 AM    GFR est AA >60 02/03/2017 01:14 AM    GFR est non-AA >60 02/03/2017 01:14 AM    Calcium 7.9 02/03/2017 01:14 AM    AST (SGOT) 18 02/02/2017 04:31 AM    Alk.  phosphatase 48 02/02/2017 04:31 AM    Protein, total 5.4 02/02/2017 04:31 AM    Albumin 2.4 02/02/2017 04:31 AM    Globulin 3.0 02/02/2017 04:31 AM    A-G Ratio 0.8 02/02/2017 04:31 AM    ALT (SGPT) 12 02/02/2017 04:31 AM      Anthropometrics: Height: 5' 5\" (165.1 cm) Weight: 64.5 kg (142 lb 4.8 oz)    IBW (%IBW): 56.8 kg (125 lb 3.5 oz) ( ) UBW (%UBW):   (  %)    BMI: Body mass index is 23.68 kg/(m^2). This BMI is indicative of:   [] Underweight for age   [x] Normal    [] Overweight    []  Obesity    []  Extreme Obesity (BMI>40)  Estimated Nutrition Needs (Based on): 1438 Kcals/day (BMR (1106) x 1.3 AF ) , 74 g (1.2 g/Kg body wt) Protein  Carbohydrate: At Least 130 g/day  Fluids: 1450 mL/day    Last BM: 2/2   [x]Active     []Hyperactive  []Hypoactive       [] Absent   BS  Skin:    [] Intact   [x] Incision--left hip incision  [] Breakdown   [] DTI   [] Tears/Excoriation/Abrasion  [x]Edema [] Other:    Wt Readings from Last 30 Encounters:   02/03/17 64.5 kg (142 lb 4.8 oz)   01/11/17 60 kg (132 lb 4.4 oz)   04/22/14 54.4 kg (120 lb)      NUTRITION DIAGNOSES:   Problem:  Increased protein needs     Etiology: related to post-op wound healing     Signs/Symptoms: as evidenced by s/p left hip arthroplasty      NUTRITION INTERVENTIONS:  Meals/Snacks: General/healthful diet   Supplements: Commercial supplement              GOAL:   Pt will continue to consume >75% of meals and supplements in the next 3-5 days    Cultural, Orthodoxy, or Ethnic Dietary Needs: None     LEARNING NEEDS (Diet, Food/Nutrient-Drug Interaction):    [x] None Identified   [] Identified and Education Provided/Documented   [] Identified and Pt declined/was not appropriate      [] Interdisciplinary Care Plan Reviewed/Documented    [x] Discharge Needs:  See d/c order    [] No Nutrition Related Discharge Needs    NUTRITION RISK:   Pt Is At Nutrition Risk  [x]     No Nutrition Risk Identified  []       PT SEEN FOR:    [x]  MD Consult: []Calorie Count      []Diabetic Diet Education        []Diet Education     []Electrolyte Management     [x]General Nutrition Management and Supplements     []Management of Tube Feeding     []TPN Recommendations    []  RN Referral:  []MST score >=2     []Enteral/Parenteral Nutrition PTA     []Pregnant: Gestational DM or Multigestation                 [] Pressure Ulcer  []  Low BMI  []  Length of Stay; Dysphagia Diet          Reva Pena, [address] [place] [address]   Pager [phone]

## 2017-02-03 NOTE — PROGRESS NOTES
Brad Mosley MD    [address] [place], [address] [facility]    Office [phone],[facility] [phone]  Pager [phone]    2/3/2017     Admit Date: 2017    Admit Diagnosis: LEFT HIP BIPOLAR;Failed total joint replacement ([address] [place]*    NAME: Shelli Jean Baptiste   :  1940     MRN: [id number]     Assessment/Plan:    NSTEMI/demand ischemia/hypovolemic shock secondary to blood loss ( resolved): Yeimy Ximenaeric ASA 81 mg daily. On Coreg. Will inc dose of coreg. Trop peak 3.07. Will need ischemia work up > cath once acute surgical issues resolve. On statin. Reviewed ID's note about pt not septic and can go for cath. However in view of recent surgery/ surgical wound L hip/pt being hemodynamically stable/need for full dose anticoagulants if PCI needs to be done during cath - best option is to treat medically for now and revisit cath vs stress in a few weeks. 2.  POD # 4: L hip arthroplasty: per ortho. 3. Hx of non ischemic CMP/Hx of systolic CHF: BB ,ACE-I.   4. Hx of LBBB:   5. Hx of Aortic sclerosis/MR:   6. Septic arthritis L hip: On IV Vanc          Will see prn over weekend. Plz call if needed. Please do not hesitate to contact us with questions or concerns. See note below for details.     Brad Mosley MD      Subjective:  No CP/dyspneae    ROS:  (bold if positive, if negative)          Medications before admission:     Current Facility-Administered Medications   Medication Dose Route Frequency    atorvastatin (LIPITOR) tablet 10 mg  10 mg Oral DAILY    lisinopril (PRINIVIL, ZESTRIL) tablet 10 mg  10 mg Oral DAILY    hydrALAZINE (APRESOLINE) 20 mg/mL injection 10 mg  10 mg IntraVENous Q2H PRN    carvedilol (COREG) tablet 6.25 mg  6.25 mg Oral BID WITH MEALS    rifAMPin (RIFADIN) capsule 600 mg  600 mg Oral ACB    enoxaparin (LOVENOX) injection 40 mg  40 mg SubCUTAneous Q24H    vancomycin (VANCOCIN) 1,250 mg in 0.9% sodium chloride 250 mL IVPB  1,250 mg IntraVENous Q12H  0.9% sodium chloride infusion  25 mL/hr IntraVENous CONTINUOUS    cyclobenzaprine (FLEXERIL) tablet 5 mg  5 mg Oral TID PRN    PHENYLephrine (NEOSYNEPHRINE) 30,000 mcg in 0.9% sodium chloride 250 mL infusion   mcg/min IntraVENous TITRATE    0.9% sodium chloride infusion 250 mL  250 mL IntraVENous PRN    celecoxib (CELEBREX) capsule 200 mg  200 mg Oral Q12H    acetaminophen (TYLENOL) tablet 650 mg  650 mg Oral Q6H    albuterol (PROVENTIL HFA, VENTOLIN HFA, PROAIR HFA) inhaler 2 Puff  2 Puff Inhalation Q6H PRN    calcium carbonate (TUMS) chewable tablet 400 mg [elemental]  400 mg Oral TID WITH MEALS    linaclotide (LINZESS) capsule 290 mcg  290 mcg Oral ACB    methadone (DOLOPHINE) tablet 5 mg  5 mg Oral Q8H    SUMAtriptan (IMITREX) tablet 50 mg  50 mg Oral ONCE PRN    umeclidinium (INCRUSE ELLIPTA) 62.5 mcg/actuation  1 Puff Inhalation DAILY    zolpidem (AMBIEN) tablet 5 mg  5 mg Oral QHS PRN    sodium chloride (NS) flush 5-10 mL  5-10 mL IntraVENous Q8H    sodium chloride (NS) flush 5-10 mL  5-10 mL IntraVENous PRN    oxyCODONE IR (ROXICODONE) tablet 10 mg  10 mg Oral Q3H PRN    naloxone (NARCAN) injection 0.4 mg  0.4 mg IntraVENous PRN    senna-docusate (PERICOLACE) 8.6-50 mg per tablet 1 Tab  1 Tab Oral BID    polyethylene glycol (MIRALAX) packet 17 g  17 g Oral DAILY    bisacodyl (DULCOLAX) suppository 10 mg  10 mg Rectal DAILY PRN    oxyCODONE IR (ROXICODONE) tablet 15 mg  15 mg Oral Q3H PRN    fentaNYL citrate (PF) injection 25 mcg  25 mcg IntraVENous Q4H PRN    famotidine (PF) (PEPCID) 20 mg in sodium chloride 0.9 % 10 mL injection  20 mg IntraVENous Q12H       Physical Exam:  Visit Vitals    BP (!) 164/91 (BP 1 Location: Right arm, BP Patient Position: At rest)    Pulse 63    Temp 97.6 °F (36.4 °C)    Resp 20    Ht 5' 5\" (1.651 m)    Wt 142 lb 4.8 oz (64.5 kg)    SpO2 95%    BMI 23.68 kg/m2    O2 Flow Rate (L/min): 2 l/min O2 Device: Room air      Telemetry: Gen: Well-developed, well-nourished, in no acute distress  Neck: Supple,No JVD, No Carotid Bruit,   Resp: No accessory muscle use, Clear breath sounds, No rales or rhonchi  Card: Regular Rate,Rythm,Normal S1, S2, No murmurs, rubs or gallop. No thrills. Abd:  Soft, non-tender, non-distended,BS+,   LE: No edema      Cardiac testing:  ECHO 1/30/17   Left ventricle: Systolic function was moderately reduced. Ejection  fraction was estimated in the range of 35 % to 40 %. Ventricular septum: There was dyssynergic motion. There was sigmoid septal  appearance. These changes are consistent with LBBB. Mitral valve: There was mild regurgitation.     Aortic valve: Leaflets exhibited mild calcification, normal cuspal  separation, and sclerosis.     Per primary cardiolgist ( Dr Bita Simeon) records -       6/16/16 - Belkis nuc: No ischemia/WMA sec to LBBB/EF 37%  6/15/16 - ECHO - LVH/ EF 40-45%/Mild AS/Mild MR/Mild TR    EKG:        Cxray:    LABS:        Lab Results   Component Value Date/Time    WBC 6.7 02/03/2017 01:14 AM    HGB 9.1 02/03/2017 07:57 AM    HCT 28.2 02/03/2017 07:57 AM    PLATELET 950 55/15/9549 01:14 AM    MCV 91.1 02/03/2017 01:14 AM     Lab Results   Component Value Date/Time    Sodium 144 02/03/2017 01:14 AM    Potassium 4.4 02/03/2017 01:14 AM    Chloride 109 02/03/2017 01:14 AM    CO2 31 02/03/2017 01:14 AM    Anion gap 4 02/03/2017 01:14 AM    Glucose 78 02/03/2017 01:14 AM    BUN 25 02/03/2017 01:14 AM    Creatinine 0.71 02/03/2017 01:14 AM    BUN/Creatinine ratio 35 02/03/2017 01:14 AM    GFR est AA >60 02/03/2017 01:14 AM    GFR est non-AA >60 02/03/2017 01:14 AM    Calcium 7.9 02/03/2017 01:14 AM     Lab Results   Component Value Date/Time    CK 99 01/30/2017 02:36 PM    Troponin-I, Qt. 1.22 02/01/2017 04:47 AM     Lab Results   Component Value Date/Time    aPTT 22.8 01/30/2017 03:19 PM     Lab Results   Component Value Date/Time    INR 1.1 01/30/2017 03:19 PM    INR 1.0 01/11/2017 03:45 PM    Prothrombin time 10.7 01/30/2017 03:19 PM    Prothrombin time 9.7 01/11/2017 03:45 PM     No results found for: BNP, BNPP, XBNPT    Care Plan discussed with:   Alexus Gallegos MD

## 2017-02-03 NOTE — PROGRESS NOTES
Problem: Falls - Risk of  Goal: *Absence of falls  Outcome: Progressing Towards Goal  Bed wheels locked, bed in low position. Side rails x 3. Call bell and possessions within reach. Asked patient to call for assistance. Patient verbalized understanding. Will continue to monitor. 0003  Patient rounding. Sleeping. 0110  Labs drawn and sent. [address] drawn and sent. 0745  Patient rounding. Complaints of left hip pain and spasms. Connie 15 mg administered per MAR. Next available @ 1045. Updated patient paper in room, patient unable to see communication board without glasses. Pain 10/10 intermittent. Will continue to monitor. Ice packs changed. 0976  Bedside and Verbal shift change report given to Cody Gonzales RN (oncoming nurse) by Sivakumar Muñoz RN (offgoing nurse). Report included the following information SBAR, Kardex, Intake/Output, Recent Results and Cardiac Rhythm NSR BBB.

## 2017-02-03 NOTE — PROGRESS NOTES
Occupational Therapy Note:  Chart reviewed and spoke with nursing. Patient is on bed rest for an hour s/p double lumen PICC placement per RN. We will continue to follow. Thank you.   Muna Springer OTR/L

## 2017-02-03 NOTE — PROGRESS NOTES
[facility]  [address] [facility], [address] [place] [address]  [phone]      Medical Progress Note      NAME: Anuj Haile   :  1940  MRM:  [id number]    Date/Time: 2/3/2017  11:09 AM       Assessment and Plan:   1. 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 Wound culture shows staph coag negative. Evaluated by ID and started on vancomycin IV and rifampin PO. Needs PICC line.      2. NSTEMI (non-ST elevated myocardial infarction) ([facility] (2017). Evaluated by cardiology. Continue ASA and coreg.      3. HTN (hypertension) (2017). BP high. Increased coreg and lisinopril.      4. Chronic systolic CHF. EF is 30%. Compensated. Continue coreg.      5. Anemia (2017) likely secondary to blood loss. Continue to monitor and transfuse for Hb < 7.      6. Thrombocytopenia ([facility] (2017). Unclear if this is chronic. Will continue to monitor.                  Subjective:     Chief Complaint:  Hip pain    ROS:  (bold if positive, if negative)      Tolerating PT  Tolerating Diet        Objective:     Last 24hrs VS reviewed since prior progress note.  Most recent are:    Visit Vitals    BP (!) 164/91 (BP 1 Location: Right arm, BP Patient Position: At rest)    Pulse 63    Temp 97.6 °F (36.4 °C)    Resp 20    Ht 5' 5\" (1.651 m)    Wt 64.5 kg (142 lb 4.8 oz)    SpO2 95%    BMI 23.68 kg/m2     SpO2 Readings from Last 6 Encounters:   17 95%   17 99%   14 95%    O2 Flow Rate (L/min): 2 l/min     Intake/Output Summary (Last 24 hours) at 17 1109  Last data filed at 17 0947   Gross per 24 hour   Intake              100 ml   Output             2300 ml   Net            -2200 ml        Physical Exam:    Gen:  Well-developed, well-nourished, in no acute distress  HEENT:  Pink conjunctivae, PERRL, hearing intact to voice, moist mucous membranes  Neck:  Supple, without masses, thyroid non-tender  Resp:  No accessory muscle use, clear breath sounds without wheezes rales or rhonchi  Card:  No murmurs, normal S1, S2 without thrills, bruits or peripheral edema  Abd:  Soft, non-tender, non-distended, normoactive bowel sounds are present, no palpable organomegaly and no detectable hernias  Lymph:  No cervical or inguinal adenopathy  Musc:  No cyanosis or clubbing  Skin:  No rashes or ulcers, skin turgor is good  Neuro:  Cranial nerves are grossly intact, no focal motor weakness, follows commands appropriately  Psych:  Good insight, oriented to person, place and time, alert  __________________________________________________________________  Medications Reviewed: (see below)  Medications:     Current Facility-Administered Medications   Medication Dose Route Frequency    aspirin chewable tablet 81 mg  81 mg Oral DAILY    atorvastatin (LIPITOR) tablet 10 mg  10 mg Oral DAILY    lisinopril (PRINIVIL, ZESTRIL) tablet 10 mg  10 mg Oral DAILY    hydrALAZINE (APRESOLINE) 20 mg/mL injection 10 mg  10 mg IntraVENous Q2H PRN    carvedilol (COREG) tablet 6.25 mg  6.25 mg Oral BID WITH MEALS    rifAMPin (RIFADIN) capsule 600 mg  600 mg Oral ACB    enoxaparin (LOVENOX) injection 40 mg  40 mg SubCUTAneous Q24H    vancomycin (VANCOCIN) 1,250 mg in 0.9% sodium chloride 250 mL IVPB  1,250 mg IntraVENous Q12H    0.9% sodium chloride infusion  25 mL/hr IntraVENous CONTINUOUS    cyclobenzaprine (FLEXERIL) tablet 5 mg  5 mg Oral TID PRN    0.9% sodium chloride infusion 250 mL  250 mL IntraVENous PRN    celecoxib (CELEBREX) capsule 200 mg  200 mg Oral Q12H    acetaminophen (TYLENOL) tablet 650 mg  650 mg Oral Q6H    albuterol (PROVENTIL HFA, VENTOLIN HFA, PROAIR HFA) inhaler 2 Puff  2 Puff Inhalation Q6H PRN    calcium carbonate (TUMS) chewable tablet 400 mg [elemental]  400 mg Oral TID WITH MEALS    linaclotide (LINZESS) capsule 290 mcg  290 mcg Oral ACB    methadone (DOLOPHINE) tablet 5 mg  5 mg Oral Q8H    SUMAtriptan (IMITREX) tablet 50 mg  50 mg Oral ONCE PRN    umeclidinium (INCRUSE ELLIPTA) 62.5 mcg/actuation  1 Puff Inhalation DAILY    zolpidem (AMBIEN) tablet 5 mg  5 mg Oral QHS PRN    sodium chloride (NS) flush 5-10 mL  5-10 mL IntraVENous Q8H    sodium chloride (NS) flush 5-10 mL  5-10 mL IntraVENous PRN    oxyCODONE IR (ROXICODONE) tablet 10 mg  10 mg Oral Q3H PRN    naloxone (NARCAN) injection 0.4 mg  0.4 mg IntraVENous PRN    senna-docusate (PERICOLACE) 8.6-50 mg per tablet 1 Tab  1 Tab Oral BID    polyethylene glycol (MIRALAX) packet 17 g  17 g Oral DAILY    bisacodyl (DULCOLAX) suppository 10 mg  10 mg Rectal DAILY PRN    oxyCODONE IR (ROXICODONE) tablet 15 mg  15 mg Oral Q3H PRN    fentaNYL citrate (PF) injection 25 mcg  25 mcg IntraVENous Q4H PRN    famotidine (PF) (PEPCID) 20 mg in sodium chloride 0.9 % 10 mL injection  20 mg IntraVENous Q12H        Lab Data Reviewed: (see below)  Lab Review:     Recent Labs      02/03/17   0757  02/03/17   0114  02/02/17   1841  02/02/17   0431 02/01/17 0447   WBC   --   6.7   --   6.5   --   6.6   HGB  9.1*  8.8*  9.6*  8.7*   < >  8.5*   HCT  28.2*  27.5*  29.7*  27.1*   < >  26.4*   PLT   --   118*   --   108*   --   96*    < > = values in this interval not displayed. Recent Labs      02/03/17   0114  02/02/17   0431 02/01/17 0447   NA  144  145  145   K  4.4  4.7  4.5   CL  109*  111*  111*   CO2  31  28  26   GLU  78  99  133*   BUN  25*  21*  21*   CREA  0.71  0.64  0.80   CA  7.9*  8.2*  7.4*   MG   --   2.1  2.0   PHOS   --   2.3*  3.2   ALB   --   2.4*  2.2*   TBILI   --   0.2  0.2   SGOT   --   18  16   ALT   --   12  10*     No results found for: GLUCPOC  No results for input(s): PH, PCO2, PO2, HCO3, FIO2 in the last 72 hours. No results for input(s): INR in the last 72 hours.     No lab exists for component: INREXT  All Micro Results     Procedure Component Value Units Date/Time    CULTURE, TISSUE Adeel Cooper STAIN [[id number]]  (Susceptibility) Collected:  01/30/17 1414    Order Status:  Completed Specimen:  Tissue Updated:  02/02/17 1437     Special Requests: ACETABULUM LEFT HIP 2      GRAM STAIN FEW  WBCS SEEN         NO ORGANISMS SEEN        Culture result: LIGHT  STAPHYLOCOCCUS LUGDUNENSIS       CULTURE, TISSUE [address] [[id number]]  (Susceptibility) Collected:  01/30/17 1412    Order Status:  Completed Specimen:  Tissue Updated:  02/02/17 1436     Special Requests: ACETABULUM LEFT HIP CULTURE      GRAM STAIN NO WBC'S SEEN         NO ORGANISMS SEEN        Culture result: FEW  STAPHYLOCOCCUS LUGDUNENSIS       CULTURE, TISSUE Reji Adrian [[id number]] Collected:  01/30/17 1320    Order Status:  Completed Specimen:  Hip Updated:  02/02/17 1434     Special Requests: NO SPECIAL REQUESTS        GRAM STAIN NO WBC'S SEEN         NO ORGANISMS SEEN        Culture result: FEW  STAPHYLOCOCCUS LUGDUNENSIS  PLEASE REFER TO [facility] [id number] FOR SUSCEPTIBILITIES         CHECKING FOR POSSIBLE  2ND   STAPHYLOCOCCUS SPECIES, COAGULASE NEGATIVE       CULTURE, TISSUE [address] [[id number]] Collected:  01/30/17 1320    Order Status:  Completed Specimen:  Hip Updated:  02/02/17 1429     Special Requests: NO SPECIAL REQUESTS        GRAM STAIN NO WBC'S SEEN         NO ORGANISMS SEEN        Culture result: LIGHT  STAPHYLOCOCCUS LUGDUNENSIS  PLEASE REFER TO [facility] [id number] FOR SUSCEPTIBILITIES       CULTURE, TISSUE [address] [[id number]] Collected:  01/30/17 1311    Order Status:  Completed Specimen:  Hip Updated:  02/02/17 1428     Special Requests: NO SPECIAL REQUESTS        GRAM STAIN FEW  WBCS SEEN         NO ORGANISMS SEEN        Culture result: FEW  STAPHYLOCOCCUS LUGDUNENSIS  PLEASE REFER  TO [facility] [id number] FOR SUSCEPTIBILITIES       CULTURE, Socorro Toroner STAIN [[id number]]  (Susceptibility) Collected:  01/30/17 1311    Order Status:  Completed Specimen:  Hip Updated:  02/02/17 1425     Special Requests: NO SPECIAL REQUESTS        GRAM STAIN RARE  WBCS SEEN         NO ORGANISMS SEEN Culture result: FEW  STAPHYLOCOCCUS LUGDUNENSIS         CHECKING FOR POSSIBLE  2ND COLONY TYPE OF  STAPHYLOCOCCUS SPECIES, COAGULASE NEGATIVE  ISOLATED FROM THIO BROTH ONLY       CULTURE, Evette Clamp STAIN [[id number]] Collected:  01/30/17 1311    Order Status:  Completed Specimen:  Hip Updated:  02/02/17 1417     Special Requests: NO SPECIAL REQUESTS        GRAM STAIN NO WBC'S SEEN         NO ORGANISMS SEEN        Culture result: FEW  STAPHYLOCOCCUS LUGDUNENSIS  (PLEASE REFER TO [facility] [id number] FOR SUSCEPTIBILITIES       CULTURE, TISSUE W Yanely Maritza [[id number]] Collected:  01/30/17 1255    Order Status:  Completed Specimen:  Hip Updated:  02/02/17 1414     Special Requests: NO SPECIAL REQUESTS        GRAM STAIN RARE  WBCS SEEN         NO ORGANISMS SEEN        Culture result: SCANT  STAPHYLOCOCCUS LUGDUNENSIS  PLEASE REFER TO [facility] [id number] FOR SUSCEPTIBILITIES       CULTURE, BODY FLUID W Yanely Maritza [[id number]]  (Susceptibility) Collected:  01/30/17 1245    Order Status:  Completed Specimen:  Hip Updated:  02/02/17 1412     Special Requests: NO SPECIAL REQUESTS        GRAM STAIN 2+  WBCS SEEN         NO ORGANISMS SEEN        Culture result:         Culture performed on Unspun Fluid      FEW  STAPHYLOCOCCUS LUGDUNENSIS  (OXACILLIN RESISTANT)       CULTURE, ANAEROBIC [[id number]] Collected:  01/30/17 1311    Order Status:  Completed Specimen:  Hip Updated:  02/01/17 1436     Special Requests: NO SPECIAL REQUESTS        Culture result: NO ANAEROBES ISOLATED       CULTURE, ANAEROBIC [[id number]] Collected:  01/30/17 1255    Order Status:  Completed Specimen:  Hip Updated:  02/01/17 1426     Special Requests: NO SPECIAL REQUESTS        Culture result: NO ANAEROBES ISOLATED       CULTURE, ANAEROBIC [[id number]] Collected:  01/30/17 1245    Order Status:  Completed Specimen:  Hip Updated:  02/01/17 1420     Special Requests: NO SPECIAL REQUESTS        Culture result: NO ANAEROBES ISOLATED             I have reviewed notes of prior 24hr. Other pertinent lab:       Total time spent with patient: 30 [address] [facility] discussed with: Patient, Nursing Staff, Consultant/Specialist and >50% of time spent in counseling and coordination of care    Discussed:  Care Plan    Prophylaxis:  Lovenox    Disposition:  SNF/LTC           ___________________________________________________    Attending Physician: Danis Torres MD

## 2017-02-03 NOTE — PROGRESS NOTES
TOTAL HIP REVISION ARTHROPLASTY DAILY NOTE     ASSESSMENT / PLAN :   1. Pain Control : Stable, moderate mid-thigh pain, will obtain a repeat x-ray this am.   2. Overnight Issues : stable, continued drainage, transferred out of the ICU. 3. Wound or incisional issue : Conintued drainage, nothing active this am, drains with mobilization. Maintain hip spica, if min drainage possible PICOS versus repeat hip spica. 4. Therapy / Weight Bearing Recommendations : WBAT, repeat AP pelvis, mobilize with therpay  5. DVT Prophylaxis : Mechanical and Lovenox  6. Disposition : Snf  7. Medical Concerns : Improving, MI - resolving, chronic infections, ID recs for Vanc / Rifampin. Pt also nutritionally deplete and will need ensure shakes with meals. Discussed smoking cessation. Will recheck albumin and transferin, nutritional svc consult. 8. Comments : May be ready for snf placement this weekend if wound is not draining and final Antibx recs / medicaly stable. PICC today. POD  4 Days Post-Op s/p Procedure(s):  LEFT TOTAL HIP ARTHROPLASTY CONVERSION     SUBJECTIVE :     Patient notes the following issues : moderate pain. OBJECTIVE :     Patient Vitals for the past 12 hrs:   BP Temp Pulse Resp SpO2 Weight   02/03/17 0824 (!) 164/91 97.6 °F (36.4 °C) 63 20 95 % -   02/03/17 0700 - - (!) 57 - - -   02/03/17 0614 - - - - - 64.5 kg (142 lb 4.8 oz)   02/03/17 0354 138/69 98 °F (36.7 °C) 66 16 99 % -   02/03/17 0003 140/77 98.1 °F (36.7 °C) 61 16 98 % -   02/02/17 2302 - - 61 - - -       Alert and oriented x3. Examination of the left Hip reveals that the dressing is clean, dry and intact. Hip spica without drainage today. Motor Exam is intact and normal  Sensation is intact to light touch. No calf pain.      Labs:  Recent Labs      02/03/17   0757  02/03/17   0114   HGB  9.1*  8.8*   HCT  28.2*  27.5*   NA   --   144   K   --   4.4   CL   --   109*   CO2   --   31   BUN   --   25*   CREA   --   0.71   GLU   --   78 CULTURES :  No results found for: SDES  Lab Results   Component Value Date/Time    Culture result: LIGHT  STAPHYLOCOCCUS LUGDUNENSIS   01/30/2017 02:14 PM    Culture result: FEW  STAPHYLOCOCCUS LUGDUNENSIS   01/30/2017 02:12 PM    Culture result:  01/30/2017 01:20 PM     LIGHT  STAPHYLOCOCCUS LUGDUNENSIS  PLEASE REFER TO [facility] [id number] FOR SUSCEPTIBILITIES      Culture result:  01/30/2017 01:20 PM     FEW  STAPHYLOCOCCUS LUGDUNENSIS  PLEASE REFER TO [facility] [id number] FOR SUSCEPTIBILITIES      Culture result:  01/30/2017 01:20 PM     CHECKING FOR POSSIBLE  2ND   STAPHYLOCOCCUS SPECIES, COAGULASE NEGATIVE            Patient mobility  Gait  Base of Support: Narrowed  Speed/Jeanie: Slow  Gait Abnormalities: Antalgic  Ambulation - Level of Assistance: Contact guard assistance  Distance (ft): 30 Feet (ft)  Assistive Device: Walker, rolling, Gait belt        John Guevara MD  Cell [phone]  Nurse [address] [place] [address] [phone]  Medical Staff : Kaylee Anderson / Charles Wong  Office : [phone] [facility]  [phone]

## 2017-02-03 NOTE — PROGRESS NOTES
TRANSFER - OUT REPORT:    Verbal report given to Soco Bales RN(name) on Colton Velázquez  being transferred to 4th floor(unit) for routine progression of care       Report consisted of patients Situation, Background, Assessment and   Recommendations(SBAR). Information from the following report(s) SBAR, Kardex, Intake/Output, Accordion, Recent Results and Cardiac Rhythm NSR was reviewed with the receiving nurse. Lines:   PICC Double Lumen 67/63/17 Right;Basilic (Active)   Site Assessment Clean, dry, & intact 2/3/2017  1:03 PM   Phlebitis Assessment 0 2/3/2017  1:03 PM   Infiltration Assessment 0 2/3/2017  1:03 PM   Arm Circumference (cm) 37 cm 2/3/2017  1:03 PM   Date of Last Dressing Change 02/03/17 2/3/2017  1:03 PM   Dressing Status New;Clean, dry, & intact 2/3/2017  1:03 PM   Dressing Type Disk with Chlorhexadine Gluconate (CHG); Transparent 2/3/2017  1:03 PM   Hub Color/Line Status Purple;Capped;Flushed 2/3/2017  1:03 PM   Positive Blood Return (Site #1) Yes 2/3/2017  1:03 PM   Hub Color/Line Status Red;Capped;Flushed 2/3/2017  1:03 PM   Positive Blood Return (Site #2) Yes 2/3/2017  1:03 PM   Alcohol Cap Used Yes 2/3/2017  1:03 PM        Opportunity for questions and clarification was provided.       Patient transported with:   Registered Nurse

## 2017-02-03 NOTE — PROGRESS NOTES
2-3-2017 CASE MANAGEMENT NOTE:  I received a call from the admissions director at [facility] stating she would need updated clinicals sent prior to 2:00 PM today for [facility] to approve the possible admission. Since the complete medical information was not available in time, the authorization will need to wait until Monday, Feb. 6, 2017. However, she expressed concern that the pt's insurance only covers 20 days in full and the co-pay could be quite expensive. CM will be sending a referral to [facility] to determine if they will accept the pt and if this would be more financially beneficial for the pt.  Howard Hamilton, BSW, CM

## 2017-02-03 NOTE — PROGRESS NOTES
Problem: Mobility Impaired (Adult and Pediatric)  Goal: *Acute Goals and Plan of Care (Insert Text)  Physical Therapy Goals  Initiated 2/1/2017    1. Patient will move from supine to sit and sit to supine , scoot up and down and roll side to side in bed with modified independence within 4 days. 2. Patient will perform sit to stand with modified independence within 4 days. 3. Patient will ambulate with modified independence for 200 feet with the least restrictive device within 4 days. 4. Patient will verbalize and demonstrate understanding of lateral precautions per protocol within 4 days. 5. Patient will perform all home exercise program per protocol with independence within 4 days. PHYSICAL THERAPY TREATMENT  Patient: Roel Londono (83 y.o. female)  Date: 2/3/2017  Diagnosis: LEFT HIP BIPOLAR  Failed total joint replacement (HCC)  Failed total hip arthroplasty ([facility] [address] <principal problem not specified>  Procedure(s) (LRB):  LEFT TOTAL HIP ARTHROPLASTY CONVERSION (Left) 4 Days Post-Op  Precautions:   WBAT on left LE short distance only      ASSESSMENT:  Based on the objective data described below, patient tolerated treatment very well. Sit on the edge of bed CGA, sit to stand CGA, sitting and standing balance good, ambulate short distance only, OOB to chair as tolerated. Performed some active range of motion on right LE all planes and ankle pumps and heel ups while sitting on the chair. Educate and  Instructed patient to continue active range of motion exercise on both legs while up on chair or on bed. Notified nurse whoa greed to monitor patient and assist back to bed when ask. Progression toward goals:  [X]    Improving appropriately and progressing toward goals  [ ]    Improving slowly and progressing toward goals  [ ]    Not making progress toward goals and plan of care will be adjusted       PLAN:  Patient continues to benefit from skilled intervention to address the above impairments.   Continue treatment per established plan of care. Discharge Recommendations:  Skilled Nursing Facility  Further Equipment Recommendations for Discharge:  TBD       SUBJECTIVE:   Patient stated Ok we can sit up on the chair.       OBJECTIVE DATA SUMMARY:   Critical Behavior:  Neurologic State: Alert  Orientation Level: Oriented X4  Cognition: Appropriate decision making, Appropriate for age attention/concentration, Appropriate safety awareness, Follows commands  Safety/Judgement: Awareness of environment  Functional Mobility Training:  Bed Mobility:  Rolling: Contact guard assistance  Supine to Sit: Contact guard assistance  Sit to Supine: Contact guard assistance  Scooting: Contact guard assistance        Transfers:  Sit to Stand: Contact guard assistance  Stand to Sit: Contact guard assistance  Stand Pivot Transfers: Contact guard assistance     Bed to Chair: Contact guard assistance                    Balance:  Sitting: Intact  Standing: Intact; With support  Standing - Static: Good  Standing - Dynamic : Good  Ambulation/Gait Training:  Distance (ft): 5 Feet (ft)  Assistive Device: Walker, rolling;Gait belt  Ambulation - Level of Assistance: Contact guard assistance     Gait Description (WDL): Exceptions to WDL  Gait Abnormalities: Antalgic     Left Side Weight Bearing: As tolerated (short distance only)  Base of Support: Narrowed     Speed/Jeanie: Slow           Therapeutic Exercises:    Instructed patient to continue active range of motion exercise on both legs while up on chair or on bed. Pain:  Pain Scale 1: Numeric (0 - 10)  Pain Intensity 1: 4  Pain Location 1: Hip  Pain Orientation 1: Left  Pain Description 1: Stabbing; Sharp  Pain Intervention(s) 1: Medication (see MAR)  Activity Tolerance:   Good. Please refer to the flowsheet for vital signs taken during this treatment.   After treatment:   [X]    Patient left in no apparent distress sitting up in chair  [ ]    Patient left in no apparent distress in bed  [X]    Call bell left within reach  [X]    Nursing notified  [ ]    Caregiver present  [ ]    Bed alarm activated      COMMUNICATION/COLLABORATION:   The patients plan of care was discussed with: Registered Nurse and patient     Kyler Sheehan PT,WCC.    Time Calculation: 23 mins

## 2017-02-03 NOTE — PROGRESS NOTES
PICC Placement Note    PRE-PROCEDURE VERIFICATION  Correct Procedure: yes  Correct Site:  yes  Temperature: Temp: 97.7 °F (36.5 °C), Temperature Source: Temp Source: Oral  Recent Labs      02/03/17   0114   BUN  25*   CREA  0.71   PLT  118*   WBC  6.7     Allergies: [place]; Codeine; Dilaudid [hydromorphone (bulk)]; and Lyrica [pregabalin]  Education materials for PICC Care given: yes. See Patient Education activity for further details. PICC Booklet placed at bedside: yes    Closed Ended PICC Catheters:  Flush Lumens as Follows:  Intermittent Medication:   Flush before and after each medication with 10 ml NS. Unused Ports:  Flush every 8 hours with 10 ml NS.  TPN Ports:  Flush every 24 hours with 20 ml NS prior to hanging new bag. Blood Draws: Stop infusion, draw off and waste 10 ml of blood. Draw sample with 10cc syringe or greater. DO NOT USE VACUTAINER . Transfer with appropriate device to lab  tubes. Flush with 20 ml NS. Dressing Change:  Every 7 days, and PRN using sterile technique if integrity of dressing is compromised. Initial dressing change for central line 24-48 hours post insertion if gauze is used. Apply new dressing per policy. PROCEDURE DETAIL  Consent was obtained and all questions were answered related to risks and benefits. A double lumen PICC line was inserted, as a sterile procedure using ultrasound and modified Seldinger technique for Home IV Therapy. The following documentation is in addition to the PICC properties in the lines/airways flowsheet :  Lot #: [id number]  Lidocaine 1% administered intradermally :yes  Internal Catheter Total Length: 36 (cm)  Vein Selection for PICC:right basilic  Central Line Bundle followed yes  Complication Related to Insertion:no     X-ray: yes. The x-ray results state the tip location is on the right side and the tip overlies the lower superior vena cava.      Line is okay to use: yes    Amina Spain RN

## 2017-02-03 NOTE — PROGRESS NOTES
[facility] Dosing Services: Vancomycin trough evaluation:     Consult for antibiotic dosing of Vancomycin by Dr. Venancio Beckham  Indication: Prosthetic hip infection  Day of Therapy 2    Vancomycin trough: 12.1mcgmL @1841hrs drawn timely is slightly subtherapeutic for trough goal: 15-20mcg/mL. Dose increased to 1250mg ivpb q12h. Pharmacy to continue to follow daily. Thank you,    Den Mccray, Pharm D.         Contact: [phone]

## 2017-02-04 LAB
BACTERIA SPEC CULT: NORMAL
BACTERIA SPEC CULT: NORMAL
DATE LAST DOSE: ABNORMAL
GRAM STN SPEC: NORMAL
HCT VFR BLD AUTO: 28.1 % (ref 35–47)
HCT VFR BLD AUTO: 28.3 % (ref 35–47)
HCT VFR BLD AUTO: 29.4 % (ref 35–47)
HCT VFR BLD AUTO: 30.4 % (ref 35–47)
HGB BLD-MCNC: 9 G/DL (ref 11.5–16)
HGB BLD-MCNC: 9.1 G/DL (ref 11.5–16)
HGB BLD-MCNC: 9.2 G/DL (ref 11.5–16)
HGB BLD-MCNC: 9.5 G/DL (ref 11.5–16)
REPORTED DOSE,DOSE: ABNORMAL UNITS
REPORTED DOSE/TIME,TMG: 436
SERVICE CMNT-IMP: NORMAL
SERVICE CMNT-IMP: NORMAL
VANCOMYCIN TROUGH SERPL-MCNC: 23.2 UG/ML (ref 5–10)

## 2017-02-04 PROCEDURE — 74011000250 HC RX REV CODE- 250: Performed by: INTERNAL MEDICINE

## 2017-02-04 PROCEDURE — 74011250637 HC RX REV CODE- 250/637: Performed by: NURSE PRACTITIONER

## 2017-02-04 PROCEDURE — 97116 GAIT TRAINING THERAPY: CPT

## 2017-02-04 PROCEDURE — 97530 THERAPEUTIC ACTIVITIES: CPT

## 2017-02-04 PROCEDURE — 74011250637 HC RX REV CODE- 250/637: Performed by: INTERNAL MEDICINE

## 2017-02-04 PROCEDURE — 65270000029 HC RM PRIVATE

## 2017-02-04 PROCEDURE — 74011250636 HC RX REV CODE- 250/636: Performed by: NURSE PRACTITIONER

## 2017-02-04 PROCEDURE — 77030032490 HC SLV COMPR SCD KNE COVD -B

## 2017-02-04 PROCEDURE — 80202 ASSAY OF VANCOMYCIN: CPT | Performed by: ORTHOPAEDIC SURGERY

## 2017-02-04 PROCEDURE — 74011250636 HC RX REV CODE- 250/636: Performed by: INTERNAL MEDICINE

## 2017-02-04 PROCEDURE — 74011250637 HC RX REV CODE- 250/637: Performed by: ANESTHESIOLOGY

## 2017-02-04 PROCEDURE — 77030012890

## 2017-02-04 RX ORDER — VANCOMYCIN/0.9 % SOD CHLORIDE 1.5G/250ML
1500 PLASTIC BAG, INJECTION (ML) INTRAVENOUS
Status: DISCONTINUED | OUTPATIENT
Start: 2017-02-05 | End: 2017-02-07

## 2017-02-04 RX ADMIN — CALCIUM CARBONATE (ANTACID) CHEW TAB 500 MG 400 MG: 500 CHEW TAB at 12:07

## 2017-02-04 RX ADMIN — LISINOPRIL 10 MG: 20 TABLET ORAL at 08:27

## 2017-02-04 RX ADMIN — LINACLOTIDE 290 MCG: 145 CAPSULE, GELATIN COATED ORAL at 06:31

## 2017-02-04 RX ADMIN — Medication 10 ML: at 21:49

## 2017-02-04 RX ADMIN — Medication 10 ML: at 13:43

## 2017-02-04 RX ADMIN — METHADONE HYDROCHLORIDE 5 MG: 10 TABLET ORAL at 08:27

## 2017-02-04 RX ADMIN — UMECLIDINIUM 1 PUFF: 62.5 AEROSOL, POWDER ORAL at 11:23

## 2017-02-04 RX ADMIN — CYCLOBENZAPRINE HYDROCHLORIDE 5 MG: 10 TABLET, FILM COATED ORAL at 01:04

## 2017-02-04 RX ADMIN — OXYCODONE HYDROCHLORIDE 15 MG: 5 TABLET ORAL at 23:40

## 2017-02-04 RX ADMIN — ACETAMINOPHEN 650 MG: 325 TABLET ORAL at 23:40

## 2017-02-04 RX ADMIN — ENOXAPARIN SODIUM 40 MG: 40 INJECTION SUBCUTANEOUS at 21:39

## 2017-02-04 RX ADMIN — ATORVASTATIN CALCIUM 10 MG: 10 TABLET, FILM COATED ORAL at 08:27

## 2017-02-04 RX ADMIN — FENTANYL CITRATE 25 MCG: 50 INJECTION, SOLUTION INTRAMUSCULAR; INTRAVENOUS at 15:02

## 2017-02-04 RX ADMIN — CYCLOBENZAPRINE HYDROCHLORIDE 5 MG: 10 TABLET, FILM COATED ORAL at 13:41

## 2017-02-04 RX ADMIN — ACETAMINOPHEN 650 MG: 325 TABLET ORAL at 12:07

## 2017-02-04 RX ADMIN — Medication 10 ML: at 06:32

## 2017-02-04 RX ADMIN — OXYCODONE HYDROCHLORIDE 15 MG: 5 TABLET ORAL at 18:53

## 2017-02-04 RX ADMIN — METHADONE HYDROCHLORIDE 5 MG: 10 TABLET ORAL at 16:40

## 2017-02-04 RX ADMIN — CARVEDILOL 6.25 MG: 6.25 TABLET, FILM COATED ORAL at 16:40

## 2017-02-04 RX ADMIN — RIFAMPIN 600 MG: 300 CAPSULE ORAL at 06:31

## 2017-02-04 RX ADMIN — OXYCODONE HYDROCHLORIDE 15 MG: 5 TABLET ORAL at 04:35

## 2017-02-04 RX ADMIN — FAMOTIDINE 20 MG: 10 INJECTION, SOLUTION INTRAVENOUS at 08:29

## 2017-02-04 RX ADMIN — METHADONE HYDROCHLORIDE 5 MG: 10 TABLET ORAL at 01:05

## 2017-02-04 RX ADMIN — CALCIUM CARBONATE (ANTACID) CHEW TAB 500 MG 400 MG: 500 CHEW TAB at 16:40

## 2017-02-04 RX ADMIN — Medication 20 ML: at 13:44

## 2017-02-04 RX ADMIN — VANCOMYCIN HYDROCHLORIDE 1250 MG: 10 INJECTION, POWDER, LYOPHILIZED, FOR SOLUTION INTRAVENOUS at 16:30

## 2017-02-04 RX ADMIN — CELECOXIB 200 MG: 100 CAPSULE ORAL at 10:45

## 2017-02-04 RX ADMIN — VANCOMYCIN HYDROCHLORIDE 1250 MG: 10 INJECTION, POWDER, LYOPHILIZED, FOR SOLUTION INTRAVENOUS at 04:36

## 2017-02-04 RX ADMIN — ACETAMINOPHEN 650 MG: 325 TABLET ORAL at 06:31

## 2017-02-04 RX ADMIN — CALCIUM CARBONATE (ANTACID) CHEW TAB 500 MG 400 MG: 500 CHEW TAB at 08:28

## 2017-02-04 RX ADMIN — ACETAMINOPHEN 650 MG: 325 TABLET ORAL at 17:47

## 2017-02-04 RX ADMIN — CARVEDILOL 6.25 MG: 6.25 TABLET, FILM COATED ORAL at 08:28

## 2017-02-04 RX ADMIN — CELECOXIB 200 MG: 100 CAPSULE ORAL at 22:52

## 2017-02-04 RX ADMIN — ASPIRIN 81 MG CHEWABLE TABLET 81 MG: 81 TABLET CHEWABLE at 08:28

## 2017-02-04 RX ADMIN — ACETAMINOPHEN 650 MG: 325 TABLET ORAL at 01:04

## 2017-02-04 RX ADMIN — FENTANYL CITRATE 25 MCG: 50 INJECTION, SOLUTION INTRAMUSCULAR; INTRAVENOUS at 21:40

## 2017-02-04 RX ADMIN — Medication 10 ML: at 13:44

## 2017-02-04 RX ADMIN — FAMOTIDINE 20 MG: 10 INJECTION, SOLUTION INTRAVENOUS at 21:39

## 2017-02-04 RX ADMIN — Medication 10 ML: at 21:40

## 2017-02-04 RX ADMIN — OXYCODONE HYDROCHLORIDE 10 MG: 5 TABLET ORAL at 12:13

## 2017-02-04 NOTE — PROGRESS NOTES
TOTAL HIP REVISION ARTHROPLASTY DAILY NOTE     ASSESSMENT / PLAN :   1. Pain Control : Stable, doing well  2. Overnight Issues : none, moderate wound drainage  3. Wound or incisional issue : Spica in-place, moderate drainage. Will re-wrap tomorrow am on rounds, reinforce overnight. 4. Therapy / Weight Bearing Recommendations : WBAT, x-rays reviewed, stable  5. DVT Prophylaxis : Mechanical and Lovenox   6. Disposition : SNF  7. Medical Concerns : MMP, appreciate hospitalist management. ID consult complete, PICC and 6wks of IV antibx  8. Comments : Stable     POD  5 Days Post-Op s/p Procedure(s):  LEFT TOTAL HIP ARTHROPLASTY CONVERSION     SUBJECTIVE :     Patient notes the following issues : none. OBJECTIVE :     Patient Vitals for the past 12 hrs:   BP Temp Pulse Resp SpO2 Weight   02/04/17 0808 134/67 97.9 °F (36.6 °C) 62 18 97 % -   02/04/17 0631 - - - - - 69 kg (152 lb 1.9 oz)   02/04/17 0422 147/68 98.1 °F (36.7 °C) 67 18 97 % -   02/03/17 2255 130/70 98.5 °F (36.9 °C) 62 20 98 % -       Alert and oriented x3. Examination of the left Hip reveals that the dressing is clean, dry and intact. Motor Exam is intact and normal  Sensation is intact to light touch. No calf pain. Labs:  Recent Labs      02/04/17   0510   02/03/17   0114   HGB  9.0*   < >  8.8*   HCT  28.3*   < >  27.5*   NA   --    --   144   K   --    --   4.4   CL   --    --   109*   CO2   --    --   31   BUN   --    --   25*   CREA   --    --   0.71   GLU   --    --   78    < > = values in this interval not displayed.        CULTURES :  No results found for: [facility]  Lab Results   Component Value Date/Time    Culture result:  01/30/2017 02:14 PM     LIGHT  STAPHYLOCOCCUS LUGDUNENSIS  (OXACILLIN RESISTANT)      Culture result:  01/30/2017 02:12 PM     FEW  STAPHYLOCOCCUS LUGDUNENSIS  (OXACILLIN RESISTANT)      Culture result:  01/30/2017 01:20 PM     LIGHT  STAPHYLOCOCCUS LUGDUNENSIS  PLEASE REFER TO [facility] [id number] FOR SUSCEPTIBILITIES      Culture result:  01/30/2017 01:20 PM     FEW  STAPHYLOCOCCUS LUGDUNENSIS  PLEASE REFER TO [facility] [id number] FOR SUSCEPTIBILITIES      Culture result:  01/30/2017 01:20 PM     CHECKING FOR POSSIBLE  2ND   STAPHYLOCOCCUS SPECIES, COAGULASE NEGATIVE            Patient mobility  Gait  Base of Support: Narrowed  Speed/Jeanie: Slow  Gait Abnormalities: Antalgic  Ambulation - Level of Assistance: Contact guard assistance  Distance (ft): 5 Feet (ft)  Assistive Device: Walker, rolling, Gait belt        Rose Brian MD  Cell [phone]  Nurse [address] [place] [address] [phone]  Medical Staff : Mag Gomez / Juan Alberto Huerta  Office : [phone]  [phone]

## 2017-02-04 NOTE — PROGRESS NOTES
[facility] Infectious Diseases Progress Note  Yobany Gibson MD, Elora Kappa DO, V. Corinne Drop, NP     [facility], [address] [facility], [facility] [id number]  Office: [phone]   Fax: [phone]    2/3/2017      Assessment & Plan:   1. 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 OR Cx with Staph Lugdunensis, oxacillin resistant. Continue Vancomycin and Rifampin PO through 3/29/17, for 8 weeks. Patient got PIC line. Outpt Abx orders in chart. 2. NSEMI  I will SIGN-OFF, call if any questions. Subjective:     Says pain under control    Objective:     Vitals:   Visit Vitals    /72 (BP 1 Location: Left arm, BP Patient Position: At rest)    Pulse 71    Temp 98.1 °F (36.7 °C)    Resp 18    Ht 5' 5\" (1.651 m)    Wt 64.5 kg (142 lb 4.8 oz)    SpO2 96%    BMI 23.68 kg/m2     Temp (24hrs), Av.9 °F (36.6 °C), Min:97.6 °F (36.4 °C), Max:98.1 °F (36.7 °C)    Recent Labs      17   1504  17   0757  17   0114   17   0431   17   0447   NA   --    --   144   --   145   --   145   K   --    --   4.4   --   4.7   --   4.5   CL   --    --   109*   --   111*   --   111*   CO2   --    --   31   --   28   --   26   BUN   --    --   25*   --   21*   --   21*   CREA   --    --   0.71   --   0.64   --   0.80   GLU   --    --   78   --   99   --   133*   PHOS  1.7*   --    --    --   2.3*   --   3.2   MG   --    --    --    --   2.1   --   2.0   ALB  2.2*   --    --    --   2.4*   --   2.2*   WBC   --    --   6.7   --   6.5   --   6.6   HGB  9.1*  9.1*  8.8*   < >  8.7*   < >  8.5*   HCT  29.0*  28.2*  27.5*   < >  27.1*   < >  26.4*   PLT   --    --   118*   --   108*   --   96*    < > = values in this interval not displayed.        Exam:    General: resting comfortably in bed, NAD  HEENT: pallor, no icterus, pupils are reactive  Chest: CTA b/l  Heart: RRR S1S2  Abdomen: soft, NT, ND, BS+  Extremities: lt hip under dressing        Cultures:     Lab Results   Component Value Date/Time    Culture result:  01/30/2017 02:14 PM     LIGHT  STAPHYLOCOCCUS LUGDUNENSIS  (OXACILLIN RESISTANT)      Culture result:  01/30/2017 02:12 PM     FEW  STAPHYLOCOCCUS LUGDUNENSIS  (OXACILLIN RESISTANT)      Culture result:  01/30/2017 01:20 PM     LIGHT  STAPHYLOCOCCUS LUGDUNENSIS  PLEASE REFER TO [facility] [id number] FOR SUSCEPTIBILITIES      Culture result:  01/30/2017 01:20 PM     FEW  STAPHYLOCOCCUS LUGDUNENSIS  PLEASE REFER TO [facility] [id number] FOR SUSCEPTIBILITIES      Culture result:  01/30/2017 01:20 PM     CHECKING FOR POSSIBLE  2ND   STAPHYLOCOCCUS SPECIES, COAGULASE NEGATIVE         Radiology:   Reviewed      Medications:        Current Facility-Administered Medications   Medication Dose Route Frequency Last Dose    aspirin chewable tablet 81 mg  81 mg Oral DAILY 81 mg at 02/03/17 1129    sodium chloride (NS) flush 10-30 mL  10-30 mL InterCATHeter PRN      sodium chloride (NS) flush 10 mL  10 mL InterCATHeter Q24H Stopped at 02/03/17 1400    sodium chloride (NS) flush 10 mL  10 mL InterCATHeter PRN      sodium chloride (NS) flush 10-40 mL  10-40 mL InterCATHeter Q8H Stopped at 02/03/17 1400    sodium chloride (NS) flush 20 mL  20 mL InterCATHeter Q24H 20 mL at 02/03/17 1502    alteplase (CATHFLO) 1 mg in sterile water (preservative free) 1 mL injection  1 mg InterCATHeter PRN      atorvastatin (LIPITOR) tablet 10 mg  10 mg Oral DAILY 10 mg at 02/03/17 0940    lisinopril (PRINIVIL, ZESTRIL) tablet 10 mg  10 mg Oral DAILY 10 mg at 02/03/17 0940    hydrALAZINE (APRESOLINE) 20 mg/mL injection 10 mg  10 mg IntraVENous Q2H PRN      carvedilol (COREG) tablet 6.25 mg  6.25 mg Oral BID WITH MEALS 6.25 mg at 02/03/17 1730    rifAMPin (RIFADIN) capsule 600 mg  600 mg Oral  mg at 02/03/17 0747    enoxaparin (LOVENOX) injection 40 mg  40 mg SubCUTAneous Q24H 40 mg at 02/03/17 2039    vancomycin (VANCOCIN) 1,250 mg in 0.9% sodium chloride 250 mL IVPB  1,250 mg IntraVENous Q12H 1,250 mg at 02/03/17 1808    0.9% sodium chloride infusion  25 mL/hr IntraVENous CONTINUOUS 25 mL/hr at 02/01/17 2014    cyclobenzaprine (FLEXERIL) tablet 5 mg  5 mg Oral TID PRN 5 mg at 02/03/17 1749    0.9% sodium chloride infusion 250 mL  250 mL IntraVENous PRN      celecoxib (CELEBREX) capsule 200 mg  200 mg Oral Q12H 200 mg at 02/03/17 1336    acetaminophen (TYLENOL) tablet 650 mg  650 mg Oral Q6H 650 mg at 02/03/17 1730    albuterol (PROVENTIL HFA, VENTOLIN HFA, PROAIR HFA) inhaler 2 Puff  2 Puff Inhalation Q6H PRN      calcium carbonate (TUMS) chewable tablet 400 mg [elemental]  400 mg Oral TID WITH MEALS 400 mg at 02/03/17 1729    linaclotide (LINZESS) capsule 290 mcg  290 mcg Oral  mcg at 02/03/17 0747    methadone (DOLOPHINE) tablet 5 mg  5 mg Oral Q8H 5 mg at 02/03/17 1730    SUMAtriptan (IMITREX) tablet 50 mg  50 mg Oral ONCE PRN      umeclidinium (INCRUSE ELLIPTA) 62.5 mcg/actuation  1 Puff Inhalation DAILY 1 Puff at 02/03/17 0939    zolpidem (AMBIEN) tablet 5 mg  5 mg Oral QHS PRN 5 mg at 02/01/17 2252    sodium chloride (NS) flush 5-10 mL  5-10 mL IntraVENous Q8H Stopped at 02/03/17 1400    sodium chloride (NS) flush 5-10 mL  5-10 mL IntraVENous PRN      oxyCODONE IR (ROXICODONE) tablet 10 mg  10 mg Oral Q3H PRN 10 mg at 02/03/17 1622    naloxone (NARCAN) injection 0.4 mg  0.4 mg IntraVENous PRN      senna-docusate (PERICOLACE) 8.6-50 mg per tablet 1 Tab  1 Tab Oral BID 1 Tab at 02/03/17 1730    polyethylene glycol (MIRALAX) packet 17 g  17 g Oral DAILY 17 g at 02/02/17 1309    bisacodyl (DULCOLAX) suppository 10 mg  10 mg Rectal DAILY PRN      oxyCODONE IR (ROXICODONE) tablet 15 mg  15 mg Oral Q3H PRN 15 mg at 02/03/17 0744    fentaNYL citrate (PF) injection 25 mcg  25 mcg IntraVENous Q4H PRN 25 mcg at 02/03/17 1130    famotidine (PF) (PEPCID) 20 mg in sodium chloride 0.9 % 10 mL injection  20 mg IntraVENous Q12H 20 mg at 02/03/17 2039         Case discussed with: patient.           Lambert Castillo MD    Signed By: February 3, 2017    February 3, 2017

## 2017-02-04 NOTE — PROGRESS NOTES
[facility] Infectious Diseases Outpatient  IV Antibiotic Orders    1. Diagnosis:  Lt prosthetic hip septic arthritis, Oxa resi Staph Lugdunensis  2. Routine PICC/ Gio/ Portacath Care including PRN cath-flow/activase to declot   3. Antibiotic:  Vancomycin 1250mg IV q 12 hour through 3/29/17                          Rifampin 600mg PO daily through 3/29/17      4. Last labs  Lab Results   Component Value Date/Time    WBC 6.7 02/03/2017 01:14 AM    Hemoglobin (POC) 12.3 01/30/2017 02:24 PM    HGB 9.1 02/03/2017 03:04 PM    HCT 29.0 02/03/2017 03:04 PM    PLATELET 660 13/43/1622 01:14 AM    MCV 91.1 02/03/2017 01:14 AM     Lab Results   Component Value Date/Time    Sodium 144 02/03/2017 01:14 AM    Potassium 4.4 02/03/2017 01:14 AM    Chloride 109 02/03/2017 01:14 AM    CO2 31 02/03/2017 01:14 AM    Anion gap 4 02/03/2017 01:14 AM    Glucose 78 02/03/2017 01:14 AM    BUN 25 02/03/2017 01:14 AM    Creatinine 0.71 02/03/2017 01:14 AM    BUN/Creatinine ratio 35 02/03/2017 01:14 AM    GFR est AA >60 02/03/2017 01:14 AM    GFR est non-AA >60 02/03/2017 01:14 AM    Calcium 7.9 02/03/2017 01:14 AM    Bilirubin, total 0.2 02/02/2017 04:31 AM    AST (SGOT) 18 02/02/2017 04:31 AM    Alk. phosphatase 48 02/02/2017 04:31 AM    Protein, total 5.4 02/02/2017 04:31 AM    Albumin 2.2 02/03/2017 03:04 PM    Globulin 3.0 02/02/2017 04:31 AM    A-G Ratio 0.8 02/02/2017 04:31 AM    ALT (SGPT) 12 02/02/2017 04:31 AM         5. _+__ Weekly Labs:          _+__CBC/diff/platelets   _+__AST/ALT/Alk phos   _+__BUN/CRT   _+__ESR   _+__CRP   _+__Trough Vancomycin level    _+__Goal 15-20     6. Fax Final lab results and critical values to Carrie @[phone]  7. Call urgent lab results to [phone]. Allergies:     Allergies   Allergen Reactions    [place] Angioedema    Codeine Other (comments)     \"Years ago gave me hives but lately I havetaken it without problem\"    Dilaudid [Hydromorphone (Bulk)] Shortness of Breath    Lyrica [Pregabalin] Nausea and Vomiting     9. Pharmacy: [facility]          Home Health Nursing:  10. Pharmacy Consult for Vancomycin/Aminoglycoside Dosing  11. First Dose protocol as needed.    12. Please pull PIC line at end of therapy or send to IR for [facility] removal     Home Health: Call Angio at [facility] to schedule Gio Removal :  P: [phone]    Fax: [phone] or [phone]      Jane Ramirez MD

## 2017-02-04 NOTE — PROGRESS NOTES
[facility] [place] [address]  [address] [facility], [address] [place] [address]  [phone]      Medical Progress Note      NAME: Emily Jensen   :  1940  MRM:  [id number]    Date/Time: 2017  11:09 AM       Assessment and Plan:   1. 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 Wound culture shows staph coag negative. Evaluated by ID and started on vancomycin IV and rifampin PO through 3/29/17.       2. NSTEMI (non-ST elevated myocardial infarction) ([facility] (2017). Evaluated by cardiology. Continue ASA and coreg.      3. HTN (hypertension) (2017). BP high. Increased coreg and lisinopril.      4. Chronic systolic CHF. EF is 30%. Compensated. Continue coreg.      5. Anemia (2017) likely secondary to blood loss. Continue to monitor and transfuse for Hb < 7.      6. Thrombocytopenia ([facility] (2017). Unclear if this is chronic. Will continue to monitor.                  Subjective:     Chief Complaint:  Hip pain    ROS:  (bold if positive, if negative)      Tolerating PT  Tolerating Diet        Objective:     Last 24hrs VS reviewed since prior progress note.  Most recent are:    Visit Vitals    /56 (BP 1 Location: Left arm, BP Patient Position: Sitting)    Pulse 80    Temp 98 °F (36.7 °C)    Resp 18    Ht 5' 5\" (1.651 m)    Wt 69 kg (152 lb 1.9 oz)    SpO2 98%    BMI 25.31 kg/m2     SpO2 Readings from Last 6 Encounters:   17 98%   17 99%   14 95%    O2 Flow Rate (L/min): 2 l/min       Intake/Output Summary (Last 24 hours) at 17 1332  Last data filed at 17 8398   Gross per 24 hour   Intake              120 ml   Output             1200 ml   Net            -1080 ml        Physical Exam:    Gen:  Well-developed, well-nourished, in no acute distress  HEENT:  Pink conjunctivae, PERRL, hearing intact to voice, moist mucous membranes  Neck:  Supple, without masses, thyroid non-tender  Resp:  No accessory muscle use, clear breath sounds without wheezes rales or rhonchi  Card:  No murmurs, normal S1, S2 without thrills, bruits or peripheral edema  Abd:  Soft, non-tender, non-distended, normoactive bowel sounds are present, no palpable organomegaly and no detectable hernias  Lymph:  No cervical or inguinal adenopathy  Musc:  No cyanosis or clubbing  Skin:  No rashes or ulcers, skin turgor is good  Neuro:  Cranial nerves are grossly intact, no focal motor weakness, follows commands appropriately  Psych:  Good insight, oriented to person, place and time, alert  __________________________________________________________________  Medications Reviewed: (see below)  Medications:     Current Facility-Administered Medications   Medication Dose Route Frequency    Vancomycin TROUGH @1630 on 2/4/2017   Other ONCE    aspirin chewable tablet 81 mg  81 mg Oral DAILY    sodium chloride (NS) flush 10-30 mL  10-30 mL InterCATHeter PRN    sodium chloride (NS) flush 10 mL  10 mL InterCATHeter Q24H    sodium chloride (NS) flush 10 mL  10 mL InterCATHeter PRN    sodium chloride (NS) flush 10-40 mL  10-40 mL InterCATHeter Q8H    sodium chloride (NS) flush 20 mL  20 mL InterCATHeter Q24H    alteplase (CATHFLO) 1 mg in sterile water (preservative free) 1 mL injection  1 mg InterCATHeter PRN    atorvastatin (LIPITOR) tablet 10 mg  10 mg Oral DAILY    lisinopril (PRINIVIL, ZESTRIL) tablet 10 mg  10 mg Oral DAILY    hydrALAZINE (APRESOLINE) 20 mg/mL injection 10 mg  10 mg IntraVENous Q2H PRN    carvedilol (COREG) tablet 6.25 mg  6.25 mg Oral BID WITH MEALS    rifAMPin (RIFADIN) capsule 600 mg  600 mg Oral ACB    enoxaparin (LOVENOX) injection 40 mg  40 mg SubCUTAneous Q24H    vancomycin (VANCOCIN) 1,250 mg in 0.9% sodium chloride 250 mL IVPB  1,250 mg IntraVENous Q12H    0.9% sodium chloride infusion  25 mL/hr IntraVENous CONTINUOUS    cyclobenzaprine (FLEXERIL) tablet 5 mg  5 mg Oral TID PRN    0.9% sodium chloride infusion 250 mL 250 mL IntraVENous PRN    celecoxib (CELEBREX) capsule 200 mg  200 mg Oral Q12H    acetaminophen (TYLENOL) tablet 650 mg  650 mg Oral Q6H    albuterol (PROVENTIL HFA, VENTOLIN HFA, PROAIR HFA) inhaler 2 Puff  2 Puff Inhalation Q6H PRN    calcium carbonate (TUMS) chewable tablet 400 mg [elemental]  400 mg Oral TID WITH MEALS    linaclotide (LINZESS) capsule 290 mcg  290 mcg Oral ACB    methadone (DOLOPHINE) tablet 5 mg  5 mg Oral Q8H    SUMAtriptan (IMITREX) tablet 50 mg  50 mg Oral ONCE PRN    umeclidinium (INCRUSE ELLIPTA) 62.5 mcg/actuation  1 Puff Inhalation DAILY    zolpidem (AMBIEN) tablet 5 mg  5 mg Oral QHS PRN    sodium chloride (NS) flush 5-10 mL  5-10 mL IntraVENous Q8H    sodium chloride (NS) flush 5-10 mL  5-10 mL IntraVENous PRN    oxyCODONE IR (ROXICODONE) tablet 10 mg  10 mg Oral Q3H PRN    naloxone (NARCAN) injection 0.4 mg  0.4 mg IntraVENous PRN    senna-docusate (PERICOLACE) 8.6-50 mg per tablet 1 Tab  1 Tab Oral BID    polyethylene glycol (MIRALAX) packet 17 g  17 g Oral DAILY    bisacodyl (DULCOLAX) suppository 10 mg  10 mg Rectal DAILY PRN    oxyCODONE IR (ROXICODONE) tablet 15 mg  15 mg Oral Q3H PRN    fentaNYL citrate (PF) injection 25 mcg  25 mcg IntraVENous Q4H PRN    famotidine (PF) (PEPCID) 20 mg in sodium chloride 0.9 % 10 mL injection  20 mg IntraVENous Q12H        Lab Data Reviewed: (see below)  Lab Review:     Recent Labs      02/04/17   0510  02/03/17   1504  02/03/17   0757   02/03/17   0114   02/02/17   0431   WBC   --    --    --    --   6.7   --   6.5   HGB  9.0*  9.1*  9.1*   < >  8.8*   < >  8.7*   HCT  28.3*  29.0*  28.2*   < >  27.5*   < >  27.1*   PLT   --    --    --    --   118*   --   108*    < > = values in this interval not displayed.      Recent Labs      02/03/17   1504  02/03/17   0114  02/02/17   0431   NA   --   144  145   K   --   4.4  4.7   CL   --   109*  111*   CO2   --   31  28   GLU   --   78  99   BUN   --   25*  21*   CREA   --   0.71 0. 64   CA   --   7.9*  8.2*   MG   --    --   2.1   PHOS  1.7*   --   2.3*   ALB  2.2*   --   2.4*   TBILI   --    --   0.2   SGOT   --    --   18   ALT   --    --   12     No results found for: GLUCPOC  No results for input(s): PH, PCO2, PO2, HCO3, FIO2 in the last 72 hours. No results for input(s): INR in the last 72 hours.     No lab exists for component: Raza Tirado  All Micro Results     Procedure Component Value Units Date/Time    CULTURE, Exie Spain STAIN [[id number]] Collected:  01/30/17 1311    Order Status:  Completed Specimen:  Hip Updated:  02/03/17 1436     Special Requests: NO SPECIAL REQUESTS        GRAM STAIN NO WBC'S SEEN         NO ORGANISMS SEEN        Culture result: FEW  STAPHYLOCOCCUS LUGDUNENSIS  (PLEASE REFER TO [facility] [id number] FOR SUSCEPTIBILITIES       CULTURE, TISSUE Hardik Dew STAIN [[id number]]  (Susceptibility) Collected:  01/30/17 1414    Order Status:  Completed Specimen:  Tissue Updated:  02/03/17 1428     Special Requests: ACETABULUM LEFT HIP 2      GRAM STAIN FEW  WBCS SEEN         NO ORGANISMS SEEN        Culture result: LIGHT  STAPHYLOCOCCUS LUGDUNENSIS  (OXACILLIN RESISTANT)       CULTURE, TISSUE Hardik Dew STAIN [[id number]]  (Susceptibility) Collected:  01/30/17 1412    Order Status:  Completed Specimen:  Tissue Updated:  02/03/17 1427     Special Requests: ACETABULUM LEFT HIP CULTURE      GRAM STAIN NO WBC'S SEEN         NO ORGANISMS SEEN        Culture result: FEW  STAPHYLOCOCCUS LUGDUNENSIS  (OXACILLIN RESISTANT)       CULTURE, TISSUE W Margdeisirite Masoud [[id number]] Collected:  01/30/17 1320    Order Status:  Completed Specimen:  Hip Updated:  02/02/17 1434     Special Requests: NO SPECIAL REQUESTS        GRAM STAIN NO WBC'S SEEN         NO ORGANISMS SEEN        Culture result: FEW  STAPHYLOCOCCUS LUGDUNENSIS  PLEASE REFER TO [facility] [id number] FOR SUSCEPTIBILITIES         CHECKING FOR POSSIBLE  2ND   STAPHYLOCOCCUS SPECIES, COAGULASE NEGATIVE       CULTURE, TISSUE W Marguarite Masoud [[id number]] Collected:  01/30/17 1320    Order Status:  Completed Specimen:  Hip Updated:  02/02/17 1429     Special Requests: NO SPECIAL REQUESTS        GRAM STAIN NO WBC'S SEEN         NO ORGANISMS SEEN        Culture result: LIGHT  STAPHYLOCOCCUS LUGDUNENSIS  PLEASE REFER TO [facility] [id number] FOR SUSCEPTIBILITIES       CULTURE, TISSUE W Brianda Wynn [[id number]] Collected:  01/30/17 1311    Order Status:  Completed Specimen:  Hip Updated:  02/02/17 1428     Special Requests: NO SPECIAL REQUESTS        GRAM STAIN FEW  WBCS SEEN         NO ORGANISMS SEEN        Culture result: FEW  STAPHYLOCOCCUS LUGDUNENSIS  PLEASE REFER  TO [facility] [id number] FOR SUSCEPTIBILITIES       CULTURE, Baptiste Guard STAIN [[id number]]  (Susceptibility) Collected:  01/30/17 1311    Order Status:  Completed Specimen:  Hip Updated:  02/02/17 1425     Special Requests: NO SPECIAL REQUESTS        GRAM STAIN RARE  WBCS SEEN         NO ORGANISMS SEEN        Culture result: FEW  STAPHYLOCOCCUS LUGDUNENSIS         CHECKING FOR POSSIBLE  2ND COLONY TYPE OF  STAPHYLOCOCCUS SPECIES, COAGULASE NEGATIVE  ISOLATED FROM THIO BROTH ONLY       CULTURE, TISSUE W Brianda Wynn [[id number]] Collected:  01/30/17 1255    Order Status:  Completed Specimen:  Hip Updated:  02/02/17 1414     Special Requests: NO SPECIAL REQUESTS        GRAM STAIN RARE  WBCS SEEN         NO ORGANISMS SEEN        Culture result: SCANT  STAPHYLOCOCCUS LUGDUNENSIS  PLEASE REFER TO [facility] [id number] FOR SUSCEPTIBILITIES       CULTURE, BODY FLUID W Brianda Wynn [[id number]]  (Susceptibility) Collected:  01/30/17 1245    Order Status:  Completed Specimen:  Hip Updated:  02/02/17 1412     Special Requests: NO SPECIAL REQUESTS        GRAM STAIN 2+  WBCS SEEN         NO ORGANISMS SEEN        Culture result:         Culture performed on Unspun Fluid      FEW  STAPHYLOCOCCUS LUGDUNENSIS  (OXACILLIN RESISTANT)       CULTURE, ANAEROBIC [[id number]] Collected:  01/30/17 1311    Order Status:  Completed Specimen:  Hip Updated: 02/01/17 1436     Special Requests: NO SPECIAL REQUESTS        Culture result: NO ANAEROBES ISOLATED       CULTURE, ANAEROBIC [[id number]] Collected:  01/30/17 1255    Order Status:  Completed Specimen:  Hip Updated:  02/01/17 1426     Special Requests: NO SPECIAL REQUESTS        Culture result: NO ANAEROBES ISOLATED       CULTURE, ANAEROBIC [[id number]] Collected:  01/30/17 1245    Order Status:  Completed Specimen:  Hip Updated:  02/01/17 1420     Special Requests: NO SPECIAL REQUESTS        Culture result: NO ANAEROBES ISOLATED             I have reviewed notes of prior 24hr. Other pertinent lab:       Total time spent with patient: 30 5 [address] discussed with: Patient, Nursing Staff, Consultant/Specialist and >50% of time spent in counseling and coordination of care    Discussed:  Care Plan    Prophylaxis:  Lovenox    Disposition:  SNF/LTC           ___________________________________________________    Attending Physician: Mague Madden MD

## 2017-02-04 NOTE — PROGRESS NOTES
Problem: Mobility Impaired (Adult and Pediatric)  Goal: *Acute Goals and Plan of Care (Insert Text)  Physical Therapy Goals  Initiated 2/1/2017    1. Patient will move from supine to sit and sit to supine , scoot up and down and roll side to side in bed with modified independence within 4 days. 2. Patient will perform sit to stand with modified independence within 4 days. 3. Patient will ambulate with modified independence for 200 feet with the least restrictive device within 4 days. 4. Patient will verbalize and demonstrate understanding of lateral precautions per protocol within 4 days. 5. Patient will perform all home exercise program per protocol with independence within 4 days. PHYSICAL THERAPY TREATMENT  Patient: Deb Colunga (46 y.o. female)  Date: 2/4/2017  Diagnosis: LEFT HIP BIPOLAR  Failed total joint replacement (HCC)  Failed total hip arthroplasty ([facility] <principal problem not specified>  Procedure(s) (LRB):  LEFT TOTAL HIP ARTHROPLASTY CONVERSION (Left) 5 Days Post-Op  Precautions:  WBAT, short distances only, lateral hip, falls, high risk for dislocation      ASSESSMENT:  Patient received in bed reporting 8/10 pain and willing only to get up to commode. Nursing aware of pain scores and medicated patient at start of PT. Patient able to recall hip precautions with minimal verbal cueing. She sat up on edge of bed with CGA to handle LLE only. Patient stood and ambulated with rolling walker 3 feet to bedside commode, then returned to bed at her request. Abduction pillow loosely in place. Patient fatigues easily. She will need rehab. Progression toward goals:  [ ]      Improving appropriately and progressing toward goals  [X]      Improving slowly and progressing toward goals  [ ]      Not making progress toward goals and plan of care will be adjusted       PLAN:  Patient continues to benefit from skilled intervention to address the above impairments.   Continue treatment per established plan of care.  Discharge Recommendations:  Rehab  Further Equipment Recommendations for Discharge:  none       SUBJECTIVE:   Patient stated I don't want to do anything but I need to use the bathroom.       OBJECTIVE DATA SUMMARY:   Functional Mobility Training:  Bed Mobility:     Supine to Sit: Additional time;Contact guard assistance  Sit to Supine: Additional time;Minimum assistance           Transfers:  Sit to Stand: Contact guard assistance  Stand to Sit: Contact guard assistance        Bed to Chair: Minimum assistance                    Ambulation/Gait Training:  Distance (ft): 6 Feet (ft)  Assistive Device: Gait belt;Walker, rolling  Ambulation - Level of Assistance: Contact guard assistance              Left Side Weight Bearing: As tolerated  Base of Support: Narrowed                                                    Therapeutic Exercises:   Exercises performed per protocol. See morning treatment note for description. Pain:  Pain Scale 1: Numeric (0 - 10)  Pain Intensity 1: 8  Pain Location 1: Hip  Pain Orientation 1: Lateral  Pain Description 1: Stabbing     Activity Tolerance:   Fatigues easily. Please refer to the flowsheet for vital signs taken during this treatment.   After treatment:   [ ] Patient left in no apparent distress sitting up in chair  [X] Patient left in no apparent distress in bed  [X] Call bell left within reach  [X] Nursing notified  [ ] Caregiver present  [ ] Bed alarm activated      COMMUNICATION/COLLABORATION:   The patients plan of care was discussed with: Registered Nurse     Jani Looney, PT   Time Calculation: 15 mins

## 2017-02-04 NOTE — PROGRESS NOTES
Bedside and Verbal shift change report given to [address] (oncoming nurse) by Teodora Paz (offgoing nurse). Report included the following information Kardex, Procedure Summary, Intake/Output, MAR, Recent Results and Med Rec Status.

## 2017-02-04 NOTE — PROGRESS NOTES
Problem: Mobility Impaired (Adult and Pediatric)  Goal: *Acute Goals and Plan of Care (Insert Text)  Physical Therapy Goals  Initiated 2/1/2017    1. Patient will move from supine to sit and sit to supine , scoot up and down and roll side to side in bed with modified independence within 4 days. 2. Patient will perform sit to stand with modified independence within 4 days. 3. Patient will ambulate with modified independence for 200 feet with the least restrictive device within 4 days. 4. Patient will verbalize and demonstrate understanding of lateral precautions per protocol within 4 days. 5. Patient will perform all home exercise program per protocol with independence within 4 days. PHYSICAL THERAPY TREATMENT  Patient: Vonnie Motta (17 y.o. female)  Date: 2/4/2017  Diagnosis: LEFT HIP BIPOLAR  Failed total joint replacement (HCC)  Failed total hip arthroplasty ([facility] <principal problem not specified>  Procedure(s) (LRB):  LEFT TOTAL HIP ARTHROPLASTY CONVERSION (Left) 5 Days Post-Op  Precautions:  WBAT short distances only, lateral hip precautions, falls, limb alert, high risk for dislocation      ASSESSMENT:  Patient was received in bed with soiled gown from spilling her breakfast and asking to use bathroom. Reviewed hip precautions and handout provided. Patient performed ankle pumps, quad and gluteal sets. Sat on edge of bed and ambulated with rolling walker +2 min assist to bedside commode. Patient with increased bleeding to dressing noted. Notified nursing and PT stood patient while nursing changed dressing. Patient became nauseated when up and requested to return to bed. Returned to supine +2 min assist. Nursing notified of nausea. Vitals stable. Recommend rehab.   Progression toward goals:  [ ]      Improving appropriately and progressing toward goals  [X]      Improving slowly and progressing toward goals  [ ]      Not making progress toward goals and plan of care will be adjusted       PLAN:  Patient continues to benefit from skilled intervention to address the above impairments. Continue treatment per established plan of care. Discharge Recommendations:  Rehab  Further Equipment Recommendations for Discharge:  none       SUBJECTIVE:   Patient stated I spilled my breakfast and I need to use the bathroom.   The patient stated 2/4 hip precautions. Reviewed all 4 with patient. OBJECTIVE DATA SUMMARY:   Critical Behavior:  Neurologic State: Alert  Orientation Level: Oriented X4  Cognition: Appropriate safety awareness  Safety/Judgement: Awareness of environment  Functional Mobility Training:  Bed Mobility:     Supine to Sit: Contact guard assistance; Additional time  Sit to Supine: Assist x2;Minimum assistance           Transfers:  Sit to Stand: Contact guard assistance  Stand to Sit: Contact guard assistance        Bed to Chair: Assist x2;Minimum assistance                    Balance:  Sitting: Intact  Standing: Intact; With support  Ambulation/Gait Training:  Distance (ft): 6 Feet (ft)  Assistive Device: Gait belt;Walker, rolling                 Left Side Weight Bearing: As tolerated  Base of Support: Narrowed                                                    Therapeutic Exercises:   SUPINE  EXERCISES   Sets   Reps   Active Active Assist   Passive Self ROM   Comments   Ankle Pumps     [X]                                           [ ]                                           [ ]                                           [ ]                                               Quad Sets     [X]                                           [ ]                                           [ ]                                           [ ]                                               Gilberto August     [ ]                                           [ ]                                           [ ]                                           [ ]                                               Hip Abduction     [ ] [ ]                                           [ ]                                           [ ]                                               Hip External Rotation     [ ]                                           [ ]                                           [ ]                                           [ ]                                               Glut Sets     [X]                                           [ ]                                           [ ]                                           [ ]                                                     [ ]                                           [ ]                                           [ ]                                           [ ]                                                     [ ]                                           [ ]                                           [ ]                                           [ ]                                                   Cherelle Singh     [ ]                                           [ ]                                           [ ]                                           [ ]                                               Hip Abduction     [ ]                                           [ ]                                           [ ]                                           [ ]                                               Hip External Rotation     [ ]                                           [ ]                                           [ ]                                           [ ]                                               Lex Marte     [ ]                                           [ ]                                           [ ]                                           [ ]                                               Mini squats     [ ] [ ]                                           [ ]                                           [ ]                                               Jerolyn Organ     [ ]                                           [ ]                                           [ ]                                           [ ]                                                  Pain:  Pain Scale 1: Numeric (0 - 10)  Pain Intensity 1: 0              Activity Tolerance:   Nausea and bleeding to RLE. Please refer to the flowsheet for vital signs taken during this treatment.   After treatment:   [ ]  Patient left in no apparent distress sitting up in chair  [X]  Patient left in no apparent distress in bed  [X]  Call bell left within reach  [X]  Nursing notified  [ ]  Caregiver present  [ ]  Bed alarm activated      COMMUNICATION/COLLABORATION:   The patients plan of care was discussed with: Registered Nurse     Suzy Rm, PT   Time Calculation: 30 mins

## 2017-02-04 NOTE — PROGRESS NOTES
Bedside shift change report given to Kadi Mccrary (oncoming nurse) by Karishma Dueñas (offgoing nurse). Report included the following information SBAR, Kardex, Intake/Output and MAR.

## 2017-02-05 ENCOUNTER — APPOINTMENT (OUTPATIENT)
Dept: GENERAL RADIOLOGY | Age: 77
DRG: 463 | End: 2017-02-05
Attending: ORTHOPAEDIC SURGERY
Payer: MEDICARE

## 2017-02-05 LAB
ANION GAP BLD CALC-SCNC: 3 MMOL/L (ref 5–15)
BASOPHILS # BLD AUTO: 0 K/UL (ref 0–0.1)
BASOPHILS # BLD: 0 % (ref 0–1)
BUN SERPL-MCNC: 22 MG/DL (ref 6–20)
BUN/CREAT SERPL: 33 (ref 12–20)
CALCIUM SERPL-MCNC: 8.3 MG/DL (ref 8.5–10.1)
CHLORIDE SERPL-SCNC: 108 MMOL/L (ref 97–108)
CO2 SERPL-SCNC: 31 MMOL/L (ref 21–32)
CREAT SERPL-MCNC: 0.67 MG/DL (ref 0.55–1.02)
EOSINOPHIL # BLD: 0.3 K/UL (ref 0–0.4)
EOSINOPHIL NFR BLD: 4 % (ref 0–7)
ERYTHROCYTE [DISTWIDTH] IN BLOOD BY AUTOMATED COUNT: 15.2 % (ref 11.5–14.5)
GLUCOSE SERPL-MCNC: 71 MG/DL (ref 65–100)
HCT VFR BLD AUTO: 30.7 % (ref 35–47)
HCT VFR BLD AUTO: 31 % (ref 35–47)
HCT VFR BLD AUTO: 31.2 % (ref 35–47)
HCT VFR BLD AUTO: 31.4 % (ref 35–47)
HGB BLD-MCNC: 9.5 G/DL (ref 11.5–16)
HGB BLD-MCNC: 9.6 G/DL (ref 11.5–16)
HGB BLD-MCNC: 9.8 G/DL (ref 11.5–16)
HGB BLD-MCNC: 9.9 G/DL (ref 11.5–16)
LYMPHOCYTES # BLD AUTO: 26 % (ref 12–49)
LYMPHOCYTES # BLD: 1.5 K/UL (ref 0.8–3.5)
MAGNESIUM SERPL-MCNC: 1.7 MG/DL (ref 1.6–2.4)
MCH RBC QN AUTO: 28.4 PG (ref 26–34)
MCHC RBC AUTO-ENTMCNC: 30.6 G/DL (ref 30–36.5)
MCV RBC AUTO: 92.5 FL (ref 80–99)
MONOCYTES # BLD: 0.5 K/UL (ref 0–1)
MONOCYTES NFR BLD AUTO: 8 % (ref 5–13)
NEUTS SEG # BLD: 3.5 K/UL (ref 1.8–8)
NEUTS SEG NFR BLD AUTO: 62 % (ref 32–75)
PHOSPHATE SERPL-MCNC: 2.9 MG/DL (ref 2.6–4.7)
PLATELET # BLD AUTO: 131 K/UL (ref 150–400)
POTASSIUM SERPL-SCNC: 4.9 MMOL/L (ref 3.5–5.1)
RBC # BLD AUTO: 3.35 M/UL (ref 3.8–5.2)
SODIUM SERPL-SCNC: 142 MMOL/L (ref 136–145)
TRANSFERRIN SERPL-MCNC: 163 MG/DL (ref 200–370)
WBC # BLD AUTO: 5.7 K/UL (ref 3.6–11)

## 2017-02-05 PROCEDURE — 74011250636 HC RX REV CODE- 250/636: Performed by: INTERNAL MEDICINE

## 2017-02-05 PROCEDURE — 65270000029 HC RM PRIVATE

## 2017-02-05 PROCEDURE — 84100 ASSAY OF PHOSPHORUS: CPT | Performed by: INTERNAL MEDICINE

## 2017-02-05 PROCEDURE — 83735 ASSAY OF MAGNESIUM: CPT | Performed by: INTERNAL MEDICINE

## 2017-02-05 PROCEDURE — 74011250637 HC RX REV CODE- 250/637: Performed by: NURSE PRACTITIONER

## 2017-02-05 PROCEDURE — 97116 GAIT TRAINING THERAPY: CPT

## 2017-02-05 PROCEDURE — 74011250637 HC RX REV CODE- 250/637: Performed by: INTERNAL MEDICINE

## 2017-02-05 PROCEDURE — 74011250637 HC RX REV CODE- 250/637: Performed by: ANESTHESIOLOGY

## 2017-02-05 PROCEDURE — 85025 COMPLETE CBC W/AUTO DIFF WBC: CPT | Performed by: INTERNAL MEDICINE

## 2017-02-05 PROCEDURE — 80048 BASIC METABOLIC PNL TOTAL CA: CPT | Performed by: INTERNAL MEDICINE

## 2017-02-05 PROCEDURE — 74011250636 HC RX REV CODE- 250/636: Performed by: NURSE PRACTITIONER

## 2017-02-05 PROCEDURE — 85018 HEMOGLOBIN: CPT | Performed by: INTERNAL MEDICINE

## 2017-02-05 PROCEDURE — 74011000250 HC RX REV CODE- 250: Performed by: INTERNAL MEDICINE

## 2017-02-05 PROCEDURE — 97110 THERAPEUTIC EXERCISES: CPT

## 2017-02-05 PROCEDURE — 36415 COLL VENOUS BLD VENIPUNCTURE: CPT | Performed by: INTERNAL MEDICINE

## 2017-02-05 RX ADMIN — Medication 10 ML: at 14:12

## 2017-02-05 RX ADMIN — RIFAMPIN 600 MG: 300 CAPSULE ORAL at 06:49

## 2017-02-05 RX ADMIN — ACETAMINOPHEN 650 MG: 325 TABLET ORAL at 11:33

## 2017-02-05 RX ADMIN — OXYCODONE HYDROCHLORIDE 15 MG: 5 TABLET ORAL at 11:33

## 2017-02-05 RX ADMIN — UMECLIDINIUM 1 PUFF: 62.5 AEROSOL, POWDER ORAL at 09:57

## 2017-02-05 RX ADMIN — Medication 20 ML: at 14:12

## 2017-02-05 RX ADMIN — CARVEDILOL 6.25 MG: 6.25 TABLET, FILM COATED ORAL at 08:03

## 2017-02-05 RX ADMIN — ATORVASTATIN CALCIUM 10 MG: 10 TABLET, FILM COATED ORAL at 09:41

## 2017-02-05 RX ADMIN — METHADONE HYDROCHLORIDE 5 MG: 10 TABLET ORAL at 09:41

## 2017-02-05 RX ADMIN — Medication 10 ML: at 06:50

## 2017-02-05 RX ADMIN — METHADONE HYDROCHLORIDE 5 MG: 10 TABLET ORAL at 17:51

## 2017-02-05 RX ADMIN — FAMOTIDINE 20 MG: 10 INJECTION, SOLUTION INTRAVENOUS at 09:42

## 2017-02-05 RX ADMIN — OXYCODONE HYDROCHLORIDE 15 MG: 5 TABLET ORAL at 06:49

## 2017-02-05 RX ADMIN — OXYCODONE HYDROCHLORIDE 15 MG: 5 TABLET ORAL at 21:01

## 2017-02-05 RX ADMIN — METHADONE HYDROCHLORIDE 5 MG: 10 TABLET ORAL at 00:49

## 2017-02-05 RX ADMIN — Medication 10 ML: at 22:23

## 2017-02-05 RX ADMIN — CELECOXIB 200 MG: 100 CAPSULE ORAL at 10:14

## 2017-02-05 RX ADMIN — ACETAMINOPHEN 650 MG: 325 TABLET ORAL at 17:50

## 2017-02-05 RX ADMIN — ENOXAPARIN SODIUM 40 MG: 40 INJECTION SUBCUTANEOUS at 21:00

## 2017-02-05 RX ADMIN — CALCIUM CARBONATE (ANTACID) CHEW TAB 500 MG 400 MG: 500 CHEW TAB at 17:50

## 2017-02-05 RX ADMIN — CYCLOBENZAPRINE HYDROCHLORIDE 5 MG: 10 TABLET, FILM COATED ORAL at 12:45

## 2017-02-05 RX ADMIN — CALCIUM CARBONATE (ANTACID) CHEW TAB 500 MG 400 MG: 500 CHEW TAB at 11:33

## 2017-02-05 RX ADMIN — VANCOMYCIN HYDROCHLORIDE 1500 MG: 10 INJECTION, POWDER, LYOPHILIZED, FOR SOLUTION INTRAVENOUS at 10:14

## 2017-02-05 RX ADMIN — FAMOTIDINE 20 MG: 10 INJECTION, SOLUTION INTRAVENOUS at 21:01

## 2017-02-05 RX ADMIN — CARVEDILOL 6.25 MG: 6.25 TABLET, FILM COATED ORAL at 17:50

## 2017-02-05 RX ADMIN — ACETAMINOPHEN 650 MG: 325 TABLET ORAL at 05:01

## 2017-02-05 RX ADMIN — ASPIRIN 81 MG CHEWABLE TABLET 81 MG: 81 TABLET CHEWABLE at 09:41

## 2017-02-05 RX ADMIN — CELECOXIB 200 MG: 100 CAPSULE ORAL at 22:19

## 2017-02-05 RX ADMIN — LISINOPRIL 10 MG: 20 TABLET ORAL at 09:41

## 2017-02-05 RX ADMIN — CALCIUM CARBONATE (ANTACID) CHEW TAB 500 MG 400 MG: 500 CHEW TAB at 08:04

## 2017-02-05 RX ADMIN — FENTANYL CITRATE 25 MCG: 50 INJECTION, SOLUTION INTRAMUSCULAR; INTRAVENOUS at 04:15

## 2017-02-05 RX ADMIN — ZOLPIDEM TARTRATE 5 MG: 5 TABLET, FILM COATED ORAL at 22:19

## 2017-02-05 RX ADMIN — FENTANYL CITRATE 25 MCG: 50 INJECTION, SOLUTION INTRAMUSCULAR; INTRAVENOUS at 14:09

## 2017-02-05 RX ADMIN — CYCLOBENZAPRINE HYDROCHLORIDE 5 MG: 10 TABLET, FILM COATED ORAL at 21:01

## 2017-02-05 RX ADMIN — CYCLOBENZAPRINE HYDROCHLORIDE 5 MG: 10 TABLET, FILM COATED ORAL at 00:49

## 2017-02-05 NOTE — PROGRESS NOTES
TOTAL HIP REVISION ARTHROPLASTY DAILY NOTE     ASSESSMENT / PLAN :   1. Pain Control : Stable, moderate pain, x-rays stable  2. Overnight Issues : none  3. Wound or incisional issue : Still draining, no shahana blood. Hip spica this am, if no drainage on am rounds then may progress with placement. If draining then wound consult and incisional wound VAC. 4. Therapy / Weight Bearing Recommendations : WBAT w/ walker  5. DVT Prophylaxis : Mechanical and Lovenox  6. Disposition : SNF  7. Medical Concerns : Stable  8. Comments : Stable, placement tomorrow if the wound is doing well. POD  6 Days Post-Op s/p Procedure(s):  LEFT TOTAL HIP ARTHROPLASTY CONVERSION     SUBJECTIVE :     Patient notes the following issues : moderate pain. OBJECTIVE :     Patient Vitals for the past 12 hrs:   BP Temp Pulse Resp SpO2 Weight   02/05/17 0940 - - - - - 72.7 kg (160 lb 3.7 oz)   02/05/17 0731 140/56 98.1 °F (36.7 °C) 64 16 99 % -   02/05/17 0500 115/65 97.9 °F (36.6 °C) 66 16 93 % -   02/04/17 2339 127/88 98 °F (36.7 °C) 65 16 99 % -       Alert and oriented x3. Examination of the left Hip reveals that the dressing has some moderate drainage, no active drainage from the incision, staples intact. Motor Exam is intact and normal  Sensation is intact to light touch. No calf pain.      Labs:  Recent Labs      02/05/17   0412   HGB  9.8*  9.5*   HCT  30.7*  31.0*   NA  142   K  4.9   CL  108   CO2  31   BUN  22*   CREA  0.67   GLU  71       CULTURES :  No results found for: SDES  Lab Results   Component Value Date/Time    Culture result:  01/30/2017 02:14 PM     LIGHT  STAPHYLOCOCCUS LUGDUNENSIS  (OXACILLIN RESISTANT)      Culture result:  01/30/2017 02:12 PM     FEW  STAPHYLOCOCCUS LUGDUNENSIS  (OXACILLIN RESISTANT)      Culture result:  01/30/2017 01:20 PM     LIGHT  STAPHYLOCOCCUS LUGDUNENSIS  PLEASE REFER TO [facility] [id number] FOR SUSCEPTIBILITIES      Culture result:  01/30/2017 01:20 PM     FEW  STAPHYLOCOCCUS LUGDUNENSIS  PLEASE REFER TO [facility] [id number] FOR SUSCEPTIBILITIES      Culture result:  01/30/2017 01:11 PM     FEW  STAPHYLOCOCCUS LUGDUNENSIS  (PLEASE REFER TO [facility] [id number] FOR SUSCEPTIBILITIES      Culture result:  01/30/2017 01:11 PM     FEW  STAPHYLOCOCCUS LUGDUNENSIS  (OXACILLIN RESISTANT)      Culture result: NO ANAEROBES ISOLATED 01/30/2017 01:11 PM    Culture result:  01/30/2017 01:11 PM     FEW  STAPHYLOCOCCUS LUGDUNENSIS  PLEASE REFER  TO [facility] [id number] FOR SUSCEPTIBILITIES            Patient mobility  Gait  Base of Support: Narrowed  Speed/Jeanie: Slow  Gait Abnormalities: Antalgic, Altered arm swing, Step to gait  Ambulation - Level of Assistance: Minimal assistance, Assist x1  Distance (ft): 20 Feet (ft)  Assistive Device: Gait belt, Walker, Regis Thao MD  Cell [phone]  Nurse [address] [place] [address] [phone]  Medical Staff : Yudy Braswell / Leticia Finder  Office : [phone] ext  [phone]

## 2017-02-05 NOTE — PROGRESS NOTES
Bedside shift change report given to [place] (oncoming nurse) by Blanca Collins (offgoing nurse). Report included the following information SBAR, Kardex, Intake/Output and MAR.

## 2017-02-05 NOTE — PROGRESS NOTES
Called answering service to reach Dr. Bekah Dent to report patient's Left Knee is ecchymotic and edematous, awaiting a return call

## 2017-02-05 NOTE — PROGRESS NOTES
[facility]  [address] [facility], [address] [place] [address]  [phone]      Medical Progress Note      NAME: Sonam Cruz   :  1940  MRM:  [id number]    Date/Time: 2017  11:09 AM       Assessment and Plan:   1. 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 Wound culture shows staph coag negative. Evaluated by ID and started on vancomycin IV and rifampin PO through 3/29/17.       2. NSTEMI (non-ST elevated myocardial infarction) ([facility] [address] (2017). Evaluated by cardiology. Continue ASA and coreg.      3. HTN (hypertension) (2017). BP high. Increased coreg and lisinopril.      4. Chronic systolic CHF. EF is 30%. Compensated. Continue coreg.      5. Anemia (2017) likely secondary to blood loss. Stable. Continue to monitor and transfuse for Hb < 7.      6. [facility]rombocyt[facility] ([facility] (2017). Unclear if this is chronic. Improving.         pt is stable from medical stand point. Will sign off. Please call us if needed. Subjective:     Chief Complaint:  Hip pain    ROS:  (bold if positive, if negative)      Tolerating PT  Tolerating Diet        Objective:     Last 24hrs VS reviewed since prior progress note.  Most recent are:    Visit Vitals    /56 (BP 1 Location: Left arm, BP Patient Position: At rest)    Pulse 64    Temp 98.1 °F (36.7 °C)    Resp 16    Ht 5' 5\" (1.651 m)    Wt 72.7 kg (160 lb 3.7 oz)    SpO2 99%    BMI 26.66 kg/m2     SpO2 Readings from Last 6 Encounters:   17 99%   17 99%   14 95%    O2 Flow Rate (L/min): 2 l/min     No intake or output data in the 24 hours ending 17 1021     Physical Exam:    Gen:  Well-developed, well-nourished, in no acute distress  HEENT:  Pink conjunctivae, PERRL, hearing intact to voice, moist mucous membranes  Neck:  Supple, without masses, thyroid non-tender  Resp:  No accessory muscle use, clear breath sounds without wheezes rales or rhonchi  Card:  No murmurs, normal S1, S2 without thrills, bruits or peripheral edema  Abd:  Soft, non-tender, non-distended, normoactive bowel sounds are present, no palpable organomegaly and no detectable hernias  Lymph:  No cervical or inguinal adenopathy  Musc:  No cyanosis or clubbing  Skin:  No rashes or ulcers, skin turgor is good  Neuro:  Cranial nerves are grossly intact, no focal motor weakness, follows commands appropriately  Psych:  Good insight, oriented to person, place and time, alert  __________________________________________________________________  Medications Reviewed: (see below)  Medications:     Current Facility-Administered Medications   Medication Dose Route Frequency    vancomycin (VANCOCIN) 1500 mg in  ml infusion  1,500 mg IntraVENous Q18H    aspirin chewable tablet 81 mg  81 mg Oral DAILY    sodium chloride (NS) flush 10-30 mL  10-30 mL InterCATHeter PRN    sodium chloride (NS) flush 10 mL  10 mL InterCATHeter Q24H    sodium chloride (NS) flush 10 mL  10 mL InterCATHeter PRN    sodium chloride (NS) flush 10-40 mL  10-40 mL InterCATHeter Q8H    sodium chloride (NS) flush 20 mL  20 mL InterCATHeter Q24H    alteplase (CATHFLO) 1 mg in sterile water (preservative free) 1 mL injection  1 mg InterCATHeter PRN    atorvastatin (LIPITOR) tablet 10 mg  10 mg Oral DAILY    lisinopril (PRINIVIL, ZESTRIL) tablet 10 mg  10 mg Oral DAILY    hydrALAZINE (APRESOLINE) 20 mg/mL injection 10 mg  10 mg IntraVENous Q2H PRN    carvedilol (COREG) tablet 6.25 mg  6.25 mg Oral BID WITH MEALS    rifAMPin (RIFADIN) capsule 600 mg  600 mg Oral ACB    enoxaparin (LOVENOX) injection 40 mg  40 mg SubCUTAneous Q24H    0.9% sodium chloride infusion  25 mL/hr IntraVENous CONTINUOUS    cyclobenzaprine (FLEXERIL) tablet 5 mg  5 mg Oral TID PRN    0.9% sodium chloride infusion 250 mL  250 mL IntraVENous PRN    celecoxib (CELEBREX) capsule 200 mg  200 mg Oral Q12H    acetaminophen (TYLENOL) tablet 650 mg  650 mg Oral Q6H    albuterol (PROVENTIL HFA, VENTOLIN HFA, PROAIR HFA) inhaler 2 Puff  2 Puff Inhalation Q6H PRN    calcium carbonate (TUMS) chewable tablet 400 mg [elemental]  400 mg Oral TID WITH MEALS    linaclotide (LINZESS) capsule 290 mcg  290 mcg Oral ACB    methadone (DOLOPHINE) tablet 5 mg  5 mg Oral Q8H    SUMAtriptan (IMITREX) tablet 50 mg  50 mg Oral ONCE PRN    umeclidinium (INCRUSE ELLIPTA) 62.5 mcg/actuation  1 Puff Inhalation DAILY    zolpidem (AMBIEN) tablet 5 mg  5 mg Oral QHS PRN    sodium chloride (NS) flush 5-10 mL  5-10 mL IntraVENous Q8H    sodium chloride (NS) flush 5-10 mL  5-10 mL IntraVENous PRN    oxyCODONE IR (ROXICODONE) tablet 10 mg  10 mg Oral Q3H PRN    naloxone (NARCAN) injection 0.4 mg  0.4 mg IntraVENous PRN    senna-docusate (PERICOLACE) 8.6-50 mg per tablet 1 Tab  1 Tab Oral BID    polyethylene glycol (MIRALAX) packet 17 g  17 g Oral DAILY    bisacodyl (DULCOLAX) suppository 10 mg  10 mg Rectal DAILY PRN    oxyCODONE IR (ROXICODONE) tablet 15 mg  15 mg Oral Q3H PRN    fentaNYL citrate (PF) injection 25 mcg  25 mcg IntraVENous Q4H PRN    famotidine (PF) (PEPCID) 20 mg in sodium chloride 0.9 % 10 mL injection  20 mg IntraVENous Q12H        Lab Data Reviewed: (see below)  Lab Review:     Recent Labs      02/05/17 0412 02/04/17 2014 02/04/17   1423   02/03/17   0114   WBC  5.7   --    --    --   6.7   HGB  9.8*  9.5*  9.2*  9.5*   < >  8.8*   HCT  30.7*  31.0*  29.4*  30.4*   < >  27.5*   PLT  131*   --    --    --   118*    < > = values in this interval not displayed.      Recent Labs      02/05/17 0412 02/03/17   1504  02/03/17 0114   NA  142   --   144   K  4.9   --   4.4   CL  108   --   109*   CO2  31   --   31   GLU  71   --   78   BUN  22*   --   25*   CREA  0.67   --   0.71   CA  8.3*   --   7.9*   MG  1.7   --    --    PHOS  2.9  1.7*   --    ALB   --   2.2*   --      No results found for: GLUCPOC  No results for input(s): PH, PCO2, PO2, HCO3, FIO2 in the last 72 hours. No results for input(s): INR in the last 72 hours.     No lab exists for component: INREXT, INREXT  All Micro Results     Procedure Component Value Units Date/Time    CULTURE, TISSUE Roxanne Cordia STAIN [[id number]] Collected:  01/30/17 1320    Order Status:  Completed Specimen:  Hip Updated:  02/04/17 1424     Special Requests: NO SPECIAL REQUESTS        GRAM STAIN NO WBC'S SEEN         NO ORGANISMS SEEN        Culture result: FEW  STAPHYLOCOCCUS LUGDUNENSIS  PLEASE REFER TO [facility] [id number] FOR SUSCEPTIBILITIES       CULTURE, Larraine Basque STAIN [[id number]]  (Susceptibility) Collected:  01/30/17 1311    Order Status:  Completed Specimen:  Hip Updated:  02/04/17 1423     Special Requests: NO SPECIAL REQUESTS        GRAM STAIN RARE  WBCS SEEN         NO ORGANISMS SEEN        Culture result: FEW  STAPHYLOCOCCUS LUGDUNENSIS  (OXACILLIN RESISTANT)       CULTURE, Larraine Basque STAIN [[id number]] Collected:  01/30/17 1311    Order Status:  Completed Specimen:  Hip Updated:  02/03/17 1436     Special Requests: NO SPECIAL REQUESTS        GRAM STAIN NO WBC'S SEEN         NO ORGANISMS SEEN        Culture result: FEW  STAPHYLOCOCCUS LUGDUNENSIS  (PLEASE REFER TO [facility] [id number] FOR SUSCEPTIBILITIES       CULTURE, TISSUE [address] [[id number]]  (Susceptibility) Collected:  01/30/17 1414    Order Status:  Completed Specimen:  Tissue Updated:  02/03/17 1428     Special Requests: ACETABULUM LEFT HIP 2      GRAM STAIN FEW  WBCS SEEN         NO ORGANISMS SEEN        Culture result: LIGHT  STAPHYLOCOCCUS LUGDUNENSIS  (OXACILLIN RESISTANT)       CULTURE, TISSUE Roxanne Cordia STAIN [[id number]]  (Susceptibility) Collected:  01/30/17 1412    Order Status:  Completed Specimen:  Tissue Updated:  02/03/17 1427     Special Requests: ACETABULUM LEFT HIP CULTURE      GRAM STAIN NO WBC'S SEEN         NO ORGANISMS SEEN        Culture result: FEW  STAPHYLOCOCCUS LUGDUNENSIS  (OXACILLIN RESISTANT)       CULTURE, TISSUE W GRAM STAIN [[id number]] Collected:  01/30/17 1320    Order Status:  Completed Specimen:  Hip Updated:  02/02/17 1429     Special Requests: NO SPECIAL REQUESTS        GRAM STAIN NO WBC'S SEEN         NO ORGANISMS SEEN        Culture result: LIGHT  STAPHYLOCOCCUS LUGDUNENSIS  PLEASE REFER TO [facility] [id number] FOR SUSCEPTIBILITIES       CULTURE, TISSUE W Brianda Wynn [[id number]] Collected:  01/30/17 1311    Order Status:  Completed Specimen:  Hip Updated:  02/02/17 1428     Special Requests: NO SPECIAL REQUESTS        GRAM STAIN FEW  WBCS SEEN         NO ORGANISMS SEEN        Culture result: FEW  STAPHYLOCOCCUS LUGDUNENSIS  PLEASE REFER  TO [facility] [id number] FOR SUSCEPTIBILITIES       CULTURE, TISSUE W Brianda [place] [[id number]] Collected:  01/30/17 1255    Order Status:  Completed Specimen:  Hip Updated:  02/02/17 1414     Special Requests: NO SPECIAL REQUESTS        GRAM STAIN RARE  WBCS SEEN         NO ORGANISMS SEEN        Culture result: SCANT  STAPHYLOCOCCUS LUGDUNENSIS  PLEASE REFER TO [facility] [id number] FOR SUSCEPTIBILITIES       CULTURE, BODY FLUID W Brianda Wynn [[id number]]  (Susceptibility) Collected:  01/30/17 1245    Order Status:  Completed Specimen:  Hip Updated:  02/02/17 1412     Special Requests: NO SPECIAL REQUESTS        GRAM STAIN 2+  WBCS SEEN         NO ORGANISMS SEEN        Culture result:         Culture performed on Unspun Fluid      FEW  STAPHYLOCOCCUS LUGDUNENSIS  (OXACILLIN RESISTANT)       CULTURE, ANAEROBIC [[id number]] Collected:  01/30/17 1311    Order Status:  Completed Specimen:  Hip Updated:  02/01/17 1436     Special Requests: NO SPECIAL REQUESTS        Culture result: NO ANAEROBES ISOLATED       CULTURE, ANAEROBIC [[id number]] Collected:  01/30/17 1255    Order Status:  Completed Specimen:  Hip Updated:  02/01/17 1426     Special Requests: NO SPECIAL REQUESTS        Culture result: NO ANAEROBES ISOLATED       CULTURE, ANAEROBIC [[id number]] Collected:  01/30/17 1245    Order Status:  Completed Specimen:  Hip Updated: 02/01/17 1420     Special Requests: NO SPECIAL REQUESTS        Culture result: NO ANAEROBES ISOLATED             I have reviewed notes of prior 24hr. Other pertinent lab:       Total time spent with patient: [address] discussed with: Patient, Nursing Staff, Consultant/Specialist and >50% of time spent in counseling and coordination of care    Discussed:  Care Plan    Prophylaxis:  Lovenox    Disposition:  SNF/LTC           ___________________________________________________    Attending Physician: Atul Hutson MD

## 2017-02-05 NOTE — PROGRESS NOTES
Problem: Mobility Impaired (Adult and Pediatric)  Goal: *Acute Goals and Plan of Care (Insert Text)  Physical Therapy Goals  Initiated 2/1/2017    1. Patient will move from supine to sit and sit to supine , scoot up and down and roll side to side in bed with modified independence within 4 days. 2. Patient will perform sit to stand with modified independence within 4 days. 3. Patient will ambulate with modified independence for 200 feet with the least restrictive device within 4 days. 4. Patient will verbalize and demonstrate understanding of lateral precautions per protocol within 4 days. 5. Patient will perform all home exercise program per protocol with independence within 4 days. PHYSICAL THERAPY TREATMENT  Patient: Nikki Garcia (38 y.o. female)  Date: 2/5/2017  Diagnosis: LEFT HIP BIPOLAR  Failed total joint replacement (HCC)  Failed total hip arthroplasty ([facility] [address] <principal problem not specified>  Procedure(s) (LRB):  LEFT TOTAL HIP ARTHROPLASTY CONVERSION (Left) 6 Days Post-Op  Precautions:   WBAT, short distances only, lateral hip, falls, high risk for dislocation      ASSESSMENT:  Patient able to recall  2/3 hip precautions and reviewed all with patient. Patient able to perform supine bed exercises. Noted Ace wrap hip spica on L. Noted mild knee swelling and distal thigh swelling. Nursing aware and observed. Noted increased redness on thoracic spine SP's and nursing aware. Patient was able to come to sitting mod A for LE; scooting mod I, sit to stand min A x 1, ambulates min A x 1 for 20'. Patient able to properly sequence assistive device however needs 1-2 standing breaks due to fatigue. Patient returned to bedside chair with alarm and asked to perform seated heel slides L LE and ankle pumps and to monitor/watch L LE below level of ace due to noted increase in swelling (will re-check this pm session and may need to re-wrap). Patient will benefit from SNF rehab due to decreased endurance. Progression toward goals:  [ ]      Improving appropriately and progressing toward goals  [X]      Improving slowly and progressing toward goals  [ ]      Not making progress toward goals and plan of care will be adjusted       PLAN:  Patient continues to benefit from skilled intervention to address the above impairments. Continue treatment per established plan of care. Discharge Recommendations:  Rehab  Further Equipment Recommendations for Discharge:  TBD       SUBJECTIVE:   Patient stated My arm feels restless like I need to do something with it. My leg feels ok it hurts when I try to move it\"      OBJECTIVE DATA SUMMARY:   Critical Behavior:  Neurologic State: Alert, Appropriate for age  Orientation Level: Oriented X4  Cognition: Appropriate decision making, Appropriate for age attention/concentration, Appropriate safety awareness, Follows commands  Safety/Judgement: Awareness of environment  Functional Mobility Training:  Bed Mobility:     Supine to Sit: Moderate assistance; Other (comment) (LE  transfer towards L)     Scooting: Contact guard assistance        Transfers:  Sit to Stand: Minimum assistance;Assist x1  Stand to Sit: Minimum assistance;Assist x1                             Balance:  Sitting: Intact  Standing: Intact; With support  Standing - Static: Good  Ambulation/Gait Training:  Distance (ft): 20 Feet (ft)  Assistive Device: Gait belt;Walker, rolling  Ambulation - Level of Assistance: Minimal assistance;Assist x1        Gait Abnormalities: Antalgic; Altered arm swing; Step to gait              Speed/Jeanie: Slow                       Stairs: did not occur              Therapeutic Exercises:   SUPINE  EXERCISES   Sets   Reps   Active Active Assist   Passive Self ROM   Comments   Ankle Pumps 1 10 [X]                                        [ ]                                        [ ]                                        [ ]                                            Quad Sets     [ ] [ ]                                        [ ]                                        [ ]                                            Heel Slides 1 10 [X]                                        [ ]                                        [ ]                                        [ ]                                            Hip Abduction     [ ]                                        [ ]                                        [ ]                                        [ ]                                            Glut Sets 1 10 [X]                                        [ ]                                        [ ]                                        [ ]                                                  Pain:  Pain Scale 1: Numeric (0 - 10)  Pain Intensity 1: 6  Pain Location 1: Hip  Pain Orientation 1: Left  Pain Description 1: Other (comment) (spasm)  Pain Intervention(s) 1: Medication (see MAR)  Activity Tolerance:   See above  Please refer to the flowsheet for vital signs taken during this treatment.   After treatment:   [X] Patient left in no apparent distress sitting up in chair  [ ] Patient left in no apparent distress in bed  [X] Call bell left within reach  [ ] Nursing notified  [ ] Caregiver present  [X] Chair alarm activated      COMMUNICATION/COLLABORATION:   The patients plan of care was discussed with: Registered Nurse     Silva Espinosa DPT   Time Calculation: 22 mins

## 2017-02-05 NOTE — PROGRESS NOTES
Return call from Dr. Nuvia Arce, instructed no intervention needed for ecchymosis and edema of the Left Knee. Patient has positive pedal pulses at this time.

## 2017-02-05 NOTE — PROGRESS NOTES
Problem: Mobility Impaired (Adult and Pediatric)  Goal: *Acute Goals and Plan of Care (Insert Text)  Physical Therapy Goals  Initiated 2/1/2017    1. Patient will move from supine to sit and sit to supine , scoot up and down and roll side to side in bed with modified independence within 4 days. 2. Patient will perform sit to stand with modified independence within 4 days. 3. Patient will ambulate with modified independence for 200 feet with the least restrictive device within 4 days. 4. Patient will verbalize and demonstrate understanding of lateral precautions per protocol within 4 days. 5. Patient will perform all home exercise program per protocol with independence within 4 days. PHYSICAL THERAPY TREATMENT  Patient: Colton Velázquez (4 y.o. female)  Date: 2/5/2017  Diagnosis: LEFT HIP BIPOLAR  Failed total joint replacement (HCC)  Failed total hip arthroplasty ([facility] [address] <principal problem not specified>  Procedure(s) (LRB):  LEFT TOTAL HIP ARTHROPLASTY CONVERSION (Left) 6 Days Post-Op  Precautions:  WBAT, Lateral hip precautions, ace spica  ASSESSMENT: Per nursing Dr. Shena Carter was notified of L knee swelling due to ace wrap hip spica. MD indicates he wants swelling to be distal vs hip region and nursing is monitoring  pedal pulses. Pateint c/o 7/10 pain. Patient able to mobilize better this pm however still c/o high pain during transfers and during ambulation. Patient able to ambulate 25' with RW min A x1. Patient request to utilize restroom and she was cga and able to Indep perform hygiene. Pateint able to stand at sink to wash without hands on RW cga. Still needs vc for hip precautions priyanka to avoid >90 hip flexion during stand to sit transfer. Patient will benefit from further PT to advance functional mobility.    Progression toward goals:  [ ]      Improving appropriately and progressing toward goals  [X]      Improving slowly and progressing toward goals  [ ]      Not making progress toward goals and plan of care will be adjusted       PLAN:  Patient continues to benefit from skilled intervention to address the above impairments. Continue treatment per established plan of care. Discharge Recommendations:  Rehab  Further Equipment Recommendations for Discharge:  TBD       SUBJECTIVE:   It stays 7/10, I am just so tired today      OBJECTIVE DATA SUMMARY:   Functional Mobility Training:  Bed Mobility:     Supine to Sit: Moderate assistance (LE)  Sit to Supine: Moderate assistance (LE)  Scooting: Contact guard assistance        Transfers:  Sit to Stand: Minimum assistance  Stand to Sit: Minimum assistance                             Ambulation/Gait Training:  Distance (ft): 25 Feet (ft)  Assistive Device: Gait belt;Walker, rolling  Ambulation - Level of Assistance: Minimal assistance        Gait Abnormalities: Antalgic; Altered arm swing; Step to gait        Base of Support: Widened     Speed/Jeanie: Slow                       Stairs:did not occur              Therapeutic Exercises:   Exercises performed per protocol. See morning treatment note for description. Pain:  Pain Scale 1: Numeric (0 - 10)  Pain Intensity 1: 9  Pain Location 1: Hip  Pain Orientation 1: Left  Pain Description 1: Other (comment) (spasm)  Pain Intervention(s) 1: Medication (see MAR)  Activity Tolerance:   see above  Please refer to the flowsheet for vital signs taken during this treatment.   After treatment:   [ ] Patient left in no apparent distress sitting up in chair  [X] Patient left in no apparent distress in bed  [X] Call bell left within reach  [X] Nursing notified  [ ] Caregiver present  [ ] Bed alarm activated      COMMUNICATION/COLLABORATION:   The patients plan of care was discussed with: Registered Nurse     Kamran Rodriguez DPT   Time Calculation: 15 mins

## 2017-02-06 LAB
HCT VFR BLD AUTO: 28.3 % (ref 35–47)
HCT VFR BLD AUTO: 31.4 % (ref 35–47)
HGB BLD-MCNC: 8.8 G/DL (ref 11.5–16)
HGB BLD-MCNC: 9.8 G/DL (ref 11.5–16)

## 2017-02-06 PROCEDURE — 74011250637 HC RX REV CODE- 250/637: Performed by: NURSE PRACTITIONER

## 2017-02-06 PROCEDURE — 97116 GAIT TRAINING THERAPY: CPT

## 2017-02-06 PROCEDURE — 74011250637 HC RX REV CODE- 250/637: Performed by: INTERNAL MEDICINE

## 2017-02-06 PROCEDURE — 97605 NEG PRS WND THER DME<=50SQCM: CPT

## 2017-02-06 PROCEDURE — 74011250637 HC RX REV CODE- 250/637: Performed by: ANESTHESIOLOGY

## 2017-02-06 PROCEDURE — 97535 SELF CARE MNGMENT TRAINING: CPT

## 2017-02-06 PROCEDURE — 74011250636 HC RX REV CODE- 250/636: Performed by: INTERNAL MEDICINE

## 2017-02-06 PROCEDURE — 77030018717 HC DRSG GRNUFM KCON -B

## 2017-02-06 PROCEDURE — 65270000029 HC RM PRIVATE

## 2017-02-06 PROCEDURE — 97530 THERAPEUTIC ACTIVITIES: CPT

## 2017-02-06 PROCEDURE — 74011250636 HC RX REV CODE- 250/636: Performed by: NURSE PRACTITIONER

## 2017-02-06 PROCEDURE — 77030012856

## 2017-02-06 PROCEDURE — 77030019952 HC CANSTR VAC ASST KCON -B

## 2017-02-06 PROCEDURE — 36415 COLL VENOUS BLD VENIPUNCTURE: CPT | Performed by: INTERNAL MEDICINE

## 2017-02-06 PROCEDURE — 85018 HEMOGLOBIN: CPT | Performed by: INTERNAL MEDICINE

## 2017-02-06 PROCEDURE — 74011000250 HC RX REV CODE- 250: Performed by: INTERNAL MEDICINE

## 2017-02-06 RX ORDER — FACIAL-BODY WIPES
10 EACH TOPICAL DAILY
Qty: 2 SUPPOSITORY | Refills: 0 | Status: ON HOLD | OUTPATIENT
Start: 2017-02-06 | End: 2017-04-03

## 2017-02-06 RX ORDER — ONDANSETRON 8 MG/1
4 TABLET, ORALLY DISINTEGRATING ORAL
Qty: 30 TAB | Refills: 0 | Status: SHIPPED | OUTPATIENT
Start: 2017-02-06

## 2017-02-06 RX ORDER — ENOXAPARIN SODIUM 100 MG/ML
40 INJECTION SUBCUTANEOUS DAILY
Qty: 30 SYRINGE | Refills: 0 | Status: ON HOLD | OUTPATIENT
Start: 2017-02-06 | End: 2017-04-03

## 2017-02-06 RX ORDER — LISINOPRIL 10 MG/1
10 TABLET ORAL DAILY
Qty: 30 TAB | Refills: 3 | Status: ON HOLD
Start: 2017-02-06 | End: 2017-04-03

## 2017-02-06 RX ORDER — ATORVASTATIN CALCIUM 10 MG/1
10 TABLET, FILM COATED ORAL DAILY
Qty: 30 TAB | Refills: 3 | Status: SHIPPED
Start: 2017-02-06

## 2017-02-06 RX ADMIN — Medication 10 ML: at 22:14

## 2017-02-06 RX ADMIN — CARVEDILOL 6.25 MG: 6.25 TABLET, FILM COATED ORAL at 08:36

## 2017-02-06 RX ADMIN — ACETAMINOPHEN 650 MG: 325 TABLET ORAL at 00:09

## 2017-02-06 RX ADMIN — CYCLOBENZAPRINE HYDROCHLORIDE 5 MG: 10 TABLET, FILM COATED ORAL at 23:08

## 2017-02-06 RX ADMIN — OXYCODONE HYDROCHLORIDE 10 MG: 5 TABLET ORAL at 10:23

## 2017-02-06 RX ADMIN — CELECOXIB 200 MG: 100 CAPSULE ORAL at 10:23

## 2017-02-06 RX ADMIN — FENTANYL CITRATE 25 MCG: 50 INJECTION, SOLUTION INTRAMUSCULAR; INTRAVENOUS at 00:10

## 2017-02-06 RX ADMIN — FAMOTIDINE 20 MG: 10 INJECTION, SOLUTION INTRAVENOUS at 20:32

## 2017-02-06 RX ADMIN — ACETAMINOPHEN 650 MG: 325 TABLET ORAL at 06:10

## 2017-02-06 RX ADMIN — CYCLOBENZAPRINE HYDROCHLORIDE 5 MG: 10 TABLET, FILM COATED ORAL at 16:22

## 2017-02-06 RX ADMIN — POLYETHYLENE GLYCOL 3350 17 G: 17 POWDER, FOR SOLUTION ORAL at 08:37

## 2017-02-06 RX ADMIN — CALCIUM CARBONATE (ANTACID) CHEW TAB 500 MG 400 MG: 500 CHEW TAB at 08:35

## 2017-02-06 RX ADMIN — LISINOPRIL 10 MG: 20 TABLET ORAL at 08:36

## 2017-02-06 RX ADMIN — Medication 10 ML: at 08:46

## 2017-02-06 RX ADMIN — Medication 10 ML: at 14:17

## 2017-02-06 RX ADMIN — CALCIUM CARBONATE (ANTACID) CHEW TAB 500 MG 400 MG: 500 CHEW TAB at 14:16

## 2017-02-06 RX ADMIN — METHADONE HYDROCHLORIDE 5 MG: 10 TABLET ORAL at 17:33

## 2017-02-06 RX ADMIN — ASPIRIN 81 MG CHEWABLE TABLET 81 MG: 81 TABLET CHEWABLE at 08:36

## 2017-02-06 RX ADMIN — RIFAMPIN 600 MG: 300 CAPSULE ORAL at 06:16

## 2017-02-06 RX ADMIN — OXYCODONE HYDROCHLORIDE 10 MG: 5 TABLET ORAL at 20:32

## 2017-02-06 RX ADMIN — OXYCODONE HYDROCHLORIDE 15 MG: 5 TABLET ORAL at 01:51

## 2017-02-06 RX ADMIN — OXYCODONE HYDROCHLORIDE 10 MG: 5 TABLET ORAL at 14:16

## 2017-02-06 RX ADMIN — ENOXAPARIN SODIUM 40 MG: 40 INJECTION SUBCUTANEOUS at 20:32

## 2017-02-06 RX ADMIN — CYCLOBENZAPRINE HYDROCHLORIDE 5 MG: 10 TABLET, FILM COATED ORAL at 08:45

## 2017-02-06 RX ADMIN — ACETAMINOPHEN 650 MG: 325 TABLET ORAL at 14:16

## 2017-02-06 RX ADMIN — UMECLIDINIUM 1 PUFF: 62.5 AEROSOL, POWDER ORAL at 10:23

## 2017-02-06 RX ADMIN — METHADONE HYDROCHLORIDE 5 MG: 10 TABLET ORAL at 08:35

## 2017-02-06 RX ADMIN — ACETAMINOPHEN 650 MG: 325 TABLET ORAL at 23:08

## 2017-02-06 RX ADMIN — CALCIUM CARBONATE (ANTACID) CHEW TAB 500 MG 400 MG: 500 CHEW TAB at 16:22

## 2017-02-06 RX ADMIN — OXYCODONE HYDROCHLORIDE 15 MG: 5 TABLET ORAL at 06:11

## 2017-02-06 RX ADMIN — VANCOMYCIN HYDROCHLORIDE 1500 MG: 10 INJECTION, POWDER, LYOPHILIZED, FOR SOLUTION INTRAVENOUS at 22:12

## 2017-02-06 RX ADMIN — Medication 1 TABLET: at 08:36

## 2017-02-06 RX ADMIN — CARVEDILOL 6.25 MG: 6.25 TABLET, FILM COATED ORAL at 16:22

## 2017-02-06 RX ADMIN — LINACLOTIDE 290 MCG: 145 CAPSULE, GELATIN COATED ORAL at 06:15

## 2017-02-06 RX ADMIN — ACETAMINOPHEN 650 MG: 325 TABLET ORAL at 17:33

## 2017-02-06 RX ADMIN — Medication 1 TABLET: at 17:33

## 2017-02-06 RX ADMIN — METHADONE HYDROCHLORIDE 5 MG: 10 TABLET ORAL at 00:09

## 2017-02-06 RX ADMIN — CELECOXIB 200 MG: 100 CAPSULE ORAL at 22:14

## 2017-02-06 RX ADMIN — SODIUM CHLORIDE 25 ML/HR: 900 INJECTION, SOLUTION INTRAVENOUS at 00:17

## 2017-02-06 RX ADMIN — FAMOTIDINE 20 MG: 10 INJECTION, SOLUTION INTRAVENOUS at 08:37

## 2017-02-06 RX ADMIN — VANCOMYCIN HYDROCHLORIDE 1500 MG: 10 INJECTION, POWDER, LYOPHILIZED, FOR SOLUTION INTRAVENOUS at 04:46

## 2017-02-06 RX ADMIN — ATORVASTATIN CALCIUM 10 MG: 10 TABLET, FILM COATED ORAL at 08:35

## 2017-02-06 NOTE — PROGRESS NOTES
Stable and doing well. Dressing change this am, if dry and no further drainage then may discharge to a snf this afternoon. If there is any further drainage then hold on discharge and wound care consult for an incisional wound VAC (PICOs will not work).      Robinson De La Torre MD

## 2017-02-06 NOTE — PROGRESS NOTES
Cardiology Progress Note    4th floor   NAME:  Syd Tse   :   1940   MRN:   [id number]     Assessment/Plan:   1. NSTEMI/demand ischemia: Cont asa, BB, statin   Trop peak 3.07. Will need ischemia work up > cath once acute surgical issues resolve and safe from ID standpoint. She can follow up with Dr Blade Feldman in [place] post discharge, her usual cardiologist   2. Post op :  L hip arthroplasty: per ortho. 3.  Hx of non ischemic CMP/Hx of systolic CHF: BB , ACE-I, ASA. 4. Hx of LBBB:   5. Hx of Aortic sclerosis/MR:   6. Septic arthritis L hip:  IV Vanc per ID. OK cardiac wise for discharge. Will see prn. Pls call if needed. Pt personally seen and examined. Chart reviewed. Agree with advanced NP's history, exam and  A/P with changes/additons    Kassidy Oliver MD, [facility]                Subjective:   Patient is a 80-year-old  female who was undergoing hip surgery when she started developing hypotension and bradycardia. Pressors were started. Her EKG was thought to have ST elevation by OR staff but review of her prior EKG indicates that she has had left bundle branch block with ST-T changes and her present EKG is similar to her prior EKGs. Patient is currently being weaned off pressors as her systolic blood pressures in the 120-130 range. She also does not complain of chest pain but is still recovering from her anesthesia. Unable to give detailed history. Her chart indicates that she has a history of nonischemic cardiomyopathy, aortic sclerosis.         Cardiac ROS: Patient denies any exertional chest pain, dyspnea, palpitations, syncope, orthopnea, edema or paroxysmal nocturnal dyspnea. Overnight events:  Awaiting SNF transfer  hgb 9.8       Previous Cardiac Eval  ECHO 17   Left ventricle: Systolic function was moderately reduced. Ejection  fraction was estimated in the range of 35 % to 40 %.     Ventricular septum: There was dyssynergic motion. There was sigmoid septal  appearance. These changes are consistent with LBBB. Mitral valve: There was mild regurgitation. Aortic valve: Leaflets exhibited mild calcification, normal cuspal  separation, and sclerosis. Per primary cardiolgist ( Dr Elissa Trimble) records -      16 - Belkis nuc: No ischemia/WMA sec to LBBB/EF 37%  6/15/16 - ECHO - LVH/ EF 40-45%/Mild AS/Mild MR/Mild TR       Review of Systems: No nausea, indigestion, vomiting, pain, cough, sputum. No bleeding. Taking po diet. Participating with PT.           Objective:     Visit Vitals    /60 (BP 1 Location: Left arm, BP Patient Position: At rest)    Pulse 69    Temp 98.1 °F (36.7 °C)    Resp 16    Ht 5' 5\" (1.651 m)    Wt 142 lb 3.2 oz (64.5 kg)    SpO2 95%    BMI 23.66 kg/m2    O2 Flow Rate (L/min): 2 l/min O2 Device: Room air    Temp (24hrs), Av.1 °F (36.7 °C), Min:97.9 °F (36.6 °C), Max:98.3 °F (36.8 °C)              TELE:  SR     General: AAOx3 cooperative, no acute distress. HEENT: Atraumatic. Pink and moist.  Anicteric sclerae. Neck : Supple, no thyromegaly. Lungs: CTA bilaterally. No wheezing/rhonchi/rales. Heart: PMI non displaced. Regular rhythm, 2/6 systolic murmur, no rubs, no gallops. No JVD. Abdomen: Soft, non-distended, non-tender. + Bowel sounds. Extremities:  L hip dressing intact   SCD's, TEDs on. No calf tenderness  Neurologic: Grossly intact. Alert and oriented X 3. No acute neurological distress. Psych: Good insight. Not anxious or agitated. Care Plan discussed with:    Comments   Patient x    Family      RN x    Care Manager                    Consultant:          Data Review:     No lab exists for component: ITNL   No results for input(s): CPK, CKMB, TROIQ in the last 72 hours.   Recent Labs      17   0624  17   0004  17   1835   17   0412   17   1504   NA   --    --    --    --   142   --    --    K   --    --    --    --   4.9   --    -- CL   --    --    --    --   108   --    --    CO2   --    --    --    --   31   --    --    BUN   --    --    --    --   22*   --    --    CREA   --    --    --    --   0.67   --    --    GLU   --    --    --    --   71   --    --    PHOS   --    --    --    --   2.9   --   1.7*   MG   --    --    --    --   1.7   --    --    ALB   --    --    --    --    --    --   2.2*   WBC   --    --    --    --   5.7   --    --    HGB  9.8*  8.8*  9.9*   < >  9.8*  9.5*   < >  9.1*   HCT  31.4*  28.3*  31.4*   < >  30.7*  31.0*   < >  29.0*   PLT   --    --    --    --   131*   --    --     < > = values in this interval not displayed. No results for input(s): INR, PTP, APTT in the last 72 hours.     No lab exists for component: INREXT, INREXT    Medications reviewed  Current Facility-Administered Medications   Medication Dose Route Frequency    vancomycin (VANCOCIN) 1500 mg in  ml infusion  1,500 mg IntraVENous Q18H    aspirin chewable tablet 81 mg  81 mg Oral DAILY    sodium chloride (NS) flush 10-30 mL  10-30 mL InterCATHeter PRN    sodium chloride (NS) flush 10 mL  10 mL InterCATHeter Q24H    sodium chloride (NS) flush 10 mL  10 mL InterCATHeter PRN    sodium chloride (NS) flush 10-40 mL  10-40 mL InterCATHeter Q8H    sodium chloride (NS) flush 20 mL  20 mL InterCATHeter Q24H    alteplase (CATHFLO) 1 mg in sterile water (preservative free) 1 mL injection  1 mg InterCATHeter PRN    atorvastatin (LIPITOR) tablet 10 mg  10 mg Oral DAILY    lisinopril (PRINIVIL, ZESTRIL) tablet 10 mg  10 mg Oral DAILY    hydrALAZINE (APRESOLINE) 20 mg/mL injection 10 mg  10 mg IntraVENous Q2H PRN    carvedilol (COREG) tablet 6.25 mg  6.25 mg Oral BID WITH MEALS    rifAMPin (RIFADIN) capsule 600 mg  600 mg Oral ACB    enoxaparin (LOVENOX) injection 40 mg  40 mg SubCUTAneous Q24H    0.9% sodium chloride infusion  25 mL/hr IntraVENous CONTINUOUS    cyclobenzaprine (FLEXERIL) tablet 5 mg  5 mg Oral TID PRN    0.9% sodium chloride infusion 250 mL  250 mL IntraVENous PRN    celecoxib (CELEBREX) capsule 200 mg  200 mg Oral Q12H    acetaminophen (TYLENOL) tablet 650 mg  650 mg Oral Q6H    albuterol (PROVENTIL HFA, VENTOLIN HFA, PROAIR HFA) inhaler 2 Puff  2 Puff Inhalation Q6H PRN    calcium carbonate (TUMS) chewable tablet 400 mg [elemental]  400 mg Oral TID WITH MEALS    linaclotide (LINZESS) capsule 290 mcg  290 mcg Oral ACB    methadone (DOLOPHINE) tablet 5 mg  5 mg Oral Q8H    SUMAtriptan (IMITREX) tablet 50 mg  50 mg Oral ONCE PRN    umeclidinium (INCRUSE ELLIPTA) 62.5 mcg/actuation  1 Puff Inhalation DAILY    zolpidem (AMBIEN) tablet 5 mg  5 mg Oral QHS PRN    sodium chloride (NS) flush 5-10 mL  5-10 mL IntraVENous Q8H    sodium chloride (NS) flush 5-10 mL  5-10 mL IntraVENous PRN    oxyCODONE IR (ROXICODONE) tablet 10 mg  10 mg Oral Q3H PRN    naloxone (NARCAN) injection 0.4 mg  0.4 mg IntraVENous PRN    senna-docusate (PERICOLACE) 8.6-50 mg per tablet 1 Tab  1 Tab Oral BID    polyethylene glycol (MIRALAX) packet 17 g  17 g Oral DAILY    bisacodyl (DULCOLAX) suppository 10 mg  10 mg Rectal DAILY PRN    oxyCODONE IR (ROXICODONE) tablet 15 mg  15 mg Oral Q3H PRN    fentaNYL citrate (PF) injection 25 mcg  25 mcg IntraVENous Q4H PRN    famotidine (PF) (PEPCID) 20 mg in sodium chloride 0.9 % 10 mL injection  20 mg IntraVENous Q12H         Drake Flowers NP

## 2017-02-06 NOTE — WOUND CARE
Wound care  MD order to assess the left hip surgical incision for possible incisional wound vac placement. S/ P left hip revision on 1-30 per Dr Aleksander Marc. Assessment with staff nurse and Rafael DUENAS. Alert, no distress.     Assessment:  Dressing to hip removed, placed by staff nurse last 2 hours, moderate amount of sanguinous drainage noted on the dressing, no active drainage, redness or edema noted to stapled incision, well approximated.      Treatment   Patient advised of plan of care with good understanding. Left hip stapled incision cleansed, prepped and drape applied, trac pad applied over the medial incision, connected to 75 mmHG negative continuous pressure, tolerated well. No active drainage noted. Staff nurse advised of care. Orders per Dr Aleksander Marc, will follow.       Lexis Marroquin RN CWCN

## 2017-02-06 NOTE — ROUTINE PROCESS
Spica dressing removed at 1100 per Doctors orders. ABD pad and 4x4's applied. By 1200 dressing had become saturated with serosanguinous fluids. Spoke to Dr. Jennifer Rockwell and he would like a wound consult for either a SPICA dressing or wound vac, but not a PICOS drain. Please speak to Dr. Jennifer Rockwell if any questions. Discharge order has also been discontinued. Wound vac placed at 1400. Call placed to Dr. Jennifer Rockwell for parameters for discharge. Bedside and Verbal shift change report given to Tali Franklin Rn (oncoming nurse) by Danette Vasquez RN (offgoing nurse). Report included the following information SBAR, Procedure Summary, Intake/Output and MAR.

## 2017-02-06 NOTE — PROGRESS NOTES
Bedside shift change report given to [place] (oncoming nurse) by Tolu Grimaldo (offgoing nurse). Report included the following information SBAR, Kardex, Intake/Output and MAR.

## 2017-02-06 NOTE — PROGRESS NOTES
Problem: Mobility Impaired (Adult and Pediatric)  Goal: *Acute Goals and Plan of Care (Insert Text)  Physical Therapy Goals  Initiated 2/1/2017    1. Patient will move from supine to sit and sit to supine , scoot up and down and roll side to side in bed with modified independence within 4 days. 2. Patient will perform sit to stand with modified independence within 4 days. 3. Patient will ambulate with modified independence for 200 feet with the least restrictive device within 4 days. 4. Patient will verbalize and demonstrate understanding of lateral precautions per protocol within 4 days. 5. Patient will perform all home exercise program per protocol with independence within 4 days. PHYSICAL THERAPY TREATMENT  Patient: Salty eZlaya (37 y.o. female)  Date: 2/6/2017  Diagnosis: LEFT HIP BIPOLAR  Failed total joint replacement (HCC)  Failed total hip arthroplasty ([facility] [address] <principal problem not specified>  Procedure(s) (LRB):  LEFT TOTAL HIP ARTHROPLASTY CONVERSION (Left) 7 Days Post-Op  Precautions:        ASSESSMENT:  Pt received in bed, agreeable to participate with physical therapy. Min A for bed mobility>sitting EOB. Sit<>stand using RW with min A. Gait training using RW x 30' with CGA. Pt performed bathroom transfers with CGA. PT returned to chair to work with Occupational therapy.  post activity. Pt report mild dizziness with positional change, fatigues quickly and requires rest breaks between transfers and gait. Progression toward goals:  [X]      Improving appropriately and progressing toward goals  [ ]      Improving slowly and progressing toward goals  [ ]      Not making progress toward goals and plan of care will be adjusted       PLAN:  Patient continues to benefit from skilled intervention to address the above impairments. Continue treatment per established plan of care.   Discharge Recommendations:  [facility]  Further Equipment Recommendations for Discharge:  TBD by SNF SUBJECTIVE:   Patient stated I am doing ok, i guess.   The patient stated 2/3 hip precautions. Reviewed all 3 with patient. OBJECTIVE DATA SUMMARY:   Critical Behavior:  Neurologic State: Alert  Orientation Level: Oriented X4  Cognition: Appropriate decision making  Safety/Judgement: Awareness of environment  Functional Mobility Training:  Bed Mobility:     Supine to Sit: Minimum assistance     Scooting: Contact guard assistance        Transfers:  Sit to Stand: Minimum assistance  Stand to Sit: Contact guard assistance                             Balance:  Sitting: Intact  Standing - Static: Good;Constant support  Standing - Dynamic : Fair  Ambulation/Gait Training:  Distance (ft): 30 Feet (ft)  Assistive Device: Walker, rolling;Gait belt  Ambulation - Level of Assistance: Contact guard assistance        Gait Abnormalities: Decreased step clearance; Step to gait        Base of Support: Widened     Speed/Jeanie: Slow                                  Stairs:               Therapeutic Exercises:   SUPINE  EXERCISES   Sets   Reps   Active Active Assist   Passive Self ROM   Comments   Ankle Pumps     [X]                                           [ ]                                           [ ]                                           [ ]                                               Quad Sets     [ ]                                           [ ]                                           [ ]                                           [ ]                                               Heel Slides     [X]                                           [ ]                                           [ ]                                           [ ]                                               Hip Abduction     [ ]                                           [ ]                                           [ ]                                           [ ]                                               Hip External Rotation     [ ] [ ]                                           [ ]                                           [ ]                                               Glut Sets     [X]                                           [ ]                                           [ ]                                           [ ]                                                     [ ]                                           [ ]                                           [ ]                                           [ ]                                                     [ ]                                           [ ]                                           [ ]                                           [ ]                                                   Loli Bellamy   Passive Self ROM   Comments   Torrie Lewis     [X]                                           [ ]                                           [ ]                                           [ ]                                               Hip Abduction     [ ]                                           [ ]                                           [ ]                                           [ ]                                               Hip External Rotation     [ ]                                           [ ]                                           [ ]                                           [ ]                                               Michael Smart     [ ]                                           [ ]                                           [ ]                                           [ ]                                               Mini squats     [ ]                                           [ ]                                           [ ]                                           [ ]                                               Lan Files     [ ] [ ]                                           [ ]                                           [ ]                                                  Pain:  Pain Scale 1: Numeric (0 - 10)  Pain Intensity 1: 5 (Does not want pain medication at this time)  Pain Location 1: Hip  Pain Orientation 1: Left  Pain Description 1: Sore  Pain Intervention(s) 1: Medication (see MAR)  Activity Tolerance:   Good  Please refer to the flowsheet for vital signs taken during this treatment.   After treatment:   [X]  Patient left in no apparent distress sitting up in chair  [ ]  Patient left in no apparent distress in bed  [X]  Call bell left within reach  [X]  Nursing notified  [ ]  Caregiver present  [X]  Chair alarm activated      COMMUNICATION/COLLABORATION:   The patients plan of care was discussed with: Certified Occupational Therapy Assistant and Registered Nurse     Romeo Bocanegra   Time Calculation: 23 mins

## 2017-02-06 NOTE — PROGRESS NOTES
2/6/2017 2:45 PM Awaiting acceptance from the [facility]. Message received referral is still pending. 2/6/2017 8:13 AM Discharge order noted. EMR reviewed, referrals have been sent to the [facility]. Discussed with pt, pt's preference is the [facility] and she does NOT want [facility]. Updates including discharge and final iv abx order sent to the [facility] via [employer]. CM called and lvm with admissions at the [facility] notifying pt is ready for discharge and to please start auth. CM will cancel referral to [address] [place] [address].  Virlinda Bamberger, SBW

## 2017-02-06 NOTE — DISCHARGE SUMMARY
Total Hip Revision Discharge Summary    Patient ID:  Nikki Garcia  1940  68 y.o.  [id number]    Admit date: 1/30/2017    Discharge date and time: No discharge date for patient encounter. 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 Intra-operative complications or issues include an MI during surgery, rapid closure. The patient was transferred to theICU in unstable condition. Physical therapy issues during the hospital stay included slow mobilization, thigh pain. At the time of discharge, the patient was able to ambulate safely and had an understanding of the explicit discharge precautions and instructions following surgery. The patient had adequate oral pain control and good PO intake. Important in Hospital Events : MI during surgery, ICU admission, tranfusion. Stable hb and stable medical issues. Long term narcotic use. 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 (see sensitivities). ID consult with recs of 6 wks of IV Vanc and oral Rifampin. Post Op complications: MI following surgery, anemia    Current Discharge Medication List      START taking these medications    Details   ondansetron (ZOFRAN ODT) 8 mg disintegrating tablet Take 0.5 Tabs by mouth every eight (8) hours as needed for Nausea. Qty: 30 Tab, Refills: 0      bisacodyl (DULCOLAX, BISACODYL,) 10 mg suppository Insert 10 mg into rectum daily. Qty: 2 Suppository, Refills: 0      enoxaparin (LOVENOX) 40 mg/0.4 mL 0.4 mL by SubCUTAneous route daily.   Qty: 30 Syringe, Refills: 0         CONTINUE these medications which have NOT CHANGED    Details   SUMAtriptan (IMITREX) 50 mg tablet Take 50 mg by mouth once as needed for Migraine. carvedilol (COREG) 3.125 mg tablet Take 3.125 mg by mouth two (2) times daily (with meals). Takes c breakfast and dinner      DULCOLAX, BISACODYL, PO Take 1 Tab by mouth daily as needed. promethazine (PHENERGAN) 25 mg tablet Take 25 mg by mouth every six (6) hours as needed for Nausea. cyanocobalamin 1,000 mcg tablet Take 1,000 mcg by mouth daily. traMADol (ULTRAM) 50 mg tablet Take 50 mg by mouth every eight (8) hours as needed for Pain.      linaclotide (LINZESS) 290 mcg cap capsule Take 290 mcg by mouth Daily (before breakfast). zolpidem (AMBIEN) 5 mg tablet Take 5 mg by mouth nightly as needed for Sleep.      tiotropium (SPIRIVA WITH HANDIHALER) 18 mcg inhalation capsule Take 1 Cap by inhalation daily. albuterol (PROVENTIL HFA) 90 mcg/actuation inhaler Take 2 Puffs by inhalation every six (6) hours as needed. methadone (DOLOPHINE) 5 mg tablet Take 5 mg by mouth every eight (8) hours. omega-3 fatty acids-vitamin e (FISH OIL) 1,000 mg cap Take 1 Cap by mouth. aspirin delayed-release 81 mg tablet Take 81 mg by mouth daily. cholecalciferol, vitamin d3, (VITAMIN D3) 400 unit cap Take 1 Tab by mouth daily. esomeprazole (NEXIUM) 40 mg capsule Take  by mouth daily. polyethylene glycol (MIRALAX) 17 gram packet Take 17 g by mouth every other day. CALCIUM CARBONATE/MAG HYDROX (ROLAIDS EXTRA STRENGTH PO) Take 2 Tabs by mouth three (3) times daily (with meals).              CULTURES :  No results found for: SDES  Lab Results   Component Value Date/Time    Culture result:  01/30/2017 02:14 PM     LIGHT  STAPHYLOCOCCUS LUGDUNENSIS  (OXACILLIN RESISTANT)      Culture result:  01/30/2017 02:12 PM     FEW  STAPHYLOCOCCUS LUGDUNENSIS  (OXACILLIN RESISTANT)      Culture result:  01/30/2017 01:20 PM     LIGHT  STAPHYLOCOCCUS LUGDUNENSIS  PLEASE REFER TO [facility] [id number] FOR SUSCEPTIBILITIES      Culture result:  01/30/2017 01:20 PM FEW  STAPHYLOCOCCUS LUGDUNENSIS  PLEASE REFER TO [facility] [id number] FOR SUSCEPTIBILITIES      Culture result:  01/30/2017 01:11 PM     FEW  STAPHYLOCOCCUS LUGDUNENSIS  (PLEASE REFER TO [facility] [id number] FOR SUSCEPTIBILITIES      Culture result:  01/30/2017 01:11 PM     FEW  STAPHYLOCOCCUS LUGDUNENSIS  (OXACILLIN RESISTANT)      Culture result: NO ANAEROBES ISOLATED 01/30/2017 01:11 PM    Culture result:  01/30/2017 01:11 PM     FEW  STAPHYLOCOCCUS LUGDUNENSIS  PLEASE REFER  TO [facility] [id number] FOR SUSCEPTIBILITIES           Discharged to: SNF      Signed:  Naomi Soto MD  2/6/2017  7:22 AM

## 2017-02-06 NOTE — PROGRESS NOTES
Problem: Mobility Impaired (Adult and Pediatric)  Goal: *Acute Goals and Plan of Care (Insert Text)  Physical Therapy Goals  Initiated 2/1/2017    1. Patient will move from supine to sit and sit to supine , scoot up and down and roll side to side in bed with modified independence within 4 days. 2. Patient will perform sit to stand with modified independence within 4 days. 3. Patient will ambulate with modified independence for 200 feet with the least restrictive device within 4 days. 4. Patient will verbalize and demonstrate understanding of lateral precautions per protocol within 4 days. 5. Patient will perform all home exercise program per protocol with independence within 4 days. PHYSICAL THERAPY TREATMENT  Patient: Syd Tse (39 y.o. female)  Date: 2/6/2017  Diagnosis: LEFT HIP BIPOLAR  Failed total joint replacement (HCC)  Failed total hip arthroplasty ([facility] [address] <principal problem not specified>  Procedure(s) (LRB):  LEFT TOTAL HIP ARTHROPLASTY CONVERSION (Left) 7 Days Post-Op  Precautions:        ASSESSMENT:  Pt received in bed wound vac now in place on L hip,agreeable to participate with phsyical therapy. Min A for bed mobility supine<>sitting EOB, to manage LLE. Demonstrates good sitting balance, although report mild dizziness initially upon sitting. Sit<>stand using RW with CGA. Gait training x 10' into bathroom. CGA for bathroom transfers. Pt reports increase in bloody drainage in wound vac, nursing notified. Pt performed additional 15' gait  training then requested to return to bed secondary to fatigue. Progression toward goals:  [ ]      Improving appropriately and progressing toward goals  [X]      Improving slowly and progressing toward goals  [ ]      Not making progress toward goals and plan of care will be adjusted       PLAN:  Patient continues to benefit from skilled intervention to address the above impairments. Continue treatment per established plan of care.   Discharge Recommendations:  Skilled Nursing Facility  Further Equipment Recommendations for Discharge:  TBD       SUBJECTIVE:   Patient stated Now i have this.  (wound vac)      OBJECTIVE DATA SUMMARY:   Functional Mobility Training:  Bed Mobility:     Supine to Sit: Minimum assistance  Sit to Supine: Minimum assistance  Scooting: Contact guard assistance        Transfers:  Sit to Stand: Contact guard assistance  Stand to Sit: Contact guard assistance                             Ambulation/Gait Training:  Distance (ft): 25 Feet (ft)  Assistive Device: Walker, rolling;Gait belt  Ambulation - Level of Assistance: Contact guard assistance        Gait Abnormalities: Decreased step clearance; Step to gait        Base of Support: Widened     Speed/Jeanie: Slow                                  Stairs: Therapeutic Exercises:   Exercises performed per protocol. See morning treatment note for description. Pain:  Pain Scale 1: Numeric (0 - 10)  Pain Intensity 1: 4  Pain Location 1: Hip  Pain Orientation 1: Left  Pain Description 1: Aching  Pain Intervention(s) 1: Medication (see MAR)  Activity Tolerance:   Fair  Please refer to the flowsheet for vital signs taken during this treatment.   After treatment:   [ ] Patient left in no apparent distress sitting up in chair  [X] Patient left in no apparent distress in bed  [X] Call bell left within reach  [X] Nursing notified  [X] Caregiver present  [ ] Bed alarm activated      COMMUNICATION/COLLABORATION:   The patients plan of care was discussed with: Registered Nurse     Romeo Ferndale   Time Calculation: 18 mins

## 2017-02-06 NOTE — PROGRESS NOTES
Problem: Self Care Deficits Care Plan (Adult)  Goal: *Acute Goals and Plan of Care (Insert Text)  Occupational Therapy Goals  Initiated 2/1/2017   1. Patient will perform lower body dressing using adaptive dressing aides at minimal assistance within 7 days. 2. Patient will perform toilet transfers at supervision/set-up level using rolling walker within 7 days. 3. Patient will perform all aspects of toileting at minimal assistance within 7 days. 4. Patient will demonstrate/maintain/recall all hip precautions with 100% accuracy without cues within 7 days. 5. Patient will participate in upper extremity therapeutic exercise/activities with modified independence for 10 minutes within 7 day(s). 6. Patient will utilize energy conservation techniques during functional activities with verbal cues within 7 day(s). OCCUPATIONAL THERAPY TREATMENT  Patient: Craig Dietz (70 y.o. female)  Date: 2/6/2017  Diagnosis: LEFT HIP BIPOLAR  Failed total joint replacement (HCC)  Failed total hip arthroplasty ([facility] [address] <principal problem not specified>  Procedure(s) (LRB):  LEFT TOTAL HIP ARTHROPLASTY CONVERSION (Left) 7 Days Post-Op  Precautions:    Chart, occupational therapy assessment, plan of care, and goals were reviewed. ASSESSMENT:  Pt agreeable to OT. After ambulation to the bathroom she transferred to bedside commode over toilet with contact guard. She was able to perform her own hygiene. Pt washed her hands standing at the sink with verbal cueing to keep walker in front of her for safety. She returned to sit in chair and was able to don her pants without AE maintaining precautions and stood with contact guard to pull pants over hips. She donned upper body clothing with mod I seated in chair. Pt returned to bed following treatment as she was fatigued. Plan is for pt to be discharged to a SNF for rehab later today.      Progression toward goals:  [ ]          Improving appropriately and progressing toward goals  [X] Improving slowly and progressing toward goals  [ ]          Not making progress toward goals and plan of care will be adjusted       PLAN:  Patient continues to benefit from skilled intervention to address the above impairments. Continue treatment per established plan of care. Discharge Recommendations:  SNF for rehab  Further Equipment Recommendations for Discharge:  None       SUBJECTIVE:   Patient stated I am so hot.       OBJECTIVE DATA SUMMARY:   Cognitive/Behavioral Status:  Neurologic State: Alert  Orientation Level: Oriented X4  Cognition: Appropriate decision making           Functional Mobility and Transfers for ADLs:              Bed Mobility:     Supine to Sit: Minimum assistance     Scooting: Contact guard assistance              Transfers:  Sit to Stand: Minimum assistance                                      Toilet Transfer : Contact guard assistance                                                              Balance:  Sitting: Intact  Standing - Static: Good;Constant support  Standing - Dynamic : Fair  ADL Intervention:        Grooming  Washing Hands: Contact guard assistance  Cues: Verbal cues provided                 Upper Body Dressing Assistance  Dressing Assistance: Modified independent  Pullover Shirt: Modified independent     Lower Body Dressing Assistance  Dressing Assistance: Contact guard assistance  Underpants: Contact guard assistance  Pants With Elastic Waist: Contact guard assistance  Leg Crossed Method Used: No  Position Performed: Seated in chair;Standing                 Pain:  Pain Scale 1: Numeric (0 - 10)  Pain Intensity 1: 5 (Does not want pain medication at this time)  Pain Location 1: Hip  Pain Orientation 1: Left  Pain Description 1: Sore  Pain Intervention(s) 1: Medication (see MAR)     Activity Tolerance:    Fair  Please refer to the flowsheet for vital signs taken during this treatment.   After treatment:   [ ]  Patient left in no apparent distress sitting up in chair  [X]  Patient left in no apparent distress in bed  [X]  Call bell left within reach  [X]  Nursing notified  [ ]  Caregiver present  [X]  Bed alarm activated      COMMUNICATION/COLLABORATION:   The patients plan of care was discussed with: Physical Therapy Assistant, Occupational Therapist and Registered Nurse     FREDDY Echevarria/L  Time Calculation: 27 mins

## 2017-02-07 LAB
DATE LAST DOSE: ABNORMAL
HCT VFR BLD AUTO: 29 % (ref 35–47)
HGB BLD-MCNC: 8.9 G/DL (ref 11.5–16)
REPORTED DOSE,DOSE: ABNORMAL UNITS
REPORTED DOSE/TIME,TMG: 2200
VANCOMYCIN TROUGH SERPL-MCNC: 20.5 UG/ML (ref 5–10)

## 2017-02-07 PROCEDURE — 80202 ASSAY OF VANCOMYCIN: CPT | Performed by: ORTHOPAEDIC SURGERY

## 2017-02-07 PROCEDURE — 65270000029 HC RM PRIVATE

## 2017-02-07 PROCEDURE — 74011250636 HC RX REV CODE- 250/636: Performed by: NURSE PRACTITIONER

## 2017-02-07 PROCEDURE — 97535 SELF CARE MNGMENT TRAINING: CPT

## 2017-02-07 PROCEDURE — 74011250637 HC RX REV CODE- 250/637: Performed by: INTERNAL MEDICINE

## 2017-02-07 PROCEDURE — 74011250637 HC RX REV CODE- 250/637: Performed by: ANESTHESIOLOGY

## 2017-02-07 PROCEDURE — 97110 THERAPEUTIC EXERCISES: CPT

## 2017-02-07 PROCEDURE — 77030019952 HC CANSTR VAC ASST KCON -B

## 2017-02-07 PROCEDURE — 74011250637 HC RX REV CODE- 250/637: Performed by: NURSE PRACTITIONER

## 2017-02-07 PROCEDURE — 74011000250 HC RX REV CODE- 250: Performed by: INTERNAL MEDICINE

## 2017-02-07 PROCEDURE — 97116 GAIT TRAINING THERAPY: CPT

## 2017-02-07 PROCEDURE — 36415 COLL VENOUS BLD VENIPUNCTURE: CPT | Performed by: INTERNAL MEDICINE

## 2017-02-07 PROCEDURE — 74011250636 HC RX REV CODE- 250/636: Performed by: INTERNAL MEDICINE

## 2017-02-07 PROCEDURE — 97530 THERAPEUTIC ACTIVITIES: CPT

## 2017-02-07 PROCEDURE — 85018 HEMOGLOBIN: CPT | Performed by: INTERNAL MEDICINE

## 2017-02-07 RX ORDER — FAMOTIDINE 20 MG/1
20 TABLET, FILM COATED ORAL 2 TIMES DAILY
Status: DISCONTINUED | OUTPATIENT
Start: 2017-02-07 | End: 2017-02-16 | Stop reason: HOSPADM

## 2017-02-07 RX ADMIN — Medication 1 TABLET: at 17:57

## 2017-02-07 RX ADMIN — ACETAMINOPHEN 650 MG: 325 TABLET ORAL at 17:56

## 2017-02-07 RX ADMIN — CYCLOBENZAPRINE HYDROCHLORIDE 5 MG: 10 TABLET, FILM COATED ORAL at 22:39

## 2017-02-07 RX ADMIN — ACETAMINOPHEN 650 MG: 325 TABLET ORAL at 06:50

## 2017-02-07 RX ADMIN — CARVEDILOL 6.25 MG: 6.25 TABLET, FILM COATED ORAL at 09:05

## 2017-02-07 RX ADMIN — CALCIUM CARBONATE (ANTACID) CHEW TAB 500 MG 400 MG: 500 CHEW TAB at 09:04

## 2017-02-07 RX ADMIN — LISINOPRIL 10 MG: 20 TABLET ORAL at 09:05

## 2017-02-07 RX ADMIN — Medication 10 ML: at 21:09

## 2017-02-07 RX ADMIN — ATORVASTATIN CALCIUM 10 MG: 10 TABLET, FILM COATED ORAL at 09:05

## 2017-02-07 RX ADMIN — FAMOTIDINE 20 MG: 20 TABLET, FILM COATED ORAL at 17:57

## 2017-02-07 RX ADMIN — RIFAMPIN 600 MG: 300 CAPSULE ORAL at 06:50

## 2017-02-07 RX ADMIN — OXYCODONE HYDROCHLORIDE 10 MG: 5 TABLET ORAL at 00:23

## 2017-02-07 RX ADMIN — UMECLIDINIUM 1 PUFF: 62.5 AEROSOL, POWDER ORAL at 09:06

## 2017-02-07 RX ADMIN — CYCLOBENZAPRINE HYDROCHLORIDE 5 MG: 10 TABLET, FILM COATED ORAL at 09:10

## 2017-02-07 RX ADMIN — ASPIRIN 81 MG CHEWABLE TABLET 81 MG: 81 TABLET CHEWABLE at 09:05

## 2017-02-07 RX ADMIN — LINACLOTIDE 290 MCG: 145 CAPSULE, GELATIN COATED ORAL at 06:50

## 2017-02-07 RX ADMIN — OXYCODONE HYDROCHLORIDE 10 MG: 5 TABLET ORAL at 17:57

## 2017-02-07 RX ADMIN — CARVEDILOL 6.25 MG: 6.25 TABLET, FILM COATED ORAL at 16:53

## 2017-02-07 RX ADMIN — CELECOXIB 200 MG: 100 CAPSULE ORAL at 12:07

## 2017-02-07 RX ADMIN — CALCIUM CARBONATE (ANTACID) CHEW TAB 500 MG 400 MG: 500 CHEW TAB at 16:53

## 2017-02-07 RX ADMIN — ACETAMINOPHEN 650 MG: 325 TABLET ORAL at 12:07

## 2017-02-07 RX ADMIN — FAMOTIDINE 20 MG: 10 INJECTION, SOLUTION INTRAVENOUS at 09:05

## 2017-02-07 RX ADMIN — Medication 10 ML: at 15:51

## 2017-02-07 RX ADMIN — METHADONE HYDROCHLORIDE 5 MG: 10 TABLET ORAL at 16:53

## 2017-02-07 RX ADMIN — OXYCODONE HYDROCHLORIDE 15 MG: 5 TABLET ORAL at 04:01

## 2017-02-07 RX ADMIN — CELECOXIB 200 MG: 100 CAPSULE ORAL at 22:39

## 2017-02-07 RX ADMIN — METHADONE HYDROCHLORIDE 5 MG: 10 TABLET ORAL at 09:06

## 2017-02-07 RX ADMIN — CALCIUM CARBONATE (ANTACID) CHEW TAB 500 MG 400 MG: 500 CHEW TAB at 12:07

## 2017-02-07 RX ADMIN — VANCOMYCIN HYDROCHLORIDE 1500 MG: 10 INJECTION, POWDER, LYOPHILIZED, FOR SOLUTION INTRAVENOUS at 16:53

## 2017-02-07 RX ADMIN — OXYCODONE HYDROCHLORIDE 10 MG: 5 TABLET ORAL at 12:07

## 2017-02-07 RX ADMIN — METHADONE HYDROCHLORIDE 5 MG: 10 TABLET ORAL at 00:23

## 2017-02-07 RX ADMIN — ENOXAPARIN SODIUM 40 MG: 40 INJECTION SUBCUTANEOUS at 21:08

## 2017-02-07 NOTE — INTERDISCIPLINARY ROUNDS
[address]    Patient Information    Name: Cyndy Barber  Age: 68 y.o. Admission Date: 1/30/2017  Surgery/Procedure: Procedure(s):  LEFT TOTAL HIP ARTHROPLASTY CONVERSION  Attending Provider: Monika Jean MD  Surgeon: Beryl Christie   Problem List: Active Problems:    Failed total joint replacement ([facility] (1/30/2017)      Failed total hip arthroplasty ([facility] [address] (1/30/2017)      NSTEMI (non-ST elevated myocardial infarction) ([facility] [address] (2/2/2017)      HTN (hypertension) (2/2/2017)      Anemia (2/2/2017)      Thrombocytopenia ([facility] [address] (5/3/9049)      Systolic CHF, chronic ([facility] (2/2/2017)      During rounds the following quality care indicators and evidence based practices were addressed :       PT/OT: Patient mobility - walked 25' with PT.   Bed Mobility Training  Rolling: Contact guard assistance  Supine to Sit: Minimum assistance  Sit to Supine: Minimum assistance  Scooting: Contact guard assistance  Transfer Training  Sit to Stand: Contact guard assistance  Stand to Sit: Contact guard assistance  Bed to Chair: Minimum assistance      Gait Training  Assistive Device: Walker, rolling, Gait belt  Ambulation - Level of Assistance: Contact guard assistance  Distance (ft): 25 Feet (ft)   Weight Bearing Status  Left Side Weight Bearing: As tolerated      Pain Assessment  Pain Intensity 1: 3 (02/07/17 0502)  Pain Location 1: Hip  Pain Intervention(s) 1: Medication (see MAR)  Patient Stated Pain Goal: [address] [facility]    Pain meds: oxycodone  Antibiotics: Vanc and rifampin  Anticoagulation:  Lovenox    SCDs: in place  Onofre: N   Bowels:     Goals For Today: Progress with PT, pain control    RRAT Score:      Readmit Risk Tool  Support Systems: Child(sandra), Family member(s)  Relationship with Primary Physician Group: Seen at least one time within the past 6 months    Discharge Management/Planning:    Readmit Risk Assessment Information:   High Risk            23 Total Score        3 Relationship with PCP    3 Patient Length of Stay > 5    4 More than 1 Admission in calendar year    5 Patient Insurance is Medicare, Medicaid or Self Pay    8 Charlson Comorbidity Score        Criteria that do not apply:    Patient Living Status         Readmit Risk Tool  Support Systems: Child(sandra), Family member(s)  Relationship with Primary Physician Group: Seen at least one time within the past 6 months    Anticipated Date of Discharge: tomorrow    Interdisciplinary team rounds were held with the following team members: Nurse Practitioner, Case Management, Nursing, Pharmacy, and Rehab. See clinical pathway and/or care plan for interventions and desired outcomes.

## 2017-02-07 NOTE — PROGRESS NOTES
TOTAL HIP REVISION ARTHROPLASTY DAILY NOTE     ASSESSMENT / PLAN :   1. Pain Control : Stable  2. Overnight Issues : none  3. Wound or incisional issue : Wound VAC placed for drainage. Must have 20cc or less output over 24hrs prior to discharge. 4. Therapy / Weight Bearing Recommendations : WBAT w/ walker  5. DVT Prophylaxis : Mechanical and Lovenox  6. Disposition : SNF  7. Medical Concerns : MMP, hospitalist signed off. Reconsult for an HTN or other issues. 8. Comments : stable, placement once drainage stops     POD  8 Days Post-Op s/p Procedure(s):  LEFT TOTAL HIP ARTHROPLASTY CONVERSION     SUBJECTIVE :     Patient notes the following issues : none. OBJECTIVE :     Patient Vitals for the past 12 hrs:   BP Temp Pulse Resp SpO2 Weight   02/07/17 0502 - - - - - 58.4 kg (128 lb 12 oz)   02/07/17 0402 107/53 97.5 °F (36.4 °C) 71 14 97 % -   02/06/17 2308 126/69 97.8 °F (36.6 °C) 80 16 100 % -   02/06/17 2015 118/55 98 °F (36.7 °C) 65 18 99 % -       Alert and oriented x3. Examination of the left Hip reveals that the dressing is clean, dry and intact. VAC intact and functioning  Motor Exam is intact and normal  Sensation is intact to light touch. No calf pain. Labs:  Recent Labs      02/07/17   0412   02/05/17   0412   HGB  8.9*   < >  9.8*  9.5*   HCT  29.0*   < >  30.7*  31.0*   NA   --    --   142   K   --    --   4.9   CL   --    --   108   CO2   --    --   31   BUN   --    --   22*   CREA   --    --   0.67   GLU   --    --   71    < > = values in this interval not displayed.        CULTURES :  No results found for: [facility]  Lab Results   Component Value Date/Time    Culture result:  01/30/2017 02:14 PM     LIGHT  STAPHYLOCOCCUS LUGDUNENSIS  (OXACILLIN RESISTANT)      Culture result:  01/30/2017 02:12 PM     FEW  STAPHYLOCOCCUS LUGDUNENSIS  (OXACILLIN RESISTANT)      Culture result:  01/30/2017 01:20 PM     LIGHT  STAPHYLOCOCCUS LUGDUNENSIS  PLEASE REFER TO [facility] [id number] FOR SUSCEPTIBILITIES Culture result:  01/30/2017 01:20 PM     FEW  STAPHYLOCOCCUS LUGDUNENSIS  PLEASE REFER TO [facility] [id number] FOR SUSCEPTIBILITIES      Culture result:  01/30/2017 01:11 PM     FEW  STAPHYLOCOCCUS LUGDUNENSIS  (PLEASE REFER TO [facility] [id number] FOR SUSCEPTIBILITIES      Culture result:  01/30/2017 01:11 PM     FEW  STAPHYLOCOCCUS LUGDUNENSIS  (OXACILLIN RESISTANT)      Culture result: NO ANAEROBES ISOLATED 01/30/2017 01:11 PM    Culture result:  01/30/2017 01:11 PM     FEW  STAPHYLOCOCCUS LUGDUNENSIS  PLEASE REFER  TO [facility] [id number] FOR SUSCEPTIBILITIES            Patient mobility  Gait  Base of Support: Widened  Speed/Jeanie: Slow  Gait Abnormalities: Decreased step clearance, Step to gait  Ambulation - Level of Assistance: Contact guard assistance  Distance (ft): 25 Feet (ft)  Assistive Device: Walker, rolling, Gait belt        Mayda Huber MD  Cell [phone]  Nurse [address] [place] [address] [phone]  Medical Staff : Mandeep Sanchez / Juany Arguello  Office : [phone] ext  [phone]

## 2017-02-07 NOTE — PROGRESS NOTES
[facility] Dosing Services: IV to PO Conversion    The pharmacist has determined that this patient meets P & T approved criteria for conversion from IV to oral therapy for the following medication:Famotidine 20 mg IV BID    The pharmacist has written the following order for the patient: Famotidine 20 mg po BID  The pharmacist will continue to monitor the patient's status and advise the physician if conversion back to IV therapy is recommended.     Signed Bertin Sena Contact information:  [phone]

## 2017-02-07 NOTE — ROUTINE PROCESS
Bedside and Verbal shift change report given to Dasha Baker RN (oncoming nurse) by West Hodgkin, RN (offgoing nurse). Report included the following information SBAR, Procedure Summary, Intake/Output and MAR.

## 2017-02-07 NOTE — PROGRESS NOTES
Palliative Medicine Consult  [id number])    Patient Name: Jahaira Whitley  YOB: 1940      Date of Initial Consult: 1-30-17  Reason for Consult: care decisions, end stage disease, overwhelming symptoms, psychosocial distress, chronic pain, HTN, GERD  Requesting Physician: Dr. Kiesha Gong   Primary Care Physician: Guillaume Moser MD          Summary:   Jahaira Whitley is a 68y.o. year old with a past history of CAD, psoriasis, COPD,  CHF, LBBB who was admitted on 1/30/2017 from home with a diagnosis of left hip repair arthroplasty. Current medical issues leading to Palliative Medicine involvement include: s/p left hip repair, pain in the left hip, pain in the spine, weakness, hypotension, bradycardia. Palliative Diagnoses:   1. Weakness   2. Pain in the spine  3. Pain in the left hip  4. Hypotension  5. Bradycardia        Plan:     1. Pain/symptom management: continue methadone 5 mg Q 8 hrs, this is her long standing pain medication for degenerative spine disease for which she sees a pain specialist. Pt reports pain in her left hip is 4/10 and she is s/p left hip surgery. She reports her stable pain in the spine thoracic and lumbar is 5/10  2. Pt has been receiving oxycodone 10-15 mg q 3hrs prn up to 6 tabs/day, she reports that 15 mg is more helpful than the 10 mg oxycodone, DC 10 mg oxycodone, pt has high tolerance due to long term opioid requirements. 3. dcd Tramadol    4. Added flexeril, low dose for severe muscle spasms. Pt reports this is very effective  5. Pressors are off, she is complaining of spasms in the left hip, will consider a low dose muscle relaxer  6. Continue miralax and pericolace   7. Coordination of care: Palliative IDT, primary nurse, case management  8. Psychosocial and emotional aspects of care: provided emotional support, active listening and non-abandonment  9.  Disposition: to be determined      Goals of Care:          TREATMENT PREFERENCES:   Code Status: Full Code    Advance Care Planning:  Advance Care Planning 1/30/2017   Patient's Healthcare Decision Maker is: Legal Next of Allan Schmitz   Primary Decision Maker Name [address]leen [address]   Primary Decision Maker Phone Number [phone]   Confirm Advance Directive None       Other:    The palliative care team has discussed with patient / health care proxy about goals of care / treatment preferences for patient.  [====Goals of Care====]           Resuscitation Status: Full Code   Durable DNR Status: na  Initial Consult Note routed to PCP? [x] Yes   [] No    [] No PCP listed          Thank you for including Palliative Medicine in this patient's care. Corby Ojeda NP           HISTORY:     History obtained from: chart, physician, nursing    CHIEF COMPLAINT: pain int he left hip    HPI/SUBJECTIVE: pt is with post op pain, hypotension, degenerative spine disease with chronic pain syndrome. The patient is: [x] Verbal / [] Nonverbal / [] Unresponsive                FUNCTIONAL ASSESSMENT:     Palliative Performance Scale (PPS):  PPS: 50         PSYCHOSOCIAL/SPIRITUAL SCREENING:     Advance Care Planning:  Advance Care Planning 1/30/2017   Patient's Healthcare Decision Maker is: Legal Next of Allan Schmitz   Primary Decision Maker Name Carleen [address]   Primary Decision Maker Phone Number [phone]   Confirm Advance Directive None        Any spiritual / Hindu concerns:  [] Yes /  [x] No    Caregiver Burnout:  [] Yes /  [x] No /  [] No Caregiver Present      Anticipatory grief assessment:   [x] Normal  / [] Maladaptive       ESAS Anxiety: Anxiety: 0    ESAS Depression: Depression: 0                 REVIEW OF SYSTEMS:     The following systems were [x] reviewed / [] unable to be reviewed  Systems: constitutional, ears/nose/mouth/throat, respiratory, gastrointestinal, musculoskeletal, neurologic, psychiatric, endocrine. Positive findings noted below.   Modified ESAS Completed by: provider   Fatigue: 5 Drowsiness: 0   Depression: 0 Pain: 6   Anxiety: 0 Nausea: 0   Anorexia: 3 Dyspnea: 0     Constipation: No     Stool Occurrence(s): 1              Functional Assessment Staging (FAST) for dementia: na                    Confusion Assessment Method Diagnostic Algorithm (CAM):    CAM Score: 0    [++++ Clinical Pain Assessment++++]  Location (including laterality):  Quality:   Severity:  Acuity (acute/chronic): Tolerable (yes/no):  Etiology, if known:  Impact (functional, psychological): Other:  [++++ Clinical Pain Assessment++++]    Clinical Pain Assessment (nonverbal scale for severity on nonverbal patients):   [++++ Clinical Pain Assessment++++]  [++++Pain Severity++++]: Pain: 6  [++++Pain Character++++]:   [++++Pain Duration++++]:   [++++Pain Effect++++]:   [++++Pain Factors++++]:   [++++Pain Frequency++++]:   [++++Pain Location++++]:   [++++ Clinical Pain Assessment++++]       PHYSICAL EXAM:     Wt Readings from Last 3 Encounters:   02/07/17 128 lb 12 oz (58.4 kg)   01/11/17 132 lb 4.4 oz (60 kg)   04/22/14 120 lb (54.4 kg)     Blood pressure 123/63, pulse 61, temperature 97.7 °F (36.5 °C), resp. rate 18, height 5' 5\" (1.651 m), weight 128 lb 12 oz (58.4 kg), SpO2 94 %.   Pain:  Pain Scale 1: Numeric (0 - 10)  Pain Intensity 1: 4  Pain Onset 1: sx  Pain Location 1: Hip  Pain Orientation 1: Right  Pain Description 1: Sore  Pain Intervention(s) 1: Medication (see MAR)           Last bowel movement: none    Constitutional: pt is awake and alert, she complains of pain mainly in the left hip with severe muscle spasms  Eyes: pupils equal, anicteric  ENMT: no nasal discharge, moist mucous membranes  Cardiovascular: regular rhythm, distal pulses intact  Respiratory: breathing not labored, symmetric  Gastrointestinal: soft non-tender, +bowel sounds  Musculoskeletal: no deformity, no tenderness to palpation  Skin: warm, dry  Neurologic:pt is able to follow commands, pt is moving all extremities  Psychiatric: full affect         HISTORY:     Active Problems:    Failed total joint replacement ([facility] (1/30/2017)      Failed total hip arthroplasty ([facility] [place] (1/30/2017)      NSTEMI (non-ST elevated myocardial infarction) ([facility] [place] (2/2/2017)      HTN (hypertension) (2/2/2017)      Anemia (2/2/2017)      Thrombocytopenia ([facility] (2/5/0143)      Systolic CHF, chronic ([facility] [place] (2/2/2017)      Past Medical History   Diagnosis Date    Autoimmune disease ([facility]      psoriasis    Beta-blocker therapy     CAD (coronary artery disease)      non ischemic cardiomyopathy    Chronic obstructive pulmonary disease (HCC)     Chronic pain      curvature of spinie    GERD (gastroesophageal reflux disease)     Heart failure (HCC)     Hypercholesterolemia     Hypertension     Ill-defined condition      hx aortic stenosis & mitral regurg    Smoker     Thromboembolus ([facility] [place]      right leg after hip surgery      Past Surgical History   Procedure Laterality Date    Hx gyn       miscarriage    Pr abdomen surgery proc unlisted  2010     Gallbladder    Pr abdomen surgery proc unlisted  2000     hiatal hernia repair    Hx orthopaedic       Right Knee, Left Foot    Hx orthopaedic  04/05/2015     left hip replacement 2015    Hx orthopaedic  06/24/2016     rmoval bone fragment left hip    Hx orthopaedic  2013     right hip replacement    Hx orthopaedic  2001     right knee replacement    Hx heent       Tonsilectomy    Hx heent       cataract removed bilateral eyes      Family History   Problem Relation Age of Onset    Cancer Mother     Stroke Father       Social History   Substance Use Topics    Smoking status: Current Every Day Smoker     Packs/day: 0.50     Years: 60.00    Smokeless tobacco: Never Used    Alcohol use No      Comment: 0     Allergies   Allergen Reactions    [place] Angioedema    Codeine Other (comments)     \"Years ago gave me hives but lately I havetaken it without problem\"    Dilaudid [Hydromorphone (Bulk)] Shortness of Breath    Lyrica [Pregabalin] Nausea and Vomiting      Current Facility-Administered Medications   Medication Dose Route Frequency    vancomycin (VANCOCIN) 1500 mg in  ml infusion  1,500 mg IntraVENous Q18H    aspirin chewable tablet 81 mg  81 mg Oral DAILY    sodium chloride (NS) flush 10-30 mL  10-30 mL InterCATHeter PRN    sodium chloride (NS) flush 10 mL  10 mL InterCATHeter Q24H    sodium chloride (NS) flush 10-40 mL  10-40 mL InterCATHeter Q8H    alteplase (CATHFLO) 1 mg in sterile water (preservative free) 1 mL injection  1 mg InterCATHeter PRN    atorvastatin (LIPITOR) tablet 10 mg  10 mg Oral DAILY    lisinopril (PRINIVIL, ZESTRIL) tablet 10 mg  10 mg Oral DAILY    hydrALAZINE (APRESOLINE) 20 mg/mL injection 10 mg  10 mg IntraVENous Q2H PRN    carvedilol (COREG) tablet 6.25 mg  6.25 mg Oral BID WITH MEALS    rifAMPin (RIFADIN) capsule 600 mg  600 mg Oral ACB    enoxaparin (LOVENOX) injection 40 mg  40 mg SubCUTAneous Q24H    0.9% sodium chloride infusion  25 mL/hr IntraVENous CONTINUOUS    cyclobenzaprine (FLEXERIL) tablet 5 mg  5 mg Oral TID PRN    0.9% sodium chloride infusion 250 mL  250 mL IntraVENous PRN    celecoxib (CELEBREX) capsule 200 mg  200 mg Oral Q12H    acetaminophen (TYLENOL) tablet 650 mg  650 mg Oral Q6H    albuterol (PROVENTIL HFA, VENTOLIN HFA, PROAIR HFA) inhaler 2 Puff  2 Puff Inhalation Q6H PRN    calcium carbonate (TUMS) chewable tablet 400 mg [elemental]  400 mg Oral TID WITH MEALS    linaclotide (LINZESS) capsule 290 mcg  290 mcg Oral ACB    methadone (DOLOPHINE) tablet 5 mg  5 mg Oral Q8H    SUMAtriptan (IMITREX) tablet 50 mg  50 mg Oral ONCE PRN    umeclidinium (INCRUSE ELLIPTA) 62.5 mcg/actuation  1 Puff Inhalation DAILY    zolpidem (AMBIEN) tablet 5 mg  5 mg Oral QHS PRN    oxyCODONE IR (ROXICODONE) tablet 10 mg  10 mg Oral Q3H PRN    naloxone (NARCAN) injection 0.4 mg  0.4 mg IntraVENous PRN    senna-docusate (PERICOLACE) 8.6-50 mg per tablet 1 Tab  1 Tab Oral BID    polyethylene glycol (MIRALAX) packet 17 g  17 g Oral DAILY    bisacodyl (DULCOLAX) suppository 10 mg  10 mg Rectal DAILY PRN    oxyCODONE IR (ROXICODONE) tablet 15 mg  15 mg Oral Q3H PRN    famotidine (PF) (PEPCID) 20 mg in sodium chloride 0.9 % 10 mL injection  20 mg IntraVENous Q12H          LAB AND IMAGING FINDINGS:     Lab Results   Component Value Date/Time    WBC 5.7 02/05/2017 04:12 AM    HGB 8.9 02/07/2017 04:12 AM    PLATELET 604 41/54/4607 04:12 AM     Lab Results   Component Value Date/Time    Sodium 142 02/05/2017 04:12 AM    Potassium 4.9 02/05/2017 04:12 AM    Chloride 108 02/05/2017 04:12 AM    CO2 31 02/05/2017 04:12 AM    BUN 22 02/05/2017 04:12 AM    Creatinine 0.67 02/05/2017 04:12 AM    Calcium 8.3 02/05/2017 04:12 AM    Magnesium 1.7 02/05/2017 04:12 AM    Phosphorus 2.9 02/05/2017 04:12 AM      Lab Results   Component Value Date/Time    AST (SGOT) 18 02/02/2017 04:31 AM    Alk. phosphatase 48 02/02/2017 04:31 AM    Protein, total 5.4 02/02/2017 04:31 AM    Albumin 2.2 02/03/2017 03:04 PM    Globulin 3.0 02/02/2017 04:31 AM     Lab Results   Component Value Date/Time    INR 1.1 01/30/2017 03:19 PM    Prothrombin time 10.7 01/30/2017 03:19 PM    aPTT 22.8 01/30/2017 03:19 PM      No results found for: IRON, FE, TIBC, IBCT, PSAT, FERR   Lab Results   Component Value Date/Time    pH 7.32 01/30/2017 03:08 PM    PCO2 37 01/30/2017 03:08 PM    PO2 67 01/30/2017 03:08 PM     No components found for: [place]   Lab Results   Component Value Date/Time    CK 99 01/30/2017 02:36 PM                Total time: 35  Counseling / coordination time: 35  > 50% counseling / coordination?: y      Prolonged service was provided for  []30 min   []75 min in face to face time in the presence of the patient. Time Start:   Time End:   Note: this can only be billed with 56888 (initial) or 00130 (follow up). If multiple start / stop times, list each separately.

## 2017-02-07 NOTE — WOUND CARE
Wound care follow up to assess left incisional vac dressing and drainage, alert, no distress. Assessment  Left hip dressing dry and intact, 150 cc serosanguinous drainage in cannister from application time 2-6 at 1400, MD aware. Cannister changed, dated and timed. Staff to monitor output and drainage. Plan of care discussed with patient, staff and ortho NP. Will follow.     Jesse Olivas

## 2017-02-07 NOTE — PROGRESS NOTES
Bedside and Verbal shift change report given to Silvana Denney (oncoming nurse) by America Echevarria (offgoing nurse). Report included the following information SBAR, Kardex, Procedure Summary, Intake/Output, MAR and Recent Results.

## 2017-02-07 NOTE — PROGRESS NOTES
Problem: Mobility Impaired (Adult and Pediatric)  Goal: *Acute Goals and Plan of Care (Insert Text)  Physical Therapy Goals  Initiated 2/1/2017    1. Patient will move from supine to sit and sit to supine , scoot up and down and roll side to side in bed with modified independence within 4 days. 2. Patient will perform sit to stand with modified independence within 4 days. 3. Patient will ambulate with modified independence for 200 feet with the least restrictive device within 4 days. 4. Patient will verbalize and demonstrate understanding of lateral precautions per protocol within 4 days. 5. Patient will perform all home exercise program per protocol with independence within 4 days. PHYSICAL THERAPY TREATMENT  Patient: Annelise Rosas (89 y.o. female)  Date: 2/7/2017  Diagnosis: LEFT HIP BIPOLAR  Failed total joint replacement (HCC)  Failed total hip arthroplasty ([facility] <principal problem not specified>  Procedure(s) (LRB):  LEFT TOTAL HIP ARTHROPLASTY CONVERSION (Left) 8 Days Post-Op  Precautions:        ASSESSMENT:  Pt received in chair, wound vac in place, agreeable to participate with physical therapy. CGA for functional transfers,including bathroom transfers, using RW for steadying Gait training x 30' usign RW with CGA. Pt  fatigues quickly during gait. Pt experienced increased abdominal pain during gaitPt returned to chair. nursing notified. .  Progression toward goals:  [ ]      Improving appropriately and progressing toward goals  [X]      Improving slowly and progressing toward goals  [ ]      Not making progress toward goals and plan of care will be adjusted       PLAN:  Patient continues to benefit from skilled intervention to address the above impairments. Continue treatment per established plan of care. Discharge Recommendations:  Skilled Nursing Facility  Further Equipment Recommendations for Discharge:  none       SUBJECTIVE:   Patient stated I am not feeling as good taody.   The patient stated 3/3 hip precautions. Reviewed all 3 with patient. OBJECTIVE DATA SUMMARY:   Critical Behavior:  Neurologic State: Alert  Orientation Level: Oriented X4  Cognition: Appropriate decision making  Safety/Judgement: Awareness of environment  Functional Mobility Training:  Bed Mobility:        Sit to Supine: Minimum assistance           Transfers:  Sit to Stand: Contact guard assistance  Stand to Sit: Contact guard assistance                             Balance:  Sitting: Intact  Standing - Static: Good;Constant support  Standing - Dynamic : Fair  Ambulation/Gait Training:  Distance (ft): 30 Feet (ft)  Assistive Device: Walker, rolling;Gait belt  Ambulation - Level of Assistance: Contact guard assistance        Gait Abnormalities: Decreased step clearance; Antalgic        Base of Support: Widened     Speed/Jeanie: Slow                                     Stairs:               Therapeutic Exercises:   SUPINE  EXERCISES   Sets   Reps   Active Active Assist   Passive Self ROM   Comments   Ankle Pumps     [X]                                           [ ]                                           [ ]                                           [ ]                                               Quad Sets     [ ]                                           [ ]                                           [ ]                                           [ ]                                               Rubye Dragon     [ ]                                           [ ]                                           [ ]                                           [ ]                                               Hip Abduction     [ ]                                           [ ]                                           [ ]                                           [ ]                                               Hip External Rotation     [ ]                                           [ ]                                           [ ] [ ]                                               Glut Sets     [X]                                           [ ]                                           [ ]                                           [ ]                                                     [ ]                                           [ ]                                           [ ]                                           [ ]                                                     [ ]                                           [ ]                                           [ ]                                           [ ]                                                   Kira Wall     [ ]                                           [ ]                                           [ ]                                           [ ]                                               Hip Abduction     [ ]                                           [ ]                                           [ ]                                           [ ]                                               Hip External Rotation     [ ]                                           [ ]                                           [ ]                                           [ ]                                               Narciso Hernandez     [ ]                                           [ ]                                           [ ]                                           [ ]                                               Mini squats     [ ]                                           [ ]                                           [ ]                                           [ ]                                               Hamstring Curl     [X]                                           [ ]                                           [ ] [ ]                                                  Pain:  Pain Scale 1: Numeric (0 - 10)  Pain Intensity 1: 3  Pain Location 1: Hip  Pain Orientation 1: Right  Pain Description 1: Aching  Pain Intervention(s) 1: Medication (see MAR)  Activity Tolerance:   Fair  Please refer to the flowsheet for vital signs taken during this treatment.   After treatment:   [X]  Patient left in no apparent distress sitting up in chair  [ ]  Patient left in no apparent distress in bed  [X]  Call bell left within reach  [X]  Nursing notified  [ ]  Caregiver present  [X]  Chair alarm activated      COMMUNICATION/COLLABORATION:   The patients plan of care was discussed with: Registered Nurse     Macey Grande   Time Calculation: 23 mins

## 2017-02-07 NOTE — PROGRESS NOTES
Problem: Self Care Deficits Care Plan (Adult)  Goal: *Acute Goals and Plan of Care (Insert Text)  Occupational Therapy Goals  Initiated 2/1/2017   1. Patient will perform lower body dressing using adaptive dressing aides at minimal assistance within 7 days. 2. Patient will perform toilet transfers at supervision/set-up level using rolling walker within 7 days. 3. Patient will perform all aspects of toileting at minimal assistance within 7 days. 4. Patient will demonstrate/maintain/recall all hip precautions with 100% accuracy without cues within 7 days. 5. Patient will participate in upper extremity therapeutic exercise/activities with modified independence for 10 minutes within 7 day(s). 6. Patient will utilize energy conservation techniques during functional activities with verbal cues within 7 day(s). OCCUPATIONAL THERAPY TREATMENT  Patient: Sandip Fowler (89 y.o. female)  Date: 2/7/2017  Diagnosis: LEFT HIP BIPOLAR  Failed total joint replacement (HCC)  Failed total hip arthroplasty ([address] <principal problem not specified>  Procedure(s) (LRB):  LEFT TOTAL HIP ARTHROPLASTY CONVERSION (Left) 8 Days Post-Op  Precautions:    Chart, occupational therapy assessment, plan of care, and goals were reviewed. ASSESSMENT:  Pt knew not to bend down to pick a piece of food off the floor but than reached down to pull her pants down her lower legs so still needing cues for hip precautions. After pt ambulated to the restroom she transferred on bedside commode and completed toileting with contact guard. Pt stood to wash her hands and contact guard. She returned to bed following treatment. Recommend SNF for rehab.   Progression toward goals:  [ ]          Improving appropriately and progressing toward goals  [X]          Improving slowly and progressing toward goals  [ ]          Not making progress toward goals and plan of care will be adjusted       PLAN:  Patient continues to benefit from skilled intervention to address the above impairments. Continue treatment per established plan of care. Discharge Recommendations:  SNF for rehab  Further Equipment Recommendations for Discharge:  None       SUBJECTIVE:   Patient stated I feel much better than I did this morning.       OBJECTIVE DATA SUMMARY:   Cognitive/Behavioral Status:  Neurologic State: Alert  Orientation Level: Oriented X4  Cognition: Appropriate decision making           Functional Mobility and Transfers for ADLs:              Bed Mobility:        Sit to Supine: Minimum assistance                 Transfers:  Sit to Stand: Contact guard assistance                                      Toilet Transfer : Contact guard assistance                                                              Balance:  Sitting: Intact  Standing - Static: Good;Constant support  Standing - Dynamic : Fair  ADL Intervention:        Grooming  Washing Hands: Contact guard assistance        Pain:  Pain Scale 1: Numeric (0 - 10)  Pain Intensity 1: 4  Pain Location 1: Hip  Pain Orientation 1: Right  Pain Description 1: Sore  Pain Intervention(s) 1: Medication (see MAR)     Activity Tolerance:    Fair  Please refer to the flowsheet for vital signs taken during this treatment.   After treatment:   [ ]  Patient left in no apparent distress sitting up in chair  [X]  Patient left in no apparent distress in bed  [X]  Call bell left within reach  [X]  Nursing notified  [ ]  Caregiver present  [ ]  Bed alarm activated      COMMUNICATION/COLLABORATION:   The patients plan of care was discussed with: Occupational Therapist and Registered Nurse     GETACHEW Godwin  Time Calculation: 15 mins

## 2017-02-07 NOTE — PROGRESS NOTES
Problem: Mobility Impaired (Adult and Pediatric)  Goal: *Acute Goals and Plan of Care (Insert Text)  Physical Therapy Goals  Initiated 2/1/2017    1. Patient will move from supine to sit and sit to supine , scoot up and down and roll side to side in bed with modified independence within 4 days. 2. Patient will perform sit to stand with modified independence within 4 days. 3. Patient will ambulate with modified independence for 200 feet with the least restrictive device within 4 days. 4. Patient will verbalize and demonstrate understanding of lateral precautions per protocol within 4 days. 5. Patient will perform all home exercise program per protocol with independence within 4 days. PHYSICAL THERAPY TREATMENT  Patient: Dariana Lopez (67 y.o. female)  Date: 2/7/2017  Diagnosis: LEFT HIP BIPOLAR  Failed total joint replacement (HCC)  Failed total hip arthroplasty ([facility] [address] <principal problem not specified>  Procedure(s) (LRB):  LEFT TOTAL HIP ARTHROPLASTY CONVERSION (Left) 8 Days Post-Op  Precautions:        ASSESSMENT:  Pt received in bed, requesting to use restroom. Min A for bed mobility to manage LLE. Sit<>stnd using Rw with CGA Gait training using RW into bathroom and back to bed. CGA for bathroom transfers. Review of supine HEP for BLE. Tolerate session well. Progression toward goals:  [ ]      Improving appropriately and progressing toward goals  [X]      Improving slowly and progressing toward goals  [ ]      Not making progress toward goals and plan of care will be adjusted       PLAN:  Patient continues to benefit from skilled intervention to address the above impairments. Continue treatment per established plan of care. Discharge Recommendations:  Skilled Nursing Facility  Further Equipment Recommendations for Discharge:  TBD by SNF       SUBJECTIVE:   Patient stated I need to use restroom.       OBJECTIVE DATA SUMMARY:   Functional Mobility Training:  Bed Mobility:     Supine to Sit: Contact guard assistance  Sit to Supine: Minimum assistance  Scooting: Contact guard assistance        Transfers:  Sit to Stand: Contact guard assistance  Stand to Sit: Contact guard assistance                             Ambulation/Gait Training:  Distance (ft): 20 Feet (ft)  Assistive Device: Walker, rolling;Gait belt  Ambulation - Level of Assistance: Contact guard assistance        Gait Abnormalities: Antalgic;Decreased step clearance        Base of Support: Narrowed     Speed/Jeanie: Pace decreased (<100 feet/min)                                  Stairs: Therapeutic Exercises:   Exercises performed per protocol. See morning treatment note for description. Pain:  Pain Scale 1: Numeric (0 - 10)  Pain Intensity 1: 4  Pain Location 1: Hip  Pain Orientation 1: Right  Pain Description 1: Sore     Activity Tolerance:   Good  Please refer to the flowsheet for vital signs taken during this treatment.   After treatment:   [ ] Patient left in no apparent distress sitting up in chair  [X] Patient left in no apparent distress in bed  [X] Call bell left within reach  [X] Nursing notified  [ ] Caregiver present  [X] Bed alarm activated      COMMUNICATION/COLLABORATION:   The patients plan of care was discussed with: Registered Nurse     [place]   Time Calculation: 23 mins

## 2017-02-07 NOTE — PROGRESS NOTES
9/0/14297:16 PM [facility] admission is still pending per . CM relayed possible discharge on 2/8. Admission's assistant at the [facility] requested CM follow up on 2/8.     2/7/2017  8:04 AM Updates sent to [facility] via [address]. Discussed with Ortho NP, planning for pt to discharge on 2/8 without would vac if drainage is under 20cc. CM will follow.  EUGENIO RodriguezW

## 2017-02-08 LAB
HCT VFR BLD AUTO: 29.1 % (ref 35–47)
HGB BLD-MCNC: 9 G/DL (ref 11.5–16)

## 2017-02-08 PROCEDURE — 74011250637 HC RX REV CODE- 250/637: Performed by: INTERNAL MEDICINE

## 2017-02-08 PROCEDURE — 74011250636 HC RX REV CODE- 250/636: Performed by: INTERNAL MEDICINE

## 2017-02-08 PROCEDURE — 85018 HEMOGLOBIN: CPT | Performed by: INTERNAL MEDICINE

## 2017-02-08 PROCEDURE — 97535 SELF CARE MNGMENT TRAINING: CPT

## 2017-02-08 PROCEDURE — 74011250637 HC RX REV CODE- 250/637: Performed by: ANESTHESIOLOGY

## 2017-02-08 PROCEDURE — 97116 GAIT TRAINING THERAPY: CPT

## 2017-02-08 PROCEDURE — 74011250637 HC RX REV CODE- 250/637: Performed by: NURSE PRACTITIONER

## 2017-02-08 PROCEDURE — 65270000029 HC RM PRIVATE

## 2017-02-08 PROCEDURE — 74011250636 HC RX REV CODE- 250/636: Performed by: NURSE PRACTITIONER

## 2017-02-08 PROCEDURE — 36415 COLL VENOUS BLD VENIPUNCTURE: CPT | Performed by: INTERNAL MEDICINE

## 2017-02-08 RX ADMIN — ACETAMINOPHEN 650 MG: 325 TABLET ORAL at 23:25

## 2017-02-08 RX ADMIN — CYCLOBENZAPRINE HYDROCHLORIDE 5 MG: 10 TABLET, FILM COATED ORAL at 17:43

## 2017-02-08 RX ADMIN — ACETAMINOPHEN 650 MG: 325 TABLET ORAL at 11:52

## 2017-02-08 RX ADMIN — UMECLIDINIUM 1 PUFF: 62.5 AEROSOL, POWDER ORAL at 09:24

## 2017-02-08 RX ADMIN — ASPIRIN 81 MG CHEWABLE TABLET 81 MG: 81 TABLET CHEWABLE at 09:25

## 2017-02-08 RX ADMIN — OXYCODONE HYDROCHLORIDE 15 MG: 5 TABLET ORAL at 23:25

## 2017-02-08 RX ADMIN — ACETAMINOPHEN 650 MG: 325 TABLET ORAL at 00:38

## 2017-02-08 RX ADMIN — OXYCODONE HYDROCHLORIDE 15 MG: 5 TABLET ORAL at 04:01

## 2017-02-08 RX ADMIN — Medication 10 ML: at 06:12

## 2017-02-08 RX ADMIN — CELECOXIB 200 MG: 100 CAPSULE ORAL at 23:25

## 2017-02-08 RX ADMIN — OXYCODONE HYDROCHLORIDE 10 MG: 5 TABLET ORAL at 02:09

## 2017-02-08 RX ADMIN — CYCLOBENZAPRINE HYDROCHLORIDE 5 MG: 10 TABLET, FILM COATED ORAL at 06:59

## 2017-02-08 RX ADMIN — OXYCODONE HYDROCHLORIDE 15 MG: 5 TABLET ORAL at 11:58

## 2017-02-08 RX ADMIN — Medication 10 ML: at 11:55

## 2017-02-08 RX ADMIN — METHADONE HYDROCHLORIDE 5 MG: 10 TABLET ORAL at 09:26

## 2017-02-08 RX ADMIN — CALCIUM CARBONATE (ANTACID) CHEW TAB 500 MG 400 MG: 500 CHEW TAB at 17:38

## 2017-02-08 RX ADMIN — FAMOTIDINE 20 MG: 20 TABLET, FILM COATED ORAL at 17:38

## 2017-02-08 RX ADMIN — METHADONE HYDROCHLORIDE 5 MG: 10 TABLET ORAL at 17:39

## 2017-02-08 RX ADMIN — CARVEDILOL 6.25 MG: 6.25 TABLET, FILM COATED ORAL at 09:25

## 2017-02-08 RX ADMIN — CALCIUM CARBONATE (ANTACID) CHEW TAB 500 MG 400 MG: 500 CHEW TAB at 11:52

## 2017-02-08 RX ADMIN — ATORVASTATIN CALCIUM 10 MG: 10 TABLET, FILM COATED ORAL at 09:25

## 2017-02-08 RX ADMIN — CELECOXIB 200 MG: 100 CAPSULE ORAL at 11:52

## 2017-02-08 RX ADMIN — VANCOMYCIN HYDROCHLORIDE 750 MG: 10 INJECTION, POWDER, LYOPHILIZED, FOR SOLUTION INTRAVENOUS at 11:52

## 2017-02-08 RX ADMIN — ENOXAPARIN SODIUM 40 MG: 40 INJECTION SUBCUTANEOUS at 19:49

## 2017-02-08 RX ADMIN — METHADONE HYDROCHLORIDE 5 MG: 10 TABLET ORAL at 00:38

## 2017-02-08 RX ADMIN — CALCIUM CARBONATE (ANTACID) CHEW TAB 500 MG 400 MG: 500 CHEW TAB at 09:25

## 2017-02-08 RX ADMIN — OXYCODONE HYDROCHLORIDE 15 MG: 5 TABLET ORAL at 14:51

## 2017-02-08 RX ADMIN — RIFAMPIN 600 MG: 300 CAPSULE ORAL at 06:11

## 2017-02-08 RX ADMIN — FAMOTIDINE 20 MG: 20 TABLET, FILM COATED ORAL at 09:25

## 2017-02-08 RX ADMIN — Medication 1 TABLET: at 17:38

## 2017-02-08 RX ADMIN — Medication 10 ML: at 23:26

## 2017-02-08 RX ADMIN — VANCOMYCIN HYDROCHLORIDE 750 MG: 10 INJECTION, POWDER, LYOPHILIZED, FOR SOLUTION INTRAVENOUS at 23:59

## 2017-02-08 RX ADMIN — ACETAMINOPHEN 650 MG: 325 TABLET ORAL at 06:11

## 2017-02-08 RX ADMIN — LISINOPRIL 10 MG: 20 TABLET ORAL at 09:26

## 2017-02-08 RX ADMIN — ACETAMINOPHEN 650 MG: 325 TABLET ORAL at 17:38

## 2017-02-08 NOTE — WOUND CARE
Wound care follow up  Plan of care discussed with MARIKA DUENAS. Incisional wound vac in place to the left lateral hip, drainage ~125 last 24 hours. Will maintain VAC inpatient and discuss plan of discharge in am 2-9 for potential discharge.   Will follow    [address] [place] [address]

## 2017-02-08 NOTE — PROGRESS NOTES
Problem: Mobility Impaired (Adult and Pediatric)  Goal: *Acute Goals and Plan of Care (Insert Text)  Physical Therapy Goals  Initiated 2/1/2017    1. Patient will move from supine to sit and sit to supine , scoot up and down and roll side to side in bed with modified independence within 4 days. 2. Patient will perform sit to stand with modified independence within 4 days. 3. Patient will ambulate with modified independence for 200 feet with the least restrictive device within 4 days. 4. Patient will verbalize and demonstrate understanding of lateral precautions per protocol within 4 days. 5. Patient will perform all home exercise program per protocol with independence within 4 days. PHYSICAL THERAPY TREATMENT  Patient: Onelia Montero (46 y.o. female)  Date: 2/8/2017  Diagnosis: LEFT HIP BIPOLAR  Failed total joint replacement (HCC)  Failed total hip arthroplasty ([facility] [address] <principal problem not specified>  Procedure(s) (LRB):  LEFT TOTAL HIP ARTHROPLASTY CONVERSION (Left) 9 Days Post-Op  Precautions:        ASSESSMENT:  Pt received in bed, agreeable to participate with physical therapy. SBA for bed mobility. CGA for functional transfers, including bathroom transfers, and gait training. Fatigues quickly with gait training will benefit from continued rehab for improved activity tolerance and strength. Progression toward goals:  [ ]      Improving appropriately and progressing toward goals  [X]      Improving slowly and progressing toward goals  [ ]      Not making progress toward goals and plan of care will be adjusted       PLAN:  Patient continues to benefit from skilled intervention to address the above impairments. Continue treatment per established plan of care. Discharge Recommendations:  Skilled Nursing Facility  Further Equipment Recommendations for Discharge:  TBD by SNF       SUBJECTIVE:   Patient stated I am just weak, everywhere.   The patient stated 3/3 hip precautions.  Reviewed all 3 with patient. OBJECTIVE DATA SUMMARY:   Critical Behavior:  Neurologic State: Alert, Appropriate for age  Orientation Level: Oriented X4  Cognition: Appropriate decision making, Appropriate for age attention/concentration, Appropriate safety awareness  Safety/Judgement: Good awareness of safety precautions, Insight into deficits  Functional Mobility Training:  Bed Mobility:  Rolling: Supervision  Supine to Sit: Stand-by asssistance  Sit to Supine: Stand-by asssistance  Scooting: Stand-by asssistance        Transfers:  Sit to Stand: Contact guard assistance  Stand to Sit: Contact guard assistance        Bed to Chair: Supervision                    Balance:  Sitting: Intact  Standing: Intact; With support  Standing - Static: Good;Constant support  Standing - Dynamic : Fair  Ambulation/Gait Training:  Distance (ft): 40 Feet (ft)  Assistive Device: Walker, rolling;Gait belt  Ambulation - Level of Assistance: Contact guard assistance        Gait Abnormalities: Antalgic;Decreased step clearance        Base of Support: Narrowed     Speed/Jeanie: Slow                                  Stairs:               Therapeutic Exercises:   SUPINE  EXERCISES   Sets   Reps   Active Active Assist   Passive Self ROM   Comments   Ankle Pumps     [X]                                           [ ]                                           [ ]                                           [ ]                                               Quad Sets     [X]                                           [ ]                                           [ ]                                           [ ]                                               Heel Slides     [X]                                           [ ]                                           [ ]                                           [ ]                                               Hip Abduction     [ ]                                           [ ]                                           [ ] [ ]                                               Hip External Rotation     [ ]                                           [ ]                                           [ ]                                           [ ]                                               Glut Sets     [ ]                                           [ ]                                           [ ]                                           [ ]                                                     [ ]                                           [ ]                                           [ ]                                           [ ]                                                     [ ]                                           [ ]                                           [ ]                                           [ ]                                                   Shahbaz Cuencas   Sets   Reps   Active Active Assist   Passive Self ROM   Comments   Kamila Sanchez     [X]                                           [ ]                                           [ ]                                           [ ]                                               Hip Abduction     [ ]                                           [ ]                                           [ ]                                           [ ]                                               Hip External Rotation     [ ]                                           [ ]                                           [ ]                                           [ ]                                               Sim De     [ ]                                           [ ]                                           [ ]                                           [ ]                                               Mini squats     [X]                                           [ ]                                           [ ] [ ]                                               Hamstring Curl     [X]                                           [ ]                                           [ ]                                           [ ]                                                  Pain:  Pain Scale 1: Numeric (0 - 10)  Pain Intensity 1: 5  Pain Location 1: Hip  Pain Orientation 1: Left  Pain Description 1: Aching  Pain Intervention(s) 1: Medication (see MAR)  Activity Tolerance:   Good  Please refer to the flowsheet for vital signs taken during this treatment.   After treatment:   [ ]  Patient left in no apparent distress sitting up in chair  [X]  Patient left in no apparent distress in bed  [X]  Call bell left within reach  [X]  Nursing notified  [ ]  Caregiver present  [ ]  Bed alarm activated      COMMUNICATION/COLLABORATION:   The patients plan of care was discussed with: Registered Nurse     Gilbert Diaz   Time Calculation: 19 mins

## 2017-02-08 NOTE — PROGRESS NOTES
Problem: Self Care Deficits Care Plan (Adult)  Goal: *Acute Goals and Plan of Care (Insert Text)  Occupational Therapy Goals  Initiated 2/1/2017   1. Patient will perform lower body dressing using adaptive dressing aides at minimal assistance within 7 days. met  2. Patient will perform toilet transfers at supervision/set-up level using rolling walker within 7 days. met  3. Patient will perform all aspects of toileting at minimal assistance within 7 days. met  4. Patient will demonstrate/maintain/recall all hip precautions with 100% accuracy without cues within 7 days. continue  5. Patient will participate in upper extremity therapeutic exercise/activities with modified independence for 10 minutes within 7 day(s). continue  6. Patient will utilize energy conservation techniques during functional activities with verbal cues within 7 day(s). continue    Re-Assessed 7 day 2/8/17    1. Patient will perform lower body dressing using adaptive dressing aides at modified independece within 7 days. modified  2. Patient will perform toilet transfers at modified independence using rolling walker within 7 days. 3. Patient will perform all aspects of toileting at modified independence within 7 days. 4. Patient will demonstrate/maintain/recall all hip precautions with 100% accuracy without cues within 7 days. continue  5. Patient will participate in upper extremity therapeutic exercise/activities with modified independence for 10 minutes within 7 day(s). 6. Patient will utilize energy conservation techniques during functional activities with verbal cues within 7 day(s). OCCUPATIONAL THERAPY TREATMENT: WEEKLY REASSESSMENT  Patient: Rikki Dancer (29 y.o. female)  Date: 2/8/2017  Diagnosis: LEFT HIP BIPOLAR  Failed total joint replacement (HCC)  Failed [facility] [place] <principal problem not specified>  Procedure(s) (LRB):  LEFT TOTAL HIP ARTHROPLASTY CONVERSION (Left) 9 Days Post-Op  Precautions:   Total hip precautions, fall      ASSESSMENT:  Patient is alert and oriented, recalls hip precautions and demonstrates good safety awareness. Patient presents with BUE generally deconditioned and weakened yet functional Patient fatigues easily with completion of ADLS and functional task mobility. Patient pending discharge to rehab at the [facility] with removal of wound vac. Patient has appropriate DME and assist as needed at home. Progression toward goals:  [X]            Improving appropriately and progressing toward goals  [ ]            Improving slowly and progressing toward goals  [ ]            Not making progress toward goals and plan of care will be adjusted       PLAN:  Goals have been updated based on progression since last assessment. Patient continues to benefit from skilled intervention to address the above impairments. Continue to follow patient 5 times a week to address goals. Planned Interventions:  [X]                    Self Care Training                  [X]             Therapeutic Activities  [X]                    Functional Mobility Training    [ ]             Cognitive Retraining  [X]                    Therapeutic Exercises           [X]             Endurance Activities  [ ]                    Balance Training                   [ ]             Neuromuscular Re-Education  [ ]                    Visual/Perceptual Training     [X]        Home Safety Training  [X]                    Patient Education                 [X]             Family Training/Education  [ ]                    Other (comment):  Discharge Recommendations: Rehab  Further Equipment Recommendations for Discharge: none       SUBJECTIVE:   Patient stated I finally feel like I am getting stronger.       OBJECTIVE DATA SUMMARY:   Cognitive/Behavioral Status:  Neurologic State: Alert; Appropriate for age  Orientation Level: Oriented X4  Cognition: Appropriate decision making; Appropriate for age attention/concentration; Appropriate safety awareness  Perception: Appears intact  Perseveration: No perseveration noted  Safety/Judgement: Good awareness of safety precautions; Insight into deficits     Functional Mobility and Transfers for ADLs:  Bed Mobility:  Rolling: Supervision  Supine to Sit: Supervision  Scooting: Supervision     Transfers:  Sit to Stand: Supervision  Stand to Sit: Supervision  Bed to Chair: Supervision  Toilet Transfer : Supervision     Balance:  Sitting: Intact; Without support  Standing: Intact; With support  Standing - Static: Good  Standing - Dynamic : Fair     ADL Intervention:    Patient instructed to don surgical extremity  first, doff last. Patient instructed to don all clothing while sitting prior to standing, doff all clothing to knees while standing, then sit to doff clothing off from knees to feet in order to facilitate fall prevention, pain management, and energy conservation with ADLS. Patient indicated understanding/recalled strategies with min instruction. Cognitive Retraining  Safety/Judgement: Good awareness of safety precautions; Insight into deficits           Pain:  Pain Scale 1: Numeric (0 - 10)  Pain Intensity 1: 9  Pain Location 1: Hip  Pain Orientation 1: Left  Pain Description 1: Sore;Aching  Pain Intervention(s) 1: Medication (see MAR)  Activity Tolerance:   Patient completed multiple sit to stand transfers, mobility around room and bathroom with RW  in completion of ADLS with no LOB or break  Please refer to the flowsheet for vital signs taken during this treatment.   After treatment:   [X] Patient left in no apparent distress sitting up in chair  [ ] Patient left in no apparent distress in bed  [X] Call bell left within reach  [X] Nursing notified  [ ] Caregiver present  [X] Chair alarm activated      COMMUNICATION/COLLABORATION:   The patients plan of care was discussed with: Physical Therapy Assistant, Registered Nurse and      Giovanni Balderrama OT  Time Calculation: 35 mins

## 2017-02-08 NOTE — PROGRESS NOTES
Vancomycin Pharmacy Consult 02/07/17    Vancomycin trough = 20.5 mcg/ml. Will change Vancomycin to 750 mg IV q12hr and recheck level soon.      Thank you  Craig Woods, PharmD  [phone]

## 2017-02-08 NOTE — ROUTINE PROCESS
Bedside and Verbal shift change report given to Loni Enamorado RN (oncoming nurse) by Allen Vargas RN (offgoing nurse). Report included the following information SBAR, Procedure Summary, Intake/Output and MAR.

## 2017-02-08 NOTE — PROGRESS NOTES
TOTAL HIP REVISION ARTHROPLASTY DAILY NOTE     ASSESSMENT / PLAN :   1. Pain Control : Stable, seen by pallaitive medicine  2. Overnight Issues : moderate pain this am  3. Wound or incisional issue : VAC functioning > 100cc out  4. Therapy / Weight Bearing Recommendations : WBAT  5. DVT Prophylaxis : Mechanical and Lovenox  6. Disposition : SNF  7. Medical Concerns : stable  8. Comments : stable, [place] tomorrow likely, anticipate decrease in drainage over time. Possible PICOs drain at discharge. POD  9 Days Post-Op s/p Procedure(s):  LEFT TOTAL HIP ARTHROPLASTY CONVERSION     SUBJECTIVE :     Patient notes the following issues : moderate pain. OBJECTIVE :     Patient Vitals for the past 12 hrs:   BP Temp Pulse Resp SpO2   02/08/17 0415 113/53 97.9 °F (36.6 °C) 69 16 96 %   02/07/17 2330 (!) 87/44 98.6 °F (37 °C) 79 16 -   02/07/17 1949 94/45 98.1 °F (36.7 °C) 72 17 95 %       Alert and oriented x3. Examination of the left Hip reveals that the dressing is clean, dry and intact. Motor Exam is intact and normal  Sensation is intact to light touch. No calf pain.      Labs:  Recent Labs      02/08/17   0410   HGB  9.0*   HCT  29.1*       CULTURES :  No results found for: STANISLAW  Lab Results   Component Value Date/Time    Culture result:  01/30/2017 02:14 PM     LIGHT  STAPHYLOCOCCUS LUGDUNENSIS  (OXACILLIN RESISTANT)      Culture result:  01/30/2017 02:12 PM     FEW  STAPHYLOCOCCUS LUGDUNENSIS  (OXACILLIN RESISTANT)      Culture result:  01/30/2017 01:20 PM     LIGHT  STAPHYLOCOCCUS LUGDUNENSIS  PLEASE REFER TO [facility] [id number] FOR SUSCEPTIBILITIES      Culture result:  01/30/2017 01:20 PM     FEW  STAPHYLOCOCCUS LUGDUNENSIS  PLEASE REFER TO [facility] [id number] FOR SUSCEPTIBILITIES      Culture result:  01/30/2017 01:11 PM     FEW  STAPHYLOCOCCUS LUGDUNENSIS  (PLEASE REFER TO [facility] [id number] FOR SUSCEPTIBILITIES      Culture result:  01/30/2017 01:11 PM     FEW  STAPHYLOCOCCUS LUGDUNENSIS  (OXACILLIN RESISTANT)      Culture result: NO ANAEROBES ISOLATED 01/30/2017 01:11 PM    Culture result:  01/30/2017 01:11 PM     FEW  STAPHYLOCOCCUS LUGDUNENSIS  PLEASE REFER  TO [facility] [id number] FOR SUSCEPTIBILITIES            Patient mobility  Gait  Base of Support: Narrowed  Speed/Jeanie: Pace decreased (<100 feet/min)  Gait Abnormalities: Antalgic, Decreased step clearance  Ambulation - Level of Assistance: Contact guard assistance  Distance (ft): 20 Feet (ft)  Assistive Device: Walker, rolling, Gait belt        Loree Cornejo MD  Cell [phone]  Nurse [address] [place] [address] [phone]  Medical Staff : [employer] [employer] / Elfego Eubanks  Office : [phone] ext  [phone]

## 2017-02-08 NOTE — PROGRESS NOTES
Physical therapy    1200 Chart reviewed, spoke with RN. Pt received in bed, reporting she recently got back into bed, is feeling tried and declining to participate with physical therapy at this time. Will attempt later this afternoon. Delight Overall  Love Vo

## 2017-02-08 NOTE — PROGRESS NOTES
6/1/09429:46 PM Pt has been accepted by the [facility]. Spoke with Amador Moe at [facility] who requested an in-service for the picos dressing. Champ Cannon can be contacted at [phone] ext [phone] or [phone]. CM will relay this to [employer]. ABELARDO Olmstead     2/8/2017 1:41 PM Spoke with Mariela Willoughby in admissions at the [facility], referral is still being reviewed. CM will follow up. 2/8/2017 10:39 AM Called and spoke with Mariela Willoughby in admissions at the [facility]. [facility] is requesting for pt's chest CT, picc report, echo report, iv abx orders, and wound care notes to be sent again via [employer]. CM sent all requested documentation via [employer] and relayed pt will discharge tomorrow. CM also relayed the pt will possibly be discharged with a picos dressing and inquired if they can manage this dressing. OLIVER will follow up.  ABELARDO Olmstead

## 2017-02-08 NOTE — PROGRESS NOTES
Nutrition Assessment:    RECOMMENDATIONS/INTERVENTION(S):     Consider addition of Elaine Q while on ABX and to continue at least 10 days after    Continue Regular, liberalized diet  Continue Ensure Enlive TID (chocolate)--encourage to drink between meals  MVI daily if not already ordered  Continue to monitor wt, appetite/PO intake, diet tolerance, and acceptance of oral supplement    ASSESSMENT:   2/8: F/u, pt reports she continues to eat well. She drinks 1 Ensure with each meal.  No teeth, dentures do not fit, states she can tolerate regular consistency food. Looking forward to discharge soon. 2/2: Follow-up. Visited pt. Tolerating diet w/ no problems. Reports of good appetite and intake, pt is ordering all meals. Request for chocolate Ensure. Law Bunn continues to follow for left hip wound. Wt hx reviewed - desirable increase noted. Labs & meds reviewed. Practitioner notes IV abx x 8 wks d/t dx.     1/31: Noted consult. Chart reviewed. This is a 67 yo female s/p left hip arthroplasty, possible NSTEMI in OR. Hx CAD, CHF, COPD, HTN. Diet advanced to Regular/High Clarence/High Protein. Tolerating diet. Labs/Meds reviewed. IVF infusing, on pressor, Solumedrol. Mg repleted. No pressure injury, + surgical incision to left hip. Wound care consulted for possible wound vac. No recent, significant weight loss. Observed food on bedside table brought by family from outside of facility. SUBJECTIVE/OBJECTIVE:     Diet Order: Regular (High Clarence/High Pro);  Ensure Enlive TID  % Eaten:  %  Patient Vitals for the past 100 hrs:   % Diet Eaten   02/07/17 0700 75 %   02/06/17 0700 70 %       Pertinent Medications: [x] Reviewed    Chemistries:  Lab Results   Component Value Date/Time    Sodium 142 02/05/2017 04:12 AM    Potassium 4.9 02/05/2017 04:12 AM    Chloride 108 02/05/2017 04:12 AM    CO2 31 02/05/2017 04:12 AM    Anion gap 3 02/05/2017 04:12 AM    Glucose 71 02/05/2017 04:12 AM    BUN 22 02/05/2017 04:12 AM    Creatinine 0.67 02/05/2017 04:12 AM    BUN/Creatinine ratio 33 02/05/2017 04:12 AM    GFR est AA >60 02/05/2017 04:12 AM    GFR est non-AA >60 02/05/2017 04:12 AM    Calcium 8.3 02/05/2017 04:12 AM    AST (SGOT) 18 02/02/2017 04:31 AM    Alk. phosphatase 48 02/02/2017 04:31 AM    Protein, total 5.4 02/02/2017 04:31 AM    Albumin 2.2 02/03/2017 03:04 PM    Globulin 3.0 02/02/2017 04:31 AM    A-G Ratio 0.8 02/02/2017 04:31 AM    ALT (SGPT) 12 02/02/2017 04:31 AM      Anthropometrics: Height: 5' 5\" (165.1 cm) Weight: 58.4 kg (128 lb 12 oz)    IBW (%IBW): 56.8 kg (125 lb 3.5 oz) ( ) UBW (%UBW):   (  %)    BMI: Body mass index is 21.42 kg/(m^2). This BMI is indicative of:   [] Underweight for age   [x] Normal    [] Overweight    []  Obesity    []  Extreme Obesity (BMI>40)  Estimated Nutrition Needs (Based on): 1438 Kcals/day (BMR (1106) x 1.3 AF ) , 74 g (1.2 g/Kg body wt) Protein  Carbohydrate: At Least 130 g/day  Fluids: 1450 mL/day    Last BM: 2/5   [x]Active     []Hyperactive  []Hypoactive       [] Absent   BS  Skin:    [] Intact   [x] Incision--left hip incision  [] Breakdown   [] DTI   [] Tears/Excoriation/Abrasion  [x]Edema [] Other:    Wt Readings from Last 30 Encounters:   02/07/17 58.4 kg (128 lb 12 oz)   01/11/17 60 kg (132 lb 4.4 oz)   04/22/14 54.4 kg (120 lb)      NUTRITION DIAGNOSES:   Problem:  Increased protein needs     Etiology: related to post-op wound healing     Signs/Symptoms: as evidenced by s/p left hip arthroplasty      NUTRITION INTERVENTIONS:  Meals/Snacks: General/healthful diet   Supplements: Commercial supplement              GOAL:   Pt will continue to consume >75% of meals and supplements in the next 3-5 days    Cultural, Yazidism, or Ethnic Dietary Needs: None     LEARNING NEEDS (Diet, Food/Nutrient-Drug Interaction):    [x] None Identified   [] Identified and Education Provided/Documented   [] Identified and Pt declined/was not appropriate      [] Interdisciplinary Care Plan Reviewed/Documented    [x] Discharge Needs:  See d/c order    [] No Nutrition Related Discharge Needs    NUTRITION RISK:   Pt Is At Nutrition Risk  [x]     No Nutrition Risk Identified  []       PT SEEN FOR:    [x]  MD Consult: []Calorie Count      []Diabetic Diet Education        []Diet Education     []Electrolyte Management     [x]General Nutrition Management and Supplements     []Management of Tube Feeding     []TPN Recommendations    []  RN Referral:  []MST score >=2     []Enteral/Parenteral Nutrition PTA     []Pregnant: Gestational DM or Multigestation                 [] Pressure Ulcer  []  Low BMI  []  Length of Stay; Dysphagia Diet          Carol Signs, RD

## 2017-02-08 NOTE — PROGRESS NOTES
[address]    Patient Information    Name: Yoselyn Harry  Age: 68 y.o. 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)      NSTEMI (non-ST elevated myocardial infarction) ([facility] (2/2/2017)      HTN (hypertension) (2/2/2017)      Anemia (2/2/2017)      Thrombocytopenia ([facility] (0/2/7495)      Systolic CHF, chronic ([facility] (2/2/2017)      During rounds the following quality care indicators and evidence based practices were addressed :     Heart Failure:  SCIP Measures for other Surgeries:  Pneumonia:   Stroke:     PT/OT: Patient mobility -- pending PT session this morning  Bed Mobility Training  Rolling: Supervision  Supine to Sit: Stand-by asssistance  Sit to Supine: Stand-by asssistance  Scooting: Stand-by asssistance  Transfer Training  Sit to Stand: Contact guard assistance  Stand to Sit: Contact guard assistance  Bed to Chair: Supervision      Gait Training  Assistive Device: Walker, rolling, Gait belt  Ambulation - Level of Assistance: Contact guard assistance  Distance (ft): 40 Feet (ft)   Weight Bearing Status  Left Side Weight Bearing: As tolerated      Pain Assessment  Pain Intensity 1: 5 (02/08/17 1535)  Pain Location 1: Hip  Pain Intervention(s) 1: Medication (see MAR)  Patient Stated Pain Goal: 4    Pain meds: scheduled methadone, PRN oxycodone  Antibiotics completed: Vancomycin and Rifampin scheduled  Anticoagulation:  lovenox  Onofre: no  N/V: no  Goals For Today: Participate in physical therapy. Continued pain control.      Discharge Management/Planning:    Readmit Risk Assessment Information:      Readmit Risk Tool  Support Systems: Child(sandra), Family member(s)  Relationship with Primary Physician Group: Seen at least one time within the past 6 months    [address]  Other Intervention:    Anticipated Date of Discharge: Friday 2/9/17    Interdisciplinary team rounds were held with the following team members Nursing, Pharmacy, and Rehab. See clinical pathway and/or care plan for interventions and desired outcomes.

## 2017-02-09 LAB
BUN SERPL-MCNC: 17 MG/DL (ref 6–20)
CREAT SERPL-MCNC: 0.72 MG/DL (ref 0.55–1.02)
ERYTHROCYTE [DISTWIDTH] IN BLOOD BY AUTOMATED COUNT: 16 % (ref 11.5–14.5)
HCT VFR BLD AUTO: 26.4 % (ref 35–47)
HCT VFR BLD AUTO: 29 % (ref 35–47)
HGB BLD-MCNC: 8.5 G/DL (ref 11.5–16)
HGB BLD-MCNC: 8.9 G/DL (ref 11.5–16)
MCH RBC QN AUTO: 28.4 PG (ref 26–34)
MCHC RBC AUTO-ENTMCNC: 30.7 G/DL (ref 30–36.5)
MCV RBC AUTO: 92.7 FL (ref 80–99)
PLATELET # BLD AUTO: 178 K/UL (ref 150–400)
RBC # BLD AUTO: 3.13 M/UL (ref 3.8–5.2)
WBC # BLD AUTO: 5.2 K/UL (ref 3.6–11)

## 2017-02-09 PROCEDURE — 36415 COLL VENOUS BLD VENIPUNCTURE: CPT | Performed by: INTERNAL MEDICINE

## 2017-02-09 PROCEDURE — 74011250636 HC RX REV CODE- 250/636: Performed by: INTERNAL MEDICINE

## 2017-02-09 PROCEDURE — 74011250637 HC RX REV CODE- 250/637: Performed by: INTERNAL MEDICINE

## 2017-02-09 PROCEDURE — 97535 SELF CARE MNGMENT TRAINING: CPT

## 2017-02-09 PROCEDURE — 80202 ASSAY OF VANCOMYCIN: CPT | Performed by: INTERNAL MEDICINE

## 2017-02-09 PROCEDURE — 74011250637 HC RX REV CODE- 250/637: Performed by: NURSE PRACTITIONER

## 2017-02-09 PROCEDURE — 97116 GAIT TRAINING THERAPY: CPT

## 2017-02-09 PROCEDURE — 65270000029 HC RM PRIVATE

## 2017-02-09 PROCEDURE — 84520 ASSAY OF UREA NITROGEN: CPT | Performed by: INTERNAL MEDICINE

## 2017-02-09 PROCEDURE — 74011250637 HC RX REV CODE- 250/637: Performed by: ANESTHESIOLOGY

## 2017-02-09 PROCEDURE — 97530 THERAPEUTIC ACTIVITIES: CPT

## 2017-02-09 PROCEDURE — 82565 ASSAY OF CREATININE: CPT | Performed by: INTERNAL MEDICINE

## 2017-02-09 PROCEDURE — 85018 HEMOGLOBIN: CPT | Performed by: INTERNAL MEDICINE

## 2017-02-09 PROCEDURE — 85027 COMPLETE CBC AUTOMATED: CPT | Performed by: NURSE PRACTITIONER

## 2017-02-09 PROCEDURE — 74011250636 HC RX REV CODE- 250/636: Performed by: NURSE PRACTITIONER

## 2017-02-09 RX ADMIN — CARVEDILOL 6.25 MG: 6.25 TABLET, FILM COATED ORAL at 16:45

## 2017-02-09 RX ADMIN — CYCLOBENZAPRINE HYDROCHLORIDE 5 MG: 10 TABLET, FILM COATED ORAL at 18:14

## 2017-02-09 RX ADMIN — CARVEDILOL 6.25 MG: 6.25 TABLET, FILM COATED ORAL at 08:25

## 2017-02-09 RX ADMIN — METHADONE HYDROCHLORIDE 5 MG: 10 TABLET ORAL at 01:19

## 2017-02-09 RX ADMIN — OXYCODONE HYDROCHLORIDE 15 MG: 5 TABLET ORAL at 08:21

## 2017-02-09 RX ADMIN — ASPIRIN 81 MG CHEWABLE TABLET 81 MG: 81 TABLET CHEWABLE at 08:20

## 2017-02-09 RX ADMIN — METHADONE HYDROCHLORIDE 5 MG: 10 TABLET ORAL at 08:21

## 2017-02-09 RX ADMIN — ENOXAPARIN SODIUM 40 MG: 40 INJECTION SUBCUTANEOUS at 20:17

## 2017-02-09 RX ADMIN — CALCIUM CARBONATE (ANTACID) CHEW TAB 500 MG 400 MG: 500 CHEW TAB at 08:20

## 2017-02-09 RX ADMIN — UMECLIDINIUM 1 PUFF: 62.5 AEROSOL, POWDER ORAL at 10:49

## 2017-02-09 RX ADMIN — OXYCODONE HYDROCHLORIDE 15 MG: 5 TABLET ORAL at 12:50

## 2017-02-09 RX ADMIN — CALCIUM CARBONATE (ANTACID) CHEW TAB 500 MG 400 MG: 500 CHEW TAB at 12:51

## 2017-02-09 RX ADMIN — VANCOMYCIN HYDROCHLORIDE 750 MG: 10 INJECTION, POWDER, LYOPHILIZED, FOR SOLUTION INTRAVENOUS at 10:49

## 2017-02-09 RX ADMIN — OXYCODONE HYDROCHLORIDE 15 MG: 5 TABLET ORAL at 21:24

## 2017-02-09 RX ADMIN — FAMOTIDINE 20 MG: 20 TABLET, FILM COATED ORAL at 18:14

## 2017-02-09 RX ADMIN — OXYCODONE HYDROCHLORIDE 15 MG: 5 TABLET ORAL at 16:45

## 2017-02-09 RX ADMIN — ZOLPIDEM TARTRATE 5 MG: 5 TABLET, FILM COATED ORAL at 21:24

## 2017-02-09 RX ADMIN — ACETAMINOPHEN 650 MG: 325 TABLET ORAL at 12:50

## 2017-02-09 RX ADMIN — Medication 1 TABLET: at 18:14

## 2017-02-09 RX ADMIN — CYCLOBENZAPRINE HYDROCHLORIDE 5 MG: 10 TABLET, FILM COATED ORAL at 10:52

## 2017-02-09 RX ADMIN — Medication 10 ML: at 20:18

## 2017-02-09 RX ADMIN — CELECOXIB 200 MG: 100 CAPSULE ORAL at 22:27

## 2017-02-09 RX ADMIN — Medication 10 ML: at 05:53

## 2017-02-09 RX ADMIN — ATORVASTATIN CALCIUM 10 MG: 10 TABLET, FILM COATED ORAL at 08:20

## 2017-02-09 RX ADMIN — OXYCODONE HYDROCHLORIDE 15 MG: 5 TABLET ORAL at 03:07

## 2017-02-09 RX ADMIN — CELECOXIB 200 MG: 100 CAPSULE ORAL at 10:49

## 2017-02-09 RX ADMIN — ACETAMINOPHEN 650 MG: 325 TABLET ORAL at 05:52

## 2017-02-09 RX ADMIN — CALCIUM CARBONATE (ANTACID) CHEW TAB 500 MG 400 MG: 500 CHEW TAB at 16:45

## 2017-02-09 RX ADMIN — LINACLOTIDE 290 MCG: 145 CAPSULE, GELATIN COATED ORAL at 05:53

## 2017-02-09 RX ADMIN — Medication 10 ML: at 22:42

## 2017-02-09 RX ADMIN — METHADONE HYDROCHLORIDE 5 MG: 10 TABLET ORAL at 16:45

## 2017-02-09 RX ADMIN — Medication 1 TABLET: at 08:20

## 2017-02-09 RX ADMIN — POLYETHYLENE GLYCOL 3350 17 G: 17 POWDER, FOR SOLUTION ORAL at 08:21

## 2017-02-09 RX ADMIN — VANCOMYCIN HYDROCHLORIDE 750 MG: 10 INJECTION, POWDER, LYOPHILIZED, FOR SOLUTION INTRAVENOUS at 22:41

## 2017-02-09 RX ADMIN — FAMOTIDINE 20 MG: 20 TABLET, FILM COATED ORAL at 08:20

## 2017-02-09 RX ADMIN — ACETAMINOPHEN 650 MG: 325 TABLET ORAL at 18:14

## 2017-02-09 RX ADMIN — LISINOPRIL 10 MG: 20 TABLET ORAL at 08:25

## 2017-02-09 RX ADMIN — RIFAMPIN 600 MG: 300 CAPSULE ORAL at 05:52

## 2017-02-09 NOTE — INTERDISCIPLINARY ROUNDS
[address]    Patient Information    Name: Sunday Ashton  Age: 68 y.o. Admission Date: 1/30/2017  Surgery/Procedure: Procedure(s):  LEFT TOTAL HIP ARTHROPLASTY CONVERSION  Attending Provider: Brenda Barbosa MD  Surgeon: Renea Camargo   Problem List: Active Problems:    [facility] (1/30/2017)      Failed total hip arthroplasty ([facility] (1/30/2017)      NSTEMI (non-ST elevated myocardial infarction) ([facility] (2/2/2017)      HTN (hypertension) (2/2/2017)      Anemia (2/2/2017)      Thrombocytopenia ([facility] (3/1/5789)      Systolic CHF, chronic ([facility] (2/2/2017)      During rounds the following quality care indicators and evidence based practices were addressed :       PT/OT: Patient mobility - walked 20' into bathroom and back.    Bed Mobility Training  Rolling: Stand-by asssistance  Supine to Sit: Stand-by asssistance  Sit to Supine: Minimum assistance  Scooting: Supervision  Transfer Training  Sit to Stand: Contact guard assistance  Stand to Sit: Contact guard assistance  Bed to Chair: Supervision      Gait Training  Assistive Device: Walker, rolling, Gait belt  Ambulation - Level of Assistance: Contact guard assistance  Distance (ft): 20 Feet (ft)   Weight Bearing Status  Left Side Weight Bearing: As tolerated      Pain Assessment  Pain Intensity 1: 7 (02/09/17 1057)  Pain Location 1: Hip  Pain Intervention(s) 1: Medication (see MAR)  Patient Stated Pain Goal: [address] [facility] pending    Pain meds: methadone schedule, oxycodone  Antibiotics completed: rifampin and vanc  Anticoagulation:  lovenox    SCDs: in place  Onofre: N   Bowels:     Goals For Today: Progress with PT, pain control    RRAT Score:      Readmit Risk Tool  Support Systems: Child(sandra), Family member(s)  Relationship with Primary Physician Group: Seen at least one time within the past 6 months    Discharge Management/Planning:    Readmit Risk Assessment Information:   High Risk            23       Total Score        3 Relationship with PCP    3 Patient Length of Stay > 5    4 More than 1 Admission in calendar year    5 Patient Insurance is Medicare, Medicaid or Self Pay    8 Charlson Comorbidity Score        Criteria that do not apply:    Patient Living Status         Readmit Risk Tool  Support Systems: Child(sandra), Family member(s)  Relationship with Primary Physician Group: Seen at least one time within the past 6 months    Anticipated Date of Discharge: today? Interdisciplinary team rounds were held with the following team members: Nurse Practitioner, Case Management, Nursing, Pharmacy, and Rehab. See clinical pathway and/or care plan for interventions and desired outcomes.

## 2017-02-09 NOTE — ROUTINE PROCESS
Bedside and Verbal shift change report given to [facility] José Manuel (oncoming nurse) by Zabrina Tompkins RN (offgoing nurse). Report included the following information SBAR, Kardex, OR Summary, Intake/Output and MAR.

## 2017-02-09 NOTE — PROGRESS NOTES
PHYSICAL THERAPY    1600 Chart reviewed , spoke with RN. Pt received sleeping soundly, will defer PT session at this time. Nursing notified. Miya Stone

## 2017-02-09 NOTE — WOUND CARE
Wound care   Follow up to assess the left lateral hip incision and wound vac drainage with Morgan Hinojosa PA. Dr Mirza Olsen advised per PA and awaiting discharge plan of care. Call placed to Yony Bryant at the [facility] in [place] to discuss pending plan of care using the [facility]. Rep for product called to advise of support and education needs, he will communicate with DON. 0- Plan per MD to delay discharge and re evaluate wound drainage on 2-10. Will follow.     Loi Watkins

## 2017-02-09 NOTE — PROGRESS NOTES
Problem: Self Care Deficits Care Plan (Adult)  Goal: *Acute Goals and Plan of Care (Insert Text)  Occupational Therapy Goals  Initiated 2/1/2017   1. Patient will perform lower body dressing using adaptive dressing aides at minimal assistance within 7 days. met  2. Patient will perform toilet transfers at supervision/set-up level using rolling walker within 7 days. met  3. Patient will perform all aspects of toileting at minimal assistance within 7 days. met  4. Patient will demonstrate/maintain/recall all hip precautions with 100% accuracy without cues within 7 days. continue  5. Patient will participate in upper extremity therapeutic exercise/activities with modified independence for 10 minutes within 7 day(s). continue  6. Patient will utilize energy conservation techniques during functional activities with verbal cues within 7 day(s). continue    Re-Assessed 7 day 2/8/17    1. Patient will perform lower body dressing using adaptive dressing aides at modified independece within 7 days. modified  2. Patient will perform toilet transfers at modified independence using rolling walker within 7 days. 3. Patient will perform all aspects of toileting at modified independence within 7 days. 4. Patient will demonstrate/maintain/recall all hip precautions with 100% accuracy without cues within 7 days. continue  5. Patient will participate in upper extremity therapeutic exercise/activities with modified independence for 10 minutes within 7 day(s). 6. Patient will utilize energy conservation techniques during functional activities with verbal cues within 7 day(s).     OCCUPATIONAL THERAPY TREATMENT  Patient: Roel Londono (67 y.o. female)  Date: 2/9/2017  Diagnosis: LEFT HIP BIPOLAR  Failed total joint replacement (HCC)  Failed total hip arthroplasty ([facility] <principal problem not specified>  Procedure(s) (LRB):  LEFT TOTAL HIP ARTHROPLASTY CONVERSION (Left) 10 Days Post-Op  Precautions:  Lateral hip precautions ASSESSMENT:  Patient received OOB to chair, expressing disappointment at not discharging to rehab today. Patient continues to demonstrate good safety awareness, compliance with hip precautions in completion of functional task mobility and ADLS. Patient expresses increased fatigue and soreness today, encouraging use of cold care and encouraging change of position with assistance. Progression toward goals:  [X]       Improving appropriately and progressing toward goals  [ ]       Improving slowly and progressing toward goals  [ ]       Not making progress toward goals and plan of care will be adjusted       PLAN:  Patient continues to benefit from skilled intervention to address the above impairments. Continue treatment per established plan of care. Discharge Recommendations:  Rehab  Further Equipment Recommendations for Discharge:  none       SUBJECTIVE:   Patient stated I just want to get on with all of this so I can get home.       OBJECTIVE DATA SUMMARY:   Cognitive/Behavioral Status:  Neurologic State: Alert; Appropriate for age  Orientation Level: Oriented X4  Cognition: Appropriate decision making; Appropriate for age attention/concentration; Appropriate safety awareness; Follows commands  Perception: Appears intact  Perseveration: No perseveration noted  Safety/Judgement: Awareness of environment; Insight into deficits     Functional Mobility and Transfers for ADLs:  Bed Mobility:  Rolling:  (received OOB)  Scooting: Supervision     Transfers:  Sit to Stand: Supervision  Bed to Chair: Supervision  Toilet Transfer : Supervision     Balance:  Sitting: Intact  Standing: Intact; With support  Standing - Static: Good;Constant support  Standing - Dynamic : Good     ADL Intervention:  Feeding  Feeding Assistance: Supervision/set-up     Grooming  Grooming Assistance: Supervision/set up  Washing Face: Supervision/set-up (in standing at sink)  Washing Hands: Supervision/set-up  Brushing Teeth: Supervision/set-up  Brushing/Combing Hair: Supervision/set-up     Upper Body Bathing  Bathing Assistance: Supervision/set-up  Position Performed: Seated edge of bed  Cues: Verbal cues provided  Adaptive Equipment: Walker     Lower Body Bathing  Bathing Assistance: Supervision/set-up  Perineal  : Supervision/set-up  Position Performed: Seated on toilet;Standing  Cues: Tactile cues provided  Adaptive Equipment: Sharilyn Felicia; Wipes(personal cleansing cloth)     Upper Body Dressing Assistance  Dressing Assistance: Modified independent  Pullover Shirt: Modified independent     Lower Body Dressing Assistance  Dressing Assistance: Stand-by assistance     Toileting  Toileting Assistance: Supervision/set up  Bladder Hygiene: Stand-by assistance  Bowel Hygiene: Stand-by assistance  Clothing Management: Stand-by assistance  Cues: Verbal cues provided; Tactile cues provided  Adaptive Equipment: Walker;Elevated seat;Grab bars     Cognitive Retraining  Safety/Judgement: Awareness of environment; Insight into deficits     Pain:  Pain Scale 1: Numeric (0 - 10)  Pain Intensity 1: 7  Pain Location 1: Hip  Pain Orientation 1: Left  Pain Description 1: Aching  Pain Intervention(s) 1: Medication (see MAR)  Activity Tolerance:   Patient completed multiple sit to stand transfers, mobility around room and bathroom with RW  in completion of ADLS with no LOB or break  Please refer to the flowsheet for vital signs taken during this treatment.   After treatment:   [X] Patient left in no apparent distress sitting up in chair  [ ] Patient left in no apparent distress in bed  [X] Call bell left within reach  [X] Nursing notified  [ ] Caregiver present  [ ] Bed alarm activated      COMMUNICATION/COLLABORATION:   The patients plan of care was discussed with: Physical Therapy Assistant, Registered Nurse and      Stoney Stevenson OT  Time Calculation: 30 mins

## 2017-02-09 NOTE — PROGRESS NOTES
Bedside and Verbal shift change report given to Tootie Merino (oncoming nurse) by [facility] (offgoing nurse). Report included the following information SBAR, Kardex, Procedure Summary, Intake/Output, MAR and Recent Results.

## 2017-02-09 NOTE — PROGRESS NOTES
Problem: Mobility Impaired (Adult and Pediatric)  Goal: *Acute Goals and Plan of Care (Insert Text)  Physical Therapy Goals  Initiated 2/1/2017    1. Patient will move from supine to sit and sit to supine , scoot up and down and roll side to side in bed with modified independence within 4 days. 2. Patient will perform sit to stand with modified independence within 4 days. 3. Patient will ambulate with modified independence for 200 feet with the least restrictive device within 4 days. 4. Patient will verbalize and demonstrate understanding of lateral precautions per protocol within 4 days. 5. Patient will perform all home exercise program per protocol with independence within 4 days. PHYSICAL THERAPY TREATMENT  Patient: Yoselyn Harry (81 y.o. female)  Date: 2/9/2017  Diagnosis: LEFT HIP BIPOLAR  Failed total joint replacement (HCC)  Failed total hip arthroplasty ([facility] <principal problem not specified>  Procedure(s) (LRB):  LEFT TOTAL HIP ARTHROPLASTY CONVERSION (Left) 10 Days Post-Op  Precautions:        ASSESSMENT:  Pt expressed having upset stomach and not feeling well and disappointed by delay in discharge, again today. CGA for functional mobility using RW. Today patient, 21' from chair to bathroom and then requested the return to bed. Progression toward goals:  [ ]      Improving appropriately and progressing toward goals  [X]      Improving slowly and progressing toward goals  [ ]      Not making progress toward goals and plan of care will be adjusted       PLAN:  Patient continues to benefit from skilled intervention to address the above impairments. Continue treatment per established plan of care. Discharge Recommendations:  Skilled Nursing Facility  Further Equipment Recommendations for Discharge:  TBD       SUBJECTIVE:   Patient stated I jst don feel very good this morning. Nick Klein  The patient stated 3/3 hip precautions. Reviewed all 3 with patient.       OBJECTIVE DATA SUMMARY:   Critical Behavior:  Neurologic State: Alert, Appropriate for age  Orientation Level: Oriented X4  Cognition: Appropriate decision making, Appropriate for age attention/concentration, Appropriate safety awareness, Follows commands  Safety/Judgement: Awareness of environment, Insight into deficits  Functional Mobility Training:  Bed Mobility:  Rolling: Stand-by asssistance     Sit to Supine: Minimum assistance  Scooting: Supervision        Transfers:  Sit to Stand: Contact guard assistance  Stand to Sit: Contact guard assistance        Bed to Chair: Supervision                    Balance:  Sitting: Intact  Standing: Intact; With support  Standing - Static: Good;Constant support  Standing - Dynamic : Fair  Ambulation/Gait Training:  Distance (ft): 20 Feet (ft)  Assistive Device: Walker, rolling;Gait belt  Ambulation - Level of Assistance: Contact guard assistance        Gait Abnormalities: Antalgic        Base of Support: Narrowed     Speed/Jeanie: Slow                                  Stairs:               Therapeutic Exercises:   SUPINE  EXERCISES   Sets   Reps   Active Active Assist   Passive Self ROM   Comments   Ankle Pumps     [X]                                           [ ]                                           [ ]                                           [ ]                                               Quad Sets     [X]                                           [ ]                                           [ ]                                           [ ]                                               Dirk Jauregui     [ ]                                           [ ]                                           [ ]                                           [ ]                                               Hip Abduction     [ ]                                           [ ]                                           [ ]                                           [ ]                                               Hip External Rotation     [ ]                                           [ ]                                           [ ]                                           [ ]                                               Glut Sets     [X]                                           [ ]                                           [ ]                                           [ ]                                                     [ ]                                           [ ]                                           [ ]                                           [ ]                                                     [ ]                                           [ ]                                           [ ]                                           [ ]                                                   Jose Guadalupe Labor     [ ]                                           [ ]                                           [ ]                                           [ ]                                               Hip Abduction     [ ]                                           [ ]                                           [ ]                                           [ ]                                               Hip External Rotation     [ ]                                           [ ]                                           [ ]                                           [ ]                                               Dempsey Oppenheim     [ ]                                           [ ]                                           [ ]                                           [ ]                                               Mini squats     [ ]                                           [ ]                                           [ ]                                           [ ]                                               Hamstring Curl [ ]                                           [ ]                                           [ ]                                           [ ]                                                  Pain:  Pain Scale 1: Numeric (0 - 10)  Pain Intensity 1: 7  Pain Location 1: Hip  Pain Orientation 1: Left  Pain Description 1: Aching  Pain Intervention(s) 1: Medication (see MAR)  Activity Tolerance:   Fair  Please refer to the flowsheet for vital signs taken during this treatment.   After treatment:   [ ]  Patient left in no apparent distress sitting up in chair  [X]  Patient left in no apparent distress in bed  [X]  Call bell left within reach  [X]  Nursing notified  [ ]  Caregiver present  [X]  Bed alarm activated      COMMUNICATION/COLLABORATION:   The patients plan of care was discussed with: Registered Nurse     Brittani Cost   Time Calculation: 28 mins

## 2017-02-09 NOTE — PROGRESS NOTES
2/9/2017 4:51 PM Pt's son will be to [facility] between 2-3PM on 2/10 to transport pt to [facility]. 2/9/2017 .4:36 PM Updates sent to the [facility]. Met with pt and discussed transport, pt will ask her son to transport her. CM will follow up on 2/10.    2/9/2017 1:53 PM Pt will not discharge today per Ortho NP. CM lvm with admissions at [facility] notifying of delay in discharge today. CM will follow up.  Jackie Dalton, EUGENIOW

## 2017-02-10 LAB
BUN SERPL-MCNC: 23 MG/DL (ref 6–20)
CREAT SERPL-MCNC: 0.69 MG/DL (ref 0.55–1.02)
DATE LAST DOSE: ABNORMAL
HCT VFR BLD AUTO: 28.4 % (ref 35–47)
HGB BLD-MCNC: 8.8 G/DL (ref 11.5–16)
REPORTED DOSE,DOSE: ABNORMAL UNITS
REPORTED DOSE/TIME,TMG: 1000
VANCOMYCIN TROUGH SERPL-MCNC: 21 UG/ML (ref 5–10)

## 2017-02-10 PROCEDURE — 74011250636 HC RX REV CODE- 250/636: Performed by: INTERNAL MEDICINE

## 2017-02-10 PROCEDURE — 74011250637 HC RX REV CODE- 250/637: Performed by: INTERNAL MEDICINE

## 2017-02-10 PROCEDURE — 74011250636 HC RX REV CODE- 250/636: Performed by: NURSE PRACTITIONER

## 2017-02-10 PROCEDURE — 84520 ASSAY OF UREA NITROGEN: CPT | Performed by: INTERNAL MEDICINE

## 2017-02-10 PROCEDURE — 97605 NEG PRS WND THER DME<=50SQCM: CPT

## 2017-02-10 PROCEDURE — 85018 HEMOGLOBIN: CPT | Performed by: INTERNAL MEDICINE

## 2017-02-10 PROCEDURE — 65270000029 HC RM PRIVATE

## 2017-02-10 PROCEDURE — 36415 COLL VENOUS BLD VENIPUNCTURE: CPT | Performed by: INTERNAL MEDICINE

## 2017-02-10 PROCEDURE — 74011250637 HC RX REV CODE- 250/637: Performed by: NURSE PRACTITIONER

## 2017-02-10 PROCEDURE — 97535 SELF CARE MNGMENT TRAINING: CPT

## 2017-02-10 PROCEDURE — 74011000258 HC RX REV CODE- 258: Performed by: INTERNAL MEDICINE

## 2017-02-10 PROCEDURE — 74011250637 HC RX REV CODE- 250/637: Performed by: ANESTHESIOLOGY

## 2017-02-10 PROCEDURE — 77030018836 HC SOL IRR NACL ICUM -A

## 2017-02-10 PROCEDURE — 97116 GAIT TRAINING THERAPY: CPT

## 2017-02-10 PROCEDURE — 82565 ASSAY OF CREATININE: CPT | Performed by: INTERNAL MEDICINE

## 2017-02-10 RX ADMIN — VANCOMYCIN HYDROCHLORIDE 500 MG: 500 INJECTION, POWDER, LYOPHILIZED, FOR SOLUTION INTRAVENOUS at 23:44

## 2017-02-10 RX ADMIN — OXYCODONE HYDROCHLORIDE 15 MG: 5 TABLET ORAL at 22:11

## 2017-02-10 RX ADMIN — OXYCODONE HYDROCHLORIDE 15 MG: 5 TABLET ORAL at 12:48

## 2017-02-10 RX ADMIN — CALCIUM CARBONATE (ANTACID) CHEW TAB 500 MG 400 MG: 500 CHEW TAB at 17:27

## 2017-02-10 RX ADMIN — OXYCODONE HYDROCHLORIDE 15 MG: 5 TABLET ORAL at 04:12

## 2017-02-10 RX ADMIN — ACETAMINOPHEN 650 MG: 325 TABLET ORAL at 23:44

## 2017-02-10 RX ADMIN — ACETAMINOPHEN 650 MG: 325 TABLET ORAL at 17:27

## 2017-02-10 RX ADMIN — FAMOTIDINE 20 MG: 20 TABLET, FILM COATED ORAL at 08:04

## 2017-02-10 RX ADMIN — CYCLOBENZAPRINE HYDROCHLORIDE 5 MG: 10 TABLET, FILM COATED ORAL at 11:29

## 2017-02-10 RX ADMIN — LISINOPRIL 10 MG: 20 TABLET ORAL at 08:04

## 2017-02-10 RX ADMIN — CALCIUM CARBONATE (ANTACID) CHEW TAB 500 MG 400 MG: 500 CHEW TAB at 11:31

## 2017-02-10 RX ADMIN — RIFAMPIN 600 MG: 300 CAPSULE ORAL at 06:06

## 2017-02-10 RX ADMIN — ASPIRIN 81 MG CHEWABLE TABLET 81 MG: 81 TABLET CHEWABLE at 08:04

## 2017-02-10 RX ADMIN — ACETAMINOPHEN 650 MG: 325 TABLET ORAL at 00:45

## 2017-02-10 RX ADMIN — Medication 10 ML: at 22:11

## 2017-02-10 RX ADMIN — ACETAMINOPHEN 650 MG: 325 TABLET ORAL at 06:07

## 2017-02-10 RX ADMIN — CYCLOBENZAPRINE HYDROCHLORIDE 5 MG: 10 TABLET, FILM COATED ORAL at 02:45

## 2017-02-10 RX ADMIN — CARVEDILOL 6.25 MG: 6.25 TABLET, FILM COATED ORAL at 08:04

## 2017-02-10 RX ADMIN — METHADONE HYDROCHLORIDE 5 MG: 10 TABLET ORAL at 17:27

## 2017-02-10 RX ADMIN — METHADONE HYDROCHLORIDE 5 MG: 10 TABLET ORAL at 08:05

## 2017-02-10 RX ADMIN — CARVEDILOL 6.25 MG: 6.25 TABLET, FILM COATED ORAL at 17:26

## 2017-02-10 RX ADMIN — Medication 10 ML: at 14:00

## 2017-02-10 RX ADMIN — ACETAMINOPHEN 650 MG: 325 TABLET ORAL at 11:31

## 2017-02-10 RX ADMIN — CELECOXIB 200 MG: 100 CAPSULE ORAL at 11:32

## 2017-02-10 RX ADMIN — Medication 10 ML: at 06:08

## 2017-02-10 RX ADMIN — METHADONE HYDROCHLORIDE 5 MG: 10 TABLET ORAL at 00:44

## 2017-02-10 RX ADMIN — METHADONE HYDROCHLORIDE 5 MG: 10 TABLET ORAL at 23:58

## 2017-02-10 RX ADMIN — CELECOXIB 200 MG: 100 CAPSULE ORAL at 22:10

## 2017-02-10 RX ADMIN — OXYCODONE HYDROCHLORIDE 15 MG: 5 TABLET ORAL at 07:17

## 2017-02-10 RX ADMIN — FAMOTIDINE 20 MG: 20 TABLET, FILM COATED ORAL at 17:26

## 2017-02-10 RX ADMIN — ATORVASTATIN CALCIUM 10 MG: 10 TABLET, FILM COATED ORAL at 08:04

## 2017-02-10 RX ADMIN — OXYCODONE HYDROCHLORIDE 15 MG: 5 TABLET ORAL at 17:26

## 2017-02-10 RX ADMIN — VANCOMYCIN HYDROCHLORIDE 500 MG: 500 INJECTION, POWDER, LYOPHILIZED, FOR SOLUTION INTRAVENOUS at 11:30

## 2017-02-10 RX ADMIN — CALCIUM CARBONATE (ANTACID) CHEW TAB 500 MG 400 MG: 500 CHEW TAB at 08:05

## 2017-02-10 RX ADMIN — ENOXAPARIN SODIUM 40 MG: 40 INJECTION SUBCUTANEOUS at 20:10

## 2017-02-10 RX ADMIN — ZOLPIDEM TARTRATE 5 MG: 5 TABLET, FILM COATED ORAL at 23:44

## 2017-02-10 RX ADMIN — UMECLIDINIUM 1 PUFF: 62.5 AEROSOL, POWDER ORAL at 08:22

## 2017-02-10 NOTE — PROGRESS NOTES
PHYSICAL THERAPY    1427 Pt received in bed, expressing she is feeling depressed and frustrated with fact she is not being discharged today. Pt declined to participate with physical therapy at this time. PT will continue to follow. Jose Cannon, PTA

## 2017-02-10 NOTE — PROGRESS NOTES
Problem: Self Care Deficits Care Plan (Adult)  Goal: *Acute Goals and Plan of Care (Insert Text)  Occupational Therapy Goals  Initiated 2/1/2017   1. Patient will perform lower body dressing using adaptive dressing aides at minimal assistance within 7 days. met  2. Patient will perform toilet transfers at supervision/set-up level using rolling walker within 7 days. met  3. Patient will perform all aspects of toileting at minimal assistance within 7 days. met  4. Patient will demonstrate/maintain/recall all hip precautions with 100% accuracy without cues within 7 days. continue  5. Patient will participate in upper extremity therapeutic exercise/activities with modified independence for 10 minutes within 7 day(s). continue  6. Patient will utilize energy conservation techniques during functional activities with verbal cues within 7 day(s). continue    Re-Assessed 7 day 2/8/17    1. Patient will perform lower body dressing using adaptive dressing aides at modified independece within 7 days. modified  2. Patient will perform toilet transfers at modified independence using rolling walker within 7 days. 3. Patient will perform all aspects of toileting at modified independence within 7 days. 4. Patient will demonstrate/maintain/recall all hip precautions with 100% accuracy without cues within 7 days. continue  5. Patient will participate in upper extremity therapeutic exercise/activities with modified independence for 10 minutes within 7 day(s). 6. Patient will utilize energy conservation techniques during functional activities with verbal cues within 7 day(s).     OCCUPATIONAL THERAPY TREATMENT  Patient: Koki Velasquez (89 y.o. female)  Date: 2/10/2017  Diagnosis: LEFT HIP BIPOLAR  Failed total joint replacement (HCC)  Failed total hip arthroplasty ([facility] [address] <principal problem not specified>  Procedure(s) (LRB):  LEFT TOTAL HIP ARTHROPLASTY CONVERSION (Left) 11 Days Post-Op  Precautions:  Left THR ASSESSMENT:  Patient received OOB, finishing breakfast, in good spirits. Patient continues to require minimal cuing for compliance with precautions, demonstrates good safety awareness. SBA for transitions sit to stand, <> bathroom and <> commode. Patient anticipates discharge to SNF later today with medical clearance, wound vac removed and FAREED dressing applied by wound care. If patient does not discharge, changing POC to 3 times per week as patient is progressing well toward goals. Progression toward goals:  [X]       Improving appropriately and progressing toward goals  [ ]       Improving slowly and progressing toward goals  [ ]       Not making progress toward goals and plan of care will be adjusted       PLAN:  Patient continues to benefit from skilled intervention to address the above impairments. Continue treatment per established plan of care. Discharge Recommendations:  Rehab  Further Equipment Recommendations for Discharge:  none       SUBJECTIVE:   Patient stated If they tell me I can't go today I am going to sneak out of here.       OBJECTIVE DATA SUMMARY:   Cognitive/Behavioral Status:  Neurologic State: Alert; Appropriate for age  Orientation Level: Oriented X4  Cognition: Appropriate decision making; Appropriate for age attention/concentration; Appropriate safety awareness; Follows commands              Functional Mobility and Transfers for ADLs:  Bed Mobility:  Rolling: Stand-by asssistance  Supine to Sit: Stand-by asssistance  Sit to Supine: Stand-by asssistance     Transfers:  Sit to Stand: Contact guard assistance  Stand to Sit: Contact guard assistance     Balance:  Sitting: Intact  Standing - Static: Good;Constant support  Standing - Dynamic : Fair     ADL Intervention:    Patient instructed to don surgical extremity  first, doff last. Patient instructed to don all clothing while sitting prior to standing, doff all clothing to knees while standing, then sit to doff clothing off from knees to feet in order to facilitate fall prevention, pain management, and energy conservation with ADLS. Patient indicated understanding/recalled strategies with min instruction. Pain:  Pain Scale 1: Numeric (0 - 10)  Pain Intensity 1: 7  Pain Location 1: Hip  Pain Orientation 1: Left  Pain Description 1: Aching  Pain Intervention(s) 1: Rest;Distraction  Activity Tolerance:   Patient completed multiple sit to stand transfers, mobility around room and bathroom with RW  in completion of ADLS with no LOB or break  Please refer to the flowsheet for vital signs taken during this treatment.   After treatment:   [X] Patient left in no apparent distress sitting up in chair  [ ] Patient left in no apparent distress in bed  [X] Call bell left within reach  [X] Nursing notified  [ ] Caregiver present  [ ] Bed alarm activated      COMMUNICATION/COLLABORATION:   The patients plan of care was discussed with: Physical Therapy Assistant, Registered Nurse and      Nabil Mae OT  Time Calculation: 25 mins

## 2017-02-10 NOTE — PROGRESS NOTES
Problem: Mobility Impaired (Adult and Pediatric)  Goal: *Acute Goals and Plan of Care (Insert Text)  Physical Therapy Goals  Initiated 2/1/2017    1. Patient will move from supine to sit and sit to supine , scoot up and down and roll side to side in bed with modified independence within 4 days. 2. Patient will perform sit to stand with modified independence within 4 days. 3. Patient will ambulate with modified independence for 200 feet with the least restrictive device within 4 days. 4. Patient will verbalize and demonstrate understanding of lateral precautions per protocol within 4 days. 5. Patient will perform all home exercise program per protocol with independence within 4 days. PHYSICAL THERAPY TREATMENT  Patient: Chuck Sahu (89 y.o. female)  Date: 2/10/2017  Diagnosis: LEFT HIP BIPOLAR  Failed total joint replacement (HCC)  Failed total hip arthroplasty ([facility] [address] <principal problem not specified>  Procedure(s) (LRB):  LEFT TOTAL HIP ARTHROPLASTY CONVERSION (Left) 11 Days Post-Op  Precautions:        ASSESSMENT:  Pt agreeable to participate with physical therapy. Reports pain in constant 5-6/10. CGA for functional transfers using RW for steadying. Gait training using RW x 40' with CGA. Able to perform standing toe raises x10 with no increased in pair, attempted 1 rep of knee flexion with increased pain using RW for steadying. Pt return to chair before wound care nurse came to remove wound vac and place FAREED dressing transferred to bed with CGA and min A to manage LLE. Progression toward goals:  [ ]      Improving appropriately and progressing toward goals  [X]      Improving slowly and progressing toward goals  [ ]      Not making progress toward goals and plan of care will be adjusted       PLAN:  Patient continues to benefit from skilled intervention to address the above impairments. Continue treatment per established plan of care.   Discharge Recommendations:  Vito Hansen Equipment Recommendations for Discharge:  TBD       SUBJECTIVE:   Patient stated I hope I am leaving today.       OBJECTIVE DATA SUMMARY:   Critical Behavior:  Neurologic State: Alert, Appropriate for age  Orientation Level: Oriented X4  Cognition: Appropriate decision making, Appropriate for age attention/concentration, Appropriate safety awareness, Follows commands  Safety/Judgement: Awareness of environment, Insight into deficits  Functional Mobility Training:  Bed Mobility:  Rolling: Stand-by asssistance  Supine to Sit: Stand-by asssistance  Sit to Supine: Stand-by asssistance           Transfers:  Sit to Stand: Contact guard assistance  Stand to Sit: Contact guard assistance                             Balance:  Sitting: Intact  Standing - Static: Good;Constant support  Standing - Dynamic : Fair  Ambulation/Gait Training:  Distance (ft): 40 Feet (ft)  Assistive Device: Walker, rolling;Gait belt  Ambulation - Level of Assistance: Contact guard assistance        Gait Abnormalities: Decreased step clearance; Antalgic        Base of Support: Narrowed     Speed/Jeanie: Slow                                  Stairs:               Therapeutic Exercises:   SUPINE  EXERCISES   Sets   Reps   Active Active Assist   Passive Self ROM   Comments   Ankle Pumps     [X]                                        [ ]                                        [ ]                                        [ ]                                            Quad Sets     [ ]                                        [ ]                                        [ ]                                        [ ]                                            Heel Slides     [X]                                        [ ]                                        [ ]                                        [ ]                                            Hip Abduction     [ ]                                        [ ]                                        [ ] [ ]                                            Glut Sets     [ ]                                        [ ]                                        [ ]                                        [ ]                                                  [ ]                                        [ ]                                        [ ]                                        [ ]                                                  [ ]                                        [ ]                                        [ ]                                        [ ]                                                Irma Parcel   Passive Self ROM   Comments   Lucas Walker     [X]                                        [ ]                                        [ ]                                        [ ]                                            Hip Abduction     [ ]                                        [ ]                                        [ ]                                        [ ]                                                  [ ]                                        [ ]                                        [ ]                                        [ ]                                                  [ ]                                        [ ]                                        [ ]                                        [ ]                                               Pain:  Pain Scale 1: Numeric (0 - 10)  Pain Intensity 1: 7  Pain Location 1: Hip  Pain Orientation 1: Left  Pain Description 1: Aching  Pain Intervention(s) 1: Rest;Distraction  Activity Tolerance:   Good  Please refer to the flowsheet for vital signs taken during this treatment.   After treatment:   [ ] Patient left in no apparent distress sitting up in chair  [X] Patient left in no apparent distress in bed  [X] Call bell left within reach  [X] Nursing notified  [ ] Caregiver present  [ ] Bed alarm activated      COMMUNICATION/COLLABORATION:   The patients plan of care was discussed with: Occupational Therapist and Registered Nurse     Taurus Infante   Time Calculation: 20 mins

## 2017-02-10 NOTE — PROGRESS NOTES
[facility] Dosing Services: Antimicrobial Stewardship Daily Doc    Consult for antibiotic dosing of Vancomycin by Dr. John Khan. ID has signed of on 2/3. Patient will not go home today. Pharmacy continues to dose. Indication: Left prosthetic hip septic arthritis   Day of Therapy 10 (until 3/29/2017)    Ht Readings from Last 1 Encounters:   01/30/17 165.1 cm (65\")        Wt Readings from Last 1 Encounters:   02/08/17 57 kg (125 lb 10.6 oz)        Vancomycin therapy:  Current maintenance dose: 750 (mg) every 12  hours (frequency). Dose calculated to approximate a therapeutic trough of 15-20 mcg/mL. Plan: trough last pm obtained timely and resulted at 21 mcg/ml. Decrease dose to 500 mg q12h. This provides an AUC/RIKKI > 400 and trough at this time 14-15 mcg/ml. This dose allows for some accumulation while on extended duration of therapy     Other Antimicrobial   (not dosed by pharmacist) Rifampin 600 mg daily   Cultures 1/30 tissue: Staph lugdunensis (susc vanc) @ Final  1/30 wound: Staph lugdunensis (susc vanc) @ Final   Serum Creatinine Lab Results   Component Value Date/Time    Creatinine 0.69 02/10/2017 04:18 AM         Creatinine Clearance Estimated Creatinine Clearance: 62.4 mL/min (based on Cr of 0.69). Temp Temp: 97.4 °F (36.3 °C)       WBC Lab Results   Component Value Date/Time    WBC 5.2 02/09/2017 03:25 AM        H/H Lab Results   Component Value Date/Time    HGB 8.8 02/10/2017 04:18 AM        Platelets    Lab Results   Component Value Date/Time    PLATELET 661 30/38/7382 03:25 AM            Thank you,    Kip Vasquez.  Roge Cannon

## 2017-02-10 NOTE — ROUTINE PROCESS
Bedside and Verbal shift change report given to Anushka Fernandes RN (oncoming nurse) by Efren Sinha RN (offgoing nurse). Report included the following information SBAR, Kardex, OR Summary, Intake/Output, MAR and Recent Results.

## 2017-02-10 NOTE — DISCHARGE SUMMARY
Total Hip Revision Discharge Summary    Patient ID:  Chuck Sahu  1940  [place] y.o.  [id number]    Admit date: 1/30/2017    Discharge date and time: No discharge date for patient encounter. 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 Intra-operative complications or issues cardiac arrest and ICU admission. The patient was transferred to the orthopaedic floor in stable condition after 3 days in the ICU. Physical therapy issues during the hospital stay included daily mobilization. At the time of discharge, the patient was able to ambulate safely and had an understanding of the explicit discharge precautions and instructions following surgery. The patient had adequate oral pain control and good PO intake. Important in Hospital Events : Continued drainage from the incision, required return to the OR for repeat I&D    Post Op complications: Cardiac arrest, persistent wound drainage. Return to the OR for [facility] of the wound. Pt with severe protein calorie malnutrition -> poor wound healing. The incision wound was closed quickly due to the patient detioriating condition during surgery. Current Discharge Medication List      START taking these medications    Details   ondansetron (ZOFRAN ODT) 8 mg disintegrating tablet Take 0.5 Tabs by mouth every eight (8) hours as needed for Nausea. Qty: 30 Tab, Refills: 0      bisacodyl (DULCOLAX, BISACODYL,) 10 mg suppository Insert 10 mg into rectum daily. Qty: 2 Suppository, Refills: 0      enoxaparin (LOVENOX) 40 mg/0.4 mL 0.4 mL by SubCUTAneous route daily.   Qty: 30 Syringe, Refills: 0      lisinopril (PRINIVIL, ZESTRIL) 10 mg tablet Take 1 Tab by mouth daily. Qty: 30 Tab, Refills: 3      atorvastatin (LIPITOR) 10 mg tablet Take 1 Tab by mouth daily. Qty: 30 Tab, Refills: 3         CONTINUE these medications which have NOT CHANGED    Details   SUMAtriptan (IMITREX) 50 mg tablet Take 50 mg by mouth once as needed for Migraine. carvedilol (COREG) 3.125 mg tablet Take 3.125 mg by mouth two (2) times daily (with meals). Takes c breakfast and dinner      DULCOLAX, BISACODYL, PO Take 1 Tab by mouth daily as needed. promethazine (PHENERGAN) 25 mg tablet Take 25 mg by mouth every six (6) hours as needed for Nausea. cyanocobalamin 1,000 mcg tablet Take 1,000 mcg by mouth daily. traMADol (ULTRAM) 50 mg tablet Take 50 mg by mouth every eight (8) hours as needed for Pain.      linaclotide (LINZESS) 290 mcg cap capsule Take 290 mcg by mouth Daily (before breakfast). zolpidem (AMBIEN) 5 mg tablet Take 5 mg by mouth nightly as needed for Sleep.      tiotropium (SPIRIVA WITH HANDIHALER) 18 mcg inhalation capsule Take 1 Cap by inhalation daily. albuterol (PROVENTIL HFA) 90 mcg/actuation inhaler Take 2 Puffs by inhalation every six (6) hours as needed. methadone (DOLOPHINE) 5 mg tablet Take 5 mg by mouth every eight (8) hours. omega-3 fatty acids-vitamin e (FISH OIL) 1,000 mg cap Take 1 Cap by mouth. aspirin delayed-release 81 mg tablet Take 81 mg by mouth daily. cholecalciferol, vitamin d3, (VITAMIN D3) 400 unit cap Take 1 Tab by mouth daily. esomeprazole (NEXIUM) 40 mg capsule Take  by mouth daily. polyethylene glycol (MIRALAX) 17 gram packet Take 17 g by mouth every other day. CALCIUM CARBONATE/MAG HYDROX (ROLAIDS EXTRA STRENGTH PO) Take 2 Tabs by mouth three (3) times daily (with meals).              CULTURES :  No results found for: SDES  Lab Results   Component Value Date/Time    Culture result:  01/30/2017 02:14 PM     LIGHT  STAPHYLOCOCCUS LUGDUNENSIS  (OXACILLIN RESISTANT)      Culture result: 01/30/2017 02:12 PM     FEW  STAPHYLOCOCCUS LUGDUNENSIS  (OXACILLIN RESISTANT)      Culture result:  01/30/2017 01:20 PM     LIGHT  STAPHYLOCOCCUS LUGDUNENSIS  PLEASE REFER TO [facility] [id number] FOR SUSCEPTIBILITIES      Culture result:  01/30/2017 01:20 PM     FEW  STAPHYLOCOCCUS LUGDUNENSIS  PLEASE REFER TO [facility] [id number] FOR SUSCEPTIBILITIES      Culture result:  01/30/2017 01:11 PM     FEW  STAPHYLOCOCCUS LUGDUNENSIS  (PLEASE REFER TO [facility] [id number] FOR SUSCEPTIBILITIES      Culture result:  01/30/2017 01:11 PM     FEW  STAPHYLOCOCCUS LUGDUNENSIS  (OXACILLIN RESISTANT)      Culture result: NO ANAEROBES ISOLATED 01/30/2017 01:11 PM    Culture result:  01/30/2017 01:11 PM     FEW  STAPHYLOCOCCUS LUGDUNENSIS  PLEASE REFER  TO [facility] [id number] FOR SUSCEPTIBILITIES           Discharged to: SNF      Signed:  Bina Shaikh MD  2/10/2017  8:48 AM

## 2017-02-10 NOTE — ROUTINE PROCESS
2/10/2017 1:58 PM Pt and pt's son are aware discharge will take place on 2/13.     2/10/2017 1:51 PM Confirmed with Todd Li in admissions at the [facility] they will accept pt on 2/13 once in service is done. 2/10/2017 12:07 PM Spoke with \[facility]\"" at the [facility], [address] in-service will not be done by the representative with picos until Monday. \[facility]\"" reported they cannot accept pt until Monday after in-service is complete. CM relayed to Ortho NP and woundcare RN. CM will follow up.     2/10/2017 9:33 AM Spoke with \[facility]\"" at the [facility] who reported she has not been contacted by the rep to do an in-service for the picos dressing. \[facility]\"" reported the [facility] cannot accept pt until the in-service for the picos dressing has been done. CM will follow up.    2/10/2017 9:28 AM Called and spoke with Todd Li in admissions at the [facility]. Todd Li reported he is waiting to hear from their [facility] if they can accept pt today. CM will follow up.  ABELARDO Burden

## 2017-02-10 NOTE — INTERDISCIPLINARY ROUNDS
[address]    Patient Information    Name: Ailyn Galindo  Age: 68 y.o.   Admission Date: 1/30/2017  Surgery/Procedure: Procedure(s):  LEFT TOTAL HIP ARTHROPLASTY CONVERSION  Attending Provider: Thierno Ruiz MD  Surgeon: Marleny Abebe   Problem List: Active Problems:    [facility] (1/30/2017)      Failed total hip arthroplasty ([facility] [address] (1/30/2017)      NSTEMI (non-ST elevated myocardial infarction) ([facility] [address] (2/2/2017)      HTN (hypertension) (2/2/2017)      Anemia (2/2/2017)      Thrombocytopenia ([facility] [address] (8/5/3600)      Systolic CHF, chronic ([facility] (2/2/2017)      During rounds the following quality care indicators and evidence based practices were addressed :       PT/OT: Patient mobility - 40' this morning, CGA  Bed Mobility Training  Rolling: Stand-by asssistance  Supine to Sit: Stand-by asssistance  Sit to Supine: Stand-by asssistance  Scooting: Supervision  Transfer Training  Sit to Stand: Contact guard assistance  Stand to Sit: Contact guard assistance  Bed to Chair: Supervision      Gait Training  Assistive Device: Walker, rolling, Gait belt  Ambulation - Level of Assistance: Contact guard assistance  Distance (ft): 40 Feet (ft)   Weight Bearing Status  Left Side Weight Bearing: As tolerated      Pain Assessment  Pain Intensity 1: 7 (02/10/17 0802)  Pain Location 1: Hip  Pain Intervention(s) 1: Rest, Distraction  Patient Stated Pain Goal: 4    Pain meds: Methadone, Oxycodone  Antibiotics completed: Rifampin, Vanc  Anticoagulation:  Lovenox  Onofre: N   Goals For Today: Progress with PT, pain control    RRAT Score:      Readmit Risk Tool  Support Systems: Child(sandra), Family member(s)  Relationship with Primary Physician Group: Seen at least one time within the past 6 months    Discharge Management/Planning:    Readmit Risk Assessment Information:   High Risk            23       Total Score        3 Relationship with PCP    3 Patient Length of Stay > 5    4 More than 1 Admission in calendar year    5 Patient Insurance is Medicare, Medicaid or Self Pay    8 Charlson Comorbidity Score        Criteria that do not apply:    Patient Living Status         Readmit Risk Tool  Support Systems: Child(sandra), Family member(s)  Relationship with Primary Physician Group: Seen at least one time within the past 6 months    [place]: [facility] [place]   Anticipated Date of Discharge: this afternoon if drainage remains low    Interdisciplinary team rounds were held with the following team members: Nurse Practitioner, Case Management, Nursing, Pharmacy, and Rehab. See clinical pathway and/or care plan for interventions and desired outcomes.

## 2017-02-10 NOTE — WOUND CARE
Wound care follow up per Dr. Shivani Peraza to remove wound vac from left hip incision and place cherrie dressing. Patient sitting up in chair, [facility] [place] [facility] assist, pt used walker to get back to bed with no difficulty. Wound vac removed from left hip. Incision is well approximated distal and proximal with staples in place. Mid line incision secured with staples with < 0.5 cm separation with small to moderate serosanguinous drainage. Brandi Horvath PA with ortho at bedside to assess incision. Area cleansed then skin prep applied, Cherrie dressing applied to middle portion of incision with good securement and suction working. Dry gauze and tape to cover remaining staples exposed. Patient tolerated well. Drainage marked with sharpie and timed for staff nurse to assess.      Juana Matthews RN

## 2017-02-11 LAB
BUN SERPL-MCNC: 26 MG/DL (ref 6–20)
CREAT SERPL-MCNC: 0.79 MG/DL (ref 0.55–1.02)
HCT VFR BLD AUTO: 27.7 % (ref 35–47)
HGB BLD-MCNC: 9 G/DL (ref 11.5–16)

## 2017-02-11 PROCEDURE — 84520 ASSAY OF UREA NITROGEN: CPT | Performed by: INTERNAL MEDICINE

## 2017-02-11 PROCEDURE — 74011250637 HC RX REV CODE- 250/637: Performed by: ANESTHESIOLOGY

## 2017-02-11 PROCEDURE — 74011250636 HC RX REV CODE- 250/636: Performed by: NURSE PRACTITIONER

## 2017-02-11 PROCEDURE — 74011250637 HC RX REV CODE- 250/637: Performed by: INTERNAL MEDICINE

## 2017-02-11 PROCEDURE — 74011000258 HC RX REV CODE- 258: Performed by: INTERNAL MEDICINE

## 2017-02-11 PROCEDURE — 74011250636 HC RX REV CODE- 250/636: Performed by: INTERNAL MEDICINE

## 2017-02-11 PROCEDURE — 97116 GAIT TRAINING THERAPY: CPT

## 2017-02-11 PROCEDURE — 82565 ASSAY OF CREATININE: CPT | Performed by: INTERNAL MEDICINE

## 2017-02-11 PROCEDURE — 74011250637 HC RX REV CODE- 250/637: Performed by: NURSE PRACTITIONER

## 2017-02-11 PROCEDURE — 65270000029 HC RM PRIVATE

## 2017-02-11 PROCEDURE — 85018 HEMOGLOBIN: CPT | Performed by: INTERNAL MEDICINE

## 2017-02-11 PROCEDURE — 97164 PT RE-EVAL EST PLAN CARE: CPT

## 2017-02-11 PROCEDURE — 36415 COLL VENOUS BLD VENIPUNCTURE: CPT | Performed by: INTERNAL MEDICINE

## 2017-02-11 RX ADMIN — Medication 1 TABLET: at 08:25

## 2017-02-11 RX ADMIN — VANCOMYCIN HYDROCHLORIDE 500 MG: 500 INJECTION, POWDER, LYOPHILIZED, FOR SOLUTION INTRAVENOUS at 11:28

## 2017-02-11 RX ADMIN — METHADONE HYDROCHLORIDE 5 MG: 10 TABLET ORAL at 18:31

## 2017-02-11 RX ADMIN — CARVEDILOL 6.25 MG: 6.25 TABLET, FILM COATED ORAL at 18:29

## 2017-02-11 RX ADMIN — OXYCODONE HYDROCHLORIDE 15 MG: 5 TABLET ORAL at 11:27

## 2017-02-11 RX ADMIN — Medication 10 ML: at 23:56

## 2017-02-11 RX ADMIN — Medication 1 TABLET: at 18:30

## 2017-02-11 RX ADMIN — ATORVASTATIN CALCIUM 10 MG: 10 TABLET, FILM COATED ORAL at 08:26

## 2017-02-11 RX ADMIN — OXYCODONE HYDROCHLORIDE 15 MG: 5 TABLET ORAL at 23:55

## 2017-02-11 RX ADMIN — ASPIRIN 81 MG CHEWABLE TABLET 81 MG: 81 TABLET CHEWABLE at 08:26

## 2017-02-11 RX ADMIN — OXYCODONE HYDROCHLORIDE 15 MG: 5 TABLET ORAL at 06:57

## 2017-02-11 RX ADMIN — CYCLOBENZAPRINE HYDROCHLORIDE 5 MG: 10 TABLET, FILM COATED ORAL at 20:22

## 2017-02-11 RX ADMIN — CALCIUM CARBONATE (ANTACID) CHEW TAB 500 MG 400 MG: 500 CHEW TAB at 11:26

## 2017-02-11 RX ADMIN — CELECOXIB 200 MG: 100 CAPSULE ORAL at 23:55

## 2017-02-11 RX ADMIN — CALCIUM CARBONATE (ANTACID) CHEW TAB 500 MG 400 MG: 500 CHEW TAB at 08:26

## 2017-02-11 RX ADMIN — CYCLOBENZAPRINE HYDROCHLORIDE 5 MG: 10 TABLET, FILM COATED ORAL at 02:41

## 2017-02-11 RX ADMIN — UMECLIDINIUM 1 PUFF: 62.5 AEROSOL, POWDER ORAL at 11:16

## 2017-02-11 RX ADMIN — CELECOXIB 200 MG: 100 CAPSULE ORAL at 11:26

## 2017-02-11 RX ADMIN — FAMOTIDINE 20 MG: 20 TABLET, FILM COATED ORAL at 18:30

## 2017-02-11 RX ADMIN — VANCOMYCIN HYDROCHLORIDE 500 MG: 500 INJECTION, POWDER, LYOPHILIZED, FOR SOLUTION INTRAVENOUS at 23:55

## 2017-02-11 RX ADMIN — ACETAMINOPHEN 650 MG: 325 TABLET ORAL at 11:27

## 2017-02-11 RX ADMIN — RIFAMPIN 600 MG: 300 CAPSULE ORAL at 06:58

## 2017-02-11 RX ADMIN — ZOLPIDEM TARTRATE 5 MG: 5 TABLET, FILM COATED ORAL at 23:55

## 2017-02-11 RX ADMIN — LINACLOTIDE 290 MCG: 145 CAPSULE, GELATIN COATED ORAL at 06:57

## 2017-02-11 RX ADMIN — Medication 10 ML: at 06:58

## 2017-02-11 RX ADMIN — METHADONE HYDROCHLORIDE 5 MG: 10 TABLET ORAL at 08:28

## 2017-02-11 RX ADMIN — OXYCODONE HYDROCHLORIDE 15 MG: 5 TABLET ORAL at 15:15

## 2017-02-11 RX ADMIN — LISINOPRIL 10 MG: 20 TABLET ORAL at 08:27

## 2017-02-11 RX ADMIN — OXYCODONE HYDROCHLORIDE 15 MG: 5 TABLET ORAL at 18:18

## 2017-02-11 RX ADMIN — ENOXAPARIN SODIUM 40 MG: 40 INJECTION SUBCUTANEOUS at 20:18

## 2017-02-11 RX ADMIN — CALCIUM CARBONATE (ANTACID) CHEW TAB 500 MG 400 MG: 500 CHEW TAB at 17:00

## 2017-02-11 RX ADMIN — ACETAMINOPHEN 650 MG: 325 TABLET ORAL at 23:55

## 2017-02-11 RX ADMIN — CARVEDILOL 6.25 MG: 6.25 TABLET, FILM COATED ORAL at 08:25

## 2017-02-11 RX ADMIN — ACETAMINOPHEN 650 MG: 325 TABLET ORAL at 06:57

## 2017-02-11 RX ADMIN — Medication 10 ML: at 15:11

## 2017-02-11 RX ADMIN — ACETAMINOPHEN 650 MG: 325 TABLET ORAL at 18:30

## 2017-02-11 RX ADMIN — CYCLOBENZAPRINE HYDROCHLORIDE 5 MG: 10 TABLET, FILM COATED ORAL at 09:44

## 2017-02-11 RX ADMIN — FAMOTIDINE 20 MG: 20 TABLET, FILM COATED ORAL at 08:26

## 2017-02-11 NOTE — PROGRESS NOTES
Bedside shift change report given to Josh Lewis (oncoming nurse) by Norberto Hall RN (offgoing nurse). Report included the following information SBAR.

## 2017-02-11 NOTE — ROUTINE PROCESS
Bedside and Verbal shift change report given to Virignia Reid RN (oncoming nurse) by Rm Fernandez RN (offgoing nurse). Report included the following information SBAR, Kardex, OR Summary, Intake/Output and MAR.

## 2017-02-11 NOTE — PROGRESS NOTES
TOTAL HIP REVISION ARTHROPLASTY DAILY NOTE     ASSESSMENT / PLAN :   1. Pain Control : Stable  2. Overnight Issues : none  3. Wound or incisional issue : PICOS in-place now x 24hrs with moderate saturation. Will keep this in place through tomorrow and change to a standard dressing. If this is still draining over Sunday night, then NPO and return to the OR on Monday for wound I&D. The patient is at extremely high risk for further surgical intervention. 4. Therapy / Weight Bearing Recommendations : WBAT w/ walker  5. DVT Prophylaxis : Mechanical and Lovenox  6. Disposition : SNF  7. Medical Concerns : MMP, hospitalist signed off. Reconsult for an HTN or other issues. 8. Comments : stable, placement once drainage stops     POD  9 Days Post-Op s/p Procedure(s):  LEFT TOTAL HIP ARTHROPLASTY CONVERSION     SUBJECTIVE :     Patient notes the following issues : moderate continued pain. OBJECTIVE :     Patient Vitals for the past 12 hrs:   BP Temp Pulse Resp SpO2 Weight   02/11/17 0659 - - - - - 62.7 kg (138 lb 3.7 oz)   02/11/17 0427 98/55 97.6 °F (36.4 °C) 70 17 97 % -       Alert and oriented x3. Examination of the left Hip reveals that the dressing is clean, dry and intact. PICOs intact and functioning  Motor Exam is intact and normal  Sensation is intact to light touch. No calf pain.      Labs:  Recent Labs      02/11/17   0230   HGB  9.0*   HCT  27.7*   BUN  26*   CREA  0.79       CULTURES :  No results found for: [facility]  Lab Results   Component Value Date/Time    Culture result:  01/30/2017 02:14 PM     LIGHT  STAPHYLOCOCCUS LUGDUNENSIS  (OXACILLIN RESISTANT)      Culture result:  01/30/2017 02:12 PM     FEW  STAPHYLOCOCCUS LUGDUNENSIS  (OXACILLIN RESISTANT)      Culture result:  01/30/2017 01:20 PM     LIGHT  STAPHYLOCOCCUS LUGDUNENSIS  PLEASE REFER TO [facility] [id number] FOR SUSCEPTIBILITIES      Culture result:  01/30/2017 01:20 PM     FEW  STAPHYLOCOCCUS LUGDUNENSIS  PLEASE REFER TO [facility] [id number] FOR SUSCEPTIBILITIES      Culture result:  01/30/2017 01:11 PM     FEW  STAPHYLOCOCCUS LUGDUNENSIS  (PLEASE REFER TO [facility] [id number] FOR SUSCEPTIBILITIES      Culture result:  01/30/2017 01:11 PM     FEW  STAPHYLOCOCCUS LUGDUNENSIS  (OXACILLIN RESISTANT)      Culture result: NO ANAEROBES ISOLATED 01/30/2017 01:11 PM    Culture result:  01/30/2017 01:11 PM     FEW  STAPHYLOCOCCUS LUGDUNENSIS  PLEASE REFER  TO [facility] [id number] FOR SUSCEPTIBILITIES            Patient mobility  Gait  Base of Support: Narrowed  Speed/Jeanie: Slow  Gait Abnormalities: Decreased step clearance, Antalgic  Ambulation - Level of Assistance: Contact guard assistance  Distance (ft): 40 Feet (ft)  Assistive Device: Walker, rolling, Gait belt        Octaviano Angel MD  Cell [phone]  Nurse [address] [place] [address] [phone]  Medical Staff : Grzegorz Adair / Izzy Harris  Office : [phone] ext  [phone]

## 2017-02-11 NOTE — PROGRESS NOTES
Problem: Mobility Impaired (Adult and Pediatric)  Goal: *Acute Goals and Plan of Care (Insert Text)  Physical Therapy Goals  Initiated 2/11/2017    1. Patient will move from supine to sit and sit to supine in bed with independence within 4 days. 2. Patient will perform sit to stand with modified independence within 4 days. 3. Patient will ambulate with modified independence for 200 feet with the least restrictive device within 4 days. 4. Patient will verbalize and demonstrate understanding of lateral precautions per protocol within 4 days. 5. Patient will perform all home exercise program per protocol with supervision/set-up within 4 days. Physical Therapy Goals  Initiated 2/1/2017    1. Patient will move from supine to sit and sit to supine , scoot up and down and roll side to side in bed with modified independence within 4 days. 2. Patient will perform sit to stand with modified independence within 4 days. 3. Patient will ambulate with modified independence for 200 feet with the least restrictive device within 4 days. 4. Patient will verbalize and demonstrate understanding of lateral precautions per protocol within 4 days. 5. Patient will perform all home exercise program per protocol with independence within 4 days. Outcome: Progressing Towards Goal  PHYSICAL THERAPY REEVALUATION  Patient: Emily Jensen (46 y.o. female)  Date: 2/11/2017  Primary Diagnosis: LEFT HIP BIPOLAR  Failed total joint replacement (HCC)  Failed total hip arthroplasty ([facility] [address]  Procedure(s) (LRB):  LEFT TOTAL HIP ARTHROPLASTY CONVERSION (Left) 12 Days Post-Op   Precautions: WBAT (LLE)      ASSESSMENT :  Based on the objective data described below, the patient presents s/p THR that has had constant drainage which limited pt's ability to be discharged home. Pt has PICOS in-place now x 24hrs with moderate saturation. The plan is to keep this in place through tomorrow (Sunday, 2/12/2017) and change to a standard dressing.  If the wound is still draining over Sunday night, then plan is to return to the OR on Monday for wound I&D. Per Ortho, patient is at extremely high risk for further surgical intervention. Pt is WBAT LLE and is ambulating with rolling walker. Pt will benefit with continued THR  therapy protocol toward maximizing functional independence. Patient will benefit from skilled intervention to address the above impairments. Patients rehabilitation potential is considered to be Good  Factors which may influence rehabilitation potential include:   [ ]           None noted  [ ]           Mental ability/status  [X]           Medical condition  [ ]           Home/family situation and support systems  [ ]           Safety awareness  [X]           Pain tolerance/management  [ ]           Other:        PLAN :  Recommendations and Planned Interventions:  [X]             Bed Mobility Training             [ ]      Neuromuscular Re-Education  [X]             Transfer Training                   [ ]      Orthotic/Prosthetic Training  [X]             Gait Training                         [ ]      Modalities  [X]             Therapeutic Exercises           [ ]      Edema Management/Control  [X]             Therapeutic Activities            [X]      Patient and Family Training/Education  [ ]             Other (comment):  Frequency/Duration: Patient will be followed by physical therapy daily to address goals. Discharge Recommendations: Home Health  Further Equipment Recommendations for Discharge: TBD       SUBJECTIVE:   Patient stated My blood pressure has been low but I want to get out of this bed.       OBJECTIVE DATA SUMMARY:       Past Medical History   Diagnosis Date    Autoimmune disease ([facility] [address]         psoriasis    Beta-blocker therapy      CAD (coronary artery disease)         non ischemic cardiomyopathy    Chronic obstructive pulmonary disease (HCC)      Chronic pain         curvature of spinie    GERD (gastroesophageal reflux disease)      Heart failure ([facility] [address]      Hypercholesterolemia      Hypertension      Ill-defined condition         hx aortic stenosis & mitral regurg    Smoker      Thromboembolus ([facility] [address]         right leg after hip surgery     Past Surgical History   Procedure Laterality Date    Hx gyn           miscarriage    Pr abdomen surgery proc unlisted   2010       Gallbladder    Pr abdomen surgery proc unlisted   2000       hiatal hernia repair    Hx orthopaedic           Right Knee, Left Foot    Hx orthopaedic   04/05/2015       left hip replacement 2015    Hx orthopaedic   06/24/2016       rmoval bone fragment left hip    Hx orthopaedic   2013       right hip replacement    Hx orthopaedic   2001       right knee replacement    Hx heent           Tonsilectomy    Hx heent           cataract removed bilateral eyes     Hospital course since last seen and reason for reevaluation: Pt has been making progress with THR functional protocol and was doing well enough to be discharged home. However, pt has a L hip incision wound that has been draining and resulted in not being discharged home. Critical Behavior:  Neurologic State: Alert, Appropriate for age  Orientation Level: Oriented X4  Cognition: Appropriate decision making, Appropriate for age attention/concentration  Safety/Judgement: Awareness of environment, Insight into deficits, Good awareness of safety precautions  Skin:  L hip wound bandaged but drainage visible.   Strength:    Strength: Generally decreased, functional                       Tone & Sensation:   Tone: Normal              Sensation: Intact               Range Of Motion:  AROM: Generally decreased, functional           PROM: Generally decreased, functional (LLE s/p THR precautions)           Coordination:  Coordination: Generally decreased, functional     Functional Mobility:  Bed Mobility:  Rolling: Supervision  Supine to Sit: Supervision  Sit to Supine: Supervision  Scooting: Supervision  Transfers:  Sit to Stand: Contact guard assistance  Stand to Sit: Stand-by asssistance  Stand Pivot Transfers: Contact guard assistance     Bed to Chair: Supervision           Balance:   Sitting: Intact; Without support  Standing: Intact; With support  Standing - Static: Good  Standing - Dynamic : Fair  Ambulation/Gait Training:  Distance (ft): 75 Feet (ft)  Assistive Device: Walker, rolling  Ambulation - Level of Assistance: Stand-by asssistance     Gait Description (WDL): Exceptions to WDL  Gait Abnormalities: Decreased step clearance     Left Side Weight Bearing: As tolerated  Base of Support: Narrowed     Speed/Jeanie: Slow  Step Length: Left lengthened;Right shortened                               Therapeutic Exercises:   Not done during this re-evaluation. Functional Measure:  Barthel Index:      Bathin  Bladder: 10  Bowels: 10  Groomin  Dressin  Feeding: 10  Mobility: 10  Stairs: 0  Toilet Use: 5  Transfer (Bed to Chair and Back): 5  Total: 65         Barthel and G-code impairment scale:  Percentage of impairment CH  0% CI  1-19% CJ  20-39% CK  40-59% CL  60-79% CM  80-99% CN  100%   Barthel Score 0-100 100 99-80 79-60 59-40 20-39 1-19    0   Barthel Score 0-20 20 17-19 13-16 9-12 5-8 1-4 0      The Barthel ADL Index: Guidelines  1. The index should be used as a record of what a patient does, not as a record of what a patient could do. 2. The main aim is to establish degree of independence from any help, physical or verbal, however minor and for whatever reason. 3. The need for supervision renders the patient not independent. 4. A patient's performance should be established using the best available evidence. Asking the patient, friends/relatives and nurses are the usual sources, but direct observation and common sense are also important. However direct testing is not needed.   5. Usually the patient's performance over the preceding 24-48 hours is important, but occasionally longer periods will be relevant. 6. Middle categories imply that the patient supplies over 50 per cent of the effort. 7. Use of aids to be independent is allowed. Africa Garcia., Barthel, D.W. (7622). Functional evaluation: the Barthel Index. [facility] (14)2. BruceLILIA Erwin, Renetta Abarca., [facility]., [place], [address] (1999). Measuring the change indisability after inpatient rehabilitation; comparison of the responsiveness of the Barthel Index and Functional [place] Measure. Journal of Neurology, Neurosurgery, and Psychiatry, 66(4), 356-530. Pricila Kelley, NMARYLIN.A, EHSAN Hernandez, & Sharon Gallo M.A. (2004.) Assessment of post-stroke quality of life in cost-effectiveness studies: The usefulness of the Barthel Index and the EuroQoL-5D. Quality of Life Research, 13, 433-76         G codes: In compliance with CMSs Claims Based Outcome Reporting, the following G-code set was chosen for this patient based on their primary functional limitation being treated: The outcome measure chosen to determine the severity of the functional limitation was the Barthel Index with a score of 65/100 which was correlated with the impairment scale. · Mobility - Walking and Moving Around:               - CURRENT STATUS:    CJ - 20%-39% impaired, limited or restricted               - GOAL STATUS:           CI - 1%-19% impaired, limited or restricted               - D/C STATUS:                       ---------------To be determined---------------      Pain:  Pain Scale 1: Numeric (0 - 10)  Pain Intensity 1: 6  Pain Location 1: Hip  Pain Orientation 1: Left  Pain Description 1: Aching  Pain Intervention(s) 1: Medication (see MAR)  Activity Tolerance:   Pt motivated to participate and eager to be discharged home. Please refer to the flowsheet for vital signs taken during this treatment.   After treatment:   [ ]  Patient left in no apparent distress sitting up in chair  [X]  Patient left in no apparent distress in bed  [X]  Call bell left within reach  [X]  Nursing notified  [ ]  Caregiver present  [ ]  Bed alarm activated      COMMUNICATION/EDUCATION:   The patients plan of care was discussed with: Registered Nurse.  [X]  Fall prevention education was provided and the patient/caregiver indicated understanding. [X]  Patient/family have participated as able in goal setting and plan of care. [X]  Patient/family agree to work toward stated goals and plan of care. [ ]  Patient understands intent and goals of therapy, but is neutral about his/her participation. [ ]  Patient is unable to participate in goal setting and plan of care.      Thank you for this referral.  Fracisco Gillespie, PT   Time Calculation: 20 mins

## 2017-02-12 LAB
BUN SERPL-MCNC: 33 MG/DL (ref 6–20)
CREAT SERPL-MCNC: 0.93 MG/DL (ref 0.55–1.02)
DATE LAST DOSE: ABNORMAL
HCT VFR BLD AUTO: 29.2 % (ref 35–47)
HGB BLD-MCNC: 8.9 G/DL (ref 11.5–16)
REPORTED DOSE,DOSE: ABNORMAL UNITS
REPORTED DOSE/TIME,TMG: 1100
VANCOMYCIN TROUGH SERPL-MCNC: 17.4 UG/ML (ref 5–10)

## 2017-02-12 PROCEDURE — 74011250637 HC RX REV CODE- 250/637: Performed by: ANESTHESIOLOGY

## 2017-02-12 PROCEDURE — 74011250637 HC RX REV CODE- 250/637: Performed by: NURSE PRACTITIONER

## 2017-02-12 PROCEDURE — 74011250636 HC RX REV CODE- 250/636: Performed by: NURSE PRACTITIONER

## 2017-02-12 PROCEDURE — 74011250637 HC RX REV CODE- 250/637: Performed by: INTERNAL MEDICINE

## 2017-02-12 PROCEDURE — 65270000029 HC RM PRIVATE

## 2017-02-12 PROCEDURE — 74011250636 HC RX REV CODE- 250/636: Performed by: ORTHOPAEDIC SURGERY

## 2017-02-12 PROCEDURE — 74011000250 HC RX REV CODE- 250: Performed by: ORTHOPAEDIC SURGERY

## 2017-02-12 PROCEDURE — 97116 GAIT TRAINING THERAPY: CPT

## 2017-02-12 PROCEDURE — 80202 ASSAY OF VANCOMYCIN: CPT | Performed by: INTERNAL MEDICINE

## 2017-02-12 PROCEDURE — 85018 HEMOGLOBIN: CPT | Performed by: INTERNAL MEDICINE

## 2017-02-12 PROCEDURE — 82565 ASSAY OF CREATININE: CPT | Performed by: INTERNAL MEDICINE

## 2017-02-12 PROCEDURE — 36415 COLL VENOUS BLD VENIPUNCTURE: CPT | Performed by: INTERNAL MEDICINE

## 2017-02-12 PROCEDURE — 74011250636 HC RX REV CODE- 250/636: Performed by: INTERNAL MEDICINE

## 2017-02-12 PROCEDURE — 84520 ASSAY OF UREA NITROGEN: CPT | Performed by: INTERNAL MEDICINE

## 2017-02-12 PROCEDURE — 74011000258 HC RX REV CODE- 258: Performed by: INTERNAL MEDICINE

## 2017-02-12 RX ADMIN — CALCIUM CARBONATE (ANTACID) CHEW TAB 500 MG 400 MG: 500 CHEW TAB at 08:41

## 2017-02-12 RX ADMIN — ATORVASTATIN CALCIUM 10 MG: 10 TABLET, FILM COATED ORAL at 08:42

## 2017-02-12 RX ADMIN — ACETAMINOPHEN 650 MG: 325 TABLET ORAL at 23:48

## 2017-02-12 RX ADMIN — CYCLOBENZAPRINE HYDROCHLORIDE 5 MG: 10 TABLET, FILM COATED ORAL at 06:11

## 2017-02-12 RX ADMIN — Medication 10 ML: at 06:11

## 2017-02-12 RX ADMIN — RIFAMPIN 600 MG: 300 CAPSULE ORAL at 06:11

## 2017-02-12 RX ADMIN — CELECOXIB 200 MG: 100 CAPSULE ORAL at 11:31

## 2017-02-12 RX ADMIN — ENOXAPARIN SODIUM 40 MG: 40 INJECTION SUBCUTANEOUS at 20:13

## 2017-02-12 RX ADMIN — METHADONE HYDROCHLORIDE 5 MG: 10 TABLET ORAL at 08:43

## 2017-02-12 RX ADMIN — ACETAMINOPHEN 650 MG: 325 TABLET ORAL at 06:11

## 2017-02-12 RX ADMIN — CELECOXIB 200 MG: 100 CAPSULE ORAL at 23:48

## 2017-02-12 RX ADMIN — LINACLOTIDE 290 MCG: 145 CAPSULE, GELATIN COATED ORAL at 06:11

## 2017-02-12 RX ADMIN — ACETAMINOPHEN 650 MG: 325 TABLET ORAL at 11:31

## 2017-02-12 RX ADMIN — FAMOTIDINE 20 MG: 20 TABLET, FILM COATED ORAL at 17:41

## 2017-02-12 RX ADMIN — CARVEDILOL 6.25 MG: 6.25 TABLET, FILM COATED ORAL at 08:42

## 2017-02-12 RX ADMIN — Medication 1 TABLET: at 17:41

## 2017-02-12 RX ADMIN — OXYCODONE HYDROCHLORIDE 15 MG: 5 TABLET ORAL at 04:44

## 2017-02-12 RX ADMIN — WATER 1 MG: 1 INJECTION INTRAMUSCULAR; INTRAVENOUS; SUBCUTANEOUS at 10:35

## 2017-02-12 RX ADMIN — ZOLPIDEM TARTRATE 5 MG: 5 TABLET, FILM COATED ORAL at 23:48

## 2017-02-12 RX ADMIN — CARVEDILOL 6.25 MG: 6.25 TABLET, FILM COATED ORAL at 17:41

## 2017-02-12 RX ADMIN — CALCIUM CARBONATE (ANTACID) CHEW TAB 500 MG 400 MG: 500 CHEW TAB at 11:31

## 2017-02-12 RX ADMIN — ACETAMINOPHEN 650 MG: 325 TABLET ORAL at 17:41

## 2017-02-12 RX ADMIN — Medication 10 ML: at 13:55

## 2017-02-12 RX ADMIN — Medication 1 TABLET: at 08:42

## 2017-02-12 RX ADMIN — METHADONE HYDROCHLORIDE 5 MG: 10 TABLET ORAL at 17:44

## 2017-02-12 RX ADMIN — UMECLIDINIUM 1 PUFF: 62.5 AEROSOL, POWDER ORAL at 08:48

## 2017-02-12 RX ADMIN — SUMATRIPTAN 50 MG: 25 TABLET ORAL at 15:28

## 2017-02-12 RX ADMIN — OXYCODONE HYDROCHLORIDE 15 MG: 5 TABLET ORAL at 11:31

## 2017-02-12 RX ADMIN — CYCLOBENZAPRINE HYDROCHLORIDE 5 MG: 10 TABLET, FILM COATED ORAL at 17:41

## 2017-02-12 RX ADMIN — Medication 10 ML: at 23:49

## 2017-02-12 RX ADMIN — ASPIRIN 81 MG CHEWABLE TABLET 81 MG: 81 TABLET CHEWABLE at 08:41

## 2017-02-12 RX ADMIN — CALCIUM CARBONATE (ANTACID) CHEW TAB 500 MG 400 MG: 500 CHEW TAB at 17:41

## 2017-02-12 RX ADMIN — FAMOTIDINE 20 MG: 20 TABLET, FILM COATED ORAL at 08:42

## 2017-02-12 RX ADMIN — VANCOMYCIN HYDROCHLORIDE 500 MG: 500 INJECTION, POWDER, LYOPHILIZED, FOR SOLUTION INTRAVENOUS at 23:48

## 2017-02-12 RX ADMIN — OXYCODONE HYDROCHLORIDE 15 MG: 5 TABLET ORAL at 20:22

## 2017-02-12 RX ADMIN — METHADONE HYDROCHLORIDE 5 MG: 10 TABLET ORAL at 01:51

## 2017-02-12 RX ADMIN — VANCOMYCIN HYDROCHLORIDE 500 MG: 500 INJECTION, POWDER, LYOPHILIZED, FOR SOLUTION INTRAVENOUS at 12:12

## 2017-02-12 NOTE — PROGRESS NOTES
Pt stable, no change in exam. PICOs functioning, saturated. - NPO p-mn  - Washout tomorrow if her drainage continues, last case.

## 2017-02-12 NOTE — PROGRESS NOTES
[facility] Dosing Services: Antimicrobial Stewardship Daily Doc    Consult for antibiotic dosing of Vancomycin by Dr. Willard Avendano  Indication: Left prosthetic hip septic arthritis   Day of Therapy 12 (until 3/29/2017)    Vancomycin therapy:  Current maintenance dose: 500(mg) every 12 hours (frequency). Trough goal 15-20 mcg/mL. Last trough level 17.4 mcg/ml drawn 1 hour late. Corrected trough 18 mcg/ml  Plan :continue current dose  Dose administration notes:   Doses given appropriately as scheduled    Pharmacy to follow daily.     Pharmacist Luz Maria Carpio                    [phone]

## 2017-02-12 NOTE — PROGRESS NOTES
Bedside and Verbal shift change report given to Silvia Peterson RN (oncoming nurse) by Bhargavi Dunbar RN (offgoing nurse). Report included the following information SBAR, Kardex, Procedure Summary, Intake/Output, MAR and Recent Results.

## 2017-02-12 NOTE — PROGRESS NOTES
0830.  Hold lisinopril per Dr. Katlin Ewing- also written order. Okay to administer coreg per Dr. Katlin Ewing. 1935. Bedside and Verbal shift change report given to Meghan Lai RN (oncoming nurse) by Loyd Amin RN (offgoing nurse). Report included the following information SBAR, Kardex, Intake/Output and Recent Results.

## 2017-02-13 ENCOUNTER — ANESTHESIA EVENT (OUTPATIENT)
Dept: SURGERY | Age: 77
DRG: 463 | End: 2017-02-13
Payer: MEDICARE

## 2017-02-13 LAB
BUN SERPL-MCNC: 23 MG/DL (ref 6–20)
CREAT SERPL-MCNC: 0.8 MG/DL (ref 0.55–1.02)
HCT VFR BLD AUTO: 29.1 % (ref 35–47)
HGB BLD-MCNC: 9 G/DL (ref 11.5–16)

## 2017-02-13 PROCEDURE — 74011250637 HC RX REV CODE- 250/637: Performed by: NURSE PRACTITIONER

## 2017-02-13 PROCEDURE — 85018 HEMOGLOBIN: CPT | Performed by: INTERNAL MEDICINE

## 2017-02-13 PROCEDURE — 97116 GAIT TRAINING THERAPY: CPT

## 2017-02-13 PROCEDURE — 74011000258 HC RX REV CODE- 258: Performed by: INTERNAL MEDICINE

## 2017-02-13 PROCEDURE — 84520 ASSAY OF UREA NITROGEN: CPT | Performed by: INTERNAL MEDICINE

## 2017-02-13 PROCEDURE — 74011250637 HC RX REV CODE- 250/637: Performed by: INTERNAL MEDICINE

## 2017-02-13 PROCEDURE — 74011250637 HC RX REV CODE- 250/637: Performed by: ANESTHESIOLOGY

## 2017-02-13 PROCEDURE — 65270000029 HC RM PRIVATE

## 2017-02-13 PROCEDURE — 74011250636 HC RX REV CODE- 250/636: Performed by: INTERNAL MEDICINE

## 2017-02-13 PROCEDURE — 36415 COLL VENOUS BLD VENIPUNCTURE: CPT | Performed by: INTERNAL MEDICINE

## 2017-02-13 PROCEDURE — 82565 ASSAY OF CREATININE: CPT | Performed by: INTERNAL MEDICINE

## 2017-02-13 PROCEDURE — 74011250636 HC RX REV CODE- 250/636: Performed by: NURSE PRACTITIONER

## 2017-02-13 RX ADMIN — VANCOMYCIN HYDROCHLORIDE 500 MG: 500 INJECTION, POWDER, LYOPHILIZED, FOR SOLUTION INTRAVENOUS at 13:29

## 2017-02-13 RX ADMIN — LINACLOTIDE 290 MCG: 145 CAPSULE, GELATIN COATED ORAL at 06:52

## 2017-02-13 RX ADMIN — CARVEDILOL 6.25 MG: 6.25 TABLET, FILM COATED ORAL at 08:56

## 2017-02-13 RX ADMIN — LISINOPRIL 10 MG: 20 TABLET ORAL at 08:55

## 2017-02-13 RX ADMIN — ENOXAPARIN SODIUM 40 MG: 40 INJECTION SUBCUTANEOUS at 20:38

## 2017-02-13 RX ADMIN — CYCLOBENZAPRINE HYDROCHLORIDE 5 MG: 10 TABLET, FILM COATED ORAL at 17:11

## 2017-02-13 RX ADMIN — METHADONE HYDROCHLORIDE 5 MG: 10 TABLET ORAL at 01:22

## 2017-02-13 RX ADMIN — Medication 10 ML: at 05:39

## 2017-02-13 RX ADMIN — RIFAMPIN 600 MG: 300 CAPSULE ORAL at 06:52

## 2017-02-13 RX ADMIN — CALCIUM CARBONATE (ANTACID) CHEW TAB 500 MG 400 MG: 500 CHEW TAB at 13:26

## 2017-02-13 RX ADMIN — CALCIUM CARBONATE (ANTACID) CHEW TAB 500 MG 400 MG: 500 CHEW TAB at 19:31

## 2017-02-13 RX ADMIN — METHADONE HYDROCHLORIDE 5 MG: 10 TABLET ORAL at 08:56

## 2017-02-13 RX ADMIN — Medication 1 TABLET: at 08:56

## 2017-02-13 RX ADMIN — OXYCODONE HYDROCHLORIDE 15 MG: 5 TABLET ORAL at 01:22

## 2017-02-13 RX ADMIN — Medication 10 ML: at 17:12

## 2017-02-13 RX ADMIN — CELECOXIB 200 MG: 100 CAPSULE ORAL at 23:33

## 2017-02-13 RX ADMIN — Medication 10 ML: at 23:34

## 2017-02-13 RX ADMIN — CYCLOBENZAPRINE HYDROCHLORIDE 5 MG: 10 TABLET, FILM COATED ORAL at 05:43

## 2017-02-13 RX ADMIN — CALCIUM CARBONATE (ANTACID) CHEW TAB 500 MG 400 MG: 500 CHEW TAB at 08:55

## 2017-02-13 RX ADMIN — OXYCODONE HYDROCHLORIDE 15 MG: 5 TABLET ORAL at 10:57

## 2017-02-13 RX ADMIN — FAMOTIDINE 20 MG: 20 TABLET, FILM COATED ORAL at 08:56

## 2017-02-13 RX ADMIN — ATORVASTATIN CALCIUM 10 MG: 10 TABLET, FILM COATED ORAL at 08:55

## 2017-02-13 RX ADMIN — ACETAMINOPHEN 650 MG: 325 TABLET ORAL at 13:27

## 2017-02-13 RX ADMIN — ASPIRIN 81 MG CHEWABLE TABLET 81 MG: 81 TABLET CHEWABLE at 08:56

## 2017-02-13 RX ADMIN — CELECOXIB 200 MG: 100 CAPSULE ORAL at 10:57

## 2017-02-13 RX ADMIN — ACETAMINOPHEN 650 MG: 325 TABLET ORAL at 17:10

## 2017-02-13 RX ADMIN — OXYCODONE HYDROCHLORIDE 15 MG: 5 TABLET ORAL at 04:09

## 2017-02-13 RX ADMIN — POLYETHYLENE GLYCOL 3350 17 G: 17 POWDER, FOR SOLUTION ORAL at 08:56

## 2017-02-13 RX ADMIN — ACETAMINOPHEN 650 MG: 325 TABLET ORAL at 05:39

## 2017-02-13 RX ADMIN — FAMOTIDINE 20 MG: 20 TABLET, FILM COATED ORAL at 17:11

## 2017-02-13 RX ADMIN — ACETAMINOPHEN 650 MG: 325 TABLET ORAL at 23:33

## 2017-02-13 RX ADMIN — VANCOMYCIN HYDROCHLORIDE 500 MG: 500 INJECTION, POWDER, LYOPHILIZED, FOR SOLUTION INTRAVENOUS at 23:34

## 2017-02-13 RX ADMIN — OXYCODONE HYDROCHLORIDE 15 MG: 5 TABLET ORAL at 23:33

## 2017-02-13 RX ADMIN — Medication 1 TABLET: at 17:10

## 2017-02-13 RX ADMIN — METHADONE HYDROCHLORIDE 5 MG: 10 TABLET ORAL at 17:11

## 2017-02-13 RX ADMIN — OXYCODONE HYDROCHLORIDE 15 MG: 5 TABLET ORAL at 20:38

## 2017-02-13 NOTE — PROGRESS NOTES
Problem: Mobility Impaired (Adult and Pediatric)  Goal: *Acute Goals and Plan of Care (Insert Text)  Physical Therapy Goals  Initiated 2/11/2017    1. Patient will move from supine to sit and sit to supine in bed with independence within 4 days. 2. Patient will perform sit to stand with modified independence within 4 days. 3. Patient will ambulate with modified independence for 200 feet with the least restrictive device within 4 days. 4. Patient will verbalize and demonstrate understanding of lateral precautions per protocol within 4 days. 5. Patient will perform all home exercise program per protocol with supervision/set-up within 4 days. Physical Therapy Goals  Initiated 2/1/2017    1. Patient will move from supine to sit and sit to supine , scoot up and down and roll side to side in bed with modified independence within 4 days. 2. Patient will perform sit to stand with modified independence within 4 days. 3. Patient will ambulate with modified independence for 200 feet with the least restrictive device within 4 days. 4. Patient will verbalize and demonstrate understanding of lateral precautions per protocol within 4 days. 5. Patient will perform all home exercise program per protocol with independence within 4 days. PHYSICAL THERAPY TREATMENT  Patient: Sujey Neal (33 y.o. female)  Date: 2/13/2017  Diagnosis: LEFT HIP BIPOLAR  Failed total joint replacement (HCC)  Failed total hip arthroplasty ([facility] [address] <principal problem not specified>  Procedure(s) (LRB):  LEFT TOTAL HIP ARTHROPLASTY CONVERSION (Left) 14 Days Post-Op  Precautions: WBAT (LLE)      ASSESSMENT:  Pt agreeable to participate with physical therapy. Performs functional transfers using RW with CGA, including bathroom transfers. CGA for gait training x 50 ' using RW for steadying. Fatigues quickly. No increased drainage noted on dressing after gait training from AM outline.   Progression toward goals:  [ ]      Improving appropriately and progressing toward goals  [X]      Improving slowly and progressing toward goals  [ ]      Not making progress toward goals and plan of care will be adjusted       PLAN:  Patient continues to benefit from skilled intervention to address the above impairments. Continue treatment per established plan of care. Discharge Recommendations:  Skilled Nursing Facility  Further Equipment Recommendations for Discharge:  TBD       SUBJECTIVE:   Patient stated I don't know what is going to happen.   The patient stated 3/3 hip precautions. Reviewed all 3 with patient. OBJECTIVE DATA SUMMARY:   Critical Behavior:  Neurologic State: Alert  Orientation Level: Oriented X4  Cognition: Appropriate decision making, Appropriate for age attention/concentration, Appropriate safety awareness, Follows commands  Safety/Judgement: Awareness of environment, Insight into deficits, Good awareness of safety precautions  Functional Mobility Training:  Bed Mobility:     Supine to Sit: Stand-by asssistance  Sit to Supine: Stand-by asssistance  Scooting: Stand-by asssistance        Transfers:  Sit to Stand: Contact guard assistance  Stand to Sit: Contact guard assistance                             Balance:  Standing - Static: Good;Constant support  Standing - Dynamic : Fair  Ambulation/Gait Training:  Distance (ft): 50 Feet (ft)  Assistive Device: Walker, rolling;Gait belt  Ambulation - Level of Assistance: Contact guard assistance        Gait Abnormalities: Antalgic; Step to gait        Base of Support: Narrowed     Speed/Jeanie: Slow  Step Length: Left shortened;Right shortened                               Stairs:               Therapeutic Exercises:   SUPINE  EXERCISES   Sets   Reps   Active Active Assist   Passive Self ROM   Comments   Ankle Pumps     [X]                                           [ ]                                           [ ]                                           [ ] Quad Sets     [X]                                           [ ]                                           [ ]                                           [ ]                                               Toni Barone     [ ]                                           [ ]                                           [ ]                                           [ ]                                               Hip Abduction     [ ]                                           [ ]                                           [ ]                                           [ ]                                               Hip External Rotation     [ ]                                           [ ]                                           [ ]                                           [ ]                                               Glut Sets     [ ]                                           [ ]                                           [ ]                                           [ ]                                                     [ ]                                           [ ]                                           [ ]                                           [ ]                                                     [ ]                                           [ ]                                           [ ]                                           [ ]                                                   STANDING  EXERCISES   Sets   Reps   Active Active Assist   Passive Self ROM   Comments   Heel Raises     [X]                                           [ ]                                           [ ]                                           [ ]                                               Hip Abduction     [ ]                                           [ ]                                           [ ]                                           [ ]                                               Hip External Rotation     [ ]                                           [ ]                                           [ ]                                           [ ]                                               Orlene Leal     [ ]                                           [ ]                                           [ ]                                           [ ]                                               Mini squats     [ ]                                           [ ]                                           [ ]                                           [ ]                                               Auguste Mhaer     [ ]                                           [ ]                                           [ ]                                           [ ]                                                  Pain:  Pain Scale 1: Numeric (0 - 10)  Pain Intensity 1: 8  Pain Location 1: Hip  Pain Orientation 1: Left  Pain Description 1: Aching  Pain Intervention(s) 1: Medication (see MAR)  Activity Tolerance:   Good  Please refer to the flowsheet for vital signs taken during this treatment.   After treatment:   [ ]  Patient left in no apparent distress sitting up in chair  [X]  Patient left in no apparent distress in bed  [X]  Call bell left within reach  [X]  Nursing notified  [ ]  Caregiver present  [ ]  Bed alarm activated      COMMUNICATION/COLLABORATION:   The patients plan of care was discussed with: Registered Nurse     Bautista Eubanks   Time Calculation: 19 mins

## 2017-02-13 NOTE — PROGRESS NOTES
[facility] Infectious Diseases Progress Note  Donavan Alcocer MD, AQUILINO Varela DO, NP     [facility], [address], [place] [id number]  Office: [phone]   Fax: [phone]    2017      Assessment & Plan: Vancomycin, Rifampin   1. 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 OR Cx with Staph Lugdunensis, oxacillin resistant. Continue Vancomycin and Rifampin PO through 3/29/17, for 8 weeks. Patient got PIC line. Outpt Abx orders in chart. Still some drainage from Sx site, watch closely. 2. NSEMI          Subjective:     Says pain under control.     Objective:     Vitals:   Visit Vitals    /56    Pulse 70    Temp 98.1 °F (36.7 °C)    Resp 20    Ht 5' 5\" (1.651 m)    Wt 61.4 kg (135 lb 5.8 oz)    SpO2 98%    BMI 22.53 kg/m2     Temp (24hrs), Av.4 °F (36.9 °C), Min:98 °F (36.7 °C), Max:98.6 °F (37 °C)    Recent Labs      17   0420  17   0037  17   0230   BUN  23*  33*  26*   CREA  0.80  0.93  0.79   HGB  9.0*  8.9*  9.0*   HCT  29.1*  29.2*  27.7*       Exam:    General: resting comfortably in bed, NAD  HEENT: pallor, no icterus, pupils are reactive  Chest: CTA b/l  Heart: RRR S1S2  Abdomen: soft, NT, ND, BS+  Extremities: lt hip under dressing        Cultures:     Lab Results   Component Value Date/Time    Culture result:  2017 02:14 PM     LIGHT  STAPHYLOCOCCUS LUGDUNENSIS  (OXACILLIN RESISTANT)      Culture result:  2017 02:12 PM     FEW  STAPHYLOCOCCUS LUGDUNENSIS  (OXACILLIN RESISTANT)      Culture result:  2017 01:20 PM     LIGHT  STAPHYLOCOCCUS LUGDUNENSIS  PLEASE REFER TO [facility] [id number] FOR SUSCEPTIBILITIES      Culture result:  2017 01:20 PM     FEW  STAPHYLOCOCCUS LUGDUNENSIS  PLEASE REFER TO [facility] [id number] FOR SUSCEPTIBILITIES         Radiology:   Reviewed      Medications:        Current Facility-Administered Medications   Medication Dose Route Frequency Last Dose    vancomycin (VANCOCIN) 500 mg in 0.9% sodium chloride (MBP/ADV) 100 mL  500 mg IntraVENous Q12H 500 mg at 02/12/17 2348    famotidine (PEPCID) tablet 20 mg  20 mg Oral BID 20 mg at 02/13/17 0856    aspirin chewable tablet 81 mg  81 mg Oral DAILY 81 mg at 02/13/17 0856    sodium chloride (NS) flush 10-30 mL  10-30 mL InterCATHeter PRN 10 mL at 02/09/17 2018    sodium chloride (NS) flush 10-40 mL  10-40 mL InterCATHeter Q8H 10 mL at 02/13/17 0539    alteplase (CATHFLO) 1 mg in sterile water (preservative free) 1 mL injection  1 mg InterCATHeter PRN 1 mg at 02/12/17 1035    atorvastatin (LIPITOR) tablet 10 mg  10 mg Oral DAILY 10 mg at 02/13/17 0855    lisinopril (PRINIVIL, ZESTRIL) tablet 10 mg  10 mg Oral DAILY 10 mg at 02/13/17 0855    hydrALAZINE (APRESOLINE) 20 mg/mL injection 10 mg  10 mg IntraVENous Q2H PRN      carvedilol (COREG) tablet 6.25 mg  6.25 mg Oral BID WITH MEALS 6.25 mg at 02/13/17 0856    rifAMPin (RIFADIN) capsule 600 mg  600 mg Oral  mg at 02/13/17 5435    enoxaparin (LOVENOX) injection 40 mg  40 mg SubCUTAneous Q24H 40 mg at 02/12/17 2013    0.9% sodium chloride infusion  25 mL/hr IntraVENous CONTINUOUS 25 mL/hr at 02/06/17 0017    cyclobenzaprine (FLEXERIL) tablet 5 mg  5 mg Oral TID PRN 5 mg at 02/13/17 0543    0.9% sodium chloride infusion 250 mL  250 mL IntraVENous PRN      celecoxib (CELEBREX) capsule 200 mg  200 mg Oral Q12H 200 mg at 02/12/17 2348    acetaminophen (TYLENOL) tablet 650 mg  650 mg Oral Q6H 650 mg at 02/13/17 0539    albuterol (PROVENTIL HFA, VENTOLIN HFA, PROAIR HFA) inhaler 2 Puff  2 Puff Inhalation Q6H PRN      calcium carbonate (TUMS) chewable tablet 400 mg [elemental]  400 mg Oral TID WITH MEALS 400 mg at 02/13/17 0855    linaclotide (LINZESS) capsule 290 mcg  290 mcg Oral  mcg at 02/13/17 2796    methadone (DOLOPHINE) tablet 5 mg  5 mg Oral Q8H 5 mg at 02/13/17 0856    SUMAtriptan (IMITREX) tablet 50 mg  50 mg Oral ONCE PRN 50 mg at 02/12/17 1521  umeclidinium (INCRUSE ELLIPTA) 62.5 mcg/actuation  1 Puff Inhalation DAILY 1 Puff at 02/12/17 0848    zolpidem (AMBIEN) tablet 5 mg  5 mg Oral QHS PRN 5 mg at 02/12/17 2348    naloxone (NARCAN) injection 0.4 mg  0.4 mg IntraVENous PRN      senna-docusate (PERICOLACE) 8.6-50 mg per tablet 1 Tab  1 Tab Oral BID 1 Tab at 02/13/17 0856    polyethylene glycol (MIRALAX) packet 17 g  17 g Oral DAILY 17 g at 02/13/17 0856    bisacodyl (DULCOLAX) suppository 10 mg  10 mg Rectal DAILY PRN      oxyCODONE IR (ROXICODONE) tablet 15 mg  15 mg Oral Q3H PRN 15 mg at 02/13/17 0409         Case discussed with: patient.           Lambert Castillo MD    Signed By: February 13, 2017 February 13, 2017

## 2017-02-13 NOTE — WOUND CARE
Wound care follow up  PICOS dressing removed per nursing due to increased drainage, dry dressing replaced. MD evaluating incision to determine plan of care. Will follow, reconsult as needed.

## 2017-02-13 NOTE — PROGRESS NOTES
Pt stable today. PE    PICOs removed, Primapore dressing in place. Moderate central drainage. MS intact    A/P Stable, moderate drainage only this am  - Will re-evaluate this afternoon, if continued drainage then I&D tomorrow.    - May eat today  - Possible placement this afternoon

## 2017-02-13 NOTE — PROGRESS NOTES
Problem: Pain  Goal: *Control of Pain  Outcome: Not Progressing Towards Goal  Patient did not reach pain goal of 3/10. Bedside and Verbal shift change report given to Triny Brody RN (oncoming nurse) by Neeru Sy RN (offgoing nurse). Report included the following information SBAR, Kardex, Procedure Summary, Intake/Output, MAR, Accordion and Recent Results.

## 2017-02-13 NOTE — PROGRESS NOTES
Patient has a small dime size area on lower left leg it is soft, blister that is red in color and intact does not cause pain. Dressing to left hip was changed large amount of drainage noted on dressing new prima pore applied. 7:46 PM  Bedside shift change report given to Samson Pineda (oncoming nurse) by Shantel Saldaña (offgoing nurse). Report included the following information SBAR, Kardex and MAR.

## 2017-02-13 NOTE — PROGRESS NOTES
2/13/2017 4:05 PM Called and lvm with admissions again at the [facility]. CM will follow up.      2/13/2017  12:11 PM Called and lvm with admissions at the [facility]. CM will follow up.     2/13/2017 8:32 AM Updates sent to [facility] via [address]. CM will follow up.  Virlinda Bamberger, BSW

## 2017-02-14 ENCOUNTER — ANESTHESIA (OUTPATIENT)
Dept: SURGERY | Age: 77
DRG: 463 | End: 2017-02-14
Payer: MEDICARE

## 2017-02-14 LAB
BUN SERPL-MCNC: 25 MG/DL (ref 6–20)
CREAT SERPL-MCNC: 0.78 MG/DL (ref 0.55–1.02)
HCT VFR BLD AUTO: 30.5 % (ref 35–47)
HGB BLD-MCNC: 9.8 G/DL (ref 11.5–16)

## 2017-02-14 PROCEDURE — 85018 HEMOGLOBIN: CPT | Performed by: INTERNAL MEDICINE

## 2017-02-14 PROCEDURE — 0JBM0ZZ EXCISION OF LEFT UPPER LEG SUBCUTANEOUS TISSUE AND FASCIA, OPEN APPROACH: ICD-10-PCS | Performed by: ORTHOPAEDIC SURGERY

## 2017-02-14 PROCEDURE — 74011250636 HC RX REV CODE- 250/636

## 2017-02-14 PROCEDURE — 74011250636 HC RX REV CODE- 250/636: Performed by: NURSE PRACTITIONER

## 2017-02-14 PROCEDURE — 74011250637 HC RX REV CODE- 250/637: Performed by: NURSE PRACTITIONER

## 2017-02-14 PROCEDURE — 74011250637 HC RX REV CODE- 250/637: Performed by: INTERNAL MEDICINE

## 2017-02-14 PROCEDURE — 87205 SMEAR GRAM STAIN: CPT | Performed by: ORTHOPAEDIC SURGERY

## 2017-02-14 PROCEDURE — 36415 COLL VENOUS BLD VENIPUNCTURE: CPT | Performed by: INTERNAL MEDICINE

## 2017-02-14 PROCEDURE — 77030020753 HC CUF TRNQT 1BLA STRY -B: Performed by: ORTHOPAEDIC SURGERY

## 2017-02-14 PROCEDURE — 84520 ASSAY OF UREA NITROGEN: CPT | Performed by: INTERNAL MEDICINE

## 2017-02-14 PROCEDURE — 77030018673: Performed by: ORTHOPAEDIC SURGERY

## 2017-02-14 PROCEDURE — 74011000250 HC RX REV CODE- 250

## 2017-02-14 PROCEDURE — 77030011640 HC PAD GRND REM COVD -A: Performed by: ORTHOPAEDIC SURGERY

## 2017-02-14 PROCEDURE — 77030002916 HC SUT ETHLN J&J -A: Performed by: ORTHOPAEDIC SURGERY

## 2017-02-14 PROCEDURE — 0S9B0ZX DRAINAGE OF LEFT HIP JOINT, OPEN APPROACH, DIAGNOSTIC: ICD-10-PCS | Performed by: ORTHOPAEDIC SURGERY

## 2017-02-14 PROCEDURE — 77030031139 HC SUT VCRL2 J&J -A: Performed by: ORTHOPAEDIC SURGERY

## 2017-02-14 PROCEDURE — 74011250637 HC RX REV CODE- 250/637: Performed by: ANESTHESIOLOGY

## 2017-02-14 PROCEDURE — 77030020782 HC GWN BAIR PAWS FLX 3M -B

## 2017-02-14 PROCEDURE — 77030002933 HC SUT MCRYL J&J -A: Performed by: ORTHOPAEDIC SURGERY

## 2017-02-14 PROCEDURE — 76030000001 HC AMB SURG OR TIME 1 TO 1.5: Performed by: ORTHOPAEDIC SURGERY

## 2017-02-14 PROCEDURE — 77030013079 HC BLNKT BAIR HGGR 3M -A: Performed by: ANESTHESIOLOGY

## 2017-02-14 PROCEDURE — 65270000029 HC RM PRIVATE

## 2017-02-14 PROCEDURE — 76210000035 HC AMBSU PH I REC 1 TO 1.5 HR: Performed by: ORTHOPAEDIC SURGERY

## 2017-02-14 PROCEDURE — 74011000272 HC RX REV CODE- 272: Performed by: ORTHOPAEDIC SURGERY

## 2017-02-14 PROCEDURE — 82565 ASSAY OF CREATININE: CPT | Performed by: INTERNAL MEDICINE

## 2017-02-14 PROCEDURE — 77030018836 HC SOL IRR NACL ICUM -A: Performed by: ORTHOPAEDIC SURGERY

## 2017-02-14 PROCEDURE — 77030008467 HC STPLR SKN COVD -B: Performed by: ORTHOPAEDIC SURGERY

## 2017-02-14 PROCEDURE — 74011000250 HC RX REV CODE- 250: Performed by: ORTHOPAEDIC SURGERY

## 2017-02-14 PROCEDURE — 77030007866 HC KT SPN ANES BBMI -B: Performed by: ANESTHESIOLOGY

## 2017-02-14 PROCEDURE — 77030002986 HC SUT PROL J&J -A: Performed by: ORTHOPAEDIC SURGERY

## 2017-02-14 PROCEDURE — 74011250636 HC RX REV CODE- 250/636: Performed by: INTERNAL MEDICINE

## 2017-02-14 PROCEDURE — 87075 CULTR BACTERIA EXCEPT BLOOD: CPT | Performed by: ORTHOPAEDIC SURGERY

## 2017-02-14 PROCEDURE — 77030028224 HC PDNG CST BSNM -A: Performed by: ORTHOPAEDIC SURGERY

## 2017-02-14 PROCEDURE — 76060000062 HC AMB SURG ANES 1 TO 1.5 HR: Performed by: ORTHOPAEDIC SURGERY

## 2017-02-14 PROCEDURE — 74011000258 HC RX REV CODE- 258: Performed by: INTERNAL MEDICINE

## 2017-02-14 RX ORDER — DIAZEPAM 5 MG/1
5 TABLET ORAL ONCE
Status: COMPLETED | OUTPATIENT
Start: 2017-02-14 | End: 2017-02-14

## 2017-02-14 RX ORDER — DIAZEPAM 5 MG/1
5 TABLET ORAL
Status: DISCONTINUED | OUTPATIENT
Start: 2017-02-14 | End: 2017-02-16 | Stop reason: HOSPADM

## 2017-02-14 RX ORDER — SODIUM CHLORIDE, SODIUM LACTATE, POTASSIUM CHLORIDE, CALCIUM CHLORIDE 600; 310; 30; 20 MG/100ML; MG/100ML; MG/100ML; MG/100ML
INJECTION, SOLUTION INTRAVENOUS
Status: DISCONTINUED | OUTPATIENT
Start: 2017-02-14 | End: 2017-02-14 | Stop reason: HOSPADM

## 2017-02-14 RX ORDER — SODIUM CHLORIDE 0.9 % (FLUSH) 0.9 %
5-10 SYRINGE (ML) INJECTION AS NEEDED
Status: DISCONTINUED | OUTPATIENT
Start: 2017-02-14 | End: 2017-02-14 | Stop reason: HOSPADM

## 2017-02-14 RX ORDER — FENTANYL CITRATE 50 UG/ML
INJECTION, SOLUTION INTRAMUSCULAR; INTRAVENOUS AS NEEDED
Status: DISCONTINUED | OUTPATIENT
Start: 2017-02-14 | End: 2017-02-14 | Stop reason: HOSPADM

## 2017-02-14 RX ORDER — BUPIVACAINE HYDROCHLORIDE 7.5 MG/ML
INJECTION, SOLUTION EPIDURAL; RETROBULBAR AS NEEDED
Status: DISCONTINUED | OUTPATIENT
Start: 2017-02-14 | End: 2017-02-14 | Stop reason: HOSPADM

## 2017-02-14 RX ORDER — SODIUM CHLORIDE, SODIUM LACTATE, POTASSIUM CHLORIDE, CALCIUM CHLORIDE 600; 310; 30; 20 MG/100ML; MG/100ML; MG/100ML; MG/100ML
100 INJECTION, SOLUTION INTRAVENOUS CONTINUOUS
Status: DISCONTINUED | OUTPATIENT
Start: 2017-02-14 | End: 2017-02-14 | Stop reason: HOSPADM

## 2017-02-14 RX ORDER — SODIUM CHLORIDE 0.9 % (FLUSH) 0.9 %
5-10 SYRINGE (ML) INJECTION AS NEEDED
Status: DISCONTINUED | OUTPATIENT
Start: 2017-02-14 | End: 2017-02-16 | Stop reason: HOSPADM

## 2017-02-14 RX ORDER — SODIUM CHLORIDE 0.9 % (FLUSH) 0.9 %
5-10 SYRINGE (ML) INJECTION EVERY 8 HOURS
Status: DISCONTINUED | OUTPATIENT
Start: 2017-02-14 | End: 2017-02-16 | Stop reason: HOSPADM

## 2017-02-14 RX ORDER — PROPOFOL 10 MG/ML
INJECTION, EMULSION INTRAVENOUS
Status: DISCONTINUED | OUTPATIENT
Start: 2017-02-14 | End: 2017-02-14 | Stop reason: HOSPADM

## 2017-02-14 RX ADMIN — ACETAMINOPHEN 650 MG: 325 TABLET ORAL at 17:48

## 2017-02-14 RX ADMIN — ZOLPIDEM TARTRATE 5 MG: 5 TABLET, FILM COATED ORAL at 21:55

## 2017-02-14 RX ADMIN — METHADONE HYDROCHLORIDE 5 MG: 10 TABLET ORAL at 17:48

## 2017-02-14 RX ADMIN — DIAZEPAM 5 MG: 5 TABLET ORAL at 11:32

## 2017-02-14 RX ADMIN — PROPOFOL 25 MCG/KG/MIN: 10 INJECTION, EMULSION INTRAVENOUS at 13:40

## 2017-02-14 RX ADMIN — OXYCODONE HYDROCHLORIDE 15 MG: 5 TABLET ORAL at 02:52

## 2017-02-14 RX ADMIN — Medication 1 TABLET: at 17:48

## 2017-02-14 RX ADMIN — FENTANYL CITRATE 50 MCG: 50 INJECTION, SOLUTION INTRAMUSCULAR; INTRAVENOUS at 13:41

## 2017-02-14 RX ADMIN — OXYCODONE HYDROCHLORIDE 15 MG: 5 TABLET ORAL at 21:55

## 2017-02-14 RX ADMIN — METHADONE HYDROCHLORIDE 5 MG: 10 TABLET ORAL at 00:29

## 2017-02-14 RX ADMIN — SODIUM CHLORIDE, SODIUM LACTATE, POTASSIUM CHLORIDE, CALCIUM CHLORIDE: 600; 310; 30; 20 INJECTION, SOLUTION INTRAVENOUS at 10:45

## 2017-02-14 RX ADMIN — Medication 10 ML: at 21:56

## 2017-02-14 RX ADMIN — FENTANYL CITRATE 50 MCG: 50 INJECTION, SOLUTION INTRAMUSCULAR; INTRAVENOUS at 12:56

## 2017-02-14 RX ADMIN — LINACLOTIDE 290 MCG: 145 CAPSULE, GELATIN COATED ORAL at 06:30

## 2017-02-14 RX ADMIN — CELECOXIB 200 MG: 100 CAPSULE ORAL at 21:57

## 2017-02-14 RX ADMIN — UMECLIDINIUM 1 PUFF: 62.5 AEROSOL, POWDER ORAL at 09:00

## 2017-02-14 RX ADMIN — METHADONE HYDROCHLORIDE 5 MG: 10 TABLET ORAL at 08:25

## 2017-02-14 RX ADMIN — CARVEDILOL 6.25 MG: 6.25 TABLET, FILM COATED ORAL at 17:48

## 2017-02-14 RX ADMIN — VANCOMYCIN HYDROCHLORIDE 500 MG: 500 INJECTION, POWDER, LYOPHILIZED, FOR SOLUTION INTRAVENOUS at 12:01

## 2017-02-14 RX ADMIN — ENOXAPARIN SODIUM 40 MG: 40 INJECTION SUBCUTANEOUS at 20:01

## 2017-02-14 RX ADMIN — CARVEDILOL 6.25 MG: 6.25 TABLET, FILM COATED ORAL at 08:25

## 2017-02-14 RX ADMIN — BUPIVACAINE HYDROCHLORIDE 2.4 ML: 7.5 INJECTION, SOLUTION EPIDURAL; RETROBULBAR at 13:03

## 2017-02-14 RX ADMIN — ACETAMINOPHEN 650 MG: 325 TABLET ORAL at 06:30

## 2017-02-14 RX ADMIN — FAMOTIDINE 20 MG: 20 TABLET, FILM COATED ORAL at 08:25

## 2017-02-14 RX ADMIN — CYCLOBENZAPRINE HYDROCHLORIDE 5 MG: 10 TABLET, FILM COATED ORAL at 08:24

## 2017-02-14 RX ADMIN — Medication 10 ML: at 17:00

## 2017-02-14 RX ADMIN — DIAZEPAM 5 MG: 5 TABLET ORAL at 23:15

## 2017-02-14 RX ADMIN — OXYCODONE HYDROCHLORIDE 15 MG: 5 TABLET ORAL at 08:24

## 2017-02-14 RX ADMIN — ACETAMINOPHEN 650 MG: 325 TABLET ORAL at 23:15

## 2017-02-14 RX ADMIN — VANCOMYCIN HYDROCHLORIDE 500 MG: 500 INJECTION, POWDER, LYOPHILIZED, FOR SOLUTION INTRAVENOUS at 23:15

## 2017-02-14 RX ADMIN — RIFAMPIN 600 MG: 300 CAPSULE ORAL at 06:30

## 2017-02-14 RX ADMIN — CALCIUM CARBONATE (ANTACID) CHEW TAB 500 MG 400 MG: 500 CHEW TAB at 17:48

## 2017-02-14 RX ADMIN — FAMOTIDINE 20 MG: 20 TABLET, FILM COATED ORAL at 17:48

## 2017-02-14 NOTE — ANESTHESIA POSTPROCEDURE EVALUATION
Post-Anesthesia Evaluation and Assessment    Patient: Onelia Montero MRN: [id number]  SSN: [id number]    YOB: 1940  Age: 68 y.o. Sex: female       Cardiovascular Function/Vital Signs  Visit Vitals    /47    Pulse 72    Temp 36.7 °C (98.1 °F)    Resp 14    Ht 5' 5\" (1.651 m)    Wt 63.8 kg (140 lb 10.5 oz)    SpO2 99%    BMI 23.41 kg/m2       Patient is status post spinal anesthesia for Procedure(s):  INCISION AND DRAINAGE HIP WITH WASHOUT AND CLOSURE. Nausea/Vomiting: None    Postoperative hydration reviewed and adequate. Pain:  Pain Scale 1: Numeric (0 - 10) (02/14/17 1530)  Pain Intensity 1: 0 (02/14/17 1530)   Managed    Neurological Status:   Neuro (WDL): Within Defined Limits (02/14/17 1456)  Neuro  Neurologic State: Alert (02/14/17 1456)  Orientation Level: Oriented X4 (02/14/17 0740)  Cognition: Appropriate decision making; Appropriate for age attention/concentration; Appropriate safety awareness (02/14/17 0740)  Speech: Clear (02/14/17 0740)  Assessment L Pupil: Brisk;Round (02/03/17 1914)  Size L Pupil (mm): 2 (02/03/17 1914)  Assessment R Pupil: Brisk;Round (02/03/17 1914)  Size R Pupil (mm): 2 (02/03/17 1914)  LUE Motor Response: Purposeful (02/14/17 1456)  LLE Motor Response: Pharmacologically paralyzed (02/14/17 1456)  RUE Motor Response: Purposeful (02/14/17 1456)  RLE Motor Response: Pharmacologically paralyzed (02/14/17 1456)   At baseline    Mental Status and Level of Consciousness: Arousable    Pulmonary Status:   O2 Device: Room air (02/14/17 1451)   Adequate oxygenation and airway patent    Complications related to anesthesia: None    Post-anesthesia assessment completed.  No concerns    Signed By: Karen Garzon MD     February 14, 2017

## 2017-02-14 NOTE — ANESTHESIA PROCEDURE NOTES
Spinal Block    Start time: 2/14/2017 12:57 PM  End time: 2/14/2017 1:03 PM  Performed by: Iam Austin  Authorized by: Iam Austin     Pre-procedure:   Indications: primary anesthetic  Preanesthetic Checklist: patient identified, risks and benefits discussed, anesthesia consent, site marked, patient being monitored and timeout performed    Timeout Time: 12:57          Spinal Block:   Patient Position:  Seated  Prep Region:  Lumbar  Prep: DuraPrep and patient draped      Location:  L3-4  Technique:  Single shot  Local:  Lidocaine 1%  Local Dose (mL):  3    Needle:   Needle Type:  Pencan  Needle Gauge:  25 G  Attempts:  1      Events: CSF confirmed, no blood with aspiration and no paresthesia        Assessment:  Insertion:  Uncomplicated  Patient tolerance:  Patient tolerated the procedure well with no immediate complications

## 2017-02-14 NOTE — OP NOTES
OPERATIVE REPORT    Admit Date: 1/30/2017  Admit Diagnosis: LEFT HIP BIPOLAR  Failed total joint replacement (HCC)  Failed total hip arthroplasty ([facility] [address]  DEEP WOUND INFECTION    Date of Procedure: 2/14/2017   Preoperative Diagnosis: DEEP WOUND INFECTION  Postoperative Diagnosis: DEEP WOUND INFECTION    Procedure: Procedure(s):  INCISION AND DRAINAGE HIP WITH WASHOUT AND CLOSURE with intermediate complexity skin closure. Surgeon: Jeffery Norton MD  Assistant(s): Muna Zhang  Anesthesia: Spinal   Estimated Blood Loss: 100cc  Specimens:   ID Type Source Tests Collected by Time Destination   1 : LEFT HIP SUPERFICIAL WOUND Wound Hip CULTURE, ANAEROBIC AND AEROBIC Jeffery Norton MD 2/14/2017 1403 Microbiology   2 : LEFT HIP DEEP ASPIRATE Joint Fluid Joint, Hip CULTURE, ANAEROBIC AND AEROBIC Jeffery Norton MD 2/14/2017 1407 Microbiology      Complications: None      INDICATIONS:  Colton Velázquez is a patient was indicated for a surgical I&D for persistent drainage from her incision status post Revision bipolar replacement with infection. I discussed with the patient the risks, alternatives and expectations prior to surgery. The patient was seen for medical clearance. PROCEDURE PERFORMED :   The patient was seen in the pre-operative holding area and the proper limb initialized. Questions were answered and the patient was taken to the operating room for anesthesia. They were positioned on the table and the operative extremity was prepped and draped in a sterile fashion. The appropriate antibiotics were given and time-out was performed prior to the incision. The previous incision was used. The incision was carried down to the iliotibial band. Depth of excisional debridement was 5cm. Findings following the incision included serous fluid only. Sharp dissection with a scalpel and scissor were utilized to remove dead, and necrotic appearing tissue. Cultures of both aerobic and anaerobic were taken.  A curette was used for final debridement down to a clean and bleeding wound bed. An excisional debridement of this area was performed. The wound was copiously irrigated with 3L Liters of normal saline irrigation. The wound was also irrigated with dilute betadine. The wound was closed with 0-Prolene, 2-0 Nylon suture and staples. A sterile dressing was applied the patient then recovered from anesthesia and was taken to the post-operative holding area in a stable condition.      IMPLANTS :   * No implants in log *    Jeffery Norton MD  Pager [phone]

## 2017-02-14 NOTE — PERIOP NOTES
1614  Pt awake, VSS. Pt denies pain or nausea. Pt off monitors for transport to room 402. Son informed. TRANSFER - OUT REPORT:    Verbal report given to Reba Jerez RN on Emily Jensen  being transferred to [phone] for routine post - op       Report consisted of patients Situation, Background, Assessment and   Recommendations(SBAR). Information from the following report(s) SBAR, OR Summary, Intake/Output and MAR was reviewed with the receiving nurse. Lines:   PICC Double Lumen 25/68/28 Right;Basilic (Active)   Central Line Being Utilized Yes 2/14/2017  3:00 PM   Criteria for Appropriate Use Long term IV/antibiotic administration 2/14/2017  3:00 PM   Site Assessment Clean, dry, & intact 2/14/2017  3:00 PM   Phlebitis Assessment 0 2/14/2017  3:00 PM   Infiltration Assessment 0 2/14/2017  3:00 PM   Arm Circumference (cm) 37 cm 2/14/2017  7:38 AM   Date of Last Dressing Change 02/12/17 2/14/2017  3:00 PM   Dressing Status Clean, dry, & intact 2/14/2017  3:00 PM   Action Taken Open ports on tubing capped 2/14/2017  7:38 AM   Dressing Type Disk with Chlorhexadine Gluconate (CHG) 2/14/2017  7:38 AM   Hub Color/Line Status Red;Capped 2/14/2017  3:00 PM   Positive Blood Return (Site #1) Yes 2/14/2017  7:38 AM   Hub Color/Line Status Purple; Infusing 2/14/2017  3:00 PM   Positive Blood Return (Site #2) Yes 2/14/2017  7:38 AM   Alcohol Cap Used Yes 2/14/2017  7:38 AM        Opportunity for questions and clarification was provided.       Patient transported with:   Registered Nurse

## 2017-02-14 NOTE — PROGRESS NOTES
Nutrition Assessment:    RECOMMENDATIONS/INTERVENTION(S):     *Consider addition of Elaine Q while on ABX and to continue at least 10 days after  *Noted Low H/H, correct as appropriate    Continue Regular, liberalized diet  Continue Ensure Enlive TID (chocolate)--encourage to drink between meals  MVI daily if not already ordered  Continue to monitor wt, appetite/PO intake, diet tolerance, and acceptance of oral supplement    ASSESSMENT:   2/14: Drainage from hip, per [facility] I&D of hip today. Visited pt who states she is still eating well. Agreed to yogurt Q meal once diet restarts. Continue supplement to aid in healing. 2/8: F/u, pt reports she continues to eat well. She drinks 1 Ensure with each meal.  No teeth, dentures do not fit, states she can tolerate regular consistency food. Looking forward to discharge soon. 2/2: Follow-up. Visited pt. Tolerating diet w/ no problems. Reports of good appetite and intake, pt is ordering all meals. Request for chocolate Ensure. Rajni Washington continues to follow for left hip wound. Wt hx reviewed - desirable increase noted. Labs & meds reviewed. Practitioner notes IV abx x 8 wks d/t dx.     1/31: Noted consult. Chart reviewed. This is a 69 yo female s/p left hip arthroplasty, possible NSTEMI in OR. Hx CAD, CHF, COPD, HTN. Diet advanced to Regular/High Clarence/High Protein. Tolerating diet. Labs/Meds reviewed. IVF infusing, on pressor, Solumedrol. Mg repleted. No pressure injury, + surgical incision to left hip. Wound care consulted for possible wound vac. No recent, significant weight loss. Observed food on bedside table brought by family from outside of facility.       SUBJECTIVE/OBJECTIVE:     Diet Order: NPO, for I&D today  % Eaten:  %  Patient Vitals for the past 100 hrs:   % Diet Eaten   02/11/17 0929 30 %       Pertinent Medications: [x] Reviewed    Chemistries:  Lab Results   Component Value Date/Time    Sodium 142 02/05/2017 04:12 AM    Potassium 4.9 02/05/2017 04:12 AM    Chloride 108 02/05/2017 04:12 AM    CO2 31 02/05/2017 04:12 AM    Anion gap 3 02/05/2017 04:12 AM    Glucose 71 02/05/2017 04:12 AM    BUN 25 02/14/2017 02:50 AM    Creatinine 0.78 02/14/2017 02:50 AM    BUN/Creatinine ratio 33 02/05/2017 04:12 AM    GFR est AA >60 02/14/2017 02:50 AM    GFR est non-AA >60 02/14/2017 02:50 AM    Calcium 8.3 02/05/2017 04:12 AM    AST (SGOT) 18 02/02/2017 04:31 AM    Alk. phosphatase 48 02/02/2017 04:31 AM    Protein, total 5.4 02/02/2017 04:31 AM    Albumin 2.2 02/03/2017 03:04 PM    Globulin 3.0 02/02/2017 04:31 AM    A-G Ratio 0.8 02/02/2017 04:31 AM    ALT (SGPT) 12 02/02/2017 04:31 AM      Anthropometrics: Height: 5' 5\" (165.1 cm) Weight: 63.8 kg (140 lb 10.5 oz)    IBW (%IBW): 56.8 kg (125 lb 3.5 oz) ( ) UBW (%UBW):   (  %)    BMI: Body mass index is 23.41 kg/(m^2). This BMI is indicative of:   [] Underweight for age   [x] Normal    [] Overweight    []  Obesity    []  Extreme Obesity (BMI>40)  Estimated Nutrition Needs (Based on): 1438 Kcals/day (BMR (1106) x 1.3 AF ) , 74 g (1.2 g/Kg body wt) Protein  Carbohydrate: At Least 130 g/day  Fluids: 1450 mL/day    Last BM: 2/14   [x]Active     []Hyperactive  []Hypoactive       [] Absent   BS  Skin:    [] Intact   [x] Incision--left hip incision, I&D of hip today, drainage from hip  [] Breakdown   [] DTI   [] Tears/Excoriation/Abrasion  [x]Edema [] Other:    Wt Readings from Last 30 Encounters:   02/14/17 63.8 kg (140 lb 10.5 oz)   01/11/17 60 kg (132 lb 4.4 oz)   04/22/14 54.4 kg (120 lb)      NUTRITION DIAGNOSES:   Problem:  Increased protein needs     Etiology: related to post-op wound healing     Signs/Symptoms: as evidenced by s/p left hip arthroplasty      NUTRITION INTERVENTIONS:  Meals/Snacks: General/healthful diet   Supplements: Commercial supplement              GOAL:   Pt will continue to consume >75% of meals and supplements in the next 5-7 days    Cultural, Samaritan, or Ethnic Dietary Needs: None     LEARNING NEEDS (Diet, Food/Nutrient-Drug Interaction):    [x] None Identified   [] Identified and Education Provided/Documented   [] Identified and Pt declined/was not appropriate      [] Interdisciplinary Care Plan Reviewed/Documented    [x] Discharge Needs:  See d/c order    [] No Nutrition Related Discharge Needs    NUTRITION RISK:   Pt Is At Nutrition Risk  [x]     No Nutrition Risk Identified  []       PT SEEN FOR:    [x]  MD Consult: []Calorie Count      []Diabetic Diet Education        []Diet Education     []Electrolyte Management     [x]General Nutrition Management and Supplements     []Management of Tube Feeding     []TPN Recommendations    []  RN Referral:  []MST score >=2     []Enteral/Parenteral Nutrition PTA     []Pregnant: Gestational DM or Multigestation                 [] Pressure Ulcer  []  Low BMI  []  Length of Stay; Dysphagia Diet          Gia Shannon RD

## 2017-02-14 NOTE — PROGRESS NOTES
Stable, continued drainage from the hip. Plan : I&D of the hip today  - NPO  - Placement on Wednesday / Thursday if there are no incisional issues.

## 2017-02-14 NOTE — PROGRESS NOTES
[facility] Infectious Diseases Progress Note  Yobany Gibson MD, Elora Kappa DO, V. Corinne Drop, NP     [facility], [address], [facility] [address]  Office: [phone]   Fax: [phone]    2017      Assessment & Plan: Vancomycin, Rifampin   1. 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 OR Cx with Staph Lugdunensis, oxacillin resistant. Continue Vancomycin and Rifampin PO. Patient got PIC line. S/p debridement today, will follow OR Cx.  2. S/p NSEMI          Subjective:     Saw right after Sx, on her way to floor.     Objective:     Vitals:   Visit Vitals    /58 (BP 1 Location: Left arm, BP Patient Position: Supine)    Pulse 75    Temp 97.7 °F (36.5 °C)    Resp 15    Ht 5' 5\" (1.651 m)    Wt 63.8 kg (140 lb 10.5 oz)    SpO2 99%    BMI 23.41 kg/m2     Temp (24hrs), Av.9 °F (36.6 °C), Min:97.3 °F (36.3 °C), Max:98.7 °F (37.1 °C)    Recent Labs      17   0250  17   0420  17   0037   BUN  25*  23*  33*   CREA  0.78  0.80  0.93   HGB  9.8*  9.0*  8.9*   HCT  30.5*  29.1*  29.2*       Exam:    General: resting comfortably in bed, NAD  HEENT: pallor, no icterus, pupils are reactive  Chest: CTA b/l  Heart: RRR S1S2  Abdomen: soft, NT, ND, BS+  Extremities: lt hip under dressing        Cultures:     Lab Results   Component Value Date/Time    Culture result:  2017 02:14 PM     LIGHT  STAPHYLOCOCCUS LUGDUNENSIS  (OXACILLIN RESISTANT)      Culture result:  2017 02:12 PM     FEW  STAPHYLOCOCCUS LUGDUNENSIS  (OXACILLIN RESISTANT)      Culture result:  2017 01:20 PM     LIGHT  STAPHYLOCOCCUS LUGDUNENSIS  PLEASE REFER TO [facility] [id number] FOR SUSCEPTIBILITIES      Culture result:  2017 01:20 PM     FEW  STAPHYLOCOCCUS LUGDUNENSIS  PLEASE REFER TO [facility] [id number] FOR SUSCEPTIBILITIES         Radiology:   Reviewed      Medications:        Current Facility-Administered Medications   Medication Dose Route Frequency Last Dose    diazePAM (VALIUM) tablet 5 mg  5 mg Oral Q6H PRN      sodium chloride (NS) flush 5-10 mL  5-10 mL IntraVENous Q8H      sodium chloride (NS) flush 5-10 mL  5-10 mL IntraVENous PRN      vancomycin (VANCOCIN) 500 mg in 0.9% sodium chloride (MBP/ADV) 100 mL  500 mg IntraVENous Q12H 500 mg at 02/14/17 1201    famotidine (PEPCID) tablet 20 mg  20 mg Oral BID 20 mg at 02/14/17 0825    aspirin chewable tablet 81 mg  81 mg Oral DAILY Stopped at 02/14/17 0900    sodium chloride (NS) flush 10-30 mL  10-30 mL InterCATHeter PRN 10 mL at 02/09/17 2018    sodium chloride (NS) flush 10-40 mL  10-40 mL InterCATHeter Q8H 10 mL at 02/13/17 2334    alteplase (CATHFLO) 1 mg in sterile water (preservative free) 1 mL injection  1 mg InterCATHeter PRN 1 mg at 02/12/17 1035    atorvastatin (LIPITOR) tablet 10 mg  10 mg Oral DAILY Stopped at 02/14/17 0900    lisinopril (PRINIVIL, ZESTRIL) tablet 10 mg  10 mg Oral DAILY Stopped at 02/14/17 0900    hydrALAZINE (APRESOLINE) 20 mg/mL injection 10 mg  10 mg IntraVENous Q2H PRN      carvedilol (COREG) tablet 6.25 mg  6.25 mg Oral BID WITH MEALS 6.25 mg at 02/14/17 0825    rifAMPin (RIFADIN) capsule 600 mg  600 mg Oral  mg at 02/14/17 0630    enoxaparin (LOVENOX) injection 40 mg  40 mg SubCUTAneous Q24H 40 mg at 02/13/17 2038    0.9% sodium chloride infusion  25 mL/hr IntraVENous CONTINUOUS 25 mL/hr at 02/06/17 0017    cyclobenzaprine (FLEXERIL) tablet 5 mg  5 mg Oral TID PRN 5 mg at 02/14/17 0824    0.9% sodium chloride infusion 250 mL  250 mL IntraVENous PRN      celecoxib (CELEBREX) capsule 200 mg  200 mg Oral Q12H 200 mg at 02/13/17 2333    acetaminophen (TYLENOL) tablet 650 mg  650 mg Oral Q6H 650 mg at 02/14/17 0630    albuterol (PROVENTIL HFA, VENTOLIN HFA, PROAIR HFA) inhaler 2 Puff  2 Puff Inhalation Q6H PRN      calcium carbonate (TUMS) chewable tablet 400 mg [elemental]  400 mg Oral TID WITH MEALS Stopped at 02/14/17 0800    linaclotide (LINZESS) capsule 290 mcg  290 mcg Oral  mcg at 02/14/17 0630    methadone (DOLOPHINE) tablet 5 mg  5 mg Oral Q8H 5 mg at 02/14/17 0825    SUMAtriptan (IMITREX) tablet 50 mg  50 mg Oral ONCE PRN 50 mg at 02/12/17 1528    umeclidinium (INCRUSE ELLIPTA) 62.5 mcg/actuation  1 Puff Inhalation DAILY 1 Puff at 02/14/17 0900    zolpidem (AMBIEN) tablet 5 mg  5 mg Oral QHS PRN 5 mg at 02/12/17 2348    naloxone (NARCAN) injection 0.4 mg  0.4 mg IntraVENous PRN      senna-docusate (PERICOLACE) 8.6-50 mg per tablet 1 Tab  1 Tab Oral BID Stopped at 02/14/17 0900    polyethylene glycol (MIRALAX) packet 17 g  17 g Oral DAILY Stopped at 02/14/17 0900    bisacodyl (DULCOLAX) suppository 10 mg  10 mg Rectal DAILY PRN      oxyCODONE IR (ROXICODONE) tablet 15 mg  15 mg Oral Q3H PRN 15 mg at 02/14/17 5010         Case discussed with: patient.           Maida Chin MD    Signed By: February 14, 2017 February 14, 2017

## 2017-02-14 NOTE — ANESTHESIA PREPROCEDURE EVALUATION
Anesthetic History   No history of anesthetic complications            Review of Systems / Medical History  Patient summary reviewed, nursing notes reviewed and pertinent labs reviewed    Pulmonary    COPD: mild      Smoker         Neuro/Psych   Within defined limits           Cardiovascular    Hypertension      CHF (non ischemic)    Past MI, CAD (stable) and hyperlipidemia    Exercise tolerance: <4 METS  Comments: ECHO LVEF 35-40%   GI/Hepatic/Renal     GERD           Endo/Other        Blood dyscrasia     Other Findings            Physical Exam    Airway  Mallampati: II  TM Distance: 4 - 6 cm  Neck ROM: normal range of motion   Mouth opening: Normal     Cardiovascular    Rhythm: regular  Rate: normal         Dental    Dentition: Edentulous     Pulmonary  Breath sounds clear to auscultation               Abdominal         Other Findings            Anesthetic Plan    ASA: 3  Anesthesia type: spinal          Induction: Intravenous  Anesthetic plan and risks discussed with: Patient and Son / Daughter

## 2017-02-14 NOTE — WOUND CARE
Wound care follow up  Patient to return to OR today for left hip I and D per Dr Enrrique Chan. Assessment  Left lower medial leg- less than 1 cm area of dark purple and red intact blister, unable to determine etiolgy, stable, no redness, areas of dark discoloration from previous vascular wounds. Right lower leg no redness or edema, no blisters noted. Plan to monitor and leave dry, open to air.     Hemant Harvey

## 2017-02-15 LAB
BUN SERPL-MCNC: 22 MG/DL (ref 6–20)
CREAT SERPL-MCNC: 0.79 MG/DL (ref 0.55–1.02)
ERYTHROCYTE [DISTWIDTH] IN BLOOD BY AUTOMATED COUNT: 16.1 % (ref 11.5–14.5)
HCT VFR BLD AUTO: 29.6 % (ref 35–47)
HGB BLD-MCNC: 9.1 G/DL (ref 11.5–16)
MCH RBC QN AUTO: 28.7 PG (ref 26–34)
MCHC RBC AUTO-ENTMCNC: 30.7 G/DL (ref 30–36.5)
MCV RBC AUTO: 93.4 FL (ref 80–99)
PLATELET # BLD AUTO: 189 K/UL (ref 150–400)
RBC # BLD AUTO: 3.17 M/UL (ref 3.8–5.2)
WBC # BLD AUTO: 4.4 K/UL (ref 3.6–11)

## 2017-02-15 PROCEDURE — 74011250636 HC RX REV CODE- 250/636: Performed by: NURSE PRACTITIONER

## 2017-02-15 PROCEDURE — 97168 OT RE-EVAL EST PLAN CARE: CPT

## 2017-02-15 PROCEDURE — 97110 THERAPEUTIC EXERCISES: CPT

## 2017-02-15 PROCEDURE — 36415 COLL VENOUS BLD VENIPUNCTURE: CPT | Performed by: ORTHOPAEDIC SURGERY

## 2017-02-15 PROCEDURE — 74011250637 HC RX REV CODE- 250/637: Performed by: ANESTHESIOLOGY

## 2017-02-15 PROCEDURE — 97530 THERAPEUTIC ACTIVITIES: CPT

## 2017-02-15 PROCEDURE — 65270000029 HC RM PRIVATE

## 2017-02-15 PROCEDURE — 97535 SELF CARE MNGMENT TRAINING: CPT

## 2017-02-15 PROCEDURE — 74011250637 HC RX REV CODE- 250/637: Performed by: NURSE PRACTITIONER

## 2017-02-15 PROCEDURE — 74011000258 HC RX REV CODE- 258: Performed by: INTERNAL MEDICINE

## 2017-02-15 PROCEDURE — 82565 ASSAY OF CREATININE: CPT | Performed by: ORTHOPAEDIC SURGERY

## 2017-02-15 PROCEDURE — 85027 COMPLETE CBC AUTOMATED: CPT | Performed by: NURSE PRACTITIONER

## 2017-02-15 PROCEDURE — 74011250636 HC RX REV CODE- 250/636: Performed by: INTERNAL MEDICINE

## 2017-02-15 PROCEDURE — 74011250637 HC RX REV CODE- 250/637: Performed by: INTERNAL MEDICINE

## 2017-02-15 PROCEDURE — 84520 ASSAY OF UREA NITROGEN: CPT | Performed by: ORTHOPAEDIC SURGERY

## 2017-02-15 PROCEDURE — 97116 GAIT TRAINING THERAPY: CPT

## 2017-02-15 RX ADMIN — CELECOXIB 200 MG: 100 CAPSULE ORAL at 22:56

## 2017-02-15 RX ADMIN — UMECLIDINIUM 1 PUFF: 62.5 AEROSOL, POWDER ORAL at 10:19

## 2017-02-15 RX ADMIN — ASPIRIN 81 MG CHEWABLE TABLET 81 MG: 81 TABLET CHEWABLE at 10:13

## 2017-02-15 RX ADMIN — Medication 10 ML: at 22:57

## 2017-02-15 RX ADMIN — OXYCODONE HYDROCHLORIDE 15 MG: 5 TABLET ORAL at 10:13

## 2017-02-15 RX ADMIN — VANCOMYCIN HYDROCHLORIDE 500 MG: 500 INJECTION, POWDER, LYOPHILIZED, FOR SOLUTION INTRAVENOUS at 22:56

## 2017-02-15 RX ADMIN — LISINOPRIL 10 MG: 20 TABLET ORAL at 10:14

## 2017-02-15 RX ADMIN — METHADONE HYDROCHLORIDE 5 MG: 10 TABLET ORAL at 10:14

## 2017-02-15 RX ADMIN — CARVEDILOL 6.25 MG: 6.25 TABLET, FILM COATED ORAL at 10:13

## 2017-02-15 RX ADMIN — Medication 1 TABLET: at 10:13

## 2017-02-15 RX ADMIN — CYCLOBENZAPRINE HYDROCHLORIDE 5 MG: 10 TABLET, FILM COATED ORAL at 11:43

## 2017-02-15 RX ADMIN — CALCIUM CARBONATE (ANTACID) CHEW TAB 500 MG 400 MG: 500 CHEW TAB at 18:06

## 2017-02-15 RX ADMIN — CARVEDILOL 6.25 MG: 6.25 TABLET, FILM COATED ORAL at 18:07

## 2017-02-15 RX ADMIN — POLYETHYLENE GLYCOL 3350 17 G: 17 POWDER, FOR SOLUTION ORAL at 10:13

## 2017-02-15 RX ADMIN — ACETAMINOPHEN 650 MG: 325 TABLET ORAL at 22:56

## 2017-02-15 RX ADMIN — FAMOTIDINE 20 MG: 20 TABLET, FILM COATED ORAL at 10:13

## 2017-02-15 RX ADMIN — Medication 10 ML: at 22:56

## 2017-02-15 RX ADMIN — ATORVASTATIN CALCIUM 10 MG: 10 TABLET, FILM COATED ORAL at 10:13

## 2017-02-15 RX ADMIN — CALCIUM CARBONATE (ANTACID) CHEW TAB 500 MG 400 MG: 500 CHEW TAB at 10:13

## 2017-02-15 RX ADMIN — ACETAMINOPHEN 650 MG: 325 TABLET ORAL at 11:44

## 2017-02-15 RX ADMIN — Medication 20 ML: at 14:00

## 2017-02-15 RX ADMIN — CALCIUM CARBONATE (ANTACID) CHEW TAB 500 MG 400 MG: 500 CHEW TAB at 11:44

## 2017-02-15 RX ADMIN — OXYCODONE HYDROCHLORIDE 15 MG: 5 TABLET ORAL at 02:32

## 2017-02-15 RX ADMIN — CYCLOBENZAPRINE HYDROCHLORIDE 5 MG: 10 TABLET, FILM COATED ORAL at 03:39

## 2017-02-15 RX ADMIN — FAMOTIDINE 20 MG: 20 TABLET, FILM COATED ORAL at 18:07

## 2017-02-15 RX ADMIN — Medication 1 TABLET: at 18:07

## 2017-02-15 RX ADMIN — RIFAMPIN 600 MG: 300 CAPSULE ORAL at 06:33

## 2017-02-15 RX ADMIN — OXYCODONE HYDROCHLORIDE 15 MG: 5 TABLET ORAL at 18:07

## 2017-02-15 RX ADMIN — LINACLOTIDE 290 MCG: 145 CAPSULE, GELATIN COATED ORAL at 06:33

## 2017-02-15 RX ADMIN — ENOXAPARIN SODIUM 40 MG: 40 INJECTION SUBCUTANEOUS at 20:21

## 2017-02-15 RX ADMIN — OXYCODONE HYDROCHLORIDE 15 MG: 5 TABLET ORAL at 13:37

## 2017-02-15 RX ADMIN — OXYCODONE HYDROCHLORIDE 15 MG: 5 TABLET ORAL at 22:56

## 2017-02-15 RX ADMIN — ACETAMINOPHEN 650 MG: 325 TABLET ORAL at 18:06

## 2017-02-15 RX ADMIN — OXYCODONE HYDROCHLORIDE 15 MG: 5 TABLET ORAL at 06:33

## 2017-02-15 RX ADMIN — Medication 10 ML: at 06:34

## 2017-02-15 RX ADMIN — VANCOMYCIN HYDROCHLORIDE 500 MG: 500 INJECTION, POWDER, LYOPHILIZED, FOR SOLUTION INTRAVENOUS at 11:44

## 2017-02-15 RX ADMIN — ACETAMINOPHEN 650 MG: 325 TABLET ORAL at 06:33

## 2017-02-15 RX ADMIN — METHADONE HYDROCHLORIDE 5 MG: 10 TABLET ORAL at 01:07

## 2017-02-15 RX ADMIN — CYCLOBENZAPRINE HYDROCHLORIDE 5 MG: 10 TABLET, FILM COATED ORAL at 20:28

## 2017-02-15 RX ADMIN — BISACODYL 10 MG: 10 SUPPOSITORY RECTAL at 18:15

## 2017-02-15 RX ADMIN — CELECOXIB 200 MG: 100 CAPSULE ORAL at 10:18

## 2017-02-15 RX ADMIN — Medication 10 ML: at 14:00

## 2017-02-15 RX ADMIN — METHADONE HYDROCHLORIDE 5 MG: 10 TABLET ORAL at 18:07

## 2017-02-15 RX ADMIN — Medication 10 ML: at 06:33

## 2017-02-15 NOTE — PROGRESS NOTES
Problem: Mobility Impaired (Adult and Pediatric)  Goal: *Acute Goals and Plan of Care (Insert Text)  Physical Therapy Goals  Initiated 2/11/2017 Re-evaluation 2/15/2016 Pt has achieved all goals except #2,3 and 5 which remain appropriate    1. Patient will move from supine to sit and sit to supine in bed with independence within 4 days. 2. Patient will perform sit to stand with modified independence within 4 days. 3. Patient will ambulate with modified independence for 200 feet with the least restrictive device within 4 days. 4. Patient will verbalize and demonstrate understanding of lateral precautions per protocol within 4 days. 5. Patient will perform all home exercise program per protocol with supervision/set-up within 4 days. Physical Therapy Goals  Initiated 2/1/2017    1. Patient will move from supine to sit and sit to supine , scoot up and down and roll side to side in bed with modified independence within 4 days. 2. Patient will perform sit to stand with modified independence within 4 days. 3. Patient will ambulate with modified independence for 200 feet with the least restrictive device within 4 days. 4. Patient will verbalize and demonstrate understanding of lateral precautions per protocol within 4 days. 5. Patient will perform all home exercise program per protocol with independence within 4 days. PHYSICAL THERAPY REEVALUATION  Patient: Ayana Sher (89 y.o. female)  Date: 2/15/2017  Primary Diagnosis: LEFT HIP BIPOLAR  Failed total joint replacement (HCC)  Failed total hip arthroplasty (HCC)  DEEP WOUND INFECTION  Procedure(s) (LRB):  INCISION AND DRAINAGE HIP WITH WASHOUT AND CLOSURE (Left) 1 Day Post-Op   Precautions: L LE toe touch weight bearing; lateral MIRIAM precations  WBAT (LLE)      ASSESSMENT :  Based on the objective data described below, the patient presents with L hip pain 7/10. Pt states it feels more like \"spasms\". Pt is due to transfer to [facility] for rehab tomorrow.  Pt transferred with supervision and ambulated about 100 feet with rolling walker and contact guard assist. Pt reports L hip aching towards end of walk and stated she needed to go back to her room. Pain limits mobility. Pt has fair dynamic balance and decreased endurance. Pt did B LE exercises in sitting and was encouraged to continue with these regularly. Pt will benefit from rehab to regain maximal functional mobility. Patient will benefit from skilled intervention to address the above impairments. Patients rehabilitation potential is considered to be Good  Factors which may influence rehabilitation potential include:   [ ]           None noted  [ ]           Mental ability/status  [X]           Medical condition  [ ]           Home/family situation and support systems  [ ]           Safety awareness  [X]           Pain tolerance/management  [ ]           Other:        PLAN :  Recommendations and Planned Interventions:  [ ]             Bed Mobility Training             [ ]      Neuromuscular Re-Education  [ ]             Transfer Training                   [ ]      Orthotic/Prosthetic Training  [ ]             Gait Training                         [ ]      Modalities  [ ]             Therapeutic Exercises           [ ]      Edema Management/Control  [ ]             Therapeutic Activities            [ ]      Patient and Family Training/Education  [ ]             Other (comment):  Frequency/Duration: Patient will be followed by physical therapy 1-2 times per day/4-7 days per week to address goals. Discharge Recommendations: Rehab  Further Equipment Recommendations for Discharge: none       SUBJECTIVE:   Patient stated I'm doing all right.       OBJECTIVE DATA SUMMARY:       Past Medical History   Diagnosis Date    Autoimmune disease ([facility] [address]         psoriasis    Beta-blocker therapy      CAD (coronary artery disease)         non ischemic cardiomyopathy    Chronic obstructive pulmonary disease ([facility] [address]      Chronic pain         curvature of spinie    GERD (gastroesophageal reflux disease)      Heart failure (HCC)      Hypercholesterolemia      Hypertension      Ill-defined condition         hx aortic stenosis & mitral regurg    Smoker      Thromboembolus (HCC)         right leg after hip surgery     Past Surgical History   Procedure Laterality Date    Hx gyn           miscarriage    Pr abdomen surgery proc unlisted   2010       Gallbladder    Pr abdomen surgery proc unlisted   2000       hiatal hernia repair    Hx orthopaedic           Right Knee, Left Foot    Hx orthopaedic   04/05/2015       left hip replacement 2015    Hx orthopaedic   06/24/2016       rmoval bone fragment left hip    Hx orthopaedic   2013       right hip replacement    Hx orthopaedic   2001       right knee replacement    Hx heent           Tonsilectomy    Hx heent           cataract removed bilateral eyes     Hospital course since last seen and reason for reevaluation: Infection - L hip I&D yesterday  Critical Behavior:  Neurologic State: Alert, Appropriate for age  Orientation Level: Oriented X4  Cognition: Appropriate decision making, Appropriate for age attention/concentration, Appropriate safety awareness  Safety/Judgement: Awareness of environment, Good awareness of safety precautions, Insight into deficits  Skin:  Dressing in place  Strength:    Strength:  Within functional limits (L LE limited after surgery)                       Tone & Sensation:   Tone: Normal              Sensation: Intact               Range Of Motion:  AROM: Within functional limits (L LE limited after surgery)           PROM: Within functional limits (L LE limited after surgery)           Coordination:  Coordination: Within functional limits (L LE limited after surgery)     Functional Mobility:  Bed Mobility:  Rolling: Supervision  Supine to Sit: Independent     Scooting: Supervision  Transfers:  Sit to Stand: Contact guard assistance;Supervision  Stand to Sit: Supervision        Bed to Chair: Contact guard assistance           Balance:   Sitting: Intact  Standing: Impaired  Standing - Static: Fair  Standing - Dynamic : Fair  Ambulation/Gait Training:  Distance (ft): 100 Feet (ft)  Assistive Device: Walker, rolling;Gait belt  Ambulation - Level of Assistance: Contact guard assistance        Gait Abnormalities: Antalgic; Step to gait     Left Side Weight Bearing: Toe touch  Base of Support: Widened     Speed/Jeanie: Pace decreased (<100 feet/min)                                  Therapeutic Exercises: Ankle pumps, knee extensions, glut and quad sets     Functional Measure:  Barthel Index:      Bathin  Bladder: 10  Bowels: 10  Groomin  Dressin  Feeding: 10  Mobility: 0  Stairs: 0  Toilet Use: 5  Transfer (Bed to Chair and Back): 10  Total: 55         Barthel and G-code impairment scale:  Percentage of impairment CH  0% CI  1-19% CJ  20-39% CK  40-59% CL  60-79% CM  80-99% CN  100%   Barthel Score 0-100 100 99-80 79-60 59-40 20-39 1-19    0   Barthel Score 0-20 20 17-19 13-16 9-12 5-8 1-4 0      The Barthel ADL Index: Guidelines  1. The index should be used as a record of what a patient does, not as a record of what a patient could do. 2. The main aim is to establish degree of independence from any help, physical or verbal, however minor and for whatever reason. 3. The need for supervision renders the patient not independent. 4. A patient's performance should be established using the best available evidence. Asking the patient, friends/relatives and nurses are the usual sources, but direct observation and common sense are also important. However direct testing is not needed. 5. Usually the patient's performance over the preceding 24-48 hours is important, but occasionally longer periods will be relevant. 6. Middle categories imply that the patient supplies over 50 per cent of the effort.   7. Use of aids to be independent is allowed. Nonie Freshwater., Barthel, D.W. (4907). Functional evaluation: the Barthel Index. [address] 14)2. LILIA Hobson, Shanita Bauman., [facility]. Zane, [address] (1999). Measuring the change indisability after inpatient rehabilitation; comparison of the responsiveness of the Barthel Index and Functional Trinity Measure. Journal of Neurology, Neurosurgery, and Psychiatry, 66(4), 578-720. ALEJANDRO Oleary, EHSAN Hernandez, & Mimi Oakes M.A. (2004.) Assessment of post-stroke quality of life in cost-effectiveness studies: The usefulness of the Barthel Index and the EuroQoL-5D. Quality of Life Research, 13, 943-85         G codes: In compliance with CMSs Claims Based Outcome Reporting, the following G-code set was chosen for this patient based on their primary functional limitation being treated: The outcome measure chosen to determine the severity of the functional limitation was the Bathel Index with a score of 55/100 which was correlated with the impairment scale. · Mobility - Walking and Moving Around:               - CURRENT STATUS:    CK - 40%-59% impaired, limited or restricted               - GOAL STATUS:           CJ - 20%-39% impaired, limited or restricted               - D/C STATUS:                       ---------------To be determined---------------      Pain:  Pain Scale 1: Numeric (0 - 10)  Pain Intensity 1: 7  Pain Location 1: Hip  Pain Orientation 1: Right  Pain Description 1: Aching  Pain Intervention(s) 1: Medication (see MAR)  Activity Tolerance:   fair  Please refer to the flowsheet for vital signs taken during this treatment.   After treatment:   [X]  Patient left in no apparent distress sitting up in chair  [ ]  Patient left in no apparent distress in bed  [X]  Call bell left within reach  [X]  Nursing notified  [ ]  Caregiver present  [ ]  Bed alarm activated      COMMUNICATION/EDUCATION:   The patients plan of care was discussed with: Registered Nurse.  [X]  Fall prevention education was provided and the patient/caregiver indicated understanding. [X]  Patient/family have participated as able in goal setting and plan of care. [X]  Patient/family agree to work toward stated goals and plan of care. [ ]  Patient understands intent and goals of therapy, but is neutral about his/her participation. [ ]  Patient is unable to participate in goal setting and plan of care.      Thank you for this referral.  Melissa Wakefield, PT   Time Calculation: 24 mins

## 2017-02-15 NOTE — PROGRESS NOTES
Bedside shift change report given to Sheree Bone (oncoming nurse) by Emi Newell (offgoing nurse). Report included the following information SBAR, Kardex and MAR.

## 2017-02-15 NOTE — PROGRESS NOTES
TOTAL HIP ARTHROPLASTY DAILY NOTE     ASSESSMENT / PLAN :   1. Pain Control : Moderate pain overnight, stable this morning  2. Therapy / Weight Bearing Recommendations : WBAT with walker  3. DVT Prophylaxis : Mechanical and Lovenox  4. Disposition : SNF, incision must be without drainage prior to discharge. 5. Medical Concerns : MMP, stable  6. Comments : Stable, patient may be discharged to SNF once incision is without drainage. Keep hip spica in place. POD  1 Day Post-Op s/p Procedure(s):  INCISION AND DRAINAGE HIP WITH WASHOUT AND CLOSURE     SUBJECTIVE :     Patient notes the following issues overnight : Moderate pain overnight, stable this morning. OBJECTIVE :     Patient Vitals for the past 12 hrs:   BP Temp Pulse Resp SpO2 Weight   02/15/17 1338 - - - - - 64 kg (141 lb 1.5 oz)   02/15/17 1221 99/56 97.9 °F (36.6 °C) 81 18 95 % -   02/15/17 1057 135/86 98.1 °F (36.7 °C) (!) 105 18 99 % -   02/15/17 0340 111/55 97.5 °F (36.4 °C) 73 18 98 % -       Alert and oriented x3. Examination of the left Hip reveals that the dressing has a small amount of drainage near the distal end of the primapore, otherwise clean, dry, and intact. Motor Exam is intact and normal  Sensation is intact to light touch. No calf pain.      Labs:  Recent Labs      02/15/17   0330   HGB  9.1*   HCT  29.6*   BUN  22*   CREA  0.79      Patient mobility  Gait  Base of Support: Widened  Speed/Jeanie: Pace decreased (<100 feet/min)  Step Length: Left shortened, Right shortened  Gait Abnormalities: Antalgic, Step to gait  Ambulation - Level of Assistance: Contact guard assistance  Distance (ft): 100 Feet (ft)  Assistive Device: Walker, rolling, Gait belt        TIFFANIE Smith  Cell [phone]  Office : [phone] ext  [address] [place] [address] or Karen Romano

## 2017-02-15 NOTE — PROGRESS NOTES
Problem: Self Care Deficits Care Plan (Adult)  Goal: *Acute Goals and Plan of Care (Insert Text)  Occupational Therapy Goals  Initiated 2/1/2017   1. Patient will perform lower body dressing using adaptive dressing aides at minimal assistance within 7 days. met  2. Patient will perform toilet transfers at supervision/set-up level using rolling walker within 7 days. met  3. Patient will perform all aspects of toileting at minimal assistance within 7 days. met  4. Patient will demonstrate/maintain/recall all hip precautions with 100% accuracy without cues within 7 days. continue  5. Patient will participate in upper extremity therapeutic exercise/activities with modified independence for 10 minutes within 7 day(s). continue  6. Patient will utilize energy conservation techniques during functional activities with verbal cues within 7 day(s). continue    Re-Assessed 7 day 2/8/17    1. Patient will perform lower body dressing using adaptive dressing aides at modified independece within 7 days. modified  2. Patient will perform toilet transfers at modified independence using rolling walker within 7 days. 3. Patient will perform all aspects of toileting at modified independence within 7 days. 4. Patient will demonstrate/maintain/recall all hip precautions with 100% accuracy without cues within 7 days. continue  5. Patient will participate in upper extremity therapeutic exercise/activities with modified independence for 10 minutes within 7 day(s). 6. Patient will utilize energy conservation techniques during functional activities with verbal cues within 7 day(s). Re-Evaluation 2/15/17 Continue goals  1. Patient will perform lower body dressing using adaptive dressing aides at modified independece within 7 days. 2. Patient will perform toilet transfers at modified independence using rolling walker within 7 days. 3. Patient will perform all aspects of toileting at modified independence within 7 days.   4. Patient will demonstrate/maintain/recall all hip precautions with 100% accuracy without cues within 7 days. 5. Patient will participate in upper extremity therapeutic exercise/activities with modified independence for 10 minutes within 7 day(s). 6. Patient will utilize energy conservation techniques during functional activities with verbal cues within 7 day(s). OCCUPATIONAL THERAPY TREATMENT: WEEKLY REASSESSMENT  Patient: Chuck Sahu (77 y.o. female)  Date: 2/15/2017  Diagnosis: LEFT HIP BIPOLAR  Failed total joint replacement (HCC)  Failed total hip arthroplasty (HCC)  DEEP WOUND INFECTION <principal problem not specified>  Procedure(s) (LRB):  INCISION AND DRAINAGE HIP WITH WASHOUT AND CLOSURE (Left) 1 Day Post-Op  Precautions: WBAT (LLE)      ASSESSMENT:  Patient received alert and oriented, receptive to therapy. VSS. Pain noted at 7/10, nursing aware and preparing pain medication dosage. Patient recalls hip precautions, completes bed mobility with supervision. Patient transfers <> bathroom, <> commode with supervision. Patient tolerates standing at sink for grooming/ADLS for 5 minutes. UE dressing with set-up, LE ADLS min assist. Patient is POD #1 I & D of Left hip, pending discharge to [facility] for rehab when medically stable. Progression toward goals:  [X]            Improving appropriately and progressing toward goals  [ ]            Improving slowly and progressing toward goals  [ ]            Not making progress toward goals and plan of care will be adjusted       PLAN:  Goals have been updated based on progression since last assessment. Patient continues to benefit from skilled intervention to address the above impairments. Continue to follow patient 3 times a week to address goals.   Planned Interventions:  [X]                    Self Care Training                  [X]             Therapeutic Activities  [X]                    Functional Mobility Training    [ ]             Cognitive Retraining  [ ] Therapeutic Exercises           [X]             Endurance Activities  [ ]                    Balance Training                   [ ]             Neuromuscular Re-Education  [ ]                    Visual/Perceptual Training     [X]        Home Safety Training  [X]                    Patient Education                 [ ]             Family Training/Education  [ ]                    Other (comment):  Discharge Recommendations: Rehab  Further Equipment Recommendations for Discharge: none       SUBJECTIVE:   Patient stated This is the last surgery, I am ready to get out of here! Cindy Chester      OBJECTIVE DATA SUMMARY:   Cognitive/Behavioral Status:  Neurologic State: Alert; Appropriate for age  Orientation Level: Oriented X4  Cognition: Appropriate decision making; Appropriate for age attention/concentration; Appropriate safety awareness  Perception: Appears intact  Perseveration: No perseveration noted  Safety/Judgement: Awareness of environment;Good awareness of safety precautions; Insight into deficits     Functional Mobility and Transfers for ADLs:  Bed Mobility:  Rolling: Supervision  Supine to Sit: Supervision  Scooting: Supervision     Transfers:  Sit to Stand: Stand-by asssistance  Stand to Sit: Stand-by asssistance  Bed to Chair: Stand-by asssistance  Toilet Transfer : Modified independent     Balance:  Sitting: Intact; Without support  Standing: Intact; With support  Standing - Static: Good  Standing - Dynamic : Good     ADL Intervention:    Patient instructed to don surgical extremity  first, doff last. Patient instructed to don all clothing while sitting prior to standing, doff all clothing to knees while standing, then sit to doff clothing off from knees to feet in order to facilitate fall prevention, pain management, and energy conservation with ADLS. Patient indicated understanding/recalled strategies with min instruction.                                          Cognitive Retraining  Safety/Judgement: Awareness of environment;Good awareness of safety precautions; Insight into deficits     Pain:  Pain Scale 1: Numeric (0 - 10)  Pain Intensity 1: 4  Pain Location 1: Hip  Pain Orientation 1: Right  Pain Description 1: Aching  Pain Intervention(s) 1: Medication (see MAR)  Activity Tolerance:   Patient completed multiple sit to stand transfers, mobility around room and bathroom with RW  in completion of ADLS with no LOB or break  Please refer to the flowsheet for vital signs taken during this treatment.   After treatment:   [X] Patient left in no apparent distress sitting up in chair  [ ] Patient left in no apparent distress in bed  [X] Call bell left within reach  [X] Nursing notified  [ ] Caregiver present  [ ] Bed alarm activated      COMMUNICATION/COLLABORATION:   The patients plan of care was discussed with: Physical Therapist, Registered Nurse and      Eugenio Mosquera OT  Time Calculation: 30 mins

## 2017-02-15 NOTE — PROGRESS NOTES
2/15/2017 3:36 PM Discussed discharge with pt, pt's son will transport pt to the [facility] on 2/16 if medically stable. CM will follow up.       2/15/2017  2:13 PM Spoke with Tricia Mendez in admissions at the [facility] who is aware pt will be ready for discharge on 2/16. Tricia Mendez reported nursing is reviewing updates that were sent. CM will follow up.     2/15/2017 11:11 AM Updates sent to [facility] via [employer]. CM notified [facility] of tentative discharge on 2/16.  EUGENIO JamesonW

## 2017-02-15 NOTE — PROGRESS NOTES
formerly [facility] Infectious Diseases Progress Note  Glendy Ireland MD, [facility] AQUILINO BILL NP     [facility], [address], [place] [address]  Office: [phone]   Fax: [phone]    2/15/2017      Assessment & Plan: Vancomycin, Rifampin   1. 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 Last OR Cx with Staph Lugdunensis, oxacillin resistant. Cx results  pending. Now will from Continue Vancomycin and Rifampin PO through 17, to finish 8 weeks after last Sx. Patient got PIC line. 2. S/p NSEMI          Subjective:     Pain under control.     Objective:     Vitals:   Visit Vitals    BP 91/55 (BP 1 Location: Left arm, BP Patient Position: At rest)    Pulse 73    Temp 97.7 °F (36.5 °C)    Resp 18    Ht 5' 5\" (1.651 m)    Wt 64 kg (141 lb 1.5 oz)    SpO2 99%    BMI 23.48 kg/m2     Temp (24hrs), Av °F (36.7 °C), Min:97.5 °F (36.4 °C), Max:99.1 °F (37.3 °C)    Recent Labs      02/15/17   0330  17   0250  17   0420   BUN  22*  25*  23*   CREA  0.79  0.78  0.80   WBC  4.4   --    --    HGB  9.1*  9.8*  9.0*   HCT  29.6*  30.5*  29.1*   PLT  189   --    --        Exam:    General: resting comfortably in bed, NAD  HEENT: pallor, no icterus, pupils are reactive  Chest: CTA b/l  Heart: RRR S1S2  Abdomen: soft, NT, ND, BS+  Extremities: lt hip under dressing        Cultures:     Lab Results   Component Value Date/Time    Culture result: NO GROWTH AFTER 11 HOURS 2017 02:07 PM    Culture result: NO GROWTH AFTER 11 HOURS 2017 02:03 PM    Culture result:  2017 02:14 PM     LIGHT  STAPHYLOCOCCUS LUGDUNENSIS  (OXACILLIN RESISTANT)         Radiology:   Reviewed      Medications:        Current Facility-Administered Medications   Medication Dose Route Frequency Last Dose    diazePAM (VALIUM) tablet 5 mg  5 mg Oral Q6H PRN 5 mg at 17 7735    sodium chloride (NS) flush 5-10 mL  5-10 mL IntraVENous Q8H 10 mL at 02/15/17 1400    sodium chloride (NS) flush 5-10 mL  5-10 mL IntraVENous PRN      vancomycin (VANCOCIN) 500 mg in 0.9% sodium chloride (MBP/ADV) 100 mL  500 mg IntraVENous Q12H 500 mg at 02/15/17 1144    famotidine (PEPCID) tablet 20 mg  20 mg Oral BID 20 mg at 02/15/17 1807    aspirin chewable tablet 81 mg  81 mg Oral DAILY 81 mg at 02/15/17 1013    sodium chloride (NS) flush 10-30 mL  10-30 mL InterCATHeter PRN 10 mL at 02/09/17 2018    sodium chloride (NS) flush 10-40 mL  10-40 mL InterCATHeter Q8H 20 mL at 02/15/17 1400    alteplase (CATHFLO) 1 mg in sterile water (preservative free) 1 mL injection  1 mg InterCATHeter PRN 1 mg at 02/12/17 1035    atorvastatin (LIPITOR) tablet 10 mg  10 mg Oral DAILY 10 mg at 02/15/17 1013    lisinopril (PRINIVIL, ZESTRIL) tablet 10 mg  10 mg Oral DAILY 10 mg at 02/15/17 1014    hydrALAZINE (APRESOLINE) 20 mg/mL injection 10 mg  10 mg IntraVENous Q2H PRN      carvedilol (COREG) tablet 6.25 mg  6.25 mg Oral BID WITH MEALS 6.25 mg at 02/15/17 1807    rifAMPin (RIFADIN) capsule 600 mg  600 mg Oral  mg at 02/15/17 7311    enoxaparin (LOVENOX) injection 40 mg  40 mg SubCUTAneous Q24H 40 mg at 02/14/17 2001    0.9% sodium chloride infusion  25 mL/hr IntraVENous CONTINUOUS 25 mL/hr at 02/06/17 0017    cyclobenzaprine (FLEXERIL) tablet 5 mg  5 mg Oral TID PRN 5 mg at 02/15/17 1143    0.9% sodium chloride infusion 250 mL  250 mL IntraVENous PRN      celecoxib (CELEBREX) capsule 200 mg  200 mg Oral Q12H 200 mg at 02/15/17 1018    acetaminophen (TYLENOL) tablet 650 mg  650 mg Oral Q6H 650 mg at 02/15/17 1806    albuterol (PROVENTIL HFA, VENTOLIN HFA, PROAIR HFA) inhaler 2 Puff  2 Puff Inhalation Q6H PRN      calcium carbonate (TUMS) chewable tablet 400 mg [elemental]  400 mg Oral TID WITH MEALS 400 mg at 02/15/17 1806    linaclotide (LINZESS) capsule 290 mcg  290 mcg Oral  mcg at 02/15/17 6160    methadone (DOLOPHINE) tablet 5 mg  5 mg Oral Q8H 5 mg at 02/15/17 1807    SUMAtriptan (IMITREX) tablet 50 mg  50 mg Oral ONCE PRN 50 mg at 02/12/17 1528    umeclidinium (INCRUSE ELLIPTA) 62.5 mcg/actuation  1 Puff Inhalation DAILY 1 Puff at 02/15/17 1019    zolpidem (AMBIEN) tablet 5 mg  5 mg Oral QHS PRN 5 mg at 02/14/17 2155    naloxone (NARCAN) injection 0.4 mg  0.4 mg IntraVENous PRN      senna-docusate (PERICOLACE) 8.6-50 mg per tablet 1 Tab  1 Tab Oral BID 1 Tab at 02/15/17 1807    polyethylene glycol (MIRALAX) packet 17 g  17 g Oral DAILY 17 g at 02/15/17 1013    bisacodyl (DULCOLAX) suppository 10 mg  10 mg Rectal DAILY PRN      oxyCODONE IR (ROXICODONE) tablet 15 mg  15 mg Oral Q3H PRN 15 mg at 02/15/17 1807         Case discussed with: patient.           Andrew Barreto MD    Signed By: February 15, 2017    February 15, 2017

## 2017-02-15 NOTE — WOUND CARE
Wound care follow up  S/P left hip debridement per Dr Fang John 2-14. Alert, no distress. Left hip dressing intact, ice and ace wrap in place, not disturbed. Left lower leg blister intact, unchanged from assessment on 2-14. Reconsult if needed.   Rebecca Briceño

## 2017-02-15 NOTE — PROGRESS NOTES
[facility] Infectious Diseases Outpatient  IV Antibiotic Orders    1. Diagnosis:  Lt prosthetic hip septic arthritis, Oxa resi Staph Lugdunensis  2. Routine PICC/ Gio/ Portacath Care including PRN cath-flow/activase to declot   3. Antibiotic:  Vancomycin 500mg IV q 12 hour through 4/12/17                          Rifampin 600mg PO daily through 4/12/17      4. Last labs  Lab Results   Component Value Date/Time    WBC 4.4 02/15/2017 03:30 AM    Hemoglobin (POC) 12.3 01/30/2017 02:24 PM    HGB 9.1 02/15/2017 03:30 AM    HCT 29.6 02/15/2017 03:30 AM    PLATELET 925 67/37/7294 03:30 AM    MCV 93.4 02/15/2017 03:30 AM     Lab Results   Component Value Date/Time    Sodium 142 02/05/2017 04:12 AM    Potassium 4.9 02/05/2017 04:12 AM    Chloride 108 02/05/2017 04:12 AM    CO2 31 02/05/2017 04:12 AM    Anion gap 3 02/05/2017 04:12 AM    Glucose 71 02/05/2017 04:12 AM    BUN 22 02/15/2017 03:30 AM    Creatinine 0.79 02/15/2017 03:30 AM    BUN/Creatinine ratio 33 02/05/2017 04:12 AM    GFR est AA >60 02/15/2017 03:30 AM    GFR est non-AA >60 02/15/2017 03:30 AM    Calcium 8.3 02/05/2017 04:12 AM    Bilirubin, total 0.2 02/02/2017 04:31 AM    AST (SGOT) 18 02/02/2017 04:31 AM    Alk. phosphatase 48 02/02/2017 04:31 AM    Protein, total 5.4 02/02/2017 04:31 AM    Albumin 2.2 02/03/2017 03:04 PM    Globulin 3.0 02/02/2017 04:31 AM    A-G Ratio 0.8 02/02/2017 04:31 AM    ALT (SGPT) 12 02/02/2017 04:31 AM         5. _+__ Weekly Labs:          _+__CBC/diff/platelets   _+__AST/ALT/Alk phos   _+__BUN/CRT   _+__ESR   _+__CRP   _+__Trough Vancomycin level    _+__Goal 15-20     6. Fax Final lab results and critical values to [facility] @[phone]  7. Call urgent lab results to [phone]. Allergies:     Allergies   Allergen Reactions    [place] Angioedema    Codeine Other (comments)     \"Years ago gave me hives but lately I havetaken it without problem\"    Dilaudid [Hydromorphone (Bulk)] Shortness of Breath    Lyrica [Pregabalin] Nausea and Vomiting     9. Pharmacy: [facility]          Home Health Nursing:  10. Pharmacy Consult for Vancomycin/Aminoglycoside Dosing  11. First Dose protocol as needed.    12. Please pull PIC line at end of therapy or send to IR for [facility] removal     Home Health: Call [facility] at [address] to schedule Gio Removal :  P: [phone]    Fax: [phone] or [phone]      Rajendra Ventura MD

## 2017-02-16 VITALS
TEMPERATURE: 97.8 F | HEART RATE: 71 BPM | BODY MASS INDEX: 23.29 KG/M2 | WEIGHT: 139.77 LBS | SYSTOLIC BLOOD PRESSURE: 100 MMHG | OXYGEN SATURATION: 90 % | DIASTOLIC BLOOD PRESSURE: 55 MMHG | RESPIRATION RATE: 15 BRPM | HEIGHT: 65 IN

## 2017-02-16 LAB
BUN SERPL-MCNC: 24 MG/DL (ref 6–20)
CREAT SERPL-MCNC: 0.72 MG/DL (ref 0.55–1.02)
HCT VFR BLD AUTO: 26.9 % (ref 35–47)
HGB BLD-MCNC: 8.4 G/DL (ref 11.5–16)

## 2017-02-16 PROCEDURE — 36415 COLL VENOUS BLD VENIPUNCTURE: CPT | Performed by: ORTHOPAEDIC SURGERY

## 2017-02-16 PROCEDURE — 74011250636 HC RX REV CODE- 250/636: Performed by: INTERNAL MEDICINE

## 2017-02-16 PROCEDURE — 74011250637 HC RX REV CODE- 250/637: Performed by: ANESTHESIOLOGY

## 2017-02-16 PROCEDURE — 74011250637 HC RX REV CODE- 250/637: Performed by: NURSE PRACTITIONER

## 2017-02-16 PROCEDURE — 74011250637 HC RX REV CODE- 250/637: Performed by: INTERNAL MEDICINE

## 2017-02-16 PROCEDURE — 82565 ASSAY OF CREATININE: CPT | Performed by: ORTHOPAEDIC SURGERY

## 2017-02-16 PROCEDURE — 85018 HEMOGLOBIN: CPT | Performed by: ORTHOPAEDIC SURGERY

## 2017-02-16 PROCEDURE — 84520 ASSAY OF UREA NITROGEN: CPT | Performed by: ORTHOPAEDIC SURGERY

## 2017-02-16 PROCEDURE — 97535 SELF CARE MNGMENT TRAINING: CPT

## 2017-02-16 PROCEDURE — 74011000258 HC RX REV CODE- 258: Performed by: INTERNAL MEDICINE

## 2017-02-16 RX ORDER — OXYCODONE HYDROCHLORIDE 15 MG/1
15 TABLET ORAL
Qty: 75 TAB | Refills: 0 | Status: SHIPPED
Start: 2017-02-16

## 2017-02-16 RX ADMIN — OXYCODONE HYDROCHLORIDE 15 MG: 5 TABLET ORAL at 15:51

## 2017-02-16 RX ADMIN — CELECOXIB 200 MG: 100 CAPSULE ORAL at 12:19

## 2017-02-16 RX ADMIN — CYCLOBENZAPRINE HYDROCHLORIDE 5 MG: 10 TABLET, FILM COATED ORAL at 06:25

## 2017-02-16 RX ADMIN — ASPIRIN 81 MG CHEWABLE TABLET 81 MG: 81 TABLET CHEWABLE at 09:00

## 2017-02-16 RX ADMIN — Medication 10 ML: at 14:00

## 2017-02-16 RX ADMIN — CALCIUM CARBONATE (ANTACID) CHEW TAB 500 MG 400 MG: 500 CHEW TAB at 12:20

## 2017-02-16 RX ADMIN — Medication 10 ML: at 06:25

## 2017-02-16 RX ADMIN — POLYETHYLENE GLYCOL 3350 17 G: 17 POWDER, FOR SOLUTION ORAL at 08:57

## 2017-02-16 RX ADMIN — RIFAMPIN 600 MG: 300 CAPSULE ORAL at 06:26

## 2017-02-16 RX ADMIN — ACETAMINOPHEN 650 MG: 325 TABLET ORAL at 12:20

## 2017-02-16 RX ADMIN — ACETAMINOPHEN 650 MG: 325 TABLET ORAL at 06:25

## 2017-02-16 RX ADMIN — VANCOMYCIN HYDROCHLORIDE 500 MG: 500 INJECTION, POWDER, LYOPHILIZED, FOR SOLUTION INTRAVENOUS at 12:22

## 2017-02-16 RX ADMIN — OXYCODONE HYDROCHLORIDE 15 MG: 5 TABLET ORAL at 08:58

## 2017-02-16 RX ADMIN — CARVEDILOL 6.25 MG: 6.25 TABLET, FILM COATED ORAL at 08:58

## 2017-02-16 RX ADMIN — Medication 1 TABLET: at 08:59

## 2017-02-16 RX ADMIN — CALCIUM CARBONATE (ANTACID) CHEW TAB 500 MG 400 MG: 500 CHEW TAB at 08:58

## 2017-02-16 RX ADMIN — CYCLOBENZAPRINE HYDROCHLORIDE 5 MG: 10 TABLET, FILM COATED ORAL at 15:51

## 2017-02-16 RX ADMIN — OXYCODONE HYDROCHLORIDE 15 MG: 5 TABLET ORAL at 03:22

## 2017-02-16 RX ADMIN — LISINOPRIL 10 MG: 20 TABLET ORAL at 08:58

## 2017-02-16 RX ADMIN — METHADONE HYDROCHLORIDE 5 MG: 10 TABLET ORAL at 00:57

## 2017-02-16 RX ADMIN — LINACLOTIDE 290 MCG: 145 CAPSULE, GELATIN COATED ORAL at 06:26

## 2017-02-16 RX ADMIN — ATORVASTATIN CALCIUM 10 MG: 10 TABLET, FILM COATED ORAL at 08:58

## 2017-02-16 RX ADMIN — UMECLIDINIUM 1 PUFF: 62.5 AEROSOL, POWDER ORAL at 09:00

## 2017-02-16 RX ADMIN — FAMOTIDINE 20 MG: 20 TABLET, FILM COATED ORAL at 09:01

## 2017-02-16 RX ADMIN — METHADONE HYDROCHLORIDE 5 MG: 10 TABLET ORAL at 08:59

## 2017-02-16 NOTE — PROGRESS NOTES
Bedside shift change report given to Logan Dorsey RN (oncoming nurse) by Imani Correia RN (offgoing nurse). Report included the following information SBAR, Kardex, ED Summary, Procedure Summary and MAR.

## 2017-02-16 NOTE — PROGRESS NOTES
2/16/2017 2:13 PM Pt's avs, mars and scripts were faxed to the [facility] at [phone] at the request of Staci Tucker. These clinicals were also sent to the [facility] via [employer]. 2/16/2017  1:54 PM Spoke with ARGENIS the [facility] can accept pt today. Pt, pt's son and pt's RN were notified. Nursing please call report to the [facility] at [phone]. Pt going to room 102 in the [facility]. Thanks. 2/16/2017 1:17 PM Spoke with Staci Tucker, pt's updates are still being reviewed. CM will continue to follow up.     2/16/2017 12:09 PM Spoke amalia Tucker in admissions at the [facility], updates are still being reviewed. Staci Tucker is aware pt is ready for discharge. CM will follow up.     2/16/2017 d10:10 AM Discharge order noted. Pt's avs, and most recent progress notes sent to the [facility] via [employer]. CM sent a message via [employer] and lvm with Staci Tucker in admissions regarding pt's discharge, extended iv abx stop date and primapore dressing. Pt's son will transport pt to the [facility] today. CM will follow up.  ABELARDO Green

## 2017-02-16 NOTE — PROGRESS NOTES
Patient handed a copy of discharge instructions and multiple scripts which were read and explained to her and her son. Nurse informed them that they are to hand the scripts to the rehab nurse so she can receive her narcotics if needed. Nurse called Marita DUENAS for  Dr. Ariadna Paul and informed her that a script for methadone was needed for rehab. The script will be waiting at Dr. Leandra Vega office for son to  before departure. Aware of all follow up appointments. Verbalized understanding.

## 2017-02-16 NOTE — PROGRESS NOTES
Problem: Self Care Deficits Care Plan (Adult)  Goal: *Acute Goals and Plan of Care (Insert Text)  Occupational Therapy Goals  Initiated 2/1/2017   1. Patient will perform lower body dressing using adaptive dressing aides at minimal assistance within 7 days. met  2. Patient will perform toilet transfers at supervision/set-up level using rolling walker within 7 days. met  3. Patient will perform all aspects of toileting at minimal assistance within 7 days. met  4. Patient will demonstrate/maintain/recall all hip precautions with 100% accuracy without cues within 7 days. continue  5. Patient will participate in upper extremity therapeutic exercise/activities with modified independence for 10 minutes within 7 day(s). continue  6. Patient will utilize energy conservation techniques during functional activities with verbal cues within 7 day(s). continue    Re-Assessed 7 day 2/8/17    1. Patient will perform lower body dressing using adaptive dressing aides at modified independece within 7 days. modified  2. Patient will perform toilet transfers at modified independence using rolling walker within 7 days. 3. Patient will perform all aspects of toileting at modified independence within 7 days. 4. Patient will demonstrate/maintain/recall all hip precautions with 100% accuracy without cues within 7 days. continue  5. Patient will participate in upper extremity therapeutic exercise/activities with modified independence for 10 minutes within 7 day(s). 6. Patient will utilize energy conservation techniques during functional activities with verbal cues within 7 day(s). Re-Evaluation 2/15/17 Continue goals  1. Patient will perform lower body dressing using adaptive dressing aides at modified independece within 7 days. 2. Patient will perform toilet transfers at modified independence using rolling walker within 7 days. 3. Patient will perform all aspects of toileting at modified independence within 7 days.   4. Patient will demonstrate/maintain/recall all hip precautions with 100% accuracy without cues within 7 days. 5. Patient will participate in upper extremity therapeutic exercise/activities with modified independence for 10 minutes within 7 day(s). 6. Patient will utilize energy conservation techniques during functional activities with verbal cues within 7 day(s). OCCUPATIONAL THERAPY TREATMENT  Patient: Kerline Lambert (44 y.o. female)  Date: 2/16/2017  Diagnosis: LEFT HIP BIPOLAR  Failed total joint replacement (HCC)  Failed total hip arthroplasty (HCC)  DEEP WOUND INFECTION <principal problem not specified>  Procedure(s) (LRB):  INCISION AND DRAINAGE HIP WITH WASHOUT AND CLOSURE (Left) 2 Days Post-Op  Precautions: WBAT (LLE)      ASSESSMENT:  Patient demonstrates good safety awareness, compliant with hip precautions. SBA for <> bathroom, manages clothing and personal hygiene SBA. Patient completes UE ADLS and grooming/bathing with set-up, mod assist for posterior and LE within hip precautions. SBA for functional task mobility, return to supine. Patient pending discharge to [facility] for rehab. Progression toward goals:  [X]       Improving appropriately and progressing toward goals  [ ]       Improving slowly and progressing toward goals  [ ]       Not making progress toward goals and plan of care will be adjusted       PLAN:  Patient continues to benefit from skilled intervention to address the above impairments. Continue treatment per established plan of care. Discharge Recommendations:  Rehab  Further Equipment Recommendations for Discharge:  none       SUBJECTIVE:   Patient stated I think I need to rest a little before I leave today.       OBJECTIVE DATA SUMMARY:   Cognitive/Behavioral Status:  Neurologic State: Alert; Appropriate for age  Orientation Level: Oriented X4  Cognition: Appropriate decision making; Appropriate for age attention/concentration; Appropriate safety awareness; Follows commands Functional Mobility and Transfers for ADLs:  Bed Mobility:   SBA     Transfers:   SBA           ADL Intervention:    Patient instructed to don all clothing while sitting prior to standing, doff all clothing to knees while standing, then sit to doff clothing off from knees to feet in order to facilitate fall prevention, pain management, and energy conservation with ADLS. Patient indicated understanding/recalled strategies with min instruction. Patient instructed and indicated understanding the benefits of maintaining activity tolerance, functional mobility, and independence with self care tasks during acute stay to ensure safe return home and to baseline. Encouraged patient to increase frequency and duration OOB, be out of bed for all meals, perform daily ADLs (as approved by RN/MD regarding bathing etc), and performing functional mobility to/from bathroom. Therapeutic Exercises:   Encouraged UE ROM and stretching  Pain:  Pain Scale 1: Numeric (0 - 10)  Pain Intensity 1: 4  Pain Location 1: Hip  Pain Orientation 1: Left  Pain Description 1: Aching  Pain Intervention(s) 1: Emotional support;Repositioned  Activity Tolerance:   Patient completed multiple sit to stand transfers, mobility around room and bathroom with RW  in completion of ADLS with no LOB or break  Please refer to the flowsheet for vital signs taken during this treatment.   After treatment:   [ ] Patient left in no apparent distress sitting up in chair  [X] Patient left in no apparent distress in bed  [X] Call bell left within reach  [X] Nursing notified  [ ] Caregiver present  [ ] Bed alarm activated      COMMUNICATION/COLLABORATION:   The patients plan of care was discussed with: Physical Therapy Assistant, Registered Nurse and      Alfie Matamoros OT  Time Calculation: 15 mins

## 2017-02-16 NOTE — ROUTINE PROCESS
Bedside and Verbal shift change report given to [address] (oncoming nurse) by Devan Cota RN (offgoing nurse). Report included the following information SBAR, Kardex and Recent Results.

## 2017-02-16 NOTE — DISCHARGE INSTRUCTIONS
TOTAL HIP DISCHARGE INSTRUCTIONS    Patient: Chelsea Jernigan MRN: [id number] SSN: [id number]             Please take the time to review the following instructions before you leave the hospital and use them as guidelines during your recovery from surgery. If you have any questions you may contact my office at [phone]. After hours or during the weekend you may reach me personally at [phone] if there is an emergency. SPECIAL INSTRUCTIONS :   1. Do not bend greater than 90 degrees at the hip for 4 weeks following your discharge  2. Avoid exercises or activities which bring the leg out or away from the mid-line of the body. The surgical repair involves this muscle and it will require 4 weeks to heal. You may disregard these instructions for a direct anterior approach. 3. You may walk as tolerated and are encouraged to work daily on progressing your activities with a walker initially. 4. You may transition to a cane for walking 5-7 days from surgery once you feel safe. You may use a walker for longer periods if you feel unstable. Wound Care/ Dressing Changes:      DRESSING :       [place] Dressing : Change as needed. monitor for increased drainage. If noted contact Dr. Estle Hodgkins office. Showering/ Bathing: If your incision is dry without drainage you may shower following your discharge. Please change your dressing if it becomes saturated after showering or begins to peel off. It is fine to have water run over the incision. Do not vigorously scrub your incision. Apply a clean, dry dressing after you have dried your incision, if your dressing peels off. Do not take a bath or get into a swimming pool / Gianna Gails until you follow up with Dr. Aleena Null. Do not soak your incision under water. If there is continued drainage or you are concerned contact Dr Estle Hodgkins office prior to showering [phone] ext [phone].          Diet:  You may advance to your regular diet as tolerated. Increase your clear liquid intake for the next 2-3 days. Nutrition Recommendations for Discharge:    Continue Oral Nutrition Supplements at discharge:   Ensure Enlive or Ensure Plus 1-2 bottles/day for 30 days unless otherwise directed by your Primary Care Physician. This product can be purchased at your local grocery store or drug store and online. Shantel Balderrama, GUANAKO   _            Medication:      1. You will be given prescriptions for pain medication when you are discharged from the hospital. The side effects of these medications can be substantial and the narcotic medications are not mandatory. You may substitute these medications with Tylenol or Alleve / Motrin. 2.  Please use the medications as prescribed. Pain medications may cause constipation- Colace twice daily and Miralax two scoops 2-3 times a day while taking the narcotic medication should help prevent constipation. Other possible side effects of pain medication are dizziness, headache, nausea, vomiting, and urinary retention. Discontinue the pain medication if you develop itching, rash, shortness of breath, or difficulties swallowing. If these symptoms become severe or are not relieved by discontinuing the medication, you should seek immediate medical attention. 3. Refills of pain medication are authorized during office hours only (8 AM- 5 PM  Monday thru Friday). Many of these medication will require you or a family member to pick-up a physical prescription at the office. 4. Medications other than antiinflammatories will not be called into the pharmacy after business hours. 5. Do not take Tylenol/Acetaminophen in addition to your pain medication as most pain medications already contain this ingredient. Do not exceed 4000mg of Tylenol/Acetaminophen per day. 6. You may resume the medication(s) you were taking prior to your surgery. Narcotics may change the effects of some antidepressant medication(s).   If you have any questions about possible interactions between your regular medications and the pain medication, you should ask the pharmacist or contact the prescribing physician. 7. If you have constipation which is not improved by oral stool softeners then a Ducolax suppository should be purchased over the counter. 8. Continue the blood thinner (Aspirin or Lovenox) for a total of 30 days following surgery. Follow up appointment:    Please call our office at [phone] for your follow up appointment. This should be scheduled 14 days following the date of surgery. Physical Therapy / Nursing:  Physical Therapy per protocol at [facility]. Important Signs and Symptoms:    If any of the following signs or symptoms occur, you should contact Dr. Joellen Fox office. Please be advised if a problem arises which you feel requires immediate medical attention or you are unable to contact Dr. Joellen Fox office you should seek immediate medical attention at the ER or other health care facility you have access to.    1. A sudden increase in swelling and/or redness or warmth at the area your surgery was performed which isnt relieved by rest, ice, and elevation. 2. Oral temperature greater than 101 degrees for 12 hours or more which isnt relieved by an increase in fluid intake and taking 2 Tylenol every 4-6 hours. 3. Excessive drainage from your incisions, or drainage which hasnt stopped by 72 hours after your surgery. 4. Fever, chills, shortness of breath, chest pain, nausea, vomiting or other signs and symptoms which are of concern to you. frequently asked questions  -    When should I call for advice regarding my pain?  o After 12 hours on the above regimen, if nothing is working call the office [phone] or [phone]) or call my cell after hours [phone].  Can I get refills?  o Narcotic refills are provided for the first 6 weeks following surgery.    - I will generally try to taper down to a single narcotic medication by your two week appointment. o Try Tylenol 650mg along with Alleve 220mg or Motrin 200mg during the majority of the day. - Save the narcotic pain medications for physical therapy (1 hour prior) and before sleeping at night. - Keep in mind you need to discontinue these medications prior to returning to driving.  Is swelling normal?  o Almost everyone has some degree of swelling following surgery. o Following hip and knee replacement surgery, swelling can be normal below the incision for the first 1-2 weeks. - This swelling peaks around 5-7 days after surgery.   - You may have some bruising around the back of the thigh, calf and even into the foot.  What should I do for the swelling?  o Keep the limb elevated. o Apply compression socks (knee high for total knees and up to the mid-thigh for total hips.   o Heat or ice may be applied, choose the modality that makes you the most comfortable.  How long should I remain on blood thinners following surgery?  o Thirty days   When can I drive?  o Once you have stopped using regular narcotic pain medications (Percocet, Lortab, etc.) and can safely apply the brakes without hesitation.  When can I shower? o 72hours following surgery if you have no drainage  o No submersion of the incision, bathing or swimming for 14 days following surgery or until cleared by Dr Karo Dominguez.  What do I do with the dressing when I shower?  o The dressing can be worn in the shower. Please remove the dressing only if it becomes saturated or begins to peel off.    o The incision is sealed with Dermabond (Biologic glue) and except for wounds which are draining should be watertight.  How active should I be following surgery?   o Progress activities in moderation at your own pace.   o Walk each day and set progressive goals with small increments (1st week - ½ block of walking, 2nd week - 1 block, 3rd week - 2 blocks, etc.)    Please do not hesitate to call me at [phone] (cell phone) for questions following surgery - Latonia Mendoza MD    MyChart Activation    Thank you for enrolling in [address] Please follow the instructions below to securely access your online medical record. [employer] allows you to send messages to your doctor, view your test results, renew your prescriptions, schedule appointments, and more. How Do I Sign Up? 1. In your internet browser, go to [email or web address]. 2. Click on the First Time User? Click Here link in the Sign In box. You will see the New Member Sign Up page. 3. Enter your [employer] Access Code exactly as it appears below. You will not need to use this code after youve completed the sign-up process. If you do not sign up before the expiration date, you must request a new code. [employer] Access Code: Activation code not generated  Current [employer] Status: Patient Declined     4. Enter the last four digits of your Social Security Number (xxxx) and Date of Birth (mm/dd/yyyy) as indicated and click Submit. You will be taken to the next sign-up page. 5. Create a [employer] ID. This will be your [employer] login ID and cannot be changed, so think of one that is secure and easy to remember. 6. Create a [employer] password. You can change your password at any time. 7. Enter your Password Reset Question and Answer. This can be used at a later time if you forget your password. 8. Enter your e-mail address. You will receive e-mail notification when new information is available in [address] 9. Click Sign Up. You can now view your medical record. Additional Information    Remember, [employer] is NOT to be used for urgent needs. For medical emergencies, dial 911. Now available from your iPhone and Android!

## 2017-02-21 ENCOUNTER — OFFICE VISIT (OUTPATIENT)
Dept: CARDIOLOGY CLINIC | Age: 77
End: 2017-02-21

## 2017-02-21 VITALS
HEIGHT: 65 IN | DIASTOLIC BLOOD PRESSURE: 64 MMHG | SYSTOLIC BLOOD PRESSURE: 98 MMHG | RESPIRATION RATE: 18 BRPM | HEART RATE: 91 BPM | OXYGEN SATURATION: 96 % | BODY MASS INDEX: 23.69 KG/M2 | WEIGHT: 142.2 LBS

## 2017-02-21 DIAGNOSIS — I25.119 CORONARY ARTERY DISEASE INVOLVING NATIVE CORONARY ARTERY OF NATIVE HEART WITH ANGINA PECTORIS (HCC): ICD-10-CM

## 2017-02-21 DIAGNOSIS — I10 ESSENTIAL HYPERTENSION: Primary | ICD-10-CM

## 2017-02-21 DIAGNOSIS — I50.22 SYSTOLIC CHF, CHRONIC (HCC): ICD-10-CM

## 2017-02-21 DIAGNOSIS — I42.9 CARDIOMYOPATHY (HCC): ICD-10-CM

## 2017-02-21 DIAGNOSIS — I44.7 LBBB (LEFT BUNDLE BRANCH BLOCK): ICD-10-CM

## 2017-02-21 RX ORDER — VANCOMYCIN HYDROCHLORIDE 500 MG/10ML
INJECTION, POWDER, LYOPHILIZED, FOR SOLUTION INTRAVENOUS DAILY
Status: ON HOLD | COMMUNITY
End: 2017-04-06

## 2017-02-21 RX ORDER — CYCLOBENZAPRINE HCL 5 MG
5 TABLET ORAL
COMMUNITY

## 2017-02-21 RX ORDER — RIFAMPIN 300 MG/1
600 CAPSULE ORAL DAILY
Status: ON HOLD | COMMUNITY
End: 2017-04-06

## 2017-02-21 NOTE — PROGRESS NOTES
Baljeet Melo MD    [address] [facility] [place], [address] [facility]    Office [phone],[facility] [phone]  Pager [phone]    Candi Sal is a 68 y.o. female is here for f/u visit. 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 Demand ischemia  CMP - EF 35-40%  POOL/Fatigue  HTN  LBBB  Hx of Aortic Sclerosis/Mitral Regurgitation        Plan:   SBP in 90's. Dec Lisinopril to 5mg daily. Monitor BP. Advised to weigh herself daily. Lasix prn - per [facility] MD based on wt gain/edema. ASA 81mg daily (if no contraindications) - was on it during recent hospitalisaton. Not seen on current med list.  She plans to f/u with Dr Sky Page after DC from [facility]. F/u with Dr Sky Page ( primary cardiologist) in 6 weeks. Patient understands the plan. All questions were answered to the patient's satisfaction. Medication Side Effects and Warnings were discussed with patient: yes  Patient Labs were reviewed and or requested:  yes  Patient Past Records were reviewed and or requested: yes    I appreciate the opportunity to be involved in Ms. Arciniega. See note below for details. Please do not hesitate to contact us with questions or concerns. Baljeet Melo MD    Cardiac Testing/ Procedures: A. Cardiac Cath/PCI:    B.ECHO/RIAZ: 1/30/17     Left ventricle: Systolic function was moderately reduced.  Ejection  fraction was estimated in the range of 35 % to 40 %.    Ventricular septum: There was dyssynergic motion. There was sigmoid septal  appearance. These changes are consistent with LBBB. Mitral valve: There was mild regurgitation. Aortic valve: Leaflets exhibited mild calcification, normal cuspal  separation, and sclerosis. C.StressNuclear/Stress ECHO/Stress test:6/16/16 - Belkis nuc: No ischemia/WMA sec to LBBB/EF 37%    D. Vascular:    E. EP:    F. Miscellaneous:    Subjective:  Dino Ma is a 68 y.o. female who returns for follow up visit. Pt underwent L total hip revision on 1/20/17 - during surgery had an event  ( bradycardia/hyotension), NSTEMI with peak trop around 3. Pt is on IV abx for infection L Hip. She is currently at [facility]. Her primary cardilogist is Dr Gus Waldron in [place]. He had done a belkis nuc study in June 2016 which showed nml perfusion but EF on echo/nuc was around 35-40%. Hx of LBBB. Pt is seeing me in f/u after hosp DC. She plans to f/u with Dr Gus Waldron after DC from [facility]. No CP. POOL+/Fatigue+. No swelling LE. Currently not on diuretics. Accompanied by son. ROS:  (bold if positive, if negative)             Medications before admission:    Current Outpatient Prescriptions   Medication Sig Dispense    vancomycin (VANCOCIN) 500 mg injection by IntraVENous route. Every 12 hours through PICC line.  rifAMPin (RIFADIN) 300 mg capsule Take 600 mg by mouth daily.  cyclobenzaprine (FLEXERIL) 5 mg tablet Take 5 mg by mouth three (3) times daily as needed for Muscle Spasm(s).  oxyCODONE IR (OXY-IR) 15 mg immediate release tablet Take 1 Tab by mouth every three (3) hours as needed. Max Daily Amount: 120 mg. 75 Tab    ondansetron (ZOFRAN ODT) 8 mg disintegrating tablet Take 0.5 Tabs by mouth every eight (8) hours as needed for Nausea. 30 Tab    bisacodyl (DULCOLAX, BISACODYL,) 10 mg suppository Insert 10 mg into rectum daily. 2 Suppository    enoxaparin (LOVENOX) 40 mg/0.4 mL 0.4 mL by SubCUTAneous route daily.  27 Syringe    lisinopril (PRINIVIL, ZESTRIL) 10 mg tablet Take 1 Tab by mouth daily. 30 Tab    atorvastatin (LIPITOR) 10 mg tablet Take 1 Tab by mouth daily. 30 Tab    SUMAtriptan (IMITREX) 50 mg tablet Take 50 mg by mouth once as needed for Migraine.  promethazine (PHENERGAN) 25 mg tablet Take 25 mg by mouth every six (6) hours as needed for Nausea.  cyanocobalamin 1,000 mcg tablet Take 1,000 mcg by mouth daily.  traMADol (ULTRAM) 50 mg tablet Take 50 mg by mouth every eight (8) hours as needed for Pain.  linaclotide (LINZESS) 290 mcg cap capsule Take 290 mcg by mouth Daily (before breakfast).  zolpidem (AMBIEN) 5 mg tablet Take 5 mg by mouth nightly.  tiotropium (SPIRIVA WITH HANDIHALER) 18 mcg inhalation capsule Take 1 Cap by inhalation daily.  albuterol (PROVENTIL HFA) 90 mcg/actuation inhaler Take 2 Puffs by inhalation every six (6) hours as needed.  methadone (DOLOPHINE) 5 mg tablet Take 5 mg by mouth every eight (8) hours.  carvedilol (COREG) 3.125 mg tablet Take 3.125 mg by mouth two (2) times daily (with meals). Takes c breakfast and dinner     omega-3 fatty acids-vitamin e (FISH OIL) 1,000 mg cap Take 1 Cap by mouth.  aspirin delayed-release 81 mg tablet Take 81 mg by mouth daily.  cholecalciferol, vitamin d3, (VITAMIN D3) 400 unit cap Take 1 Tab by mouth daily.  esomeprazole (NEXIUM) 40 mg capsule Take  by mouth daily.  polyethylene glycol (MIRALAX) 17 gram packet Take 17 g by mouth every other day.  CALCIUM CARBONATE/MAG HYDROX (ROLAIDS EXTRA STRENGTH PO) Take 2 Tabs by mouth three (3) times daily (with meals).  DULCOLAX, BISACODYL, PO Take 1 Tab by mouth daily as needed. No current facility-administered medications for this visit.         Family History of CAD:    No    Social History:  Current  Smoker  No    Physical Exam:  Visit Vitals    BP 98/64 (BP 1 Location: Left arm)    Pulse 91    Resp 18    Ht 5' 5\" (1.651 m)    Wt 142 lb 3.2 oz (64.5 kg)    SpO2 96%    BMI 23.66 kg/m2          Gen: Well-developed, well-nourished, in no acute distress  Neck: Supple,No JVD, No Carotid Bruit,   Resp: No accessory muscle use, Clear breath sounds, No rales or rhonchi  Card: Regular Rate,Rythm,Normal S1, S2, 2/6 sys murmur+,No rubs or gallop. No thrills.    Abd:  Soft, non-tender, non-distended,BS+,   MSK: No cyanosis  Skin: No rashes    Neuro: moving all four extremities , follows commands appropriately  Psych:  Good insight, oriented to person, place , alert, Nml Affect  LE: No edema    EKG: SR/LBBB/PVC      LABS:        Lab Results   Component Value Date/Time    WBC 4.4 02/15/2017 03:30 AM    HGB 8.4 02/16/2017 03:30 AM    HCT 26.9 02/16/2017 03:30 AM    PLATELET 990 85/22/7516 03:30 AM     Lab Results   Component Value Date/Time    Sodium 142 02/05/2017 04:12 AM    Potassium 4.9 02/05/2017 04:12 AM    Chloride 108 02/05/2017 04:12 AM    CO2 31 02/05/2017 04:12 AM    Anion gap 3 02/05/2017 04:12 AM    Glucose 71 02/05/2017 04:12 AM    BUN 24 02/16/2017 03:30 AM    Creatinine 0.72 02/16/2017 03:30 AM    BUN/Creatinine ratio 33 02/05/2017 04:12 AM    GFR est AA >60 02/16/2017 03:30 AM    GFR est non-AA >60 02/16/2017 03:30 AM    Calcium 8.3 02/05/2017 04:12 AM       Lab Results   Component Value Date/Time    aPTT 22.8 01/30/2017 03:19 PM     Lab Results   Component Value Date/Time    INR 1.1 01/30/2017 03:19 PM    INR 1.0 01/11/2017 03:45 PM    Prothrombin time 10.7 01/30/2017 03:19 PM    Prothrombin time 9.7 01/11/2017 03:45 PM     No components found for: Sapna Thompson MD

## 2017-02-21 NOTE — MR AVS SNAPSHOT
Visit Information Date & Time Provider Department Dept. Phone Encounter #  
 2/21/2017 11:20 AM Tom Tylre MD [facility] Noemi Noonan [phone] [id number] Upcoming Health Maintenance Date Due DTaP/Tdap/Td series (1 - Tdap) 7/1/1961 ZOSTER VACCINE AGE 60> 7/1/2000 GLAUCOMA SCREENING Q2Y 7/1/2005 OSTEOPOROSIS SCREENING (DEXA) 7/1/2005 Pneumococcal 65+ Low/Medium Risk (1 of 2 - PCV13) 7/1/2005 MEDICARE YEARLY EXAM 7/1/2005 INFLUENZA AGE 9 TO ADULT 8/1/2016 Allergies as of 2/21/2017  Review Complete On: 2/14/2017 By: Evangelina Vaughn RN Severity Noted Reaction Type Reactions [place] High 01/11/2017   Systemic Angioedema Codeine High 04/22/2014   Systemic Other (comments) \"Years ago gave me hives but lately I havetaken it without problem\" Dilaudid [Hydromorphone (Bulk)]  04/22/2014    Shortness of Breath Lyrica [Pregabalin]  04/22/2014    Nausea and Vomiting Current Immunizations  Never Reviewed No immunizations on file. Not reviewed this visit You Were Diagnosed With   
  
 Codes Comments NSTEMI (non-ST elevated myocardial infarction) ([facility]    -  Primary ICD-10-CM: I21.4 ICD-9-CM: 410.70 Vitals BP Pulse Resp Height(growth percentile) Weight(growth percentile) SpO2  
 98/64 (BP 1 Location: Left arm) 91 18 5' 5\" (1.651 m) 142 lb 3.2 oz (64.5 kg) 96% BMI OB Status Smoking Status 23.66 kg/m2 Postmenopausal Former Smoker Vitals History BMI and BSA Data Body Mass Index Body Surface Area  
 23.66 kg/m 2 1.72 m 2 Your Updated Medication List  
  
   
This list is accurate as of: 2/21/17 12:21 PM.  Always use your most recent med list.  
  
  
  
  
 AMBIEN 5 mg tablet Generic drug:  zolpidem Take 5 mg by mouth nightly. aspirin delayed-release 81 mg tablet Take 81 mg by mouth daily. atorvastatin 10 mg tablet Commonly known as:  LIPITOR Take 1 Tab by mouth daily. COREG 3.125 mg tablet Generic drug:  carvedilol Take 3.125 mg by mouth two (2) times daily (with meals). Takes c breakfast and dinner  
  
 cyanocobalamin 1,000 mcg tablet Take 1,000 mcg by mouth daily. cyclobenzaprine 5 mg tablet Commonly known as:  FLEXERIL Take 5 mg by mouth three (3) times daily as needed for Muscle Spasm(s). * DULCOLAX (BISACODYL) PO Take 1 Tab by mouth daily as needed. * bisacodyl 10 mg suppository Commonly known as:  DULCOLAX (BISACODYL) Insert 10 mg into rectum daily. enoxaparin 40 mg/0.4 mL Commonly known as:  LOVENOX  
0.4 mL by SubCUTAneous route daily. FISH OIL 1,000 mg Cap Generic drug:  omega-3 fatty acids-vitamin e Take 1 Cap by mouth. IMITREX 50 mg tablet Generic drug:  SUMAtriptan Take 50 mg by mouth once as needed for Migraine. LINZESS 290 mcg Cap capsule Generic drug:  linaclotide Take 290 mcg by mouth Daily (before breakfast). lisinopril 10 mg tablet Commonly known as:  Annabelle [place] Take 1 Tab by mouth daily. methadone 5 mg tablet Commonly known as:  DOLOPHINE Take 5 mg by mouth every eight (8) hours. MIRALAX 17 gram packet Generic drug:  polyethylene glycol Take 17 g by mouth every other day. NexIUM 40 mg capsule Generic drug:  esomeprazole Take  by mouth daily. ondansetron 8 mg disintegrating tablet Commonly known as:  ZOFRAN ODT Take 0.5 Tabs by mouth every eight (8) hours as needed for Nausea. oxyCODONE IR 15 mg immediate release tablet Commonly known as:  OXY-IR Take 1 Tab by mouth every three (3) hours as needed. Max Daily Amount: 120 mg.  
  
 promethazine 25 mg tablet Commonly known as:  PHENERGAN Take 25 mg by mouth every six (6) hours as needed for Nausea. PROVENTIL HFA 90 mcg/actuation inhaler Generic drug:  albuterol Take 2 Puffs by inhalation every six (6) hours as needed. rifAMPin 300 mg capsule Commonly known as:  RIFADIN Take 600 mg by mouth daily. ROLAIDS EXTRA STRENGTH PO Take 2 Tabs by mouth three (3) times daily (with meals). SPIRIVA WITH HANDIHALER 18 mcg inhalation capsule Generic drug:  tiotropium Take 1 Cap by inhalation daily. traMADol 50 mg tablet Commonly known as:  ULTRAM  
Take 50 mg by mouth every eight (8) hours as needed for Pain.  
  
 vancomycin 500 mg injection Commonly known as:  VANCOCIN  
by IntraVENous route. Every 12 hours through PICC line. VITAMIN D3 400 unit Cap Generic drug:  cholecalciferol (vitamin d3) Take 1 Tab by mouth daily. * Notice: This list has 2 medication(s) that are the same as other medications prescribed for you. Read the directions carefully, and ask your doctor or other care provider to review them with you. We Performed the Following AMB POC EKG ROUTINE W/ 12 LEADS, INTER & REP [69644 CPT(R)] Please provide this summary of care documentation to your next provider. Your primary care clinician is listed as Phys Other. If you have any questions after today's visit, please call [phone].

## 2017-02-28 LAB
BACTERIA SPEC CULT: NORMAL
GRAM STN SPEC: NORMAL
GRAM STN SPEC: NORMAL
SERVICE CMNT-IMP: NORMAL

## 2017-03-01 LAB
BACTERIA SPEC CULT: NORMAL
GRAM STN SPEC: NORMAL
SERVICE CMNT-IMP: NORMAL

## 2017-04-03 ENCOUNTER — HOSPITAL ENCOUNTER (INPATIENT)
Age: 77
LOS: 3 days | Discharge: HOME HEALTH | DRG: 314 | End: 2017-04-06
Attending: INTERNAL MEDICINE | Admitting: INTERNAL MEDICINE
Payer: MEDICARE

## 2017-04-03 DIAGNOSIS — I50.22 SYSTOLIC CHF, CHRONIC (HCC): ICD-10-CM

## 2017-04-03 DIAGNOSIS — D61.818 PANCYTOPENIA (HCC): ICD-10-CM

## 2017-04-03 DIAGNOSIS — T80.219A PICC LINE INFECTION, INITIAL ENCOUNTER: ICD-10-CM

## 2017-04-03 PROBLEM — R78.81 BACTEREMIA: Status: ACTIVE | Noted: 2017-04-03

## 2017-04-03 PROBLEM — M00.9 SEPTIC ARTHRITIS (HCC): Status: ACTIVE | Noted: 2017-04-03

## 2017-04-03 PROBLEM — A41.9 SEPSIS, UNSPECIFIED: Status: ACTIVE | Noted: 2017-04-03

## 2017-04-03 LAB — LACTATE SERPL-SCNC: 1.2 MMOL/L (ref 0.4–2)

## 2017-04-03 PROCEDURE — 80048 BASIC METABOLIC PNL TOTAL CA: CPT | Performed by: INTERNAL MEDICINE

## 2017-04-03 PROCEDURE — 93005 ELECTROCARDIOGRAM TRACING: CPT

## 2017-04-03 PROCEDURE — 87040 BLOOD CULTURE FOR BACTERIA: CPT | Performed by: INTERNAL MEDICINE

## 2017-04-03 PROCEDURE — 74011250637 HC RX REV CODE- 250/637: Performed by: INTERNAL MEDICINE

## 2017-04-03 PROCEDURE — 36415 COLL VENOUS BLD VENIPUNCTURE: CPT | Performed by: INTERNAL MEDICINE

## 2017-04-03 PROCEDURE — 74011250636 HC RX REV CODE- 250/636: Performed by: INTERNAL MEDICINE

## 2017-04-03 PROCEDURE — 74011000258 HC RX REV CODE- 258: Performed by: INTERNAL MEDICINE

## 2017-04-03 PROCEDURE — 83605 ASSAY OF LACTIC ACID: CPT | Performed by: INTERNAL MEDICINE

## 2017-04-03 PROCEDURE — 83735 ASSAY OF MAGNESIUM: CPT | Performed by: INTERNAL MEDICINE

## 2017-04-03 PROCEDURE — 65660000000 HC RM CCU STEPDOWN

## 2017-04-03 RX ORDER — FUROSEMIDE 20 MG/1
20 TABLET ORAL DAILY
COMMUNITY

## 2017-04-03 RX ORDER — TRAMADOL HYDROCHLORIDE 50 MG/1
50 TABLET ORAL
Status: DISCONTINUED | OUTPATIENT
Start: 2017-04-03 | End: 2017-04-07 | Stop reason: HOSPADM

## 2017-04-03 RX ORDER — RIFAMPIN 300 MG/1
600 CAPSULE ORAL DAILY
Status: DISCONTINUED | OUTPATIENT
Start: 2017-04-04 | End: 2017-04-07 | Stop reason: HOSPADM

## 2017-04-03 RX ORDER — ATORVASTATIN CALCIUM 10 MG/1
10 TABLET, FILM COATED ORAL DAILY
Status: DISCONTINUED | OUTPATIENT
Start: 2017-04-04 | End: 2017-04-07 | Stop reason: HOSPADM

## 2017-04-03 RX ORDER — SODIUM CHLORIDE 0.9 % (FLUSH) 0.9 %
5-10 SYRINGE (ML) INJECTION AS NEEDED
Status: DISCONTINUED | OUTPATIENT
Start: 2017-04-03 | End: 2017-04-03 | Stop reason: SDUPTHER

## 2017-04-03 RX ORDER — ZOLPIDEM TARTRATE 5 MG/1
5 TABLET ORAL
Status: DISCONTINUED | OUTPATIENT
Start: 2017-04-04 | End: 2017-04-07 | Stop reason: HOSPADM

## 2017-04-03 RX ORDER — SUMATRIPTAN 25 MG/1
50 TABLET, FILM COATED ORAL
Status: DISCONTINUED | OUTPATIENT
Start: 2017-04-03 | End: 2017-04-07 | Stop reason: HOSPADM

## 2017-04-03 RX ORDER — OXYCODONE HYDROCHLORIDE 5 MG/1
15 TABLET ORAL
Status: DISCONTINUED | OUTPATIENT
Start: 2017-04-03 | End: 2017-04-07 | Stop reason: HOSPADM

## 2017-04-03 RX ORDER — POTASSIUM CHLORIDE 750 MG/1
10 TABLET, FILM COATED, EXTENDED RELEASE ORAL DAILY
COMMUNITY

## 2017-04-03 RX ORDER — HEPARIN SODIUM 5000 [USP'U]/ML
5000 INJECTION, SOLUTION INTRAVENOUS; SUBCUTANEOUS EVERY 8 HOURS
Status: DISCONTINUED | OUTPATIENT
Start: 2017-04-03 | End: 2017-04-03

## 2017-04-03 RX ORDER — SODIUM CHLORIDE 0.9 % (FLUSH) 0.9 %
5-10 SYRINGE (ML) INJECTION EVERY 8 HOURS
Status: DISCONTINUED | OUTPATIENT
Start: 2017-04-03 | End: 2017-04-07 | Stop reason: HOSPADM

## 2017-04-03 RX ORDER — LISINOPRIL 5 MG/1
5 TABLET ORAL DAILY
COMMUNITY

## 2017-04-03 RX ORDER — ALBUTEROL SULFATE 90 UG/1
2 AEROSOL, METERED RESPIRATORY (INHALATION)
Status: DISCONTINUED | OUTPATIENT
Start: 2017-04-03 | End: 2017-04-07 | Stop reason: HOSPADM

## 2017-04-03 RX ORDER — ENOXAPARIN SODIUM 100 MG/ML
40 INJECTION SUBCUTANEOUS DAILY
Status: DISCONTINUED | OUTPATIENT
Start: 2017-04-04 | End: 2017-04-07 | Stop reason: HOSPADM

## 2017-04-03 RX ORDER — ESOMEPRAZOLE MAGNESIUM 40 MG/1
40 CAPSULE, DELAYED RELEASE ORAL DAILY
Status: DISCONTINUED | OUTPATIENT
Start: 2017-04-04 | End: 2017-04-03 | Stop reason: CLARIF

## 2017-04-03 RX ORDER — PANTOPRAZOLE SODIUM 40 MG/1
40 TABLET, DELAYED RELEASE ORAL DAILY
Status: DISCONTINUED | OUTPATIENT
Start: 2017-04-04 | End: 2017-04-07 | Stop reason: HOSPADM

## 2017-04-03 RX ORDER — VANCOMYCIN HYDROCHLORIDE 500 MG/10ML
500 INJECTION, POWDER, LYOPHILIZED, FOR SOLUTION INTRAVENOUS 2 TIMES DAILY
Status: DISCONTINUED | OUTPATIENT
Start: 2017-04-03 | End: 2017-04-03

## 2017-04-03 RX ORDER — FACIAL-BODY WIPES
10 EACH TOPICAL
COMMUNITY

## 2017-04-03 RX ORDER — POLYETHYLENE GLYCOL 3350 17 G/17G
17 POWDER, FOR SOLUTION ORAL EVERY OTHER DAY
Status: DISCONTINUED | OUTPATIENT
Start: 2017-04-04 | End: 2017-04-07 | Stop reason: HOSPADM

## 2017-04-03 RX ORDER — ASPIRIN 81 MG/1
81 TABLET ORAL DAILY
Status: DISCONTINUED | OUTPATIENT
Start: 2017-04-04 | End: 2017-04-07 | Stop reason: HOSPADM

## 2017-04-03 RX ORDER — SODIUM CHLORIDE 0.9 % (FLUSH) 0.9 %
5-10 SYRINGE (ML) INJECTION AS NEEDED
Status: DISCONTINUED | OUTPATIENT
Start: 2017-04-03 | End: 2017-04-07 | Stop reason: HOSPADM

## 2017-04-03 RX ORDER — FACIAL-BODY WIPES
10 EACH TOPICAL DAILY
Status: DISCONTINUED | OUTPATIENT
Start: 2017-04-04 | End: 2017-04-07 | Stop reason: HOSPADM

## 2017-04-03 RX ADMIN — SODIUM CHLORIDE 2 G: 900 INJECTION, SOLUTION INTRAVENOUS at 18:09

## 2017-04-03 RX ADMIN — Medication 10 ML: at 22:00

## 2017-04-03 RX ADMIN — OXYCODONE HYDROCHLORIDE 15 MG: 5 TABLET ORAL at 18:09

## 2017-04-03 RX ADMIN — Medication 10 ML: at 18:09

## 2017-04-03 RX ADMIN — TRAMADOL HYDROCHLORIDE 50 MG: 50 TABLET, FILM COATED ORAL at 21:16

## 2017-04-03 NOTE — H&P
[facility] [address]  [phone]    Admission History and Physical      NAME:  Erica Rollins   :   1940   MRN:  [id number]     PCP:  Dre Andrea MD     Date/Time:  4/3/2017         Subjective:     CHIEF COMPLAINT: transferred from [address] [place] [address]:     Ms. Ramya Aponte is a 68 y.o.  female who is admitted with sepsis/ bacteremia. Ms. Ramya Aponte with PMH of septic joint, CAD, COPD, GERD, HTN, hyperlipidemia, CHF was transferred from [address] [place] [address] for management of sepsis. Initially pt was seen at [address] [facility] on 17 fo SOB and chest pain after she had vancomycin infusion. In ER, had BC and culture from the PICC line and sent home. Again, she went back to ER with chills and SOB yesterday where she was admitted. Pt was admitted with septic shock, which she responded to IVF. According to the sign out report, the culture from the picc showed pseudomonas.         Past Medical History:   Diagnosis Date    Autoimmune disease ([facility]     psoriasis    Beta-blocker therapy     CAD (coronary artery disease)     non ischemic cardiomyopathy    Chronic obstructive pulmonary disease (HCC)     Chronic pain     curvature of spinie    GERD (gastroesophageal reflux disease)     Heart failure (HCC)     Hypercholesterolemia     Hypertension     Ill-defined condition     hx aortic stenosis & mitral regurg    Smoker     Thromboembolus ([facility]     right leg after hip surgery        Past Surgical History:   Procedure Laterality Date    ABDOMEN SURGERY PROC UNLISTED      Gallbladder    ABDOMEN SURGERY PROC UNLISTED      hiatal hernia repair    HX GYN      miscarriage    HX HEENT      Tonsilectomy    HX HEENT      cataract removed bilateral eyes    HX ORTHOPAEDIC      Right Knee, Left Foot    HX ORTHOPAEDIC  2015    left hip replacement     HX ORTHOPAEDIC  2016    rmoval bone fragment left hip    HX ORTHOPAEDIC  2013    right hip replacement    HX ORTHOPAEDIC  2001    right knee replacement       Social History   Substance Use Topics    Smoking status: Former Smoker     Packs/day: 0.50     Years: 60.00    Smokeless tobacco: Never Used    Alcohol use No      Comment: 0        Family History   Problem Relation Age of Onset    Cancer Mother     Stroke Father         Allergies   Allergen Reactions    [place] Angioedema    Codeine Other (comments)     \"Years ago gave me hives but lately I havetaken it without problem\"    Dilaudid [Hydromorphone (Bulk)] Shortness of Breath    Lyrica [Pregabalin] Nausea and Vomiting        Prior to Admission medications    Medication Sig Start Date End Date Taking? Authorizing Provider   vancomycin (VANCOCIN) 500 mg injection by IntraVENous route. Every 12 hours through PICC line. Historical Provider   rifAMPin (RIFADIN) 300 mg capsule Take 600 mg by mouth daily. Historical Provider   cyclobenzaprine (FLEXERIL) 5 mg tablet Take 5 mg by mouth three (3) times daily as needed for Muscle Spasm(s). Historical Provider   oxyCODONE IR (OXY-IR) 15 mg immediate release tablet Take 1 Tab by mouth every three (3) hours as needed. Max Daily Amount: 120 mg. 2/16/17   Mike Montesinos NP   ondansetron (ZOFRAN ODT) 8 mg disintegrating tablet Take 0.5 Tabs by mouth every eight (8) hours as needed for Nausea. 2/6/17   Mele Mckeon MD   bisacodyl (DULCOLAX, BISACODYL,) 10 mg suppository Insert 10 mg into rectum daily. 2/6/17   Mele Mckeon MD   enoxaparin (LOVENOX) 40 mg/0.4 mL 0.4 mL by SubCUTAneous route daily. 2/6/17   Mele Mckeon MD   lisinopril (PRINIVIL, ZESTRIL) 10 mg tablet Take 1 Tab by mouth daily. 2/6/17   Sumeet Palomo NP   atorvastatin (LIPITOR) 10 mg tablet Take 1 Tab by mouth daily. 2/6/17   Sumeet Palomo NP   SUMAtriptan (IMITREX) 50 mg tablet Take 50 mg by mouth once as needed for Migraine.     Historical Provider promethazine (PHENERGAN) 25 mg tablet Take 25 mg by mouth every six (6) hours as needed for Nausea. Historical Provider   cyanocobalamin 1,000 mcg tablet Take 1,000 mcg by mouth daily. Historical Provider   traMADol (ULTRAM) 50 mg tablet Take 50 mg by mouth every eight (8) hours as needed for Pain. Historical Provider   linaclotide Tilmon Shivers) 290 mcg cap capsule Take 290 mcg by mouth Daily (before breakfast). Historical Provider   zolpidem (AMBIEN) 5 mg tablet Take 5 mg by mouth nightly. Historical Provider   tiotropium (SPIRIVA WITH HANDIHALER) 18 mcg inhalation capsule Take 1 Cap by inhalation daily. Historical Provider   albuterol (PROVENTIL HFA) 90 mcg/actuation inhaler Take 2 Puffs by inhalation every six (6) hours as needed. Historical Provider   methadone (DOLOPHINE) 5 mg tablet Take 5 mg by mouth every eight (8) hours. Historical Provider   carvedilol (COREG) 3.125 mg tablet Take 3.125 mg by mouth two (2) times daily (with meals). Takes c breakfast and dinner    Historical Provider   omega-3 fatty acids-vitamin e (FISH OIL) 1,000 mg cap Take 1 Cap by mouth. Historical Provider   aspirin delayed-release 81 mg tablet Take 81 mg by mouth daily. Historical Provider   cholecalciferol, vitamin d3, (VITAMIN D3) 400 unit cap Take 1 Tab by mouth daily. Historical Provider   esomeprazole (NEXIUM) 40 mg capsule Take  by mouth daily. Historical Provider   polyethylene glycol (MIRALAX) 17 gram packet Take 17 g by mouth every other day. Historical Provider   CALCIUM CARBONATE/MAG HYDROX (ROLAIDS EXTRA STRENGTH PO) Take 2 Tabs by mouth three (3) times daily (with meals). Historical Provider   DULCOLAX, BISACODYL, PO Take 1 Tab by mouth daily as needed.     Historical Provider         Review of Systems:  (bold if positive, if negative)    Gen:  Eyes:  ENT:  CVS:  Pulm:  GI:    :    MS:  Skin:  Psych:  Endo:    Hem:  Renal:    Neuro:            Objective:      VITALS:    Vital signs reviewed; most recent are: There were no vitals taken for this visit. SpO2 Readings from Last 6 Encounters:   02/21/17 96%   02/16/17 90%   01/11/17 99%   04/22/14 95%        No intake or output data in the 24 hours ending 04/03/17 1658         Exam:     Physical Exam:    Gen:  Well-developed, well-nourished, in no acute distress  HEENT:  Pink conjunctivae, PERRL, hearing intact to voice, moist mucous membranes  Neck:  Supple, without masses, thyroid non-tender  Resp:  No accessory muscle use, clear breath sounds without wheezes rales or rhonchi  Card:  No murmurs, normal S1, S2 without thrills, bruits or peripheral edema  Abd:  Soft, non-tender, non-distended, normoactive bowel sounds are present, no palpable organomegaly and no detectable hernias  Lymph:  No cervical or inguinal adenopathy  Musc:  No cyanosis or clubbing  Skin:  No rashes or ulcers, skin turgor is good  Neuro:  Cranial nerves are grossly intact, no focal motor weakness, follows commands appropriately  Psych:  Good insight, oriented to person, place and time, alert       Labs:    No results for input(s): WBC, HGB, HCT, PLT, HGBEXT, HCTEXT, PLTEXT in the last 72 hours. No results for input(s): NA, K, CL, CO2, GLU, BUN, CREA, CA, MG, PHOS, ALB, TBIL, TBILI, SGOT, ALT in the last 72 hours. No results found for: GLUCPOC  No results for input(s): PH, PCO2, PO2, HCO3, FIO2 in the last 72 hours. No results for input(s): INR in the last 72 hours. No lab exists for component: INREXT    Telemetry reviewed:          Assessment/Plan:    1. PIIC line infection. Not septic. According to the verbal report from [facility], the PICC line area was inflamed and some pus was drained. PICC line was removed today. Culture from PICC line area showed pseudomonas sensitive to gent, cefepime, zosyn, cipro, but BC is negative so far (result in chart). Start cefepime and consult ID. Check BC    2. LT hip Septic arthritis ([address] (4/3/2017). Intraoperative wound culture showed Oxacillin resistant staph Lugdunensis. Will continue vancomycin and rifampin through 4/12. Consult Dr Khushbu Hartmann to evaluated the hip. 3.  HTN (hypertension) (2/2/2017). Hold BP meds as her BP was low at [address]. Watch closely. 4.  Anemia (2/2/2017). likley secondary to chronic disease. Continue to monitor. Check iron panel, vit B12 and folate. 5.  Systolic CHF, chronic ([address] (2/2/2017). Compensated. Monitor fluid status    6. GERD. Continue PPI    7. Hyperlipidemia. On statin.             Previous medical records reviewed     Risk of deterioration: high      Total time spent with patient: [address] discussed with: Patient, Nursing Staff and >50% of time spent in counseling and coordination of care    Discussed:  Care Plan    Prophylaxis:  Hep SQ    Probable Disposition:  Home w/Family           ___________________________________________________    Attending Physician: Geoff Felipe MD

## 2017-04-03 NOTE — PROGRESS NOTES
1445: TRANSFER - IN REPORT:    Verbal report received from [facility]) on [employer]  being received from [employer]) for routine progression of care      Report consisted of patients Situation, Background, Assessment and   Recommendations(SBAR). Information from the following report(s) SBAR, Kardex, Intake/Output, MAR and Cardiac Rhythm NSR was reviewed with the receiving nurse. Opportunity for questions and clarification was provided. Assessment completed upon patients arrival to unit and care assumed. 1645: Pt arrived to [place] [address] via [employer]. VSS. Pt on RA. Pt walked from stretcher to commode using her walker, stable on feet with her walker. Pt presented with two PIVs and skin intact. Dr. Jennifer Dumas notified of pt's arrival.     1700: Dr. Jennifer Dumas at bedside to evaluate pt. Will wait for orders. [facility] faxed over PICC Line cultures. 1830: Blood cultures, labs, and EKG completed at this time. Abx and pain medication administered. Pt sitting up eating dinner. No complaints at this time. 1900: Shift change report received from LORENE Pizano. SBAR, Kardex, Intake/Output, MAR and Cardiac Rhythm NSR BBB reviewed.

## 2017-04-03 NOTE — PROGRESS NOTES
Primary Nurse Will Go RN and Hollis Talbert RN performed a dual skin assessment on this patient No impairment noted  Aniceto score is 21

## 2017-04-03 NOTE — IP AVS SNAPSHOT
Current Discharge Medication List  
  
START taking these medications Dose & Instructions Dispensing Information Comments Morning Noon Evening Bedtime  
 cefepime 1 gram injection Commonly known as:  MAXIPIME Your last dose was: Your next dose is:    
   
   
 Dose:  1 g  
1 g by IntraVENous route every eight (8) hours for 6 days. Take through 4/12. Quantity:  18 Each Refills:  0  
     
   
   
   
  
 ferrous sulfate 325 mg (65 mg iron) tablet Your last dose was: Your next dose is:    
   
   
 Dose:  325 mg Take 1 Tab by mouth daily (with breakfast). Quantity:  30 Tab Refills:  0 CONTINUE these medications which have CHANGED Dose & Instructions Dispensing Information Comments Morning Noon Evening Bedtime  
 carvedilol 6.25 mg tablet Commonly known as:  Render Blonder What changed:   
- medication strength 
- how much to take 
- additional instructions Your last dose was: Your next dose is:    
   
   
 Dose:  6.25 mg Take 1 Tab by mouth two (2) times daily (with meals). Quantity:  60 Tab Refills:  0  
     
   
   
   
  
 rifAMPin 300 mg capsule Commonly known as:  RIFADIN What changed:  additional instructions Your last dose was: Your next dose is:    
   
   
 Dose:  600 mg Take 2 Caps by mouth daily for 6 days. Take through 4/12. Quantity:  6 Cap Refills:  0  
     
   
   
   
  
 vancomycin 500 mg injection Commonly known as:  [employer] What changed:   
- how much to take - when to take this 
- additional instructions Your last dose was: Your next dose is:    
   
   
 Dose:  500 mg  
500 mg by IntraVENous route every twelve (12) hours for 6 days. Take through 4/12. Quantity:  1 Vial  
Refills:  0 CONTINUE these medications which have NOT CHANGED Dose & Instructions Dispensing Information Comments Morning Noon Evening Bedtime AMBIEN 5 mg tablet Generic drug:  zolpidem Your last dose was: Your next dose is:    
   
   
 Dose:  5 mg Take 5 mg by mouth nightly. Refills:  0  
     
   
   
   
  
 aspirin delayed-release 81 mg tablet Your last dose was: Your next dose is:    
   
   
 Dose:  81 mg Take 81 mg by mouth daily. Refills:  0  
     
   
   
   
  
 atorvastatin 10 mg tablet Commonly known as:  LIPITOR Your last dose was: Your next dose is:    
   
   
 Dose:  10 mg Take 1 Tab by mouth daily. Quantity:  30 Tab Refills:  3  
     
   
   
   
  
 bisacodyl 10 mg suppository Commonly known as:  DULCOLAX Your last dose was: Your next dose is:    
   
   
 Dose:  10 mg Insert 10 mg into rectum daily as needed for Diarrhea. Refills:  0  
     
   
   
   
  
 cyanocobalamin 1,000 mcg tablet Your last dose was: Your next dose is:    
   
   
 Dose:  1000 mcg Take 1,000 mcg by mouth daily. Refills:  0  
     
   
   
   
  
 cyclobenzaprine 5 mg tablet Commonly known as:  FLEXERIL Your last dose was: Your next dose is:    
   
   
 Dose:  5 mg Take 5 mg by mouth three (3) times daily as needed for Muscle Spasm(s). Refills:  0  
     
   
   
   
  
 FISH OIL 1,000 mg Cap Generic drug:  omega-3 fatty acids-vitamin e Your last dose was: Your next dose is:    
   
   
 Dose:  1 Cap Take 1 Cap by mouth. Refills:  0 IMITREX 50 mg tablet Generic drug:  SUMAtriptan Your last dose was: Your next dose is:    
   
   
 Dose:  50 mg Take 50 mg by mouth once as needed for Migraine. Refills:  0  
     
   
   
   
  
 LASIX 20 mg tablet Generic drug:  furosemide Your last dose was: Your next dose is:    
   
   
 Dose:  20 mg Take 20 mg by mouth daily. Refills:  0 LINZESS 290 mcg Cap capsule Generic drug:  linaclotide Your last dose was: Your next dose is:    
   
   
 Dose:  290 mcg Take 290 mcg by mouth Daily (before breakfast). Refills:  0  
     
   
   
   
  
 lisinopril 5 mg tablet Commonly known as:  Albino Mash Your last dose was: Your next dose is:    
   
   
 Dose:  5 mg Take 5 mg by mouth daily. Refills:  0  
     
   
   
   
  
 methadone 5 mg tablet Commonly known as:  DOLOPHINE Your last dose was: Your next dose is:    
   
   
 Dose:  5 mg Take 5 mg by mouth every eight (8) hours. Refills:  0 MIRALAX 17 gram packet Generic drug:  polyethylene glycol Your last dose was: Your next dose is:    
   
   
 Dose:  17 g Take 17 g by mouth every other day. Refills:  0 NexIUM 40 mg capsule Generic drug:  esomeprazole Your last dose was: Your next dose is:    
   
   
 Dose:  40 mg Take 40 mg by mouth daily. Refills:  0  
     
   
   
   
  
 ondansetron 8 mg disintegrating tablet Commonly known as:  ZOFRAN ODT Your last dose was: Your next dose is:    
   
   
 Dose:  4 mg Take 0.5 Tabs by mouth every eight (8) hours as needed for Nausea. Quantity:  30 Tab Refills:  0  
     
   
   
   
  
 oxyCODONE IR 15 mg immediate release tablet Commonly known as:  OXY-IR Your last dose was: Your next dose is:    
   
   
 Dose:  15 mg Take 1 Tab by mouth every three (3) hours as needed. Max Daily Amount: 120 mg.  
 Quantity:  75 Tab Refills:  0  
     
   
   
   
  
 potassium chloride SR 10 mEq tablet Commonly known as:  KLOR-CON 10 Your last dose was: Your next dose is:    
   
   
 Dose:  10 mEq Take 10 mEq by mouth daily. Refills:  0  
     
   
   
   
  
 promethazine 25 mg tablet Commonly known as:  PHENERGAN Your last dose was: Your next dose is:    
   
   
 Dose:  25 mg Take 25 mg by mouth every six (6) hours as needed for Nausea. Refills:  0 PROVENTIL HFA 90 mcg/actuation inhaler Generic drug:  albuterol Your last dose was: Your next dose is:    
   
   
 Dose:  2 Puff Take 2 Puffs by inhalation every six (6) hours as needed. Refills:  0  
     
   
   
   
  
 ROLAIDS EXTRA STRENGTH PO Your last dose was: Your next dose is:    
   
   
 Dose:  2 Tab Take 2 Tabs by mouth three (3) times daily (with meals). Refills:  0 SPIRIVA WITH HANDIHALER 18 mcg inhalation capsule Generic drug:  tiotropium Your last dose was: Your next dose is:    
   
   
 Dose:  1 Cap Take 1 Cap by inhalation daily. Refills:  0  
     
   
   
   
  
 traMADol 50 mg tablet Commonly known as:  ULTRAM  
   
Your last dose was: Your next dose is:    
   
   
 Dose:  50 mg Take 50 mg by mouth every eight (8) hours as needed for Pain. Refills:  0  
     
   
   
   
  
 VITAMIN D3 400 unit Cap Generic drug:  cholecalciferol (vitamin d3) Your last dose was: Your next dose is:    
   
   
 Dose:  1 Tab Take 1 Tab by mouth daily. Refills:  0 Where to Get Your Medications Information on where to get these meds will be given to you by the nurse or doctor. ! Ask your nurse or doctor about these medications  
  carvedilol 6.25 mg tablet  
 cefepime 1 gram injection  
 ferrous sulfate 325 mg (65 mg iron) tablet  
 rifAMPin 300 mg capsule  
 vancomycin 500 mg injection

## 2017-04-04 LAB
ANION GAP BLD CALC-SCNC: 9 MMOL/L (ref 5–15)
ANION GAP BLD CALC-SCNC: 9 MMOL/L (ref 5–15)
ATRIAL RATE: 71 BPM
ATRIAL RATE: 73 BPM
BUN SERPL-MCNC: 12 MG/DL (ref 6–20)
BUN SERPL-MCNC: 14 MG/DL (ref 6–20)
BUN/CREAT SERPL: 16 (ref 12–20)
BUN/CREAT SERPL: 20 (ref 12–20)
CALCIUM SERPL-MCNC: 7.8 MG/DL (ref 8.5–10.1)
CALCIUM SERPL-MCNC: 8 MG/DL (ref 8.5–10.1)
CALCULATED P AXIS, ECG09: -9 DEGREES
CALCULATED P AXIS, ECG09: 32 DEGREES
CALCULATED R AXIS, ECG10: -10 DEGREES
CALCULATED R AXIS, ECG10: -9 DEGREES
CALCULATED T AXIS, ECG11: 157 DEGREES
CALCULATED T AXIS, ECG11: 175 DEGREES
CHLORIDE SERPL-SCNC: 105 MMOL/L (ref 97–108)
CHLORIDE SERPL-SCNC: 106 MMOL/L (ref 97–108)
CO2 SERPL-SCNC: 21 MMOL/L (ref 21–32)
CO2 SERPL-SCNC: 24 MMOL/L (ref 21–32)
CREAT SERPL-MCNC: 0.7 MG/DL (ref 0.55–1.02)
CREAT SERPL-MCNC: 0.74 MG/DL (ref 0.55–1.02)
DIAGNOSIS, 93000: NORMAL
DIAGNOSIS, 93000: NORMAL
ERYTHROCYTE [DISTWIDTH] IN BLOOD BY AUTOMATED COUNT: 14.6 % (ref 11.5–14.5)
FERRITIN SERPL-MCNC: 45 NG/ML (ref 8–252)
FOLATE SERPL-MCNC: 15.2 NG/ML (ref 5–21)
GLUCOSE SERPL-MCNC: 81 MG/DL (ref 65–100)
GLUCOSE SERPL-MCNC: 81 MG/DL (ref 65–100)
HCT VFR BLD AUTO: 28.5 % (ref 35–47)
HGB BLD-MCNC: 9.1 G/DL (ref 11.5–16)
IRON SATN MFR SERPL: 11 % (ref 20–50)
IRON SERPL-MCNC: 29 UG/DL (ref 35–150)
MAGNESIUM SERPL-MCNC: 2.1 MG/DL (ref 1.6–2.4)
MCH RBC QN AUTO: 27.7 PG (ref 26–34)
MCHC RBC AUTO-ENTMCNC: 31.9 G/DL (ref 30–36.5)
MCV RBC AUTO: 86.9 FL (ref 80–99)
P-R INTERVAL, ECG05: 182 MS
P-R INTERVAL, ECG05: 190 MS
PLATELET # BLD AUTO: 99 K/UL (ref 150–400)
POTASSIUM SERPL-SCNC: 3.3 MMOL/L (ref 3.5–5.1)
POTASSIUM SERPL-SCNC: 4.7 MMOL/L (ref 3.5–5.1)
Q-T INTERVAL, ECG07: 484 MS
Q-T INTERVAL, ECG07: 498 MS
QRS DURATION, ECG06: 180 MS
QRS DURATION, ECG06: 182 MS
QTC CALCULATION (BEZET), ECG08: 533 MS
QTC CALCULATION (BEZET), ECG08: 541 MS
RBC # BLD AUTO: 3.28 M/UL (ref 3.8–5.2)
SODIUM SERPL-SCNC: 135 MMOL/L (ref 136–145)
SODIUM SERPL-SCNC: 139 MMOL/L (ref 136–145)
TIBC SERPL-MCNC: 257 UG/DL (ref 250–450)
TROPONIN I SERPL-MCNC: 0.1 NG/ML
VANCOMYCIN SERPL-MCNC: 6.6 UG/ML
VENTRICULAR RATE, ECG03: 71 BPM
VENTRICULAR RATE, ECG03: 73 BPM
VIT B12 SERPL-MCNC: 509 PG/ML (ref 211–911)
WBC # BLD AUTO: 3.1 K/UL (ref 3.6–11)

## 2017-04-04 PROCEDURE — 82728 ASSAY OF FERRITIN: CPT | Performed by: INTERNAL MEDICINE

## 2017-04-04 PROCEDURE — 82607 VITAMIN B-12: CPT | Performed by: INTERNAL MEDICINE

## 2017-04-04 PROCEDURE — 80048 BASIC METABOLIC PNL TOTAL CA: CPT | Performed by: INTERNAL MEDICINE

## 2017-04-04 PROCEDURE — 84484 ASSAY OF TROPONIN QUANT: CPT | Performed by: SPECIALIST

## 2017-04-04 PROCEDURE — 93308 TTE F-UP OR LMTD: CPT

## 2017-04-04 PROCEDURE — 93005 ELECTROCARDIOGRAM TRACING: CPT

## 2017-04-04 PROCEDURE — 80202 ASSAY OF VANCOMYCIN: CPT | Performed by: INTERNAL MEDICINE

## 2017-04-04 PROCEDURE — 77010033678 HC OXYGEN DAILY

## 2017-04-04 PROCEDURE — 74011250636 HC RX REV CODE- 250/636: Performed by: INTERNAL MEDICINE

## 2017-04-04 PROCEDURE — 74011250637 HC RX REV CODE- 250/637: Performed by: INTERNAL MEDICINE

## 2017-04-04 PROCEDURE — 85027 COMPLETE CBC AUTOMATED: CPT | Performed by: INTERNAL MEDICINE

## 2017-04-04 PROCEDURE — 65660000000 HC RM CCU STEPDOWN

## 2017-04-04 PROCEDURE — 83540 ASSAY OF IRON: CPT | Performed by: INTERNAL MEDICINE

## 2017-04-04 PROCEDURE — 82746 ASSAY OF FOLIC ACID SERUM: CPT | Performed by: INTERNAL MEDICINE

## 2017-04-04 PROCEDURE — 36415 COLL VENOUS BLD VENIPUNCTURE: CPT | Performed by: INTERNAL MEDICINE

## 2017-04-04 PROCEDURE — 74011000258 HC RX REV CODE- 258: Performed by: INTERNAL MEDICINE

## 2017-04-04 RX ORDER — FACIAL-BODY WIPES
10 EACH TOPICAL
Status: DISCONTINUED | OUTPATIENT
Start: 2017-04-04 | End: 2017-04-07 | Stop reason: HOSPADM

## 2017-04-04 RX ORDER — CYCLOBENZAPRINE HCL 10 MG
5 TABLET ORAL
Status: DISCONTINUED | OUTPATIENT
Start: 2017-04-04 | End: 2017-04-07 | Stop reason: HOSPADM

## 2017-04-04 RX ORDER — METHADONE HYDROCHLORIDE 10 MG/1
5 TABLET ORAL EVERY 8 HOURS
Status: DISCONTINUED | OUTPATIENT
Start: 2017-04-04 | End: 2017-04-07 | Stop reason: HOSPADM

## 2017-04-04 RX ORDER — LANOLIN ALCOHOL/MO/W.PET/CERES
1000 CREAM (GRAM) TOPICAL DAILY
Status: DISCONTINUED | OUTPATIENT
Start: 2017-04-05 | End: 2017-04-07 | Stop reason: HOSPADM

## 2017-04-04 RX ORDER — FUROSEMIDE 20 MG/1
20 TABLET ORAL DAILY
Status: DISCONTINUED | OUTPATIENT
Start: 2017-04-05 | End: 2017-04-07 | Stop reason: HOSPADM

## 2017-04-04 RX ORDER — POTASSIUM CHLORIDE 750 MG/1
40 TABLET, FILM COATED, EXTENDED RELEASE ORAL
Status: COMPLETED | OUTPATIENT
Start: 2017-04-04 | End: 2017-04-04

## 2017-04-04 RX ADMIN — CEFEPIME 2 G: 2 INJECTION, POWDER, FOR SOLUTION INTRAVENOUS at 05:17

## 2017-04-04 RX ADMIN — CEFEPIME 2 G: 2 INJECTION, POWDER, FOR SOLUTION INTRAVENOUS at 21:24

## 2017-04-04 RX ADMIN — BISACODYL 10 MG: 10 SUPPOSITORY RECTAL at 08:40

## 2017-04-04 RX ADMIN — OXYCODONE HYDROCHLORIDE 15 MG: 5 TABLET ORAL at 21:25

## 2017-04-04 RX ADMIN — VANCOMYCIN HYDROCHLORIDE 1000 MG: 1 INJECTION, POWDER, LYOPHILIZED, FOR SOLUTION INTRAVENOUS at 10:55

## 2017-04-04 RX ADMIN — TRAMADOL HYDROCHLORIDE 50 MG: 50 TABLET, FILM COATED ORAL at 18:01

## 2017-04-04 RX ADMIN — Medication 10 ML: at 14:50

## 2017-04-04 RX ADMIN — ZOLPIDEM TARTRATE 5 MG: 5 TABLET, FILM COATED ORAL at 21:26

## 2017-04-04 RX ADMIN — ATORVASTATIN CALCIUM 10 MG: 10 TABLET, FILM COATED ORAL at 08:33

## 2017-04-04 RX ADMIN — CEFEPIME 2 G: 2 INJECTION, POWDER, FOR SOLUTION INTRAVENOUS at 14:47

## 2017-04-04 RX ADMIN — PANTOPRAZOLE SODIUM 40 MG: 40 TABLET, DELAYED RELEASE ORAL at 08:33

## 2017-04-04 RX ADMIN — METHADONE HYDROCHLORIDE 5 MG: 10 TABLET ORAL at 14:46

## 2017-04-04 RX ADMIN — POLYETHYLENE GLYCOL 3350 17 G: 17 POWDER, FOR SOLUTION ORAL at 18:01

## 2017-04-04 RX ADMIN — ENOXAPARIN SODIUM 40 MG: 100 INJECTION SUBCUTANEOUS at 08:35

## 2017-04-04 RX ADMIN — METHADONE HYDROCHLORIDE 5 MG: 10 TABLET ORAL at 10:42

## 2017-04-04 RX ADMIN — METHADONE HYDROCHLORIDE 5 MG: 10 TABLET ORAL at 21:25

## 2017-04-04 RX ADMIN — Medication 10 ML: at 05:19

## 2017-04-04 RX ADMIN — OXYCODONE HYDROCHLORIDE 15 MG: 5 TABLET ORAL at 05:03

## 2017-04-04 RX ADMIN — Medication 10 ML: at 21:29

## 2017-04-04 RX ADMIN — RIFAMPIN 600 MG: 300 CAPSULE ORAL at 08:32

## 2017-04-04 RX ADMIN — ASPIRIN 81 MG: 81 TABLET, COATED ORAL at 08:33

## 2017-04-04 RX ADMIN — POTASSIUM CHLORIDE 40 MEQ: 750 TABLET, FILM COATED, EXTENDED RELEASE ORAL at 12:18

## 2017-04-04 NOTE — PROGRESS NOTES
I met with pt and her son to begin discharge planning. Per pt,she was referred to [facility] from [facility] for a PICC line infection. PICC removed on 4/3/17. Pt is scheduled to be on iv vancomycin and rifampin through 4/12/17. When discharged from [address] [place] [address]  went to SNF @ [facility]. From there,pt was set up with [facility] for home iv infusion and [facility]. Pt refused [facility] because it would be 200 more dollars out-of-pocket. Pt and her son stated they were not aware that they were supposed to change the dressing when they kane home. P tstates she cannot afford a home health nurse. I will ask [facility] if they can have their nurse assist pt with education on dressing changes ,PICC care,and monitoring for S/S of infection.   Sunday Rivas

## 2017-04-04 NOTE — PROGRESS NOTES
Bedside shift change report given to Laurel Delgadillo RN (oncoming nurse) by Jennifer Fulton RN (offgoing nurse). Report included the following information SBAR, Kardex, ED Summary, Intake/Output, MAR, Accordion, Recent Results, Med Rec Status and Cardiac Rhythm NSR; BBB.     0730 - Received pt a/o x 4 with pleasant and appropriate conversation, pt able to ambulated with standby assistance of one. NSR noted on the monitor with periods of v-tach and PVC's. Lungs clear on room air. 1015 - Troponin lab came back 0.10, reported lab to [facility], no further orders received. 1100 - Removed both IV's that were infiltrated and inserted a 20g into the right wrist. Pt requesting home doses of methadone, called Dr. Arias Johnston and received new orders for Methadone 5mg q8hrs routine. 1200 - Reassessment performed, no changes noted in pt's condition. 1600 - Reassessment performed, no changes noted in pt's condition. 1800 - Pt complaining of left hip pain 7/10 and is requestion tramadol. 1900 - Bedside shift change report given to Jennifer Fulton RN (oncoming nurse) by Laurel Delgadillo RN (offgoing nurse). Report included the following information SBAR, Kardex, Procedure Summary, Intake/Output, MAR, Accordion, Recent Results, Med Rec Status and Cardiac Rhythm NSR.

## 2017-04-04 NOTE — PROGRESS NOTES
[facility] Dosing Services: 4/4/17    The following medication: Cefepime was automatically dose-adjusted per [facility] P&T Committee Protocol, with respect to renal function. Dosage changed to:  Cefepime 2gm q 8 hrs      Previous Regimen Cefepime 2 gm q 12 hrs   Serum Creatinine Lab Results   Component Value Date/Time    Creatinine 0.74 04/04/2017 05:13 AM       Creatinine Clearance Estimated Creatinine Clearance: 63.2 mL/min (based on Cr of 0.74). BUN Lab Results   Component Value Date/Time    BUN 12 04/04/2017 05:13 AM           Additional notes:      Pharmacy to continue to monitor patient daily. Will make dosage adjustments based upon changing renal function. Signed Lesley RIVERA  [address] [place] [address] information:  [phone]

## 2017-04-04 NOTE — CONSULTS
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 female readmitted last night for sepsis to the ICU per Hospitalist. Pt. Had I&D of L hip on 2/14 s/p revision L MIRIAM. On IV abx. Seems as though Hospitalists feels PIC line was source. Pulled last night and + cultures. Pt. Denies L hip pain at this time.     Patient Active Problem List    Diagnosis Date Noted    Sepsis ([facility] 04/03/2017    PICC line infection 04/03/2017    Septic arthritis ([facility] 04/03/2017    Bacteremia 04/03/2017    NSTEMI (non-ST elevated myocardial infarction) ([facility] 02/02/2017    HTN (hypertension) 02/02/2017    Anemia 02/02/2017    Thrombocytopenia ([facility] 56/42/0544    Systolic CHF, chronic ([facility] 02/02/2017    Failed total joint replacement ([facility] 01/30/2017    Failed total hip arthroplasty ([facility] 01/30/2017     Family History   Problem Relation Age of Onset    Cancer Mother     Stroke Father       Social History   Substance Use Topics    Smoking status: Former Smoker     Packs/day: 0.50     Years: 60.00    Smokeless tobacco: Never Used    Alcohol use No      Comment: 0     Past Medical History:   Diagnosis Date    Autoimmune disease ([facility]     psoriasis    Beta-blocker therapy     Cardiomyopathy ([facility]     non-ischemic cardiomyopathy per history     Chronic obstructive pulmonary disease (HCC)     Chronic pain     curvature of spinie    GERD (gastroesophageal reflux disease)     Heart failure (HCC)     moderate LV dysfunction 1/17     Hypercholesterolemia     Hypertension     LBBB (left bundle branch block)     Smoker     Thromboembolus ([facility]     right leg after hip surgery      Past Surgical History:   Procedure Laterality Date    ABDOMEN SURGERY PROC UNLISTED  2010    Gallbladder    ABDOMEN SURGERY PROC UNLISTED  2000    hiatal hernia repair    HX GYN      miscarriage    HX HEENT      Tonsilectomy    HX HEENT      cataract removed bilateral eyes    HX ORTHOPAEDIC      Right Knee, Left Foot    HX ORTHOPAEDIC  04/05/2015    left hip replacement 2015    HX ORTHOPAEDIC  06/24/2016    rmoval bone fragment left hip    HX ORTHOPAEDIC  2013    right hip replacement    HX ORTHOPAEDIC  2001    right knee replacement      Prior to Admission medications    Medication Sig Start Date End Date Taking? Authorizing Provider   lisinopril (PRINIVIL, ZESTRIL) 5 mg tablet Take 5 mg by mouth daily. Yes Historical Provider   bisacodyl (DULCOLAX) 10 mg suppository Insert 10 mg into rectum daily as needed for Diarrhea. Yes Historical Provider   furosemide (LASIX) 20 mg tablet Take 20 mg by mouth daily. Yes Historical Provider   potassium chloride SR (KLOR-CON 10) 10 mEq tablet Take 10 mEq by mouth daily. Yes Historical Provider   vancomycin (VANCOCIN) 500 mg injection by IntraVENous route daily. Yes Historical Provider   rifAMPin (RIFADIN) 300 mg capsule Take 600 mg by mouth daily. Yes Historical Provider   cyclobenzaprine (FLEXERIL) 5 mg tablet Take 5 mg by mouth three (3) times daily as needed for Muscle Spasm(s). Yes Historical Provider   oxyCODONE IR (OXY-IR) 15 mg immediate release tablet Take 1 Tab by mouth every three (3) hours as needed. Max Daily Amount: 120 mg. 2/16/17  Yes Freddy Babb NP   ondansetron (ZOFRAN ODT) 8 mg disintegrating tablet Take 0.5 Tabs by mouth every eight (8) hours as needed for Nausea. 2/6/17  Yes Tiburcio Abarca MD   atorvastatin (LIPITOR) 10 mg tablet Take 1 Tab by mouth daily. 2/6/17  Yes Shoshana Hadley NP   SUMAtriptan (IMITREX) 50 mg tablet Take 50 mg by mouth once as needed for Migraine. Yes Historical Provider   promethazine (PHENERGAN) 25 mg tablet Take 25 mg by mouth every six (6) hours as needed for Nausea. Yes Historical Provider   cyanocobalamin 1,000 mcg tablet Take 1,000 mcg by mouth daily.    Yes Historical Provider   traMADol (ULTRAM) 50 mg tablet Take 50 mg by mouth every eight (8) hours as needed for Pain. Yes Historical Provider   linaclotide (LINZESS) 290 mcg cap capsule Take 290 mcg by mouth Daily (before breakfast). Yes Historical Provider   zolpidem (AMBIEN) 5 mg tablet Take 5 mg by mouth nightly. Yes Historical Provider   tiotropium (SPIRIVA WITH HANDIHALER) 18 mcg inhalation capsule Take 1 Cap by inhalation daily. Yes Historical Provider   albuterol (PROVENTIL HFA) 90 mcg/actuation inhaler Take 2 Puffs by inhalation every six (6) hours as needed. Yes Historical Provider   methadone (DOLOPHINE) 5 mg tablet Take 5 mg by mouth every eight (8) hours. Yes Historical Provider   carvedilol (COREG) 3.125 mg tablet Take 3.125 mg by mouth two (2) times daily (with meals). Takes c breakfast and dinner   Yes Historical Provider   omega-3 fatty acids-vitamin e (FISH OIL) 1,000 mg cap Take 1 Cap by mouth. Yes Historical Provider   aspirin delayed-release 81 mg tablet Take 81 mg by mouth daily. Yes Historical Provider   cholecalciferol, vitamin d3, (VITAMIN D3) 400 unit cap Take 1 Tab by mouth daily. Yes Historical Provider   esomeprazole (NEXIUM) 40 mg capsule Take 40 mg by mouth daily. Yes Historical Provider   polyethylene glycol (MIRALAX) 17 gram packet Take 17 g by mouth every other day. Yes Historical Provider   CALCIUM CARBONATE/MAG HYDROX (ROLAIDS EXTRA STRENGTH PO) Take 2 Tabs by mouth three (3) times daily (with meals).    Yes Historical Provider     Current Facility-Administered Medications   Medication Dose Route Frequency    vancomycin (VANCOCIN) 1,000 mg in 0.9% sodium chloride (MBP/ADV) 250 mL  1 g IntraVENous Q24H    methadone (DOLOPHINE) tablet 5 mg  5 mg Oral Q8H    sodium chloride (NS) flush 5-10 mL  5-10 mL IntraVENous Q8H    sodium chloride (NS) flush 5-10 mL  5-10 mL IntraVENous PRN    rifAMPin (RIFADIN) capsule 600 mg  600 mg Oral DAILY    cefepime (MAXIPIME) 2 g in 0.9% sodium chloride (MBP/ADV) 100 mL  2 g IntraVENous Q12H    oxyCODONE IR (ROXICODONE) tablet 15 mg  15 mg Oral Q4H PRN    albuterol (PROVENTIL HFA, VENTOLIN HFA, PROAIR HFA) inhaler 2 Puff  2 Puff Inhalation Q6H PRN    aspirin delayed-release tablet 81 mg  81 mg Oral DAILY    atorvastatin (LIPITOR) tablet 10 mg  10 mg Oral DAILY    bisacodyl (DULCOLAX) suppository 10 mg  10 mg Rectal DAILY    enoxaparin (LOVENOX) injection 40 mg  40 mg SubCUTAneous DAILY    polyethylene glycol (MIRALAX) packet 17 g  17 g Oral EVERY OTHER DAY    SUMAtriptan (IMITREX) tablet 50 mg  50 mg Oral ONCE PRN    zolpidem (AMBIEN) tablet 5 mg  5 mg Oral QHS    traMADol (ULTRAM) tablet 50 mg  50 mg Oral Q8H PRN    pantoprazole (PROTONIX) tablet 40 mg  40 mg Oral DAILY     Allergies   Allergen Reactions    [place] Angioedema    Codeine Other (comments)     \"Years ago gave me hives but lately I havetaken it without problem\"    Dilaudid [Hydromorphone (Bulk)] Shortness of Breath    Lyrica [Pregabalin] Nausea and Vomiting        Review of Systems:  A comprehensive review of systems was negative except for that written in the HPI.     Objective:     Patient Vitals for the past 8 hrs:   BP Temp Pulse Resp SpO2   04/04/17 0800 121/60 97.8 °F (36.6 °C) 74 13 95 %   04/04/17 0730 112/55 - 74 12 93 %   04/04/17 0715 110/56 - 77 14 93 %   04/04/17 0701 - - 73 - -   04/04/17 0700 113/56 - 73 19 94 %   04/04/17 0645 119/56 - 73 23 97 %   04/04/17 0630 119/54 - 77 (!) 33 92 %   04/04/17 0615 126/77 - 79 13 95 %   04/04/17 0600 119/75 - 71 10 (!) 89 %   04/04/17 0545 130/68 - 70 20 91 %   04/04/17 0530 142/77 - 71 16 94 %   04/04/17 0500 121/73 - 73 12 97 %   04/04/17 0445 114/62 - 72 10 97 %   04/04/17 0430 118/57 - 69 10 96 %   04/04/17 0415 126/68 98 °F (36.7 °C) 74 11 95 %   04/04/17 0400 124/63 - 75 11 95 %   04/04/17 0345 134/70 - 75 12 95 %   04/04/17 0330 127/62 - 77 13 97 %   04/04/17 0315 121/74 - 79 12 98 %   04/04/17 0300 134/81 - 77 11 95 %   04/04/17 0245 128/72 - 76 14 96 %   04/04/17 0230 130/68 - 74 12 95 %   17 0215 113/53 - 81 20 98 %     Temp (24hrs), Av °F (36.7 °C), Min:97.8 °F (36.6 °C), Max:98.1 °F (36.7 °C)        EXAM: I examined pt's L hip. Incision appears well healed, no s/sx infection. No erythema, no fluctuance. ROM of the L hip without pain in groin. NVI distally BLE's.     Data Review   Recent Results (from the past 24 hour(s))   CULTURE, BLOOD    Collection Time: 17  5:58 PM   Result Value Ref Range    Special Requests: NO SPECIAL REQUESTS      Culture result: NO GROWTH AFTER 12 HOURS     LACTIC ACID, PLASMA    Collection Time: 17  5:58 PM   Result Value Ref Range    Lactic acid 1.2 0.4 - 2.0 MMOL/L   CULTURE, BLOOD    Collection Time: 17  5:58 PM   Result Value Ref Range    Special Requests: NO SPECIAL REQUESTS      Culture result: NO GROWTH AFTER 12 HOURS     EKG, 12 LEAD, INITIAL    Collection Time: 17  6:34 PM   Result Value Ref Range    Ventricular Rate 72 BPM    Atrial Rate 72 BPM    P-R Interval 180 ms    QRS Duration 182 ms    Q-T Interval 502 ms    QTC Calculation (Bezet) 549 ms    Calculated P Axis 18 degrees    Calculated R Axis -7 degrees    Calculated T Axis -178 degrees    Diagnosis       Normal sinus rhythm  Left bundle branch block  Abnormal ECG  When compared with ECG of 2017 15:35,  fusion complexes are no longer present  premature ventricular complexes are no longer present  Inverted T waves have replaced nonspecific T wave abnormality in Inferior   leads     METABOLIC PANEL, BASIC    Collection Time: 17 11:34 PM   Result Value Ref Range    Sodium 135 (L) 136 - 145 mmol/L    Potassium 4.7 3.5 - 5.1 mmol/L    Chloride 105 97 - 108 mmol/L    CO2 21 21 - 32 mmol/L    Anion gap 9 5 - 15 mmol/L    Glucose 81 65 - 100 mg/dL    BUN 14 6 - 20 MG/DL    Creatinine 0.70 0.55 - 1.02 MG/DL    BUN/Creatinine ratio 20 12 - 20      GFR est AA >60 >60 ml/min/1.73m2    GFR est non-AA >60 >60 ml/min/1.73m2    Calcium 7.8 (L) 8.5 - 10.1 MG/DL MAGNESIUM    Collection Time: 04/03/17 11:34 PM   Result Value Ref Range    Magnesium 2.1 1.6 - 2.4 mg/dL   FOLATE    Collection Time: 04/04/17  5:13 AM   Result Value Ref Range    Folate 15.2 5.0 - 21.0 ng/mL   VITAMIN B12    Collection Time: 04/04/17  5:13 AM   Result Value Ref Range    Vitamin B12 509 211 - 949 pg/mL   METABOLIC PANEL, BASIC    Collection Time: 04/04/17  5:13 AM   Result Value Ref Range    Sodium 139 136 - 145 mmol/L    Potassium 3.3 (L) 3.5 - 5.1 mmol/L    Chloride 106 97 - 108 mmol/L    CO2 24 21 - 32 mmol/L    Anion gap 9 5 - 15 mmol/L    Glucose 81 65 - 100 mg/dL    BUN 12 6 - 20 MG/DL    Creatinine 0.74 0.55 - 1.02 MG/DL    BUN/Creatinine ratio 16 12 - 20      GFR est AA >60 >60 ml/min/1.73m2    GFR est non-AA >60 >60 ml/min/1.73m2    Calcium 8.0 (L) 8.5 - 10.1 MG/DL   CBC W/O DIFF    Collection Time: 04/04/17  5:13 AM   Result Value Ref Range    WBC 3.1 (L) 3.6 - 11.0 K/uL    RBC 3.28 (L) 3.80 - 5.20 M/uL    HGB 9.1 (L) 11.5 - 16.0 g/dL    HCT 28.5 (L) 35.0 - 47.0 %    MCV 86.9 80.0 - 99.0 FL    MCH 27.7 26.0 - 34.0 PG    MCHC 31.9 30.0 - 36.5 g/dL    RDW 14.6 (H) 11.5 - 14.5 %    PLATELET 99 (L) 075 - 400 K/uL   FERRITIN    Collection Time: 04/04/17  5:13 AM   Result Value Ref Range    Ferritin 45 8 - 252 NG/ML   IRON PROFILE    Collection Time: 04/04/17  5:13 AM   Result Value Ref Range    Iron 29 (L) 35 - 150 ug/dL    TIBC 257 250 - 450 ug/dL    Iron % saturation 11 (L) 20 - 50 %   VANCOMYCIN, RANDOM    Collection Time: 04/04/17  5:13 AM   Result Value Ref Range    VANCOMYCIN,RANDOM 6.6 UG/ML         Assessment/Plan:     S/p L hip revision and I&D    Will discuss readmission with Dr. Kali Smith. L hip appears with s/sx of infection. Continue IV abx as previously discussed with ID. Thank you for allowing us to take part in this patients care. Silvino Stevens PA-C  Orthopaedic Surgery [address]  [address]  [phone] [facility], [place]

## 2017-04-04 NOTE — PROGRESS NOTES
[facility] Infectious Diseases Outpatient  IV Antibiotic Orders    1. Diagnosis:  s lugdunesis septic hip  2. Routine PICC/ Gio/ Portacath Care including PRN cath-flow/activase to declot   3. Antibiotic:  Vancomycin 500mg IV q 12 hour through 4/12/17    Cefepime 1gm IV q 8 through 4/12/17     Rifampin 600mg PO daily through 4/12/17     4. Last labs  Lab Results   Component Value Date/Time    WBC 3.1 04/04/2017 05:13 AM    Hemoglobin (POC) 12.3 01/30/2017 02:24 PM    HGB 9.1 04/04/2017 05:13 AM    HCT 28.5 04/04/2017 05:13 AM    PLATELET 99 13/47/1651 05:13 AM    MCV 86.9 04/04/2017 05:13 AM     Lab Results   Component Value Date/Time    Sodium 139 04/04/2017 05:13 AM    Potassium 3.3 04/04/2017 05:13 AM    Chloride 106 04/04/2017 05:13 AM    CO2 24 04/04/2017 05:13 AM    Anion gap 9 04/04/2017 05:13 AM    Glucose 81 04/04/2017 05:13 AM    BUN 12 04/04/2017 05:13 AM    Creatinine 0.74 04/04/2017 05:13 AM    BUN/Creatinine ratio 16 04/04/2017 05:13 AM    GFR est AA >60 04/04/2017 05:13 AM    GFR est non-AA >60 04/04/2017 05:13 AM    Calcium 8.0 04/04/2017 05:13 AM    Bilirubin, total 0.2 02/02/2017 04:31 AM    AST (SGOT) 18 02/02/2017 04:31 AM    Alk. phosphatase 48 02/02/2017 04:31 AM    Protein, total 5.4 02/02/2017 04:31 AM    Albumin 2.2 02/03/2017 03:04 PM    Globulin 3.0 02/02/2017 04:31 AM    A-G Ratio 0.8 02/02/2017 04:31 AM    ALT (SGPT) 12 02/02/2017 04:31 AM       Last Vanc trough:     5. __x_ Weekly Labs:      ____ Bi Weekly Labs:  __________     Start date:  _____     __x_CBC/diff/platelets   _x__AST/ALT/Alk phos/   ___CPK   _x__BUN/CRT   ___ESR   __x_CRP   ___Gentamicin level  ___gentamicin peak/trough   _x__Trough Vancomycin level    __x_Goal 15-20 ___Goal 10-15    6. Fax Final lab results and critical values to Carrie @[phone]  7. Call urgent lab results to [phone]. Allergies:     Allergies   Allergen Reactions    [place] Angioedema    Codeine Other (comments)     \"Years ago gave me hives but lately I havetaken it without problem\"    Dilaudid [Hydromorphone (Bulk)] Shortness of Breath    Lyrica [Pregabalin] Nausea and Vomiting     9. Pharmacy: [facility]          Home Health Nursing:  10. Pharmacy Consult for Vancomycin/Aminoglycoside Dosing  11. First Dose protocol as needed.    12. Please pull PIC line at end of therapy or send to IR for [facility] removal     Home Health: Call Angio at [facility] to schedule Gio Removal :  P:  [phone]    Fax: [phone] or [phone], [place]

## 2017-04-04 NOTE — PROGRESS NOTES
1930: Bedside shift change report given to Fareed Boles RN (oncoming nurse) by Loreta Ramírez RN (offgoing nurse). Report included the following information SBAR, Kardex, ED Summary, Procedure Summary, Intake/Output, MAR, Accordion, Recent Results, Med Rec Status and Cardiac Rhythm NSR.     2000: Full head to toe assessment and vital signs completed. Patient A&Ox4 and ambulates with rollator walker. Vital signs stable. 2200: Patient resting quietly in bed. 0007: Reassessment and vital signs completed. Patient resting comfortably in bed. A&Ox4.    0400: Reassessment and vital signs completed. Patient resting comfortably in bed.    0615: At change of shift, patient resting in bed. Up with rollator walker to use toilet. 0715: Bedside shift change report given to Vern Huerta RN (oncoming nurse) by Fareed Boles RN (offgoing nurse). Report included the following information SBAR, Kardex, ED Summary, Procedure Summary, Intake/Output, MAR, Accordion, Recent Results, Med Rec Status and Cardiac Rhythm Sinus Rhythm with BBB.

## 2017-04-04 NOTE — CONSULTS
Belkys Tripp MD. [facility]              Patient: Vanna Neville  : 1940      Today's Date: 2017          HISTORY OF PRESENT ILLNESS:     History of Present Illness:  Reason for consult - NSVT    Ms. Sarai Morales is a 68 y.o. Female with a history of cardiomyopathy (non-ischemic per the chart), HTN, COPD, HTN who is transferred with sepsis/ bacteremia from [place] on 4/3/17. She apparently went to [facility] ER on 17 fo SOB and chest pain after she had vancomycin infusion - she was sent home after ER workup. On 4/3/17 she again went back to ER with chills and SOB andwas admitted with septic shock and transferred to [facility]. According to the chart notes (sign out), the culture from the picc a few days ago showed pseudomonas. Cardiology consulted after patient noted to have frequent PVC's and runs of NSVT (11 beat). Patient was asymptomatic during these episodes. She denies any chest pain, although had some 4 days prior. Came to hospital with SOB, but doing better.               PAST MEDICAL HISTORY:     Past Medical History:   Diagnosis Date    Autoimmune disease ([facility] [address]     psoriasis    Beta-blocker therapy     Cardiomyopathy ([facility] [address]     non-ischemic cardiomyopathy per history     Chronic obstructive pulmonary disease (HCC)     Chronic pain     curvature of spinie    GERD (gastroesophageal reflux disease)     Heart failure (HCC)     moderate LV dysfunction      Hypercholesterolemia     Hypertension     LBBB (left bundle branch block)     Smoker     Thromboembolus (HCC)     right leg after hip surgery       Past Surgical History:   Procedure Laterality Date    ABDOMEN SURGERY PROC UNLISTED      Gallbladder    ABDOMEN SURGERY PROC UNLISTED      hiatal hernia repair    HX GYN      miscarriage    HX HEENT      Tonsilectomy    HX HEENT      cataract removed bilateral eyes    HX ORTHOPAEDIC      Right Knee, Left Foot    HX ORTHOPAEDIC  2015    left hip replacement 2015    HX ORTHOPAEDIC  06/24/2016    rmoval bone fragment left hip    HX ORTHOPAEDIC  2013    right hip replacement    HX ORTHOPAEDIC  2001    right knee replacement           MEDICATIONS:     Current Facility-Administered Medications   Medication Dose Route Frequency Provider Last Rate Last Dose    sodium chloride (NS) flush 5-10 mL  5-10 mL IntraVENous Q8H Chan Avery MD   10 mL at 04/04/17 0519    sodium chloride (NS) flush 5-10 mL  5-10 mL IntraVENous PRN Chan Avery MD        rifAMPin (RIFADIN) capsule 600 mg  600 mg Oral DAILY Chan Avery MD        cefepime (MAXIPIME) 2 g in 0.9% sodium chloride (MBP/ADV) 100 mL  2 g IntraVENous Q12H Chan Avery  mL/hr at 04/04/17 0517 2 g at 04/04/17 0517    oxyCODONE IR (ROXICODONE) tablet 15 mg  15 mg Oral Q4H PRN Chan Avery MD   15 mg at 04/04/17 0503    albuterol (PROVENTIL HFA, VENTOLIN HFA, PROAIR HFA) inhaler 2 Puff  2 Puff Inhalation Q6H PRN Chan Avery MD        aspirin delayed-release tablet 81 mg  81 mg Oral DAILY Chan Avery MD        atorvastatin (LIPITOR) tablet 10 mg  10 mg Oral DAILY Chan Avery MD        bisacodyl (DULCOLAX) suppository 10 mg  10 mg Rectal DAILY Chan Avery MD        enoxaparin (LOVENOX) injection 40 mg  40 mg SubCUTAneous DAILY Chan Avery MD        polyethylene glycol (MIRALAX) packet 17 g  17 g Oral EVERY OTHER DAY Chan Avery MD        SUMAtriptan (IMITREX) tablet 50 mg  50 mg Oral ONCE PRN Chan Avery MD        zolpidem (AMBIEN) tablet 5 mg  5 mg Oral QHS Chan Avery MD        traMADol (ULTRAM) tablet 50 mg  50 mg Oral Q8H PRN Chan Avery MD   50 mg at 04/03/17 2116    pantoprazole (PROTONIX) tablet 40 mg  40 mg Oral DAILY Katy Luis MD        Vancomcyin- Pharmacy Dosing   Other Rx Dosing/Monitoring Katy Luis MD           Medication Documentation Review Audit         Reviewed by Anibal Luong.  Padilla Moore (Pharmacist) on 04/03/17 at [address] Medication Sig Documenting Provider Last Dose Status Taking?        albuterol (PROVENTIL HFA) 90 mcg/actuation inhaler Take 2 Puffs by inhalation every six (6) hours as needed. Historical Provider   Active No     aspirin delayed-release 81 mg tablet Take 81 mg by mouth daily. Historical Provider 4/3/2017 AM Active Yes     atorvastatin (LIPITOR) 10 mg tablet Take 1 Tab by mouth daily. Maximino Lambert NP   Active No     bisacodyl (DULCOLAX) 10 mg suppository Insert 10 mg into rectum daily as needed for Diarrhea. Historical Provider   Active Yes     CALCIUM CARBONATE/MAG HYDROX (ROLAIDS EXTRA STRENGTH PO) Take 2 Tabs by mouth three (3) times daily (with meals). Historical Provider   Active Yes     carvedilol (COREG) 3.125 mg tablet Take 3.125 mg by mouth two (2) times daily (with meals). Takes c breakfast and dinner Historical Provider 4/3/2017 AM Active Yes     cholecalciferol, vitamin d3, (VITAMIN D3) 400 unit cap Take 1 Tab by mouth daily. Historical Provider 4/2/2017 Unknown time Active Yes     cyanocobalamin 1,000 mcg tablet Take 1,000 mcg by mouth daily. Historical Provider 4/2/2017 Unknown time Active Yes     cyclobenzaprine (FLEXERIL) 5 mg tablet Take 5 mg by mouth three (3) times daily as needed for Muscle Spasm(s). Historical Provider   Active Yes     esomeprazole (NEXIUM) 40 mg capsule Take 40 mg by mouth daily. Historical Provider 4/3/2017 AM Active Yes     furosemide (LASIX) 20 mg tablet Take 20 mg by mouth daily. Historical Provider   Active Yes     linaclotide (LINZESS) 290 mcg cap capsule Take 290 mcg by mouth Daily (before breakfast). Historical Provider 4/2/2017 Unknown time Active Yes     lisinopril (PRINIVIL, ZESTRIL) 5 mg tablet Take 5 mg by mouth daily. Historical Provider 4/2/2017 Unknown time Active Yes     methadone (DOLOPHINE) 5 mg tablet Take 5 mg by mouth every eight (8) hours.  Historical Provider 4/3/2017 AM Active Yes     omega-3 fatty acids-vitamin e (FISH OIL) 1,000 mg cap Take 1 Cap by mouth. Historical Provider 3/27/2017 Unknown time Active Yes     ondansetron (ZOFRAN ODT) 8 mg disintegrating tablet Take 0.5 Tabs by mouth every eight (8) hours as needed for Nausea. Romeo Clements MD   Active Yes     oxyCODONE IR (OXY-IR) 15 mg immediate release tablet Take 1 Tab by mouth every three (3) hours as needed. Max Daily Amount: 120 mg. Beatrice Damico NP   Active Yes     polyethylene glycol (MIRALAX) 17 gram packet Take 17 g by mouth every other day. Historical Provider 3/27/2017 Unknown time Active Yes     potassium chloride SR (KLOR-CON 10) 10 mEq tablet Take 10 mEq by mouth daily. Historical Provider   Active Yes     promethazine (PHENERGAN) 25 mg tablet Take 25 mg by mouth every six (6) hours as needed for Nausea. Historical Provider   Active Yes     rifAMPin (RIFADIN) 300 mg capsule Take 600 mg by mouth daily. Historical Provider   Active Yes                     Med Note (Katlin Kulkarni. Mon Apr 3, 2017 11:03 PM): Concerned medication not being administered recently. Do not see it on any of external medical records     SUMAtriptan (IMITREX) 50 mg tablet Take 50 mg by mouth once as needed for Migraine. Historical Provider   Active Yes     tiotropium (SPIRIVA WITH HANDIHALER) 18 mcg inhalation capsule Take 1 Cap by inhalation daily. Historical Provider 4/2/2017 Unknown time Active Yes     traMADol (ULTRAM) 50 mg tablet Take 50 mg by mouth every eight (8) hours as needed for Pain. Historical Provider   Active Yes     vancomycin (VANCOCIN) 500 mg injection by IntraVENous route daily. Historical Provider   Active Yes                     Med Note (Katlin Kulkarni. Mon Apr 3, 2017 11:03 PM): May have been changed to daily. [place] did not administer vancomycin and [place] d/c'd pt about 1 week ago.     zolpidem (AMBIEN) 5 mg tablet Take 5 mg by mouth nightly.  Historical Provider 4/2/2017 PM Active Yes                     Allergies   Allergen Reactions    [place] Angioedema    Codeine Other (comments)     \"Years ago gave me hives but lately I havetaken it without problem\"    Dilaudid [Hydromorphone (Bulk)] Shortness of Breath    Lyrica [Pregabalin] Nausea and Vomiting             SOCIAL HISTORY:     Social History   Substance Use Topics    Smoking status: Former Smoker     Packs/day: 0.50     Years: 60.00    Smokeless tobacco: Never Used    Alcohol use No      Comment: 0         FAMILY HISTORY:     Family History   Problem Relation Age of Onset    Cancer Mother     Stroke Father              REVIEW OF SYMPTOMS:     Review of Symptoms:  Constitutional: + fevere  HEENT: Negative for nosebleeds, tinnitus, and vision changes. Respiratory: + SOB  Cardiovascular: Negative for syncope  Gastrointestinal: Negative for abdominal pain, diarrhea, melena. + nausea   Genitourinary: Negative for dysuria  Musculoskeletal: Negative for myalgias. Skin: Negative for rash  Heme: No problems bleeding. Neurological: Negative for speech change and focal weakness. PHYSICAL EXAM:     Physical Exam:  Visit Vitals    /55    Pulse 74    Temp 98 °F (36.7 °C)    Resp 12    Ht 5' 4.96\" (1.65 m)    Wt 153 lb 3.5 oz (69.5 kg)    SpO2 93%    BMI 25.53 kg/m2     Patient NAD  HEENT:  Hearing intact, non-icteric, normocephalic, atraumatic. Neck Exam: Supple, Mild bilat carotid bruits. Lung Exam: Clear to auscultation, even breath sounds. Cardiac Exam: Regular rate and rhythm with 1/6 systolic murmur  Abdomen: Soft, non-tender, normal bowel sounds. Extremities: Moves all ext well. No lower extremity edema. Vascular: 2+ dorsalis pedis pulses bilaterally.   Psych: Appropriate affect  Neuro - Grossly intact          LABS / OTHER STUDIES:     Recent Results (from the past 24 hour(s))   LACTIC ACID, PLASMA    Collection Time: 04/03/17  5:58 PM   Result Value Ref Range    Lactic acid 1.2 0.4 - 2.0 MMOL/L   EKG, 12 LEAD, INITIAL    Collection Time: 04/03/17  6:34 PM   Result Value Ref Range Ventricular Rate 72 BPM    Atrial Rate 72 BPM    P-R Interval 180 ms    QRS Duration 182 ms    Q-T Interval 502 ms    QTC Calculation (Bezet) 549 ms    Calculated P Axis 18 degrees    Calculated R Axis -7 degrees    Calculated T Axis -178 degrees    Diagnosis       Normal sinus rhythm  Left bundle branch block  Abnormal ECG  When compared with ECG of 30-JAN-2017 15:35,  fusion complexes are no longer present  premature ventricular complexes are no longer present  Inverted T waves have replaced nonspecific T wave abnormality in Inferior   leads     METABOLIC PANEL, BASIC    Collection Time: 04/03/17 11:34 PM   Result Value Ref Range    Sodium 135 (L) 136 - 145 mmol/L    Potassium 4.7 3.5 - 5.1 mmol/L    Chloride 105 97 - 108 mmol/L    CO2 21 21 - 32 mmol/L    Anion gap 9 5 - 15 mmol/L    Glucose 81 65 - 100 mg/dL    BUN 14 6 - 20 MG/DL    Creatinine 0.70 0.55 - 1.02 MG/DL    BUN/Creatinine ratio 20 12 - 20      GFR est AA >60 >60 ml/min/1.73m2    GFR est non-AA >60 >60 ml/min/1.73m2    Calcium 7.8 (L) 8.5 - 10.1 MG/DL   MAGNESIUM    Collection Time: 04/03/17 11:34 PM   Result Value Ref Range    Magnesium 2.1 1.6 - 2.4 mg/dL   METABOLIC PANEL, BASIC    Collection Time: 04/04/17  5:13 AM   Result Value Ref Range    Sodium 139 136 - 145 mmol/L    Potassium 3.3 (L) 3.5 - 5.1 mmol/L    Chloride 106 97 - 108 mmol/L    CO2 24 21 - 32 mmol/L    Anion gap 9 5 - 15 mmol/L    Glucose 81 65 - 100 mg/dL    BUN 12 6 - 20 MG/DL    Creatinine 0.74 0.55 - 1.02 MG/DL    BUN/Creatinine ratio 16 12 - 20      GFR est AA >60 >60 ml/min/1.73m2    GFR est non-AA >60 >60 ml/min/1.73m2    Calcium 8.0 (L) 8.5 - 10.1 MG/DL   CBC W/O DIFF    Collection Time: 04/04/17  5:13 AM   Result Value Ref Range    WBC 3.1 (L) 3.6 - 11.0 K/uL    RBC 3.28 (L) 3.80 - 5.20 M/uL    HGB 9.1 (L) 11.5 - 16.0 g/dL    HCT 28.5 (L) 35.0 - 47.0 %    MCV 86.9 80.0 - 99.0 FL    MCH 27.7 26.0 - 34.0 PG    MCHC 31.9 30.0 - 36.5 g/dL    RDW 14.6 (H) 11.5 - 14.5 % PLATELET 99 (L) 249 - 400 K/uL             CARDIAC DIAGNOSTICS:     Cardiac Evaluation Includes:  Echo 1/31/17 - LVEF 35-40%, mild MR, AV sclerosis   Lexiscan Cardiolite 6/16/16 - No ischemia/WMA sec to LBBB/EF 37%    EKG 1/30/17 - NSR, PVC's, LBBB  EKG 4/4/17 - NSR, LBBB        ASSESSMENT AND PLAN:     Assessment and Plan:  1) NSVT and frequent PVC's  - asymptomatic.   - electrolytes OK   - recheck an echo and cardiac troponin (she had chest pain 4 days ago)  - resume BB when BP allows     2) Cardiomyopathy (non-ischemic ?)  - recheck an echo   - resume Coreg and lisinopril when BP allows  - also on ASA and statin     3) Dr. Loli Alan to follow      Viri Weiner MD, [address]  [address], [facility] [address] [place].  Nimesh, [address] [place] [address]  Ph: [phone]   Ph [phone]

## 2017-04-04 NOTE — PROGRESS NOTES
[facility] [address], [place], [address] [facility]  [phone]      Medical Progress Note      NAME: Adrianne Dove   :  1940  MRM:  [id number]    Date/Time: 2017  11:15 AM       Assessment and Plan:   1. PIIC line infection. Not septic. According to the verbal report from [facility], the PICC line area was inflamed and some pus was drained. PICC line was removed on 4/3. Culture from Pus around PICC line area showed pseudomonas sensitive to gent, cefepime, zosyn, cipro, but BC is negative so far (result in chart). Continue cefepime and consult ID. Check BC     2. LT hip Septic arthritis ([facility] [address] (4/3/2017). Intraoperative wound culture showed Oxacillin resistant staph [place]ensis from previous admission. Will continue vancomycin and rifampin through . Orthopedics evaluation appreciated.       3. HTN (hypertension) (2017). Continue to hold BP meds as her BP was low on admission.      4. Anemia (2017). likley secondary to chronic disease. Continue to monitor.       5. Systolic CHF, chronic ([facility] [address] (2017)/ nonischemic CM. Had nonsustained Vtach. Monitor fluid status. Cardiology evaluation appreciated.      6. GERD. Continue PPI     7. Hyperlipidemia. On statin. 8.  Leukopenia/ thrombocytopenia. This is not new. Likely secondary to ongoing infection. Monitor for now and if continue to drop consult hematology.              Subjective:     Chief Complaint:  Follow up pt with septic joint and line infection. ROS:  (bold if positive, if negative)      Tolerating PT  Tolerating Diet        Objective:     Last 24hrs VS reviewed since prior progress note.  Most recent are:    Visit Vitals    /60 (BP 1 Location: Right arm, BP Patient Position: At rest)    Pulse 74    Temp 97.8 °F (36.6 °C)    Resp 13    Ht 5' 4.96\" (1.65 m)    Wt 69.5 kg (153 lb 3.5 oz)    SpO2 95%    BMI 25.53 kg/m2     SpO2 Readings from Last 6 Encounters:   17 95%   02/21/17 96%   02/16/17 90%   01/11/17 99%   04/22/14 95%    O2 Flow Rate (L/min): 2 l/min     Intake/Output Summary (Last 24 hours) at 04/04/17 1115  Last data filed at 04/04/17 0800   Gross per 24 hour   Intake              100 ml   Output              850 ml   Net             -750 ml        Physical Exam:    Gen:  Well-developed, well-nourished, in no acute distress  HEENT:  Pink conjunctivae, PERRL, hearing intact to voice, moist mucous membranes  Neck:  Supple, without masses, thyroid non-tender  Resp:  No accessory muscle use, clear breath sounds without wheezes rales or rhonchi  Card:  No murmurs, normal S1, S2 without thrills, bruits or peripheral edema  Abd:  Soft, non-tender, non-distended, normoactive bowel sounds are present, no palpable organomegaly and no detectable hernias  Lymph:  No cervical or inguinal adenopathy  Musc:  No cyanosis or clubbing  Skin:  No rashes or ulcers, skin turgor is good  Neuro:  Cranial nerves are grossly intact, no focal motor weakness, follows commands appropriately  Psych:  Good insight, oriented to person, place and time, alert  __________________________________________________________________  Medications Reviewed: (see below)  Medications:     Current Facility-Administered Medications   Medication Dose Route Frequency    vancomycin (VANCOCIN) 1,000 mg in 0.9% sodium chloride (MBP/ADV) 250 mL  1 g IntraVENous Q24H    methadone (DOLOPHINE) tablet 5 mg  5 mg Oral Q8H    potassium chloride SR (KLOR-CON 10) tablet 40 mEq  40 mEq Oral NOW    sodium chloride (NS) flush 5-10 mL  5-10 mL IntraVENous Q8H    sodium chloride (NS) flush 5-10 mL  5-10 mL IntraVENous PRN    rifAMPin (RIFADIN) capsule 600 mg  600 mg Oral DAILY    cefepime (MAXIPIME) 2 g in 0.9% sodium chloride (MBP/ADV) 100 mL  2 g IntraVENous Q12H    oxyCODONE IR (ROXICODONE) tablet 15 mg  15 mg Oral Q4H PRN    albuterol (PROVENTIL HFA, VENTOLIN HFA, PROAIR HFA) inhaler 2 Puff  2 Puff Inhalation Q6H PRN    aspirin delayed-release tablet 81 mg  81 mg Oral DAILY    atorvastatin (LIPITOR) tablet 10 mg  10 mg Oral DAILY    bisacodyl (DULCOLAX) suppository 10 mg  10 mg Rectal DAILY    enoxaparin (LOVENOX) injection 40 mg  40 mg SubCUTAneous DAILY    polyethylene glycol (MIRALAX) packet 17 g  17 g Oral EVERY OTHER DAY    SUMAtriptan (IMITREX) tablet 50 mg  50 mg Oral ONCE PRN    zolpidem (AMBIEN) tablet 5 mg  5 mg Oral QHS    traMADol (ULTRAM) tablet 50 mg  50 mg Oral Q8H PRN    pantoprazole (PROTONIX) tablet 40 mg  40 mg Oral DAILY        Lab Data Reviewed: (see below)  Lab Review:     Recent Labs      04/04/17 0513   WBC  3.1*   HGB  9.1*   HCT  28.5*   PLT  99*     Recent Labs      04/04/17 0513 04/03/17   2334   NA  139  135*   K  3.3*  4.7   CL  106  105   CO2  24  21   GLU  81  81   BUN  12  14   CREA  0.74  0.70   CA  8.0*  7.8*   MG   --   2.1     No results found for: GLUCPOC  No results for input(s): PH, PCO2, PO2, HCO3, FIO2 in the last 72 hours. No results for input(s): INR in the last 72 hours. No lab exists for component: INREXT  All Micro Results     Procedure Component Value Units Date/Time    CULTURE, BLOOD [[id number]] Collected:  04/03/17 1758    Order Status:  Completed Specimen:  Blood from Blood Updated:  04/04/17 0807     Special Requests: NO SPECIAL REQUESTS        Culture result: NO GROWTH AFTER 12 HOURS       CULTURE, BLOOD [[id number]] Collected:  04/03/17 1758    Order Status:  Completed Specimen:  Blood from Blood Updated:  04/04/17 0807     Special Requests: NO SPECIAL REQUESTS        Culture result: NO GROWTH AFTER 12 HOURS             I have reviewed notes of prior 24hr. Other pertinent lab:       Total time spent with patient: [address] discussed with: Patient, Nursing Staff and >50% of time spent in counseling and coordination of care    Discussed:  Care Plan    Prophylaxis:  Lovenox    Disposition:  Home w/Family           ___________________________________________________    Attending Physician: Bethany Nageotte, MD

## 2017-04-04 NOTE — PROGRESS NOTES
[facility] Dosing Services: Antimicrobial Stewardship Progress Note    Consult for antibiotic dosing of Vancomcyin by Dr. Rizvi See  Pharmacist reviewed antibiotic appropriateness for 68year old , female  for indication of septic arthritis (possible bacteremia as well w/ pseudomonas, thus Cefepim)    Continued -- pt to take Vanc +Rifamipin until 4/12/17    Plan:  Vancomycin therapy:  M:  prescribed as 500mg BID but may have been reduced to daily ([place] gave no Vancomycin today and [place] discharged [place] about 1 week ago[place]  [place] was asleep when went to interview her)    Dose calculated to approximate a therapeutic trough of 15-20 mcg/mL. Last trough level / Plan for level:   Random level at 4am on 4/3/17. Unknown when pt received last dose. Will have to ask her in the morning    Pharmacy to follow daily and will make changes to dose and/or frequency based on clinical status. Other Antimicrobial  (not dosed by pharmacist)   Cefepime   Cultures     4/3:Blood x2- pending    Possible light Pseudomonas from outside records- from PICC line, blood   Serum Creatinine     Lab Results   Component Value Date/Time    Creatinine 0.72 02/16/2017 03:30 AM       Creatinine Clearance CrCl cannot be calculated (Patient has no serum creatinine result on file. ). Temp   98 °F (36.7 °C)    WBC   Lab Results   Component Value Date/Time    WBC 4.4 02/15/2017 03:30 AM       H/H   Lab Results   Component Value Date/Time    HGB 8.4 02/16/2017 03:30 AM        Platelets   Lab Results   Component Value Date/Time    PLATELET 620 20/25/8903 03:30 AM      Thank you,  Jean Carlos Torres, Pharm. D.

## 2017-04-04 NOTE — PROGRESS NOTES
[facility] Infectious Diseases Progress Note  Sandra Olivares MD,             Yanely Falcon MD,          Vergil Handler DO     [address] [place] [address]    [address], [address]  [phone]  Fax    2017      Assessment & Plan:   1. PICC line infection. Pseudomonas from drainage at PICC site. BC's NGSF. PICC removed. Cefepime X 10 days. Avoid cipro due to potential interaction with methadone  2. Left hip septic arthritis. Intraoperative wound culture growing oxacillin resistant staph lugdunensis. Continue vancomycin and rifampin through . Needs new PICC at discharge. Check CRP          Subjective:     No complaints    Objective:     Vitals:   Visit Vitals    /86 (BP 1 Location: Right arm, BP Patient Position: At rest)    Pulse 71    Temp 97.9 °F (36.6 °C)    Resp 22    Ht 5' 4.96\" (1.65 m)    Wt 69.5 kg (153 lb 3.5 oz)    SpO2 97%    BMI 25.53 kg/m2        Tmax:  Temp (24hrs), Av °F (36.7 °C), Min:97.8 °F (36.6 °C), Max:98.1 °F (36.7 °C)      Exam:   Patient is intubated:  no    Physical Examination:   GENERAL ASSESSMENT: NAD  HEENT:Nontraumatic   CHEST: CTA B/l  HEART: S1S2 RRR  ABDOMEN: Soft,NT,ND  :Onofre:   EXTREMITY: PICC site with scant green tinged drainage. No erythema  NEURO:Grossly non focal    Labs:        No lab exists for component: ITNL   Recent Labs      17   0923   TROIQ  0.10*     Recent Labs      17   0513  17   2334   NA  139  135*   K  3.3*  4.7   CL  106  105   CO2  24  21   BUN  12  14   CREA  0.74  0.70   GLU  81  81   MG   --   2.1   WBC  3.1*   --    HGB  9.1*   --    HCT  28.5*   --    PLT  99*   --      No results for input(s): INR, PTP, APTT in the last 72 hours.     No lab exists for component: INREXT  Needs: urine analysis, urine sodium, protein and creatinine  No results found for: ESTEFANI, CREAU      Cultures:     No results found for: SDES  Lab Results   Component Value Date/Time    Culture result: NO GROWTH AFTER 12 HOURS 04/03/2017 05:58 PM    Culture result: NO GROWTH AFTER 12 HOURS 04/03/2017 05:58 PM    Culture result: NO GROWTH 14 DAYS 02/14/2017 02:07 PM    Culture result: NO GROWTH 14 DAYS 02/14/2017 02:07 PM       Radiology:     Medications       Current Facility-Administered Medications   Medication Dose Route Frequency Last Dose    vancomycin (VANCOCIN) 1,000 mg in 0.9% sodium chloride (MBP/ADV) 250 mL  1 g IntraVENous Q24H 1,000 mg at 04/04/17 1055    methadone (DOLOPHINE) tablet 5 mg  5 mg Oral Q8H 5 mg at 04/04/17 1446    cefepime (MAXIPIME) 2 g in 0.9% sodium chloride (MBP/ADV) 100 mL  2 g IntraVENous Q8H 2 g at 04/04/17 1447    sodium chloride (NS) flush 5-10 mL  5-10 mL IntraVENous Q8H 10 mL at 04/04/17 1450    sodium chloride (NS) flush 5-10 mL  5-10 mL IntraVENous PRN      rifAMPin (RIFADIN) capsule 600 mg  600 mg Oral DAILY 600 mg at 04/04/17 0832    oxyCODONE IR (ROXICODONE) tablet 15 mg  15 mg Oral Q4H PRN 15 mg at 04/04/17 0503    albuterol (PROVENTIL HFA, VENTOLIN HFA, PROAIR HFA) inhaler 2 Puff  2 Puff Inhalation Q6H PRN      aspirin delayed-release tablet 81 mg  81 mg Oral DAILY 81 mg at 04/04/17 0833    atorvastatin (LIPITOR) tablet 10 mg  10 mg Oral DAILY 10 mg at 04/04/17 0833    bisacodyl (DULCOLAX) suppository 10 mg  10 mg Rectal DAILY 10 mg at 04/04/17 0840    enoxaparin (LOVENOX) injection 40 mg  40 mg SubCUTAneous DAILY 40 mg at 04/04/17 0835    polyethylene glycol (MIRALAX) packet 17 g  17 g Oral EVERY OTHER DAY      SUMAtriptan (IMITREX) tablet 50 mg  50 mg Oral ONCE PRN      zolpidem (AMBIEN) tablet 5 mg  5 mg Oral QHS      traMADol (ULTRAM) tablet 50 mg  50 mg Oral Q8H PRN 50 mg at 04/03/17 2116    pantoprazole (PROTONIX) tablet 40 mg  40 mg Oral DAILY 40 mg at 04/04/17 0988           Case discussed with: IM attending      Michelle Fernando DO

## 2017-04-04 NOTE — PROGRESS NOTES
[facility]: MED RECONCILIATION    Comments/Recommendations:     Concerned rifampin not continued- did not see it documented in other paperwork but suppose to take unitl 4/12/17. Vancomycin- uncertain of current dose or when last dose given    Medications added:   · Furosemide- appears to have been started since discharge from [facility]  · KCl supplement- appears to have been started since discharge from [facility]    Medications removed:  · Lovenox 40mg SC daily- discharged from [facility] a week ago and now at home    Medications adjusted:  · Lisinopril- previously 10mg    Information obtained from: Papers from [facility], possible papers from [facility]. Unable to interview patient since asleep, recent medical records from [facility]      Allergies: [place]; Codeine; Dilaudid [hydromorphone (bulk)]; and Lyrica [pregabalin]    Prior to Admission Medications:     Medication Documentation Review Audit       Reviewed by Nael Gallagher. Elliot Hodgkin (Pharmacist) on 04/03/17 at 2314         Medication Sig Documenting Provider Last Dose Status Taking? albuterol (PROVENTIL HFA) 90 mcg/actuation inhaler Take 2 Puffs by inhalation every six (6) hours as needed. Historical Provider  Active No    aspirin delayed-release 81 mg tablet Take 81 mg by mouth daily. Historical Provider 4/3/2017 AM Active Yes    atorvastatin (LIPITOR) 10 mg tablet Take 1 Tab by mouth daily. Cheri Rowland, NP  Active No    bisacodyl (DULCOLAX) 10 mg suppository Insert 10 mg into rectum daily as needed for Diarrhea. Historical Provider  Active Yes    CALCIUM CARBONATE/MAG HYDROX (ROLAIDS EXTRA STRENGTH PO) Take 2 Tabs by mouth three (3) times daily (with meals). Historical Provider  Active Yes    carvedilol (COREG) 3.125 mg tablet Take 3.125 mg by mouth two (2) times daily (with meals). Takes c breakfast and dinner Historical Provider 4/3/2017 AM Active Yes    cholecalciferol, vitamin d3, (VITAMIN D3) 400 unit cap Take 1 Tab by mouth daily.  Historical Provider 4/2/2017 Unknown time Active Yes    cyanocobalamin 1,000 mcg tablet Take 1,000 mcg by mouth daily. Historical Provider 4/2/2017 Unknown time Active Yes    cyclobenzaprine (FLEXERIL) 5 mg tablet Take 5 mg by mouth three (3) times daily as needed for Muscle Spasm(s). Historical Provider  Active Yes    esomeprazole (NEXIUM) 40 mg capsule Take 40 mg by mouth daily. Historical Provider 4/3/2017 AM Active Yes    furosemide (LASIX) 20 mg tablet Take 20 mg by mouth daily. Historical Provider  Active Yes    linaclotide (LINZESS) 290 mcg cap capsule Take 290 mcg by mouth Daily (before breakfast). Historical Provider 4/2/2017 Unknown time Active Yes    lisinopril (PRINIVIL, ZESTRIL) 5 mg tablet Take 5 mg by mouth daily. Historical Provider 4/2/2017 Unknown time Active Yes    methadone (DOLOPHINE) 5 mg tablet Take 5 mg by mouth every eight (8) hours. Historical Provider 4/3/2017 AM Active Yes    omega-3 fatty acids-vitamin e (FISH OIL) 1,000 mg cap Take 1 Cap by mouth. Historical Provider 3/27/2017 Unknown time Active Yes    ondansetron (ZOFRAN ODT) 8 mg disintegrating tablet Take 0.5 Tabs by mouth every eight (8) hours as needed for Nausea. Emely Caldera MD  Active Yes    oxyCODONE IR (OXY-IR) 15 mg immediate release tablet Take 1 Tab by mouth every three (3) hours as needed. Max Daily Amount: 120 mg. Ghulam Fagan NP  Active Yes    polyethylene glycol (MIRALAX) 17 gram packet Take 17 g by mouth every other day. Historical Provider 3/27/2017 Unknown time Active Yes    potassium chloride SR (KLOR-CON 10) 10 mEq tablet Take 10 mEq by mouth daily. Historical Provider  Active Yes    promethazine (PHENERGAN) 25 mg tablet Take 25 mg by mouth every six (6) hours as needed for Nausea. Historical Provider  Active Yes    rifAMPin (RIFADIN) 300 mg capsule Take 600 mg by mouth daily. Historical Provider  Active Yes             Med Note (Breezy Maynard. Mon Apr 3, 2017 11:03 PM): Concerned medication not being administered recently. Do not see it on any of external medical records      SUMAtriptan (IMITREX) 50 mg tablet Take 50 mg by mouth once as needed for Migraine. Historical Provider  Active Yes    tiotropium (SPIRIVA WITH HANDIHALER) 18 mcg inhalation capsule Take 1 Cap by inhalation daily. Historical Provider 4/2/2017 Unknown time Active Yes    traMADol (ULTRAM) 50 mg tablet Take 50 mg by mouth every eight (8) hours as needed for Pain. Historical Provider  Active Yes    vancomycin (VANCOCIN) 500 mg injection by IntraVENous route daily. Historical Provider  Active Yes             Med Note (Roberto Whyte. Mon Apr 3, 2017 11:03 PM): May have been changed to daily. [facility] did not administer vancomycin and [place] d/c'd pt about 1 week ago. zolpidem (AMBIEN) 5 mg tablet Take 5 mg by mouth nightly. Historical Provider 4/2/2017 PM Active Yes                    Thank you,  Juarez Victor, Pharm. D.

## 2017-04-04 NOTE — PROGRESS NOTES
1900: Bedside shift change report given to Jennifer Fulton RN (oncoming nurse) by Jorge Aguero (offgoing nurse). Report included the following information SBAR, Kardex, ED Summary, Intake/Output, MAR, Accordion, Recent Results, Med Rec Status and Cardiac Rhythm NSR with with BBB. 2000: Head to toe assessment performed. Patient A&Ox4. Vital signs stable. Patient resting in bed.    2200: Vital signs stable. Patient resting in bed.    2315: 11 beat run of V-tach. Patient asymptomatic. Paged Dr. Sonu Fox, BMP and Mag ordered. Labs came back within normal range. Told to monitor patient. 0110: Patient had another run of V-tach and is in ventricular bigeminy. Paged Dr. Sonu Fox, waiting to hear back. 0148: Spoke with Dr. Sonu Fox about ventricular bigeminy, told to call cardiology and consult them. 0: Spoke with Dr. Matty Moore about V-tach and ventricular bigeminy, EKG ordered. 0230: Dr. Matty Moore stated EKG looked stable, no further orders at this time, he will see patient in morning. 1795: Reassessment and vital signs completed. No further episodes of V-Tach. Patient resting quietly in bed. Vital signs stable. 0630: At change of shift, patient resting in bed. Vital signs stable. 2 liters of oxygen via [place]. Patient up with walker to toilet. 0715: Bedside shift change report given to Laurel Delgadillo RN (oncoming nurse) by Jennifer Fulton RN (offgoing nurse). Report included the following information SBAR, Kardex, ED Summary, Procedure Summary, Intake/Output, MAR and Accordion, Cardiac rhythm Sinus Rhythm with BBB.

## 2017-04-05 LAB
ANION GAP BLD CALC-SCNC: 10 MMOL/L (ref 5–15)
BASOPHILS # BLD AUTO: 0 K/UL (ref 0–0.1)
BASOPHILS # BLD: 1 % (ref 0–1)
BUN SERPL-MCNC: 9 MG/DL (ref 6–20)
BUN/CREAT SERPL: 13 (ref 12–20)
CALCIUM SERPL-MCNC: 8.2 MG/DL (ref 8.5–10.1)
CHLORIDE SERPL-SCNC: 109 MMOL/L (ref 97–108)
CO2 SERPL-SCNC: 23 MMOL/L (ref 21–32)
CREAT SERPL-MCNC: 0.67 MG/DL (ref 0.55–1.02)
CRP SERPL-MCNC: 2.68 MG/DL
EOSINOPHIL # BLD: 0.2 K/UL (ref 0–0.4)
EOSINOPHIL NFR BLD: 6 % (ref 0–7)
ERYTHROCYTE [DISTWIDTH] IN BLOOD BY AUTOMATED COUNT: 14.9 % (ref 11.5–14.5)
GLUCOSE SERPL-MCNC: 96 MG/DL (ref 65–100)
HCT VFR BLD AUTO: 30.8 % (ref 35–47)
HGB BLD-MCNC: 9.6 G/DL (ref 11.5–16)
LYMPHOCYTES # BLD AUTO: 41 % (ref 12–49)
LYMPHOCYTES # BLD: 1.1 K/UL (ref 0.8–3.5)
MAGNESIUM SERPL-MCNC: 2 MG/DL (ref 1.6–2.4)
MCH RBC QN AUTO: 27.7 PG (ref 26–34)
MCHC RBC AUTO-ENTMCNC: 31.2 G/DL (ref 30–36.5)
MCV RBC AUTO: 89 FL (ref 80–99)
MONOCYTES # BLD: 0.2 K/UL (ref 0–1)
MONOCYTES NFR BLD AUTO: 9 % (ref 5–13)
NEUTS SEG # BLD: 1.2 K/UL (ref 1.8–8)
NEUTS SEG NFR BLD AUTO: 43 % (ref 32–75)
PLATELET # BLD AUTO: 110 K/UL (ref 150–400)
POTASSIUM SERPL-SCNC: 3.9 MMOL/L (ref 3.5–5.1)
RBC # BLD AUTO: 3.46 M/UL (ref 3.8–5.2)
SODIUM SERPL-SCNC: 142 MMOL/L (ref 136–145)
TROPONIN I SERPL-MCNC: 0.07 NG/ML
WBC # BLD AUTO: 2.7 K/UL (ref 3.6–11)

## 2017-04-05 PROCEDURE — 74011250637 HC RX REV CODE- 250/637: Performed by: NURSE PRACTITIONER

## 2017-04-05 PROCEDURE — 85025 COMPLETE CBC W/AUTO DIFF WBC: CPT | Performed by: INTERNAL MEDICINE

## 2017-04-05 PROCEDURE — 74011250637 HC RX REV CODE- 250/637: Performed by: INTERNAL MEDICINE

## 2017-04-05 PROCEDURE — 65660000000 HC RM CCU STEPDOWN

## 2017-04-05 PROCEDURE — 84484 ASSAY OF TROPONIN QUANT: CPT | Performed by: NURSE PRACTITIONER

## 2017-04-05 PROCEDURE — 36415 COLL VENOUS BLD VENIPUNCTURE: CPT | Performed by: INTERNAL MEDICINE

## 2017-04-05 PROCEDURE — 74011250636 HC RX REV CODE- 250/636: Performed by: INTERNAL MEDICINE

## 2017-04-05 PROCEDURE — 86140 C-REACTIVE PROTEIN: CPT | Performed by: INTERNAL MEDICINE

## 2017-04-05 PROCEDURE — 77010033678 HC OXYGEN DAILY

## 2017-04-05 PROCEDURE — 65270000029 HC RM PRIVATE

## 2017-04-05 PROCEDURE — 80048 BASIC METABOLIC PNL TOTAL CA: CPT | Performed by: INTERNAL MEDICINE

## 2017-04-05 PROCEDURE — 74011000258 HC RX REV CODE- 258: Performed by: INTERNAL MEDICINE

## 2017-04-05 PROCEDURE — 83735 ASSAY OF MAGNESIUM: CPT | Performed by: INTERNAL MEDICINE

## 2017-04-05 RX ORDER — CARVEDILOL 3.12 MG/1
3.12 TABLET ORAL 2 TIMES DAILY WITH MEALS
Status: DISCONTINUED | OUTPATIENT
Start: 2017-04-05 | End: 2017-04-06

## 2017-04-05 RX ORDER — LANOLIN ALCOHOL/MO/W.PET/CERES
1 CREAM (GRAM) TOPICAL
Status: DISCONTINUED | OUTPATIENT
Start: 2017-04-06 | End: 2017-04-07 | Stop reason: HOSPADM

## 2017-04-05 RX ADMIN — RIFAMPIN 600 MG: 300 CAPSULE ORAL at 09:35

## 2017-04-05 RX ADMIN — CEFEPIME 2 G: 2 INJECTION, POWDER, FOR SOLUTION INTRAVENOUS at 21:55

## 2017-04-05 RX ADMIN — ENOXAPARIN SODIUM 40 MG: 100 INJECTION SUBCUTANEOUS at 09:20

## 2017-04-05 RX ADMIN — METHADONE HYDROCHLORIDE 5 MG: 10 TABLET ORAL at 05:22

## 2017-04-05 RX ADMIN — ATORVASTATIN CALCIUM 10 MG: 10 TABLET, FILM COATED ORAL at 09:19

## 2017-04-05 RX ADMIN — Medication 10 ML: at 21:56

## 2017-04-05 RX ADMIN — TRAMADOL HYDROCHLORIDE 50 MG: 50 TABLET, FILM COATED ORAL at 09:32

## 2017-04-05 RX ADMIN — METHADONE HYDROCHLORIDE 5 MG: 10 TABLET ORAL at 21:56

## 2017-04-05 RX ADMIN — CEFEPIME 2 G: 2 INJECTION, POWDER, FOR SOLUTION INTRAVENOUS at 05:20

## 2017-04-05 RX ADMIN — PANTOPRAZOLE SODIUM 40 MG: 40 TABLET, DELAYED RELEASE ORAL at 09:19

## 2017-04-05 RX ADMIN — METHADONE HYDROCHLORIDE 5 MG: 10 TABLET ORAL at 15:15

## 2017-04-05 RX ADMIN — ASPIRIN 81 MG: 81 TABLET, COATED ORAL at 09:20

## 2017-04-05 RX ADMIN — Medication 10 ML: at 15:16

## 2017-04-05 RX ADMIN — CEFEPIME 2 G: 2 INJECTION, POWDER, FOR SOLUTION INTRAVENOUS at 15:16

## 2017-04-05 RX ADMIN — Medication 1000 MCG: at 09:20

## 2017-04-05 RX ADMIN — VANCOMYCIN HYDROCHLORIDE 1000 MG: 1 INJECTION, POWDER, LYOPHILIZED, FOR SOLUTION INTRAVENOUS at 10:23

## 2017-04-05 RX ADMIN — Medication 10 ML: at 05:22

## 2017-04-05 RX ADMIN — LINACLOTIDE 290 MCG: 145 CAPSULE, GELATIN COATED ORAL at 09:18

## 2017-04-05 RX ADMIN — CARVEDILOL 3.12 MG: 3.12 TABLET, FILM COATED ORAL at 09:32

## 2017-04-05 RX ADMIN — FUROSEMIDE 20 MG: 20 TABLET ORAL at 09:19

## 2017-04-05 RX ADMIN — ZOLPIDEM TARTRATE 5 MG: 5 TABLET, FILM COATED ORAL at 21:55

## 2017-04-05 RX ADMIN — TRAMADOL HYDROCHLORIDE 50 MG: 50 TABLET, FILM COATED ORAL at 20:04

## 2017-04-05 RX ADMIN — CARVEDILOL 3.12 MG: 3.12 TABLET, FILM COATED ORAL at 17:48

## 2017-04-05 NOTE — CONSULTS
[id number] [facility] at [address] [place] [address]   [phone]    Hematology / Oncology Consult    Reason for Visit:   Cleveland Mendez is a 68 y.o. female who is seen in consultation at the request of Dr. Swathi West for evaluation of pancytopenia. History of Present Illness:     Ms Clarisa Shaver was received as a transfer from [facility] and admitted to [address] [place] [address] on 4/03/2017. Received for assistance in managing sepsis. Seen in [facility] ED 3/31/2017 presenting with c/o SOB and chest pain after she received a vancomycin infusion at home via PICC line. Culture from PICC line showed pseudomonas. Therefore she was admitted for further eval and management. Ms Clarisa Shaver shares had original left hip surgery in [place] (2015); continued with pain; had bone fragments removed in June 2016 ; was eventually referred to Dr Namrata Cartwright. (1/30/2017) had replacement with infection noted; noted emergent MI during case; 2/14 underwent ID of left hip; 2/16 transferred to SNF in [facility]) with PICC line for Vancomycin; 3/26/2017 home with PICC line for continuation of abx; 3/30 had chills at home with shaking; called Dr Mehul Tomlinson who directed her to ED was seen and sent home. On 4/1 had SOB and chest tightness; presented to ED again in [place] and was admitted. Denies any prior hx of anemia; states has been taking Fe supplements on and off due to feeling fatigued. Follows with [facility] Shanda Smith for hx of curvature of back.        Past Medical History:   Diagnosis Date    Autoimmune disease ([address]     psoriasis    Beta-blocker therapy     Cardiomyopathy ([facility] [address]     non-ischemic cardiomyopathy per history     Chronic obstructive pulmonary disease (HCC)     Chronic pain     curvature of spinie    GERD (gastroesophageal reflux disease)     Heart failure (HCC)     moderate LV dysfunction 1/17     Hypercholesterolemia     Hypertension     LBBB (left bundle branch block)  Smoker     Thromboembolus ([address]     right leg after hip surgery      Past Surgical History:   Procedure Laterality Date    ABDOMEN SURGERY PROC UNLISTED  2010    Gallbladder    ABDOMEN SURGERY PROC UNLISTED  2000    hiatal hernia repair    HX GYN      miscarriage    HX HEENT      Tonsilectomy    HX HEENT      cataract removed bilateral eyes    HX ORTHOPAEDIC      Right Knee, Left Foot    HX ORTHOPAEDIC  04/05/2015    left hip replacement 2015    HX ORTHOPAEDIC  06/24/2016    rmoval bone fragment left hip    HX ORTHOPAEDIC  2013    right hip replacement    HX ORTHOPAEDIC  2001    right knee replacement      Social History   Substance Use Topics    Smoking status: Former Smoker     Packs/day: 0.50     Years: 60.00    Smokeless tobacco: Never Used    Alcohol use No      Comment: 0      Family History   Problem Relation Age of Onset    Cancer Mother     Stroke Father      Current Facility-Administered Medications   Medication Dose Route Frequency    carvedilol (COREG) tablet 3.125 mg  3.125 mg Oral BID WITH MEALS    vancomycin (VANCOCIN) 1,000 mg in 0.9% sodium chloride (MBP/ADV) 250 mL  1 g IntraVENous Q24H    methadone (DOLOPHINE) tablet 5 mg  5 mg Oral Q8H    bisacodyl (DULCOLAX) suppository 10 mg  10 mg Rectal DAILY PRN    cyanocobalamin (VITAMIN B12) tablet 1,000 mcg  1,000 mcg Oral DAILY    cyclobenzaprine (FLEXERIL) tablet 5 mg  5 mg Oral TID PRN    furosemide (LASIX) tablet 20 mg  20 mg Oral DAILY    linaclotide (LINZESS) capsule 290 mcg  290 mcg Oral ACB    cefepime (MAXIPIME) 2 g in 0.9% sodium chloride (MBP/ADV) 100 mL  2 g IntraVENous Q8H    sodium chloride (NS) flush 5-10 mL  5-10 mL IntraVENous Q8H    sodium chloride (NS) flush 5-10 mL  5-10 mL IntraVENous PRN    rifAMPin (RIFADIN) capsule 600 mg  600 mg Oral DAILY    oxyCODONE IR (ROXICODONE) tablet 15 mg  15 mg Oral Q4H PRN    albuterol (PROVENTIL HFA, VENTOLIN HFA, PROAIR HFA) inhaler 2 Puff  2 Puff Inhalation Q6H PRN  aspirin delayed-release tablet 81 mg  81 mg Oral DAILY    atorvastatin (LIPITOR) tablet 10 mg  10 mg Oral DAILY    bisacodyl (DULCOLAX) suppository 10 mg  10 mg Rectal DAILY    enoxaparin (LOVENOX) injection 40 mg  40 mg SubCUTAneous DAILY    polyethylene glycol (MIRALAX) packet 17 g  17 g Oral EVERY OTHER DAY    SUMAtriptan (IMITREX) tablet 50 mg  50 mg Oral ONCE PRN    zolpidem (AMBIEN) tablet 5 mg  5 mg Oral QHS    traMADol (ULTRAM) tablet 50 mg  50 mg Oral Q8H PRN    pantoprazole (PROTONIX) tablet 40 mg  40 mg Oral DAILY      Allergies   Allergen Reactions    [place] Angioedema    Codeine Other (comments)     \"Years ago gave me hives but lately I havetaken it without problem\"    Dilaudid [Hydromorphone (Bulk)] Shortness of Breath    Lyrica [Pregabalin] Nausea and Vomiting        Review of Systems: A complete review of systems was obtained, negative except as described above. Physical Exam:     Visit Vitals    /68    Pulse 63    Temp 98.5 °F (36.9 °C)    Resp 11    Ht 5' 4.96\" (1.65 m)    Wt 75.1 kg (165 lb 9.6 oz)    SpO2 93%    BMI 27.59 kg/m2       General: No distress  Eyes:  anicteric sclerae  HENT: Atraumatic, OP clear  Neck: Supple  Lymphatic: No cervical, supraclavicular, or inguinal adenopathy  Respiratory: CTAB, normal respiratory effort  CV: Normal rate, regular rhythm, no murmurs, no peripheral edema  GI: Soft, nontender, nondistended, no masses, no hepatomegaly, no splenomegaly  Skin: No rashes, ecchymoses, or petechiae. Normal temperature, turgor, and texture. MSK: large curvature of spine noted  Psych: Alert, oriented, appropriate affect, normal judgment/insight  Neuro: CN II-XII intact    Results:     Lab Results   Component Value Date/Time    WBC 2.7 04/05/2017 05:28 AM    HGB 9.6 04/05/2017 05:28 AM    HCT 30.8 04/05/2017 05:28 AM    PLATELET 340 26/16/1397 05:28 AM    MCV 89.0 04/05/2017 05:28 AM    ABS.  NEUTROPHILS 1.2 04/05/2017 05:28 AM    Hemoglobin (POC) 12.3 01/30/2017 02:24 PM     Lab Results   Component Value Date/Time    Sodium 142 04/05/2017 05:28 AM    Potassium 3.9 04/05/2017 05:28 AM    Chloride 109 04/05/2017 05:28 AM    CO2 23 04/05/2017 05:28 AM    Glucose 96 04/05/2017 05:28 AM    BUN 9 04/05/2017 05:28 AM    Creatinine 0.67 04/05/2017 05:28 AM    GFR est AA >60 04/05/2017 05:28 AM    GFR est non-AA >60 04/05/2017 05:28 AM    Calcium 8.2 04/05/2017 05:28 AM     Lab Results   Component Value Date/Time    Bilirubin, total 0.2 02/02/2017 04:31 AM    ALT (SGPT) 12 02/02/2017 04:31 AM    AST (SGOT) 18 02/02/2017 04:31 AM    Alk. phosphatase 48 02/02/2017 04:31 AM    Protein, total 5.4 02/02/2017 04:31 AM    Albumin 2.2 02/03/2017 03:04 PM    Globulin 3.0 02/02/2017 04:31 AM     Lab Results   Component Value Date/Time    Iron % saturation 11 04/04/2017 05:13 AM    Iron 29 04/04/2017 05:13 AM    TIBC 257 04/04/2017 05:13 AM    Ferritin 45 04/04/2017 05:13 AM    Vitamin B12 509 04/04/2017 05:13 AM    Folate 15.2 04/04/2017 05:13 AM    C-Reactive protein 2.68 04/05/2017 05:28 AM     Lab Results   Component Value Date/Time    INR 1.1 01/30/2017 03:19 PM    aPTT 22.8 01/30/2017 03:19 PM         Assessment and Recommendations:   1. PICC line infection  Blood cultures NGTD  Culture from other facility with noted pseudomonas  Abx coverage     2. Anemia, normocytic  Normal Hgb in Jan ; appears acute  Possibly secondary to infection and recent surgery  Slight Fe def noted; will start oral Fe replacement  Will monitor for now; if no improvement noted might consider GI eval      3. Leukopenia/ Thrombocytopenia  Possibly secondary to on going infection of hip  Continue to monitor for now    4. Left hip septic arthritis  ABX coverage  Receiving new PICC line  Consulted ortho      The above exam and treatment plan were reviewed with Dr Aundria Cowing.     Signed By: Keagan Rolon NP     April 5, 2017

## 2017-04-05 NOTE — PROGRESS NOTES
Cardiology Progress Note [facility]         NAME:  Cleveland Mendez   :   1940   MRN:   [id number]     Assessment/Plan:   NSVT- no fruther- PVCs- asx      -  resume coreg today       - keep K ~ 4, Mg~ 2       NICM- EF unchanged 35-40%- no evidence of HF        - resume coreg today       - resume lisinopril as as BP allows        - cont statin/ASA   Hypertroponinemia- suspect strain - re-ck this am   Sepsis- per team        OP f/u  @ 11 am arranged       Subjective:   Cleveland Mendez is a 68y.o. year old female w/ hx:  cardiomyopathy (non-ischemic per the chart), HTN, COPD, HTN who is transferred with sepsis/ bacteremia from [place] on 4/3/17. She apparently went to [facility] ER on 17 fo SOB and chest pain after she had vancomycin infusion - she was sent home after ER workup. On 4/3/17 she again went back to ER with chills and SOB andwas admitted with septic shock and transferred to [facility]. According to the chart notes (sign out), the culture from the picc a few days ago showed pseudomonas.     Cardiology consulted after patient noted to have frequent PVC's and runs of NSVT (11 beat). Patient was asymptomatic during these episodes. She denies any chest pain, although had some 4 days prior. Came to hospital with SOB, but doing better. BC (-)  Overnight activities reviewed:    - -140/70 range    - Tele ST BBB PVCs    - K+3.9     Mg++2,     Cr 0.67    - WBC 2.7, plt 110               -  UO: 850      Cardiac ROS: feels OK, chest wall pain, increased w/ palpation,  Patient denies any exertional chest pain, dyspnea, palpitations, syncope, orthopnea, edema or paroxysmal nocturnal dyspnea. Review of Systems: fatigue, No nausea, indigestion, vomiting, pain, cough, sputum. No bleeding. Taking po. Appetite OK . Tolerating PT.      CARDIAC EVALUATION   STRESS: 16 - Belkis nuc: No ischemia/WMA sec to LBBB/EF 37%      ECHO: 6/15/16 - LVH/ EF 40-45%/Mild AS/Mild MR/Mild TR  ECHO: 17: LVEF 35 -40 %. Ventricular septum: here was sigmoid septal  Appearance- 2/2 LBBB. Mild MR.  ECHO 2017: EF 35-40%, mod diffuse HK        Objective:     Visit Vitals    /70 (BP 1 Location: Right arm, BP Patient Position: At rest)    Pulse 64    Temp 98.4 °F (36.9 °C)    Resp 11    Ht 5' 4.96\" (1.65 m)    Wt 165 lb 9.6 oz (75.1 kg)    SpO2 93%    BMI 27.59 kg/m2      O2 Flow Rate (L/min): 2 l/min O2 Device: Nasal cannula    Temp (24hrs), Av °F (36.7 °C), Min:97.4 °F (36.3 °C), Max:98.5 °F (36.9 °C)        Intake/Output Summary (Last 24 hours) at 17 0831  Last data filed at 17 0520   Gross per 24 hour   Intake              450 ml   Output                0 ml   Net              450 ml       TELE: SR LBBB, PVCs     General: AAOx3 cooperative, no acute distress. HEENT: Atraumatic. Pink and moist.  Anicteric sclerae. Neck: Supple,     Lungs: CTA bilaterally. No wheezing/rhonchi/crackles. Heart: PMI: nondisplaced,   Regular rhythm, 2/6 systolic  Murmur-LLSB, no S3, no rubs, no gallops. No JVD. No carotid bruits. Abdomen: Soft, non-distended, non-tender. + Bowel sounds. No bruits. : voiding  Extremities: chronic venous stasis changes, No edema, no clubbing, no cyanosis. No calf tenderness  Neurologic: Grossly intact. Alert and oriented X 3. No acute neurological distress. Psych: Good insight. Not anxious nor agitated.       Care Plan discussed with:    Comments   Patient y    Family      RN y    Care Manager                    Consultant:          Data Review:   CMP:   Lab Results   Component Value Date/Time     2017 05:28 AM    K 3.9 2017 05:28 AM     (H) 2017 05:28 AM    CO2 23 2017 05:28 AM    AGAP 10 2017 05:28 AM    GLU 96 2017 05:28 AM    BUN 9 2017 05:28 AM    CREA 0.67 2017 05:28 AM    GFRAA >60 2017 05:28 AM    GFRNA >60 2017 05:28 AM    CA 8.2 (L) 2017 05:28 AM    MG 2.0 04/05/2017 05:28 AM     CBC:   Lab Results   Component Value Date/Time    WBC 2.7 (L) 04/05/2017 05:28 AM    HGB 9.6 (L) 04/05/2017 05:28 AM    HCT 30.8 (L) 04/05/2017 05:28 AM     (L) 04/05/2017 05:28 AM     All Cardiac Markers in the last 24 hours:   Lab Results   Component Value Date/Time    TROIQ 0.10 (H) 04/04/2017 09:23 AM     Recent Glucose Results:   Lab Results   Component Value Date/Time    GLU 96 04/05/2017 05:28 AM     ABG: No results found for: PH, PHI, PCO2, PCO2I, PO2, PO2I, HCO3, HCO3I, FIO2, FIO2I  LIPIDS:  Cholesterol, total   Date Value Ref Range Status   02/02/2017 114 <200 MG/DL Final     Triglyceride   Date Value Ref Range Status   02/02/2017 76 <150 MG/DL Final     Comment:     Based on NCEP-ATP III:  Triglycerides <150 mg/dL  is considered normal, 150-199 mg/dL  borderline high,  200-499 mg/dL high and  greater than or equal to 500 mg/dL very high. HDL Cholesterol   Date Value Ref Range Status   02/02/2017 47 MG/DL Final     Comment:     Based on NCEP ATP III, HDL Cholesterol <40 mg/dL is considered low and >60 mg/dL is elevated.      LDL, calculated   Date Value Ref Range Status   02/02/2017 51.8 0 - 100 MG/DL Final     Comment:     Based on the NCEP-ATP: LDL-C concentrations are considered  optimal <100 mg/dL,  near optimal/above Normal 100-129 mg/dL  Borderline High: 130-159, High: 160-189 mg/dL  Very High: Greater than or equal to 190 mg/dL       VLDL, calculated   Date Value Ref Range Status   02/02/2017 15.2 MG/DL Final     CHOL/HDL Ratio   Date Value Ref Range Status   02/02/2017 2.4 0 - 5.0   Final       Medications reviewed  Current Facility-Administered Medications   Medication Dose Route Frequency    vancomycin (VANCOCIN) 1,000 mg in 0.9% sodium chloride (MBP/ADV) 250 mL  1 g IntraVENous Q24H    methadone (DOLOPHINE) tablet 5 mg  5 mg Oral Q8H    bisacodyl (DULCOLAX) suppository 10 mg  10 mg Rectal DAILY PRN    cyanocobalamin (VITAMIN B12) tablet 1,000 mcg  1,000 mcg Oral DAILY    cyclobenzaprine (FLEXERIL) tablet 5 mg  5 mg Oral TID PRN    furosemide (LASIX) tablet 20 mg  20 mg Oral DAILY    linaclotide (LINZESS) capsule 290 mcg  290 mcg Oral ACB    cefepime (MAXIPIME) 2 g in 0.9% sodium chloride (MBP/ADV) 100 mL  2 g IntraVENous Q8H    sodium chloride (NS) flush 5-10 mL  5-10 mL IntraVENous Q8H    sodium chloride (NS) flush 5-10 mL  5-10 mL IntraVENous PRN    rifAMPin (RIFADIN) capsule 600 mg  600 mg Oral DAILY    oxyCODONE IR (ROXICODONE) tablet 15 mg  15 mg Oral Q4H PRN    albuterol (PROVENTIL HFA, VENTOLIN HFA, PROAIR HFA) inhaler 2 Puff  2 Puff Inhalation Q6H PRN    aspirin delayed-release tablet 81 mg  81 mg Oral DAILY    atorvastatin (LIPITOR) tablet 10 mg  10 mg Oral DAILY    bisacodyl (DULCOLAX) suppository 10 mg  10 mg Rectal DAILY    enoxaparin (LOVENOX) injection 40 mg  40 mg SubCUTAneous DAILY    polyethylene glycol (MIRALAX) packet 17 g  17 g Oral EVERY OTHER DAY    SUMAtriptan (IMITREX) tablet 50 mg  50 mg Oral ONCE PRN    zolpidem (AMBIEN) tablet 5 mg  5 mg Oral QHS    traMADol (ULTRAM) tablet 50 mg  50 mg Oral Q8H PRN    pantoprazole (PROTONIX) tablet 40 mg  40 mg Oral DAILY         Whitney Becerra RN, ACNP-BC, [facility]

## 2017-04-05 NOTE — PROGRESS NOTES
Pt stable, admitted for PICC line sepsis. Exam of the hip is benign. Seems to be doing well. She may f/u on a routine basis as an outpatient.  Will continue to follow in the hospital.     [place] Titus MD

## 2017-04-05 NOTE — PROGRESS NOTES
[facility] Infectious Diseases Progress Note  Brittany Bradley MD,             Henry Camp MD,          Avel Acevedo DO     [address] [place] [address]    [address], [address]  [phone]  Fax    2017      Assessment & Plan:   1. PICC line infection. Pseudomonas from drainage at PICC site. BC's NGSF. PICC removed. Cefepime X 10 days. Avoid cipro due to potential interaction with methadone  2. Left hip septic arthritis. Intraoperative wound culture growing oxacillin resistant staph lugdunensis. Continue vancomycin and rifampin through . Needs new PICC at discharge. New IV abx orders in chart. CRP remains elevated. Will start po doxy once IV abx complete. F/u with Dr Yumiko Blackman as directed          Subjective:     No complaints    Objective:     Vitals:   Visit Vitals    /68    Pulse 63    Temp 98.5 °F (36.9 °C)    Resp 11    Ht 5' 4.96\" (1.65 m)    Wt 75.1 kg (165 lb 9.6 oz)    SpO2 93%    BMI 27.59 kg/m2        Tmax:  Temp (24hrs), Av.1 °F (36.7 °C), Min:97.4 °F (36.3 °C), Max:98.5 °F (36.9 °C)      Exam:   Patient is intubated:  no    Physical Examination:   GENERAL ASSESSMENT: NAD  HEENT:Nontraumatic   CHEST: CTA B/l  HEART: S1S2 RRR  ABDOMEN: Soft,NT,ND  :Onofre:   EXTREMITY: PICC site with scant green tinged drainage. No erythema  NEURO:Grossly non focal    Labs:        No lab exists for component: ITNL   Recent Labs      17   0528  17   0923   TROIQ  0.07*  0.10*     Recent Labs      17   0528  17   0513  17   2334   NA  142  139  135*   K  3.9  3.3*  4.7   CL  109*  106  105   CO2  23  24  21   BUN  9  12  14   CREA  0.67  0.74  0.70   GLU  96  81  81   MG  2.0   --   2.1   WBC  2.7*  3.1*   --    HGB  9.6*  9.1*   --    HCT  30.8*  28.5*   --    PLT  110*  99*   --      No results for input(s): INR, PTP, APTT in the last 72 hours.     No lab exists for component: INREXT, INREXT  Needs: urine analysis, urine sodium, protein and creatinine  No results found for: ESTEFANI, CREAU      Cultures:     No results found for: SDES  Lab Results   Component Value Date/Time    Culture result: NO GROWTH 2 DAYS 04/03/2017 05:58 PM    Culture result: NO GROWTH 2 DAYS 04/03/2017 05:58 PM    Culture result: NO GROWTH 14 DAYS 02/14/2017 02:07 PM    Culture result: NO GROWTH 14 DAYS 02/14/2017 02:07 PM       Radiology:     Medications       Current Facility-Administered Medications   Medication Dose Route Frequency Last Dose    carvedilol (COREG) tablet 3.125 mg  3.125 mg Oral BID WITH MEALS 3.125 mg at 04/05/17 0932    [START ON 4/6/2017] ferrous sulfate tablet 325 mg  1 Tab Oral DAILY WITH BREAKFAST      vancomycin (VANCOCIN) 1,000 mg in 0.9% sodium chloride (MBP/ADV) 250 mL  1 g IntraVENous Q24H 1,000 mg at 04/05/17 1023    methadone (DOLOPHINE) tablet 5 mg  5 mg Oral Q8H 5 mg at 04/05/17 0522    bisacodyl (DULCOLAX) suppository 10 mg  10 mg Rectal DAILY PRN      cyanocobalamin (VITAMIN B12) tablet 1,000 mcg  1,000 mcg Oral DAILY 1,000 mcg at 04/05/17 0920    cyclobenzaprine (FLEXERIL) tablet 5 mg  5 mg Oral TID PRN      furosemide (LASIX) tablet 20 mg  20 mg Oral DAILY 20 mg at 04/05/17 0919    linaclotide (LINZESS) capsule 290 mcg  290 mcg Oral  mcg at 04/05/17 0918    cefepime (MAXIPIME) 2 g in 0.9% sodium chloride (MBP/ADV) 100 mL  2 g IntraVENous Q8H 2 g at 04/05/17 0520    sodium chloride (NS) flush 5-10 mL  5-10 mL IntraVENous Q8H 10 mL at 04/05/17 0522    sodium chloride (NS) flush 5-10 mL  5-10 mL IntraVENous PRN      rifAMPin (RIFADIN) capsule 600 mg  600 mg Oral DAILY 600 mg at 04/05/17 0935    oxyCODONE IR (ROXICODONE) tablet 15 mg  15 mg Oral Q4H PRN 15 mg at 04/04/17 2125    albuterol (PROVENTIL HFA, VENTOLIN HFA, PROAIR HFA) inhaler 2 Puff  2 Puff Inhalation Q6H PRN      aspirin delayed-release tablet 81 mg  81 mg Oral DAILY 81 mg at 04/05/17 0920    atorvastatin (LIPITOR) tablet 10 mg  10 mg Oral DAILY 10 mg at 04/05/17 0919    bisacodyl (DULCOLAX) suppository 10 mg  10 mg Rectal DAILY 10 mg at 04/04/17 0840    enoxaparin (LOVENOX) injection 40 mg  40 mg SubCUTAneous DAILY 40 mg at 04/05/17 0920    polyethylene glycol (MIRALAX) packet 17 g  17 g Oral EVERY OTHER DAY 17 g at 04/04/17 1801    SUMAtriptan (IMITREX) tablet 50 mg  50 mg Oral ONCE PRN      zolpidem (AMBIEN) tablet 5 mg  5 mg Oral QHS 5 mg at 04/04/17 2126    traMADol (ULTRAM) tablet 50 mg  50 mg Oral Q8H PRN 50 mg at 04/05/17 0932    pantoprazole (PROTONIX) tablet 40 mg  40 mg Oral DAILY 40 mg at 04/05/17 8218           Case discussed with:       Sg Sosa DO

## 2017-04-05 NOTE — PROGRESS NOTES
Medical Progress Note      NAME: Suyapa Cabrera   :  1940  MRM:  [id number]    Date/Time: 2017  11:53 AM       Assessment / Plan:     PICC line site infection:  PICC line was removed on 4/3 from [facility]. Culture from PICC site grew pseudomonas sensitive to gent, cefepime, zosyn, cipro. Blood cultures negative to date here. -- continue cefepime through       LT hip Septic arthritis ([facility] (4/3/2017): Symptoms continue to improve. Hx of oxacillin resistant staph lugdunensis. -- to continue vancomycin and rifampin through    -- PICC team for new PICC      Systolic CHF, chronic ([facility] (2017) / HTN:  Lisinopril and coreg were held on admission. Had NSVT on admission. Appreciate cardiology evaluation. Echo done with EF 35-40% with moderate diffuse hypokinesis. -- coreg restarted   -- continue ASA   -- monitor lytes      Pancytopenia:  HGB stable from prior, but now with mild leukopenia and thrombocytopenia. Possible from infection vs medication. -- hematology evaluation   -- monitor counts       GERD:    -- Continue PPI      Hyperlipidemia:     -- continue statin              Subjective:     Chief Complaint:  Feels well. No fever, chills. No pain in prior picc site. No n/v. Hip continues to improve. ROS:  (bold if positive, if negative)              Objective:       Vitals:          Last 24hrs VS reviewed since prior progress note.  Most recent are:    Visit Vitals    /68    Pulse 63    Temp 98.5 °F (36.9 °C)    Resp 11    Ht 5' 4.96\" (1.65 m)    Wt 75.1 kg (165 lb 9.6 oz)    SpO2 93%    BMI 27.59 kg/m2     SpO2 Readings from Last 6 Encounters:   17 93%   17 96%   17 90%   17 99%   14 95%    O2 Flow Rate (L/min): 2 l/min     Intake/Output Summary (Last 24 hours) at 17 1153  Last data filed at 17 0520   Gross per 24 hour   Intake              450 ml   Output                0 ml   Net              450 ml          Exam: Physical Exam:    Gen:  Well-developed, well-nourished, in no acute distress  HEENT:  Pink conjunctivae, hearing intact to voice, moist mucous membranes  Neck:  Supple  Resp:  No accessory muscle use, clear breath sounds without wheezes rales or rhonchi  Card:  No murmurs, normal S1, S2 without thrills or peripheral edema  Abd:  Soft, non-tender, non-distended, normoactive bowel sounds are present  Musc:  No cyanosis  Skin:  No rashes  Neuro:   Face symmetric, tongue midline, speech fluent,  strength is 5/5 bilaterally and dorsi / plantarflexion is 5/5 bilaterally, follows commands appropriately  Psych:  Good insight, oriented to person, place and time, alert       Telemetry reviewed:   PVC    Medications Reviewed: (see below)    Lab Data Reviewed: (see below)    ______________________________________________________________________    Medications:     Current Facility-Administered Medications   Medication Dose Route Frequency    carvedilol (COREG) tablet 3.125 mg  3.125 mg Oral BID WITH MEALS    vancomycin (VANCOCIN) 1,000 mg in 0.9% sodium chloride (MBP/ADV) 250 mL  1 g IntraVENous Q24H    methadone (DOLOPHINE) tablet 5 mg  5 mg Oral Q8H    bisacodyl (DULCOLAX) suppository 10 mg  10 mg Rectal DAILY PRN    cyanocobalamin (VITAMIN B12) tablet 1,000 mcg  1,000 mcg Oral DAILY    cyclobenzaprine (FLEXERIL) tablet 5 mg  5 mg Oral TID PRN    furosemide (LASIX) tablet 20 mg  20 mg Oral DAILY    linaclotide (LINZESS) capsule 290 mcg  290 mcg Oral ACB    cefepime (MAXIPIME) 2 g in 0.9% sodium chloride (MBP/ADV) 100 mL  2 g IntraVENous Q8H    sodium chloride (NS) flush 5-10 mL  5-10 mL IntraVENous Q8H    sodium chloride (NS) flush 5-10 mL  5-10 mL IntraVENous PRN    rifAMPin (RIFADIN) capsule 600 mg  600 mg Oral DAILY    oxyCODONE IR (ROXICODONE) tablet 15 mg  15 mg Oral Q4H PRN    albuterol (PROVENTIL HFA, VENTOLIN HFA, PROAIR HFA) inhaler 2 Puff  2 Puff Inhalation Q6H PRN    aspirin delayed-release tablet 81 mg  81 mg Oral DAILY    atorvastatin (LIPITOR) tablet 10 mg  10 mg Oral DAILY    bisacodyl (DULCOLAX) suppository 10 mg  10 mg Rectal DAILY    enoxaparin (LOVENOX) injection 40 mg  40 mg SubCUTAneous DAILY    polyethylene glycol (MIRALAX) packet 17 g  17 g Oral EVERY OTHER DAY    SUMAtriptan (IMITREX) tablet 50 mg  50 mg Oral ONCE PRN    zolpidem (AMBIEN) tablet 5 mg  5 mg Oral QHS    traMADol (ULTRAM) tablet 50 mg  50 mg Oral Q8H PRN    pantoprazole (PROTONIX) tablet 40 mg  40 mg Oral DAILY            Lab Review:     Recent Labs      04/05/17 0528 04/04/17 0513   WBC  2.7*  3.1*   HGB  9.6*  9.1*   HCT  30.8*  28.5*   PLT  110*  99*     Recent Labs      04/05/17 0528 04/04/17 0513 04/03/17   2334   NA  142  139  135*   K  3.9  3.3*  4.7   CL  109*  106  105   CO2  23  24  21   GLU  96  81  81   BUN  9  12  14   CREA  0.67  0.74  0.70   CA  8.2*  8.0*  7.8*   MG  2.0   --   2.1     No results found for: GLUCPOC      Total time spent with patient: 28 5 [address] discussed with: Patient    Discussed:  Care Plan    Prophylaxis:  Lovenox    Disposition:  Home w/Family           ___________________________________________________    Attending Physician: Vicky Roche MD

## 2017-04-05 NOTE — PROGRESS NOTES
1910 Received report. 2004 Ultram 50 mg po given for left hip pain. 2110 TRANSFER - OUT REPORT:    Verbal report given to LORENE Hamm on Theresa Corrigan  being transferred to [facility] for routine progression of care       Report consisted of patients Situation, Background, Assessment and   Recommendations(SBAR). Information from the following report(s) SBAR, Kardex, Intake/Output, MAR, Accordion, Recent Results, Med Rec Status, Cardiac Rhythm SR and Alarm Parameters  was reviewed with the receiving nurse. Lines:   PICC Double Lumen 08/29/77 Right;Basilic (Active)       Peripheral IV 04/04/17 Right Wrist (Active)   Site Assessment Clean, dry, & intact 4/5/2017  8:04 PM   Phlebitis Assessment 0 4/5/2017  8:04 PM   Infiltration Assessment 0 4/5/2017  8:04 PM   Dressing Status Clean, dry, & intact 4/5/2017  8:04 PM   Dressing Type Transparent 4/5/2017  8:04 PM   Hub Color/Line Status Pink 4/5/2017  8:04 PM   Action Taken Open ports on tubing capped 4/5/2017  8:30 AM   Alcohol Cap Used Yes 4/5/2017  8:04 PM        Opportunity for questions and clarification was provided.       Patient transported with:   Monitor  Registered Nurse

## 2017-04-05 NOTE — PROGRESS NOTES
I received notification from attending that pt will need continuation of iv antibiotics when pt is discharged home. Order with clinicals faxed to [facility] who was servicing pt prior to admission. I am requesting [facility] will send their nurse to check on pt.'s dressing and PICC care. as pt had a PICC line infection. Daniel Sanchez    If stop date ,dosage,or actual antibiotics differ from previous iv antibiotic regimen ,will new orders with specifics.   Daniel Sanchez

## 2017-04-05 NOTE — PROGRESS NOTES
Bedside and Verbal shift change report given to Loco Durham (oncoming nurse) by Gregg Simon (offgoing nurse). Report included the following information SBAR, Kardex, MAR and Cardiac Rhythm SR with BBB.   [id number]:pt received alert and oriented, correct pt identified, assessment completed and charged,  C/o pain to L hip tramadol given with good effect c/o headache helped with scheduled   Methadone. Pt up to BR with assist x1 , plan for IR to place pic-line and pt will be discharged with home antibiotics  Bedside and Verbal shift change report given to Eufemia PAULSON (oncoming nurse) by Loco Durham (offgoing nurse). Report included the following information SBAR, Recent Results and Cardiac Rhythm SR with BBB.

## 2017-04-06 ENCOUNTER — APPOINTMENT (OUTPATIENT)
Dept: INTERVENTIONAL RADIOLOGY/VASCULAR | Age: 77
DRG: 314 | End: 2017-04-06
Attending: INTERNAL MEDICINE
Payer: MEDICARE

## 2017-04-06 VITALS
SYSTOLIC BLOOD PRESSURE: 137 MMHG | DIASTOLIC BLOOD PRESSURE: 78 MMHG | OXYGEN SATURATION: 99 % | HEART RATE: 77 BPM | TEMPERATURE: 98.1 F | WEIGHT: 151.8 LBS | RESPIRATION RATE: 18 BRPM | HEIGHT: 65 IN | BODY MASS INDEX: 25.29 KG/M2

## 2017-04-06 LAB
ANION GAP BLD CALC-SCNC: 8 MMOL/L (ref 5–15)
BASOPHILS # BLD AUTO: 0 K/UL (ref 0–0.1)
BASOPHILS # BLD: 1 % (ref 0–1)
BUN SERPL-MCNC: 7 MG/DL (ref 6–20)
BUN/CREAT SERPL: 10 (ref 12–20)
CALCIUM SERPL-MCNC: 8.1 MG/DL (ref 8.5–10.1)
CHLORIDE SERPL-SCNC: 111 MMOL/L (ref 97–108)
CO2 SERPL-SCNC: 25 MMOL/L (ref 21–32)
CREAT SERPL-MCNC: 0.68 MG/DL (ref 0.55–1.02)
DIFFERENTIAL METHOD BLD: ABNORMAL
EOSINOPHIL # BLD: 0.2 K/UL (ref 0–0.4)
EOSINOPHIL NFR BLD: 7 % (ref 0–7)
ERYTHROCYTE [DISTWIDTH] IN BLOOD BY AUTOMATED COUNT: 14.4 % (ref 11.5–14.5)
GLUCOSE SERPL-MCNC: 79 MG/DL (ref 65–100)
HCT VFR BLD AUTO: 29.5 % (ref 35–47)
HGB BLD-MCNC: 9.4 G/DL (ref 11.5–16)
LYMPHOCYTES # BLD AUTO: 30 % (ref 12–49)
LYMPHOCYTES # BLD: 0.9 K/UL (ref 0.8–3.5)
MAGNESIUM SERPL-MCNC: 1.8 MG/DL (ref 1.6–2.4)
MCH RBC QN AUTO: 27.9 PG (ref 26–34)
MCHC RBC AUTO-ENTMCNC: 31.9 G/DL (ref 30–36.5)
MCV RBC AUTO: 87.5 FL (ref 80–99)
MONOCYTES # BLD: 0.2 K/UL (ref 0–1)
MONOCYTES NFR BLD AUTO: 5 % (ref 5–13)
NEUTS SEG # BLD: 1.7 K/UL (ref 1.8–8)
NEUTS SEG NFR BLD AUTO: 57 % (ref 32–75)
PLATELET # BLD AUTO: 112 K/UL (ref 150–400)
POTASSIUM SERPL-SCNC: 3.7 MMOL/L (ref 3.5–5.1)
RBC # BLD AUTO: 3.37 M/UL (ref 3.8–5.2)
RBC MORPH BLD: ABNORMAL
SODIUM SERPL-SCNC: 144 MMOL/L (ref 136–145)
WBC # BLD AUTO: 3 K/UL (ref 3.6–11)

## 2017-04-06 PROCEDURE — 85025 COMPLETE CBC W/AUTO DIFF WBC: CPT | Performed by: INTERNAL MEDICINE

## 2017-04-06 PROCEDURE — 77030018719 HC DRSG PTCH ANTIMIC J&J -A

## 2017-04-06 PROCEDURE — 74011250637 HC RX REV CODE- 250/637: Performed by: INTERNAL MEDICINE

## 2017-04-06 PROCEDURE — 76937 US GUIDE VASCULAR ACCESS: CPT

## 2017-04-06 PROCEDURE — 74011000250 HC RX REV CODE- 250: Performed by: INTERNAL MEDICINE

## 2017-04-06 PROCEDURE — C1751 CATH, INF, PER/CENT/MIDLINE: HCPCS

## 2017-04-06 PROCEDURE — 80048 BASIC METABOLIC PNL TOTAL CA: CPT | Performed by: INTERNAL MEDICINE

## 2017-04-06 PROCEDURE — 74011000258 HC RX REV CODE- 258: Performed by: INTERNAL MEDICINE

## 2017-04-06 PROCEDURE — 77030018786 HC NDL GD F/USND BARD -B

## 2017-04-06 PROCEDURE — 83735 ASSAY OF MAGNESIUM: CPT | Performed by: INTERNAL MEDICINE

## 2017-04-06 PROCEDURE — 74011250636 HC RX REV CODE- 250/636: Performed by: INTERNAL MEDICINE

## 2017-04-06 PROCEDURE — 36415 COLL VENOUS BLD VENIPUNCTURE: CPT | Performed by: INTERNAL MEDICINE

## 2017-04-06 PROCEDURE — C1894 INTRO/SHEATH, NON-LASER: HCPCS

## 2017-04-06 PROCEDURE — 77030029065 HC DRSG HEMO QCLOT ZMED -B

## 2017-04-06 PROCEDURE — 74011250637 HC RX REV CODE- 250/637: Performed by: NURSE PRACTITIONER

## 2017-04-06 PROCEDURE — 02H633Z INSERTION OF INFUSION DEVICE INTO RIGHT ATRIUM, PERCUTANEOUS APPROACH: ICD-10-PCS | Performed by: RADIOLOGY

## 2017-04-06 RX ORDER — LANOLIN ALCOHOL/MO/W.PET/CERES
325 CREAM (GRAM) TOPICAL
Qty: 30 TAB | Refills: 0 | Status: SHIPPED | OUTPATIENT
Start: 2017-04-06

## 2017-04-06 RX ORDER — RIFAMPIN 300 MG/1
600 CAPSULE ORAL DAILY
Qty: 6 CAP | Refills: 0 | Status: SHIPPED | OUTPATIENT
Start: 2017-04-06 | End: 2017-04-12

## 2017-04-06 RX ORDER — POTASSIUM CHLORIDE 750 MG/1
20 TABLET, FILM COATED, EXTENDED RELEASE ORAL
Status: COMPLETED | OUTPATIENT
Start: 2017-04-06 | End: 2017-04-06

## 2017-04-06 RX ORDER — VANCOMYCIN HYDROCHLORIDE 500 MG/10ML
500 INJECTION, POWDER, LYOPHILIZED, FOR SOLUTION INTRAVENOUS EVERY 12 HOURS
Qty: 1 VIAL | Refills: 0 | Status: SHIPPED
Start: 2017-04-06 | End: 2017-04-12

## 2017-04-06 RX ORDER — CARVEDILOL 6.25 MG/1
6.25 TABLET ORAL 2 TIMES DAILY WITH MEALS
Qty: 60 TAB | Refills: 0 | Status: SHIPPED | OUTPATIENT
Start: 2017-04-06

## 2017-04-06 RX ORDER — CARVEDILOL 6.25 MG/1
6.25 TABLET ORAL 2 TIMES DAILY WITH MEALS
Status: DISCONTINUED | OUTPATIENT
Start: 2017-04-06 | End: 2017-04-07 | Stop reason: HOSPADM

## 2017-04-06 RX ORDER — MAGNESIUM SULFATE HEPTAHYDRATE 40 MG/ML
2 INJECTION, SOLUTION INTRAVENOUS ONCE
Status: COMPLETED | OUTPATIENT
Start: 2017-04-06 | End: 2017-04-06

## 2017-04-06 RX ORDER — FENTANYL CITRATE 50 UG/ML
25-200 INJECTION, SOLUTION INTRAMUSCULAR; INTRAVENOUS
Status: DISCONTINUED | OUTPATIENT
Start: 2017-04-06 | End: 2017-04-06 | Stop reason: HOSPADM

## 2017-04-06 RX ORDER — LIDOCAINE HYDROCHLORIDE 10 MG/ML
1-20 INJECTION INFILTRATION; PERINEURAL
Status: DISCONTINUED | OUTPATIENT
Start: 2017-04-06 | End: 2017-04-06 | Stop reason: HOSPADM

## 2017-04-06 RX ORDER — HEPARIN SODIUM 200 [USP'U]/100ML
500 INJECTION, SOLUTION INTRAVENOUS ONCE
Status: DISCONTINUED | OUTPATIENT
Start: 2017-04-06 | End: 2017-04-06 | Stop reason: HOSPADM

## 2017-04-06 RX ADMIN — LIDOCAINE HYDROCHLORIDE 10 ML: 10 INJECTION, SOLUTION INFILTRATION; PERINEURAL at 16:02

## 2017-04-06 RX ADMIN — VANCOMYCIN HYDROCHLORIDE 1000 MG: 1 INJECTION, POWDER, LYOPHILIZED, FOR SOLUTION INTRAVENOUS at 17:18

## 2017-04-06 RX ADMIN — ENOXAPARIN SODIUM 40 MG: 100 INJECTION SUBCUTANEOUS at 09:51

## 2017-04-06 RX ADMIN — CARVEDILOL 6.25 MG: 6.25 TABLET, FILM COATED ORAL at 17:27

## 2017-04-06 RX ADMIN — ATORVASTATIN CALCIUM 10 MG: 10 TABLET, FILM COATED ORAL at 09:50

## 2017-04-06 RX ADMIN — TRAMADOL HYDROCHLORIDE 50 MG: 50 TABLET, FILM COATED ORAL at 03:19

## 2017-04-06 RX ADMIN — OXYCODONE HYDROCHLORIDE 15 MG: 5 TABLET ORAL at 12:32

## 2017-04-06 RX ADMIN — ASPIRIN 81 MG: 81 TABLET, COATED ORAL at 09:51

## 2017-04-06 RX ADMIN — MAGNESIUM SULFATE HEPTAHYDRATE 2 G: 40 INJECTION, SOLUTION INTRAVENOUS at 17:23

## 2017-04-06 RX ADMIN — METHADONE HYDROCHLORIDE 5 MG: 10 TABLET ORAL at 17:27

## 2017-04-06 RX ADMIN — Medication 10 ML: at 05:45

## 2017-04-06 RX ADMIN — POTASSIUM CHLORIDE 20 MEQ: 750 TABLET, FILM COATED, EXTENDED RELEASE ORAL at 17:27

## 2017-04-06 RX ADMIN — PANTOPRAZOLE SODIUM 40 MG: 40 TABLET, DELAYED RELEASE ORAL at 09:51

## 2017-04-06 RX ADMIN — LINACLOTIDE 290 MCG: 145 CAPSULE, GELATIN COATED ORAL at 09:50

## 2017-04-06 RX ADMIN — FUROSEMIDE 20 MG: 20 TABLET ORAL at 09:50

## 2017-04-06 RX ADMIN — Medication 1000 MCG: at 09:50

## 2017-04-06 RX ADMIN — RIFAMPIN 600 MG: 300 CAPSULE ORAL at 09:51

## 2017-04-06 RX ADMIN — FERROUS SULFATE TAB 325 MG (65 MG ELEMENTAL FE) 325 MG: 325 (65 FE) TAB at 09:51

## 2017-04-06 RX ADMIN — CEFEPIME 2 G: 2 INJECTION, POWDER, FOR SOLUTION INTRAVENOUS at 17:34

## 2017-04-06 RX ADMIN — VANCOMYCIN HYDROCHLORIDE 1000 MG: 1 INJECTION, POWDER, LYOPHILIZED, FOR SOLUTION INTRAVENOUS at 09:45

## 2017-04-06 RX ADMIN — CEFEPIME 2 G: 2 INJECTION, POWDER, FOR SOLUTION INTRAVENOUS at 05:44

## 2017-04-06 RX ADMIN — Medication 10 ML: at 17:28

## 2017-04-06 RX ADMIN — METHADONE HYDROCHLORIDE 5 MG: 10 TABLET ORAL at 05:45

## 2017-04-06 NOTE — PROGRESS NOTES
Home Care Face to Face Encounter    Patients Name: Yong Rojas    YOB: 1940    Primary Diagnosis:  PICC infection    Date of Face to Face:   4/6/2017                                  Face to Face Encounter findings are related to primary reason for home care:   yes. 1. I certify that the patient needs intermittent care as follows: skilled nursing care:  skilled observation/assessment, patient education and PICC care and antibiotic administration    2. I certify that this patient is homebound, that is: 1) patient requires the use of a walker device, special transportation, or assistance of another to leave the home; or 2) patient's condition makes leaving the home medically contraindicated; and 3) patient has a normal inability to leave the home and leaving the home requires considerable and taxing effort. Patient may leave the home for infrequent and short duration for medical reasons, and occasional absences for non-medical reasons. Homebound status is due to the following functional limitations: Patient with strength deficits limiting the performance of all ADL's without caregiver assistance or the use of an assistive device. Patient with poor safety awareness and is at risk for falls without assistance of another person and the use of an assistive device. Patient with poor ambulation endurance limiting their safe ability to ascend/descend the required number of steps to leave the home. 3. I certify that this patient is under my care and that I, or a nurse practitioner or  [id number], or clinical nurse specialist, or certified nurse midwife, working with me, had a Face-to-Face Encounter that meets the physician Face-to-Face Encounter requirements.   The following are the clinical findings from the [address] [place] [address] encounter that support the need for skilled services and is a summary of the encounter:     See summary of the patient's illness      Kimi Whitlock MD  4/6/2017

## 2017-04-06 NOTE — PROGRESS NOTES
Medical Progress Note      NAME: Alma Vicente   :  1940  MRM:  [id number]    Date/Time: 2017  9:41 AM       Assessment / Plan:     PICC line site infection:  PICC line was removed on 4/3 from [facility]. Culture from PICC site grew pseudomonas sensitive. Blood cultures negative to date here. -- continue cefepime through       LT hip Septic arthritis ([facility] [address] (4/3/2017): Symptoms continue to improve. Hx of oxacillin resistant staph lugdunensis. -- to continue vancomycin and rifampin through    -- PICC to be placed today      Systolic CHF, chronic ([facility] [address] (2017) / HTN:  Lisinopril and coreg were held on admission. Appreciate cardiology evaluation. Echo done with EF 35-40% with moderate diffuse hypokinesis. -- increased coreg per cards   -- continue ASA      Pancytopenia:  HGB stable from prior, but now with mild leukopenia and thrombocytopenia. Possible from infection vs medication. Appreciate hematology eval.   -- monitor counts       GERD:    -- Continue PPI      Hyperlipidemia:     -- continue statin     Home today if PICC placed and cleared by cardiology and hematology. Subjective:     Chief Complaint:  Feels well. Denies hip pain. No fever, chills. ROS:  (bold if positive, if negative)              Objective:       Vitals:          Last 24hrs VS reviewed since prior progress note.  Most recent are:    Visit Vitals    /74 (BP 1 Location: Left arm, BP Patient Position: Supine)    Pulse 69    Temp 98.3 °F (36.8 °C)    Resp 16    Ht 5' 4.96\" (1.65 m)    Wt 68.9 kg (151 lb 12.8 oz)    SpO2 95%    BMI 25.29 kg/m2     SpO2 Readings from Last 6 Encounters:   17 95%   17 96%   17 90%   17 99%   14 95%    O2 Flow Rate (L/min): 2 l/min       Intake/Output Summary (Last 24 hours) at 17 0940  Last data filed at 17 0846   Gross per 24 hour   Intake             1630 ml   Output             1275 ml   Net              355 ml Exam:     Physical Exam:    Gen:  Well-developed, well-nourished, in no acute distress  HEENT:  Pink conjunctivae, hearing intact to voice, moist mucous membranes  Resp:  No accessory muscle use, clear breath sounds without wheezes rales or rhonchi  Card:  No murmurs, normal S1, S2 without thrills or peripheral edema  Abd:  Soft, non-tender, non-distended, normoactive bowel sounds are present  Musc:  No cyanosis  Skin:  No rashes, RUE at prior PICC site w/o erythema or ttp  Neuro:   Face symmetric, tongue midline, speech fluent,  strength is 5/5 bilaterally and dorsi / plantarflexion is 5/5 bilaterally, follows commands appropriately  Psych:  Good insight, oriented to person, place and time, alert       Telemetry reviewed:   PVC    Medications Reviewed: (see below)    Lab Data Reviewed: (see below)    ______________________________________________________________________    Medications:     Current Facility-Administered Medications   Medication Dose Route Frequency    carvedilol (COREG) tablet 6.25 mg  6.25 mg Oral BID WITH MEALS    ferrous sulfate tablet 325 mg  1 Tab Oral DAILY WITH BREAKFAST    vancomycin (VANCOCIN) 1,000 mg in 0.9% sodium chloride (MBP/ADV) 250 mL  1 g IntraVENous Q24H    methadone (DOLOPHINE) tablet 5 mg  5 mg Oral Q8H    bisacodyl (DULCOLAX) suppository 10 mg  10 mg Rectal DAILY PRN    cyanocobalamin (VITAMIN B12) tablet 1,000 mcg  1,000 mcg Oral DAILY    cyclobenzaprine (FLEXERIL) tablet 5 mg  5 mg Oral TID PRN    furosemide (LASIX) tablet 20 mg  20 mg Oral DAILY    linaclotide (LINZESS) capsule 290 mcg  290 mcg Oral ACB    cefepime (MAXIPIME) 2 g in 0.9% sodium chloride (MBP/ADV) 100 mL  2 g IntraVENous Q8H    sodium chloride (NS) flush 5-10 mL  5-10 mL IntraVENous Q8H    sodium chloride (NS) flush 5-10 mL  5-10 mL IntraVENous PRN    rifAMPin (RIFADIN) capsule 600 mg  600 mg Oral DAILY    oxyCODONE IR (ROXICODONE) tablet 15 mg  15 mg Oral Q4H PRN    albuterol (PROVENTIL HFA, VENTOLIN HFA, PROAIR HFA) inhaler 2 Puff  2 Puff Inhalation Q6H PRN    aspirin delayed-release tablet 81 mg  81 mg Oral DAILY    atorvastatin (LIPITOR) tablet 10 mg  10 mg Oral DAILY    bisacodyl (DULCOLAX) suppository 10 mg  10 mg Rectal DAILY    enoxaparin (LOVENOX) injection 40 mg  40 mg SubCUTAneous DAILY    polyethylene glycol (MIRALAX) packet 17 g  17 g Oral EVERY OTHER DAY    SUMAtriptan (IMITREX) tablet 50 mg  50 mg Oral ONCE PRN    zolpidem (AMBIEN) tablet 5 mg  5 mg Oral QHS    traMADol (ULTRAM) tablet 50 mg  50 mg Oral Q8H PRN    pantoprazole (PROTONIX) tablet 40 mg  40 mg Oral DAILY            Lab Review:     Recent Labs      04/06/17 0110 04/05/17 0528 04/04/17 0513   WBC  3.0*  2.7*  3.1*   HGB  9.4*  9.6*  9.1*   HCT  29.5*  30.8*  28.5*   PLT  112*  110*  99*     Recent Labs      04/06/17   0110  04/05/17 0528 04/04/17 0513 04/03/17   2334   NA  144  142  139  135*   K  3.7  3.9  3.3*  4.7   CL  111*  109*  106  105   CO2  25  23  24  21   GLU  79  96  81  81   BUN  7  9  12  14   CREA  0.68  0.67  0.74  0.70   CA  8.1*  8.2*  8.0*  7.8*   MG  1.8  2.0   --   2.1     No results found for: [address] [place] [address] discussed with: Patient    Discussed:  Care Plan    Prophylaxis:  Lovenox    Disposition:  Home w/Family           ___________________________________________________    Attending Physician: Anitra Bennett MD

## 2017-04-06 NOTE — PROGRESS NOTES
Cardiology Progress Note  [facility]         NAME:  Theresa Corrigan   :   1940   MRN:   [id number]     Assessment/Plan:   NSVT- no fruther- PVCs- asx      -  resume coreg today       - keep K ~ 4, Mg~ 2       NICM- EF unchanged 35-40%- no evidence of HF        - resume coreg today       - resume lisinopril as as BP allows        - cont statin/ASA   Hypertroponinemia- suspect strain - re-ck this am   Sepsis- per team        OP f/u  @ 11 am arranged       Subjective:   Theresa Corrigan is a 68y.o. year old female w/ hx:  cardiomyopathy (non-ischemic per the chart), HTN, COPD, HTN who is transferred with sepsis/ bacteremia from [place] on 4/3/17. She apparently went to [facility] ER on 17 fo SOB and chest pain after she had vancomycin infusion - she was sent home after ER workup. On 4/3/17 she again went back to ER with chills and SOB andwas admitted with septic shock and transferred to [facility]. According to the chart notes (sign out), the culture from the picc a few days ago showed pseudomonas.     Cardiology consulted after patient noted to have frequent PVC's and runs of NSVT (11 beat). Patient was asymptomatic during these episodes. She denies any chest pain, although had some 4 days prior. Came to hospital with SOB, but doing better. BC (-)  Overnight activities reviewed:    -none     Cardiac ROS:  Patient denies any exertional chest pain, dyspnea, palpitations, syncope, orthopnea, edema or paroxysmal nocturnal dyspnea. Review of Systems: fatigue, No nausea, indigestion, vomiting, pain, cough, sputum. No bleeding. Taking po. Appetite OK . Tolerating PT. CARDIAC EVALUATION   STRESS: 16 - Belkis nuc: No ischemia/WMA sec to LBBB/EF 37%      ECHO: 6/15/16 - LVH/ EF 40-45%/Mild AS/Mild MR/Mild TR  ECHO: 17: LVEF 35 -40 %. Ventricular septum: here was sigmoid septal  Appearance- 2/2 LBBB.  Mild MR.  ECHO 2017: EF 35-40%, mod diffuse         Objective:     Visit Vitals    /74 (BP 1 Location: Left arm, BP Patient Position: Supine)    Pulse 69    Temp 98.3 °F (36.8 °C)    Resp 16    Ht 5' 4.96\" (1.65 m)    Wt 151 lb 12.8 oz (68.9 kg)    SpO2 95%    BMI 25.29 kg/m2      O2 Flow Rate (L/min): 2 l/min O2 Device: Room air    Temp (24hrs), Av.1 °F (36.7 °C), Min:97.4 °F (36.3 °C), Max:98.5 °F (36.9 °C)        Intake/Output Summary (Last 24 hours) at 17 09  Last data filed at 17 0846   Gross per 24 hour   Intake             1630 ml   Output             1275 ml   Net              355 ml       TELE: SR LBBB, PVCs     General: AAOx3 cooperative, no acute distress. HEENT: Atraumatic. Pink and moist.  Anicteric sclerae. Neck: Supple,     Lungs: CTA bilaterally. No wheezing/rhonchi/crackles. Heart:   Regular rhythm, 2/6 systolic  Murmur-LLSB, no S3, no rubs, no gallops. No JVD. No carotid bruits. Abdomen: Soft, non-distended, non-tender. + Bowel sounds. No bruits. : voiding  Extremities: chronic venous stasis changes, No edema, no clubbing, no cyanosis. No calf tenderness  Neurologic: Grossly intact. Alert and oriented X 3. No acute neurological distress. Psych: Good insight. Not anxious or agitated.       Care Plan discussed with:    Comments   Patient y    Family      RN y    Care Manager                    Consultant:  estuardo attending       Data Review:   CMP:   Lab Results   Component Value Date/Time     2017 01:10 AM    K 3.7 2017 01:10 AM     (H) 2017 01:10 AM    CO2 25 2017 01:10 AM    AGAP 8 2017 01:10 AM    GLU 79 2017 01:10 AM    BUN 7 2017 01:10 AM    CREA 0.68 2017 01:10 AM    GFRAA >60 2017 01:10 AM    GFRNA >60 2017 01:10 AM    CA 8.1 (L) 2017 01:10 AM    MG 1.8 2017 01:10 AM     CBC:   Lab Results   Component Value Date/Time    WBC 3.0 (L) 2017 01:10 AM    HGB 9.4 (L) 2017 01:10 AM    HCT 29.5 (L) 2017 01:10 AM     (L) 04/06/2017 01:10 AM     All Cardiac Markers in the last 24 hours:   No results found for: CPK, CKMMB, CKMB, RCK3, CKMBT, CKNDX, CKND1, LUCIO, TROPT, TROIQ, SUSANA, TROPT, TNIPOC, BNP, BNPP  Recent Glucose Results:   Lab Results   Component Value Date/Time    GLU 79 04/06/2017 01:10 AM     ABG: No results found for: PH, PHI, PCO2, PCO2I, PO2, PO2I, HCO3, HCO3I, FIO2, FIO2I  LIPIDS:  Cholesterol, total   Date Value Ref Range Status   02/02/2017 114 <200 MG/DL Final     Triglyceride   Date Value Ref Range Status   02/02/2017 76 <150 MG/DL Final     Comment:     Based on NCEP-ATP III:  Triglycerides <150 mg/dL  is considered normal, 150-199 mg/dL  borderline high,  200-499 mg/dL high and  greater than or equal to 500 mg/dL very high. HDL Cholesterol   Date Value Ref Range Status   02/02/2017 47 MG/DL Final     Comment:     Based on NCEP ATP III, HDL Cholesterol <40 mg/dL is considered low and >60 mg/dL is elevated.      LDL, calculated   Date Value Ref Range Status   02/02/2017 51.8 0 - 100 MG/DL Final     Comment:     Based on the NCEP-ATP: LDL-C concentrations are considered  optimal <100 mg/dL,  near optimal/above Normal 100-129 mg/dL  Borderline High: 130-159, High: 160-189 mg/dL  Very High: Greater than or equal to 190 mg/dL       VLDL, calculated   Date Value Ref Range Status   02/02/2017 15.2 MG/DL Final     CHOL/HDL Ratio   Date Value Ref Range Status   02/02/2017 2.4 0 - 5.0   Final       Medications reviewed  Current Facility-Administered Medications   Medication Dose Route Frequency    carvedilol (COREG) tablet 3.125 mg  3.125 mg Oral BID WITH MEALS    ferrous sulfate tablet 325 mg  1 Tab Oral DAILY WITH BREAKFAST    vancomycin (VANCOCIN) 1,000 mg in 0.9% sodium chloride (MBP/ADV) 250 mL  1 g IntraVENous Q24H    methadone (DOLOPHINE) tablet 5 mg  5 mg Oral Q8H    bisacodyl (DULCOLAX) suppository 10 mg  10 mg Rectal DAILY PRN    cyanocobalamin (VITAMIN B12) tablet 1,000 mcg  1,000 mcg Oral DAILY    cyclobenzaprine (FLEXERIL) tablet 5 mg  5 mg Oral TID PRN    furosemide (LASIX) tablet 20 mg  20 mg Oral DAILY    linaclotide (LINZESS) capsule 290 mcg  290 mcg Oral ACB    cefepime (MAXIPIME) 2 g in 0.9% sodium chloride (MBP/ADV) 100 mL  2 g IntraVENous Q8H    sodium chloride (NS) flush 5-10 mL  5-10 mL IntraVENous Q8H    sodium chloride (NS) flush 5-10 mL  5-10 mL IntraVENous PRN    rifAMPin (RIFADIN) capsule 600 mg  600 mg Oral DAILY    oxyCODONE IR (ROXICODONE) tablet 15 mg  15 mg Oral Q4H PRN    albuterol (PROVENTIL HFA, VENTOLIN HFA, PROAIR HFA) inhaler 2 Puff  2 Puff Inhalation Q6H PRN    aspirin delayed-release tablet 81 mg  81 mg Oral DAILY    atorvastatin (LIPITOR) tablet 10 mg  10 mg Oral DAILY    bisacodyl (DULCOLAX) suppository 10 mg  10 mg Rectal DAILY    enoxaparin (LOVENOX) injection 40 mg  40 mg SubCUTAneous DAILY    polyethylene glycol (MIRALAX) packet 17 g  17 g Oral EVERY OTHER DAY    SUMAtriptan (IMITREX) tablet 50 mg  50 mg Oral ONCE PRN    zolpidem (AMBIEN) tablet 5 mg  5 mg Oral QHS    traMADol (ULTRAM) tablet 50 mg  50 mg Oral Q8H PRN    pantoprazole (PROTONIX) tablet 40 mg  40 mg Oral DAILY

## 2017-04-06 NOTE — PROGRESS NOTES
Pt on scheduled antibiotics, last dose this morning. No antibiotics needed prior to [facility] placement per provider.

## 2017-04-06 NOTE — WOUND CARE
Wound care consult:  Initial visit for skin assessment, alert, no distress Plans to return home today    Assessment  All skin folds and bony prominences assessed, ambulatory. Area of chronic discoloration noted on the sacral area, heels and elbows intact. Scarring noted with hypopigmented skin on the lower legs, skin intact.      Treatment  Repositioned in bed     [address]

## 2017-04-06 NOTE — DISCHARGE SUMMARY
Physician Discharge Summary     Patient ID:  Willis Vail  [id number]  30 y.o.  1940    Admit date: 4/3/2017    Discharge date and time: 4/6/2017    Admission Diagnoses: sepsis  Sepsis [facility])  Bacteremia    Discharge Diagnoses:    Principal Problem:    Sepsis ([address] (4/3/2017)    Active Problems:    HTN (hypertension) (2/2/2017)      Anemia (0/0/8722)      Systolic CHF, chronic ([address] (2/2/2017)      PICC line infection (4/3/2017)      Septic arthritis ([address] (4/3/2017)      Bacteremia (4/3/2017)           Hospital Course:   PICC line site infection: Patient admitted from OS for PICC line infection. PICC was removed on 4/3 from [facility]. Culture from PICC at OSH grew pseudomonas sensitive to ceffepime. Blood cultures negative to date here. Patient seen by ID and will continue cefepime through 4/12.      LT hip Septic arthritis ([address] (4/3/2017):  Hx of oxacillin resistant staph lugdunensis. Patient was continued on vancomycin and rifampin and to continue through 4/12. New Gio placed today.      Systolic CHF, chronic ([address] (2/2/2017) / HTN:  Lisinopril and coreg were held on admission, but restarted this admission. Patient followed by cardiology Echo was updated with EF 35-40% with moderate diffuse hypokinesis. Coreg increased this admission and ASA continued.      Pancytopenia: HGB stable from prior, but now with mild leukopenia and thrombocytopenia. Possible from infection vs medication. Patient seen by hematology and will follow up as outpatient.       PCP: Guillaume Moser MD     Consults: Cardiology, ID and Hematology/Oncology    Condition of patient at discharge: stable    Discharge Exam:  Please refer to progress note from day of discharge. Disposition: home    Discharge Medications:   Current Discharge Medication List      START taking these medications    Details   cefepime (MAXIPIME) 1 gram injection 1 g by IntraVENous route every eight (8) hours for 6 days. Take through 4/12.   Qty: 18 Each, Refills: 0      ferrous sulfate 325 mg (65 mg iron) tablet Take 1 Tab by mouth daily (with breakfast). Qty: 30 Tab, Refills: 0         CONTINUE these medications which have CHANGED    Details   carvedilol (COREG) 6.25 mg tablet Take 1 Tab by mouth two (2) times daily (with meals). Qty: 60 Tab, Refills: 0      rifAMPin (RIFADIN) 300 mg capsule Take 2 Caps by mouth daily for 6 days. Take through 4/12. Qty: 6 Cap, Refills: 0    Associated Diagnoses: Systolic CHF, chronic (HCC)      vancomycin (VANCOCIN) 500 mg injection 500 mg by IntraVENous route every twelve (12) hours for 6 days. Take through 4/12. Qty: 1 Vial, Refills: 0    Associated Diagnoses: Systolic CHF, chronic (HCC)         CONTINUE these medications which have NOT CHANGED    Details   lisinopril (PRINIVIL, ZESTRIL) 5 mg tablet Take 5 mg by mouth daily. bisacodyl (DULCOLAX) 10 mg suppository Insert 10 mg into rectum daily as needed for Diarrhea. furosemide (LASIX) 20 mg tablet Take 20 mg by mouth daily. potassium chloride SR (KLOR-CON 10) 10 mEq tablet Take 10 mEq by mouth daily. cyclobenzaprine (FLEXERIL) 5 mg tablet Take 5 mg by mouth three (3) times daily as needed for Muscle Spasm(s). Associated Diagnoses: Systolic CHF, chronic (HCC)      oxyCODONE IR (OXY-IR) 15 mg immediate release tablet Take 1 Tab by mouth every three (3) hours as needed. Max Daily Amount: 120 mg.  Qty: 75 Tab, Refills: 0      ondansetron (ZOFRAN ODT) 8 mg disintegrating tablet Take 0.5 Tabs by mouth every eight (8) hours as needed for Nausea. Qty: 30 Tab, Refills: 0      atorvastatin (LIPITOR) 10 mg tablet Take 1 Tab by mouth daily. Qty: 30 Tab, Refills: 3      SUMAtriptan (IMITREX) 50 mg tablet Take 50 mg by mouth once as needed for Migraine. promethazine (PHENERGAN) 25 mg tablet Take 25 mg by mouth every six (6) hours as needed for Nausea. cyanocobalamin 1,000 mcg tablet Take 1,000 mcg by mouth daily.       traMADol (ULTRAM) 50 mg tablet Take 50 mg by mouth every eight (8) hours as needed for Pain.      linaclotide (LINZESS) 290 mcg cap capsule Take 290 mcg by mouth Daily (before breakfast). zolpidem (AMBIEN) 5 mg tablet Take 5 mg by mouth nightly. tiotropium (SPIRIVA WITH HANDIHALER) 18 mcg inhalation capsule Take 1 Cap by inhalation daily. albuterol (PROVENTIL HFA) 90 mcg/actuation inhaler Take 2 Puffs by inhalation every six (6) hours as needed. methadone (DOLOPHINE) 5 mg tablet Take 5 mg by mouth every eight (8) hours. omega-3 fatty acids-vitamin e (FISH OIL) 1,000 mg cap Take 1 Cap by mouth. aspirin delayed-release 81 mg tablet Take 81 mg by mouth daily. cholecalciferol, vitamin d3, (VITAMIN D3) 400 unit cap Take 1 Tab by mouth daily. esomeprazole (NEXIUM) 40 mg capsule Take 40 mg by mouth daily. polyethylene glycol (MIRALAX) 17 gram packet Take 17 g by mouth every other day. CALCIUM CARBONATE/MAG HYDROX (ROLAIDS EXTRA STRENGTH PO) Take 2 Tabs by mouth three (3) times daily (with meals). STOP taking these medications       enoxaparin (LOVENOX) 40 mg/0.4 mL Comments:   Reason for Stopping:               Activity: Activity as tolerated  Diet: Cardiac Diet    Follow-up with Guillaume Moser MD in 1 week. Approximate time spent in patient care on day of discharge: 45 min.     Signed:  Olayinka Jacobs MD  4/6/2017  3:37 PM

## 2017-04-06 NOTE — PROGRESS NOTES
Shift Report:    0710: Verbal bedside report received from Lizette Gomez RN. Patient resting quietly in bed without complaint at this time. 1050: Spoke with Susana Hendrickson, PICC team and she is awaiting case management to have verbal agreement from family to allow nursing care in home prior to placing PICC. Spoke with Benjamin Lazaro, case management, and she will call and speak with family. 1250: PICC team at bedside. Attempted to place but unable to complete placement. PICC is out 6 mm, covered with transparent dressing. Notified IR that patient will need to complete placement under fluoro. 1550: Patient off floor with IR staff. 1610: IR called and reported that patient received Gio catheter. PICC is being pulled out.    1625: Patient returned to floor by IR staff. 1640: Spoke with pharmacist and gave update on the delay in receiving vancomycin. She ordered new dose to be given and changed time for vancomycin trough to be drawn. [phone]: Spoke with patient and informed her that antibiotics were being given now; she stated that the home health nurse is supposed to be coming around 6pm to bring her information. Patient aware that she has discharge order. She states that her son will take her home. 1925: Bedside and Verbal shift change report given to Lizette Gomez RN (oncoming nurse) by Manju Hylton RN (offgoing nurse). Report included the following information SBAR, Kardex, MAR, Accordion and Recent Results.

## 2017-04-06 NOTE — PROGRESS NOTES
TALISHA inst. Sent to [facility] in [facility]. Both agencies aware that raven was placed, faxed order for raven care to [facility].   Thanks JOSEFA Barriga

## 2017-04-06 NOTE — DISCHARGE INSTRUCTIONS
HOSPITALIST DISCHARGE INSTRUCTIONS  NAME: Corinne Red   :  1940   MRN:  [id number]     Date/Time:  2017 3:32 PM    ADMIT DATE: 4/3/2017     DISCHARGE DATE: 2017     DISCHARGE DIAGNOSIS:  PICC site infection    MEDICATIONS:    · It is important that you take the medication exactly as they are prescribed. · Keep your medication in the bottles provided by the pharmacist and keep a list of the medication names, dosages, and times to be taken in your wallet. · Do not take other medications without consulting your doctor. What to do at Home:  1. Check your blood pressure at home twice a day. Call your doctor for blood pressure greater than 150/90 or lower than 110/55 or if you have dizziness or lightheadedness. 2.  Home IV care as instructed by home health. Recommended diet:  Cardiac Diet    Recommended activity: Activity as tolerated    If you experience any of the following symptoms then please call your primary care physician or return to the emergency room if you cannot get hold of your doctor:  Fever, chills, nausea, vomiting, diarrhea, change in mentation, falling, bleeding, shortness of breath, chest pain    Follow Up:  YULI Jauregui at the [facility], you are to call and set up an appointment to see them in 1 week. Keep appointments as made by cardiology and hematology. You should call to have your IV removed once your are done with your antibiotics. Information obtained by :  I understand that if any problems occur once I am at home I am to contact my physician. I understand and acknowledge receipt of the instructions indicated above.                                                                                                                                            Physician's or R.N.'s Signature                                                                  Date/Time Patient or Representative Signature                                                          Date/Time         Heart Failure: Care Instructions  Your Care Instructions    Heart failure occurs when your heart does not pump as much blood as the body needs. Failure does not mean that the heart has stopped pumping but rather that it is not pumping as well as it should. Over time, this causes fluid buildup in your lungs and other parts of your body. Fluid buildup can cause shortness of breath, fatigue, swollen ankles, and other problems. By taking medicines regularly, reducing sodium (salt) in your diet, checking your weight every day, and making lifestyle changes, you can feel better and live longer. Follow-up care is a key part of your treatment and safety. Be sure to make and go to all appointments, and call your doctor if you are having problems. It's also a good idea to know your test results and keep a list of the medicines you take. How can you care for yourself at home? Medicines  · Be safe with medicines. Take your medicines exactly as prescribed. Call your doctor if you think you are having a problem with your medicine. · Do not take any vitamins, over-the-counter medicine, or herbal products without talking to your doctor first. Loi Men not take ibuprofen (Advil or Motrin) and naproxen (Aleve) without talking to your doctor first. They could make your heart failure worse. · You may be taking some of the following medicine. ¨ Beta-blockers can slow heart rate, decrease blood pressure, and improve your condition. Taking a beta-blocker may lower your chance of needing to be hospitalized. ¨ Angiotensin-converting enzyme inhibitors (ACEIs) reduce the heart's workload, lower blood pressure, and reduce swelling. Taking an ACEI may lower your chance of needing to be hospitalized again. ¨ Angiotensin II receptor blockers (ARBs) work like ACEIs.  Your doctor may prescribe them instead of ACEIs. ¨ Diuretics, also called water pills, reduce swelling. ¨ Potassium supplements replace this important mineral, which is sometimes lost with diuretics. ¨ Aspirin and other blood thinners prevent blood clots, which can cause a stroke or heart attack. You will get more details on the specific medicines your doctor prescribes. Diet  · Your doctor may suggest that you limit sodium to 2,000 milligrams (mg) a day or less. That is less than 1 teaspoon of salt a day, including all the salt you eat in cooking or in packaged foods. People get most of their sodium from processed foods. Fast food and restaurant meals also tend to be very high in sodium. · Ask your doctor how much liquid you can drink each day. You may have to limit liquids. Weight  · Weigh yourself without clothing at the same time each day. Record your weight. Call your doctor if you gain more than 3 pounds in 2 to 3 days. A sudden weight gain may mean that your heart failure is getting worse. Activity level  · Start light exercise (if your doctor says it is okay). Even if you can only do a small amount, exercise will help you get stronger, have more energy, and manage your weight and your stress. Walking is an easy way to get exercise. Start out by walking a little more than you did before. Bit by bit, increase the amount you walk. · When you exercise, watch for signs that your heart is working too hard. You are pushing yourself too hard if you cannot talk while you are exercising. If you become short of breath or dizzy or have chest pain, stop, sit down, and rest.  · If you feel \"wiped out\" the day after you exercise, walk slower or for a shorter distance until you can work up to a better pace. · Get enough rest at night. Sleeping with 1 or 2 pillows under your upper body and head may help you breathe easier. Lifestyle changes  · Do not smoke. Smoking can make a heart condition worse.  If you need help quitting, talk to your doctor about stop-smoking programs and medicines. These can increase your chances of quitting for good. Quitting smoking may be the most important step you can take to protect your heart. · Limit alcohol to 2 drinks a day for men and 1 drink a day for women. Too much alcohol can cause health problems. · Avoid getting sick from colds and the flu. Get a pneumococcal vaccine shot. If you have had one before, ask your doctor whether you need another dose. Get a flu shot each year. If you must be around people with colds or the flu, wash your hands often. When should you call for help? Call 911 if you have symptoms of sudden heart failure such as:  · You have severe trouble breathing. · You cough up pink, foamy mucus. · You have a new irregular or rapid heartbeat. Call your doctor now or seek immediate medical care if:  · You have new or increased shortness of breath. · You are dizzy or lightheaded, or you feel like you may faint. · You have sudden weight gain, such as 3 pounds or more in 2 to 3 days. · You have increased swelling in your legs, ankles, or feet. · You are suddenly so tired or weak that you cannot do your usual activities. Watch closely for changes in your health, and be sure to contact your doctor if:  · You develop new symptoms. Where can you learn more? Go to http://melanie-mich.info/. Enter K369 in the search box to learn more about \"Heart Failure: Care Instructions. \"  Current as of: January 27, 2016  Content Version: 11.2  © 1565-7790 [employer]. Care instructions adapted under license by [employer] (which disclaims liability or warranty for this information). If you have questions about a medical condition or this instruction, always ask your healthcare professional. [address] any warranty or liability for your use of this information.

## 2017-04-06 NOTE — PROGRESS NOTES
[id number] [facility]  [phone]    Hematology / Oncology Consult    Reason for Visit:   Jorge Martinez is a 68 y.o. female who is seen in consultation at the request of Dr. Amita Stacy for evaluation of pancytopenia. Interval History:   Ms Tameka Zamarripa states feeling better; hoping to go home today; waiting for PICC line placement. Had loose stool this am; dark in color. No further complaints at present. No family at bedside.        Current Facility-Administered Medications   Medication Dose Route Frequency    carvedilol (COREG) tablet 6.25 mg  6.25 mg Oral BID WITH MEALS    ferrous sulfate tablet 325 mg  1 Tab Oral DAILY WITH BREAKFAST    vancomycin (VANCOCIN) 1,000 mg in 0.9% sodium chloride (MBP/ADV) 250 mL  1 g IntraVENous Q24H    methadone (DOLOPHINE) tablet 5 mg  5 mg Oral Q8H    bisacodyl (DULCOLAX) suppository 10 mg  10 mg Rectal DAILY PRN    cyanocobalamin (VITAMIN B12) tablet 1,000 mcg  1,000 mcg Oral DAILY    cyclobenzaprine (FLEXERIL) tablet 5 mg  5 mg Oral TID PRN    furosemide (LASIX) tablet 20 mg  20 mg Oral DAILY    linaclotide (LINZESS) capsule 290 mcg  290 mcg Oral ACB    cefepime (MAXIPIME) 2 g in 0.9% sodium chloride (MBP/ADV) 100 mL  2 g IntraVENous Q8H    sodium chloride (NS) flush 5-10 mL  5-10 mL IntraVENous Q8H    sodium chloride (NS) flush 5-10 mL  5-10 mL IntraVENous PRN    rifAMPin (RIFADIN) capsule 600 mg  600 mg Oral DAILY    oxyCODONE IR (ROXICODONE) tablet 15 mg  15 mg Oral Q4H PRN    albuterol (PROVENTIL HFA, VENTOLIN HFA, PROAIR HFA) inhaler 2 Puff  2 Puff Inhalation Q6H PRN    aspirin delayed-release tablet 81 mg  81 mg Oral DAILY    atorvastatin (LIPITOR) tablet 10 mg  10 mg Oral DAILY    bisacodyl (DULCOLAX) suppository 10 mg  10 mg Rectal DAILY    enoxaparin (LOVENOX) injection 40 mg  40 mg SubCUTAneous DAILY    polyethylene glycol (MIRALAX) packet 17 g  17 g Oral EVERY OTHER DAY    SUMAtriptan (IMITREX) tablet 50 mg  50 mg Oral ONCE PRN    zolpidem (AMBIEN) tablet 5 mg  5 mg Oral QHS    traMADol (ULTRAM) tablet 50 mg  50 mg Oral Q8H PRN    pantoprazole (PROTONIX) tablet 40 mg  40 mg Oral DAILY      Allergies   Allergen Reactions    [place] Angioedema    Codeine Other (comments)     \"Years ago gave me hives but lately I havetaken it without problem\"    Dilaudid [Hydromorphone (Bulk)] Shortness of Breath    Lyrica [Pregabalin] Nausea and Vomiting        Review of Systems: A complete review of systems was obtained, negative except as described above. Physical Exam:     Visit Vitals    /74 (BP 1 Location: Left arm, BP Patient Position: Supine)    Pulse 69    Temp 98.3 °F (36.8 °C)    Resp 16    Ht 5' 4.96\" (1.65 m)    Wt 68.9 kg (151 lb 12.8 oz)    SpO2 95%    BMI 25.29 kg/m2       General: No distress  Eyes:  anicteric sclerae  Neck: Supple  Respiratory: CTAB, normal respiratory effort  CV: Normal rate, regular rhythm, no murmurs, no peripheral edema  GI: Soft, nontender, nondistended, no masses, no hepatomegaly, no splenomegaly  Skin: No rashes, ecchymoses, or petechiae. Normal temperature, turgor, and texture. MSK: large curvature of spine noted  Psych: Alert, oriented, appropriate affect, normal judgment/insight      Results:     Lab Results   Component Value Date/Time    WBC 3.0 04/06/2017 01:10 AM    HGB 9.4 04/06/2017 01:10 AM    HCT 29.5 04/06/2017 01:10 AM    PLATELET 730 00/98/0726 01:10 AM    MCV 87.5 04/06/2017 01:10 AM    ABS.  NEUTROPHILS 1.7 04/06/2017 01:10 AM    Hemoglobin (POC) 12.3 01/30/2017 02:24 PM     Lab Results   Component Value Date/Time    Sodium 144 04/06/2017 01:10 AM    Potassium 3.7 04/06/2017 01:10 AM    Chloride 111 04/06/2017 01:10 AM    CO2 25 04/06/2017 01:10 AM    Glucose 79 04/06/2017 01:10 AM    BUN 7 04/06/2017 01:10 AM    Creatinine 0.68 04/06/2017 01:10 AM    GFR est AA >60 04/06/2017 01:10 AM    GFR est non-AA >60 04/06/2017 01:10 AM    Calcium 8.1 04/06/2017 01:10 AM     Lab Results   Component Value Date/Time    Bilirubin, total 0.2 02/02/2017 04:31 AM    ALT (SGPT) 12 02/02/2017 04:31 AM    AST (SGOT) 18 02/02/2017 04:31 AM    Alk. phosphatase 48 02/02/2017 04:31 AM    Protein, total 5.4 02/02/2017 04:31 AM    Albumin 2.2 02/03/2017 03:04 PM    Globulin 3.0 02/02/2017 04:31 AM     Lab Results   Component Value Date/Time    Iron % saturation 11 04/04/2017 05:13 AM    Iron 29 04/04/2017 05:13 AM    TIBC 257 04/04/2017 05:13 AM    Ferritin 45 04/04/2017 05:13 AM    Vitamin B12 509 04/04/2017 05:13 AM    Folate 15.2 04/04/2017 05:13 AM    C-Reactive protein 2.68 04/05/2017 05:28 AM     Lab Results   Component Value Date/Time    INR 1.1 01/30/2017 03:19 PM    aPTT 22.8 01/30/2017 03:19 PM         Assessment and Recommendations:   1. PICC line infection  Blood cultures NGTD  Culture from other facility with noted pseudomonas  Abx coverage ; possible home with IV abx today    2. Anemia, normocytic  Normal Hgb in Jan ; appears acute  Possibly secondary to infection and recent surgery  Slight Fe def noted; wstarted oral Fe replacement  Labs stable      3. Leukopenia/ Thrombocytopenia  Possibly secondary to on going infection of hip  Stable; will slight improvement    Ok from heme standpoint for discharge; will follow in clinic as outpatient once IV abx are complete; appt placed in discharge summary and reviewed with patient    4. Left hip septic arthritis  ABX coverage  New PICC line placed (4/06/2017)  Ortho following Karilyn Hammans)      The above exam and treatment plan were reviewed with Dr Amy Daniel.     Signed By: Elizabeth Payne NP     April 6, 2017

## 2017-04-06 NOTE — PROGRESS NOTES
Cardiology Progress Note          NAME:  Gus Ruano   :   1940   MRN:   [id number]     Assessment/Plan:   NSVT- freq PVC's on tele      -  Inc coreg to 6.25 mg BID      - keep K ~ 4, Mg~ 2       NICM- EF unchanged 35-40%- no evidence of HF        - coreg        - resume ACE-I at discharge       - cont statin/ASA   Hypertroponinemia- suspect strain - trended down  Sepsis- per team         OP f/u  @ 11 am arranged . D/C home today after PICC placement         Subjective:   Gus Ruano is a 68y.o. year old female w/ hx:  cardiomyopathy (non-ischemic per the chart), HTN, COPD, HTN who is transferred with sepsis/ bacteremia from [place] on 4/3/17. She apparently went to [facility] ER on 17 fo SOB and chest pain after she had vancomycin infusion - she was sent home after ER workup. On 4/3/17 she again went back to ER with chills and SOB andwas admitted with septic shock and transferred to [facility]. According to the chart notes (sign out), the culture from the picc a few days ago showed pseudomonas.     Cardiology consulted after patient noted to have frequent PVC's and runs of NSVT (11 beat). Patient was asymptomatic during these episodes. She denies any chest pain, although had some 4 days prior. Came to hospital with SOB, but doing better. BC (-)  Overnight activities reviewed:    -to               - Mg 1.7                K 3.7          Cardiac ROS:  Patient denies any exertional chest pain, dyspnea, palpitations, syncope, orthopnea, edema or paroxysmal nocturnal dyspnea. Review of Systems: fatigue, No nausea, indigestion, vomiting, pain, cough, sputum. No bleeding. Taking po. Appetite OK . Up with rollator. CARDIAC EVALUATION   STRESS: 16 - [place] nuc: No ischemia/WMA sec to LBBB/EF 37%      ECHO: 6/15/16 - LVH/ EF 40-45%/Mild AS/Mild MR/Mild TR  ECHO: 17: LVEF 35 -40 %.  Ventricular septum: here was sigmoid septal  Appearance- 2/2 LBBB. Mild MR.  ECHO 2017: EF 35-40%, mod diffuse HK        Objective:     Visit Vitals    /74 (BP 1 Location: Left arm, BP Patient Position: Supine)    Pulse 69    Temp 98.3 °F (36.8 °C)    Resp 16    Ht 5' 4.96\" (1.65 m)    Wt 151 lb 12.8 oz (68.9 kg)    SpO2 95%    BMI 25.29 kg/m2      O2 Flow Rate (L/min): 2 l/min O2 Device: Room air    Temp (24hrs), Av.1 °F (36.7 °C), Min:97.4 °F (36.3 °C), Max:98.5 °F (36.9 °C)        Intake/Output Summary (Last 24 hours) at 17  Last data filed at 17 0846   Gross per 24 hour   Intake             1630 ml   Output             1275 ml   Net              355 ml       TELE: SR LBBB, PVCs     General: AAOx3 cooperative, no acute distress. HEENT: Atraumatic. Pink and moist.  Anicteric sclerae. Neck: Supple,     Lungs: CTA bilaterally. No wheezing/rhonchi/crackles. Heart: PMI: nondisplaced,   Regular rhythm, 2/6 systolic  Murmur-LLSB, no S3, no rubs, no gallops. No JVD. No carotid bruits. Abdomen: Soft, non-distended, non-tender. + Bowel sounds. No bruits. : voiding  Extremities: chronic venous stasis changes, No edema, no clubbing, no cyanosis. No calf tenderness  Neurologic: Grossly intact. Alert and oriented X 3. No acute neurological distress. Psych: Good insight. Not anxious or agitated.       Care Plan discussed with:    Comments   Patient y    Family      RN y    Care Manager                    Consultant:  estuardo attending       Data Review:   CMP:   Lab Results   Component Value Date/Time     2017 01:10 AM    K 3.7 2017 01:10 AM     (H) 2017 01:10 AM    CO2 25 2017 01:10 AM    AGAP 8 2017 01:10 AM    GLU 79 2017 01:10 AM    BUN 7 2017 01:10 AM    CREA 0.68 2017 01:10 AM    GFRAA >60 2017 01:10 AM    GFRNA >60 2017 01:10 AM    CA 8.1 (L) 2017 01:10 AM    MG 1.8 2017 01:10 AM     CBC:   Lab Results   Component Value Date/Time    WBC 3.0 (L) 04/06/2017 01:10 AM    HGB 9.4 (L) 04/06/2017 01:10 AM    HCT 29.5 (L) 04/06/2017 01:10 AM     (L) 04/06/2017 01:10 AM     All Cardiac Markers in the last 24 hours:   No results found for: CPK, CKMMB, CKMB, RCK3, CKMBT, CKNDX, CKND1, LUCIO, TROPT, TROIQ, SUSANA, TROPT, TNIPOC, BNP, BNPP  Recent Glucose Results:   Lab Results   Component Value Date/Time    GLU 79 04/06/2017 01:10 AM     ABG: No results found for: PH, PHI, PCO2, PCO2I, PO2, PO2I, HCO3, HCO3I, FIO2, FIO2I  LIPIDS:  Cholesterol, total   Date Value Ref Range Status   02/02/2017 114 <200 MG/DL Final     Triglyceride   Date Value Ref Range Status   02/02/2017 76 <150 MG/DL Final     Comment:     Based on NCEP-ATP III:  Triglycerides <150 mg/dL  is considered normal, 150-199 mg/dL  borderline high,  200-499 mg/dL high and  greater than or equal to 500 mg/dL very high. HDL Cholesterol   Date Value Ref Range Status   02/02/2017 47 MG/DL Final     Comment:     Based on NCEP ATP III, HDL Cholesterol <40 mg/dL is considered low and >60 mg/dL is elevated.      LDL, calculated   Date Value Ref Range Status   02/02/2017 51.8 0 - 100 MG/DL Final     Comment:     Based on the NCEP-ATP: LDL-C concentrations are considered  optimal <100 mg/dL,  near optimal/above Normal 100-129 mg/dL  Borderline High: 130-159, High: 160-189 mg/dL  Very High: Greater than or equal to 190 mg/dL       VLDL, calculated   Date Value Ref Range Status   02/02/2017 15.2 MG/DL Final     CHOL/HDL Ratio   Date Value Ref Range Status   02/02/2017 2.4 0 - 5.0   Final       Medications reviewed  Current Facility-Administered Medications   Medication Dose Route Frequency    carvedilol (COREG) tablet 3.125 mg  3.125 mg Oral BID WITH MEALS    ferrous sulfate tablet 325 mg  1 Tab Oral DAILY WITH BREAKFAST    vancomycin (VANCOCIN) 1,000 mg in 0.9% sodium chloride (MBP/ADV) 250 mL  1 g IntraVENous Q24H    methadone (DOLOPHINE) tablet 5 mg  5 mg Oral Q8H    bisacodyl (DULCOLAX) suppository 10 mg 10 mg Rectal DAILY PRN    cyanocobalamin (VITAMIN B12) tablet 1,000 mcg  1,000 mcg Oral DAILY    cyclobenzaprine (FLEXERIL) tablet 5 mg  5 mg Oral TID PRN    furosemide (LASIX) tablet 20 mg  20 mg Oral DAILY    linaclotide (LINZESS) capsule 290 mcg  290 mcg Oral ACB    cefepime (MAXIPIME) 2 g in 0.9% sodium chloride (MBP/ADV) 100 mL  2 g IntraVENous Q8H    sodium chloride (NS) flush 5-10 mL  5-10 mL IntraVENous Q8H    sodium chloride (NS) flush 5-10 mL  5-10 mL IntraVENous PRN    rifAMPin (RIFADIN) capsule 600 mg  600 mg Oral DAILY    oxyCODONE IR (ROXICODONE) tablet 15 mg  15 mg Oral Q4H PRN    albuterol (PROVENTIL HFA, VENTOLIN HFA, PROAIR HFA) inhaler 2 Puff  2 Puff Inhalation Q6H PRN    aspirin delayed-release tablet 81 mg  81 mg Oral DAILY    atorvastatin (LIPITOR) tablet 10 mg  10 mg Oral DAILY    bisacodyl (DULCOLAX) suppository 10 mg  10 mg Rectal DAILY    enoxaparin (LOVENOX) injection 40 mg  40 mg SubCUTAneous DAILY    polyethylene glycol (MIRALAX) packet 17 g  17 g Oral EVERY OTHER DAY    SUMAtriptan (IMITREX) tablet 50 mg  50 mg Oral ONCE PRN    zolpidem (AMBIEN) tablet 5 mg  5 mg Oral QHS    traMADol (ULTRAM) tablet 50 mg  50 mg Oral Q8H PRN    pantoprazole (PROTONIX) tablet 40 mg  40 mg Oral DAILY

## 2017-04-06 NOTE — PROGRESS NOTES
SHIFT REPORT    TRANSFER - IN REPORT:    Verbal report received from RN(name) on [address]  being received from ICU(unit) for routine progression of care      Report consisted of patients Situation, Background, Assessment and   Recommendations(SBAR). Information from the following report(s) SBAR, Kardex and MAR was reviewed with the receiving nurse. Opportunity for questions and clarification was provided. Assessment completed upon patients arrival to unit and care assumed. Primary Nurse Rosas Thomas RN and So Guadalupe RN performed a dual skin assessment on this patient Impairment noted- see wound doc flow sheet (right front extremity old wound healed, blue; right abdomen under fold red, ecchymosis on different places, surgery scar, sacrum redness, blanchable, small hole present appearing to be a healed ulcer? )  Aniceto score is 20  Wound consult placed. SHIFT ASSESSMENT   0328 Pt asked pain med.    0329 Pt asymptomatic. Pt said \" It could had been the hip pain causing it\". Tele called for 8 beats VT non sustained. Notified MD Rosario Guzman. [address] [facility] [employer] called back no further orders. END SHIFT REPORT  Bedside and Verbal shift change report given to [address] (oncoming nurse) by Niels Thompson RN (offgoing nurse). Report included the following information SBAR, Kardex and MAR.  NSR BBB inverted T

## 2017-04-06 NOTE — PROGRESS NOTES
I spoke to pt's son Padmini Canela cell is [phone]. He stated that he is aware that his mother will need a home health RN to come out and do PICC care. He stated that he knows there is a copay and they will pay for that at the time of service. I also spoke to [employer] with [facility], his cell is [phone]. I will send home health orders to [facility] in [employer] when written. I will also send today's progress note from Dr. Francoise Tejeda when completed.   Thanks JOSEFA Garrison

## 2017-04-06 NOTE — PROGRESS NOTES
Rad/Vat:  Chart reviewed for PICC placement evaluation. Areas reviewed include, but are not limited to, patient's current and past H&P, Lab and Procedure Results, all notes, media records and medications. Spoke to  Jillian Amaya 4/5/17 regarding setting up [facility] for PICC line care. Request sent and awaiting note to confirm in place for PICC line care prior to insertion of second PICC line, with infection of previous PICC placed due to lack of care. Spoke to Millie daniel RN awaiting case management note declaring PICC care set up prior to discharge. Mathieu Moura with call back stating casemanagement putting note in now declaring care set up for PICC line and agreeable by patient. Plan on placing PICC today for probable intended discharge today. 1245 Up to place PICC, unable to get PICC line to drop into proper position, MARBELLA Baker RN notified. Patient's PICC line dressed and left out at 6 cm in left basilic.

## 2017-04-06 NOTE — PROGRESS NOTES
TRANSFER - OUT REPORT:    Verbal report given to [facility] on [address]  being transferred to [facility](unit) for routine progression of care       Report consisted of patients Situation, Background, Assessment and   Recommendations(SBAR). Information from the following report(s) SBAR was reviewed with the receiving nurse. Lines:   PICC Double Lumen 88/88/37 Right;Basilic (Active)       PICC Single Lumen 85/04/33 Left;Basilic (Active)   Central Line Being Utilized No 4/6/2017  2:00 PM   Criteria for Appropriate Use Long term IV/antibiotic administration 4/6/2017  2:00 PM   Phlebitis Assessment 0 4/6/2017  2:00 PM   Infiltration Assessment 0 4/6/2017  2:00 PM   Date of Last Dressing Change 04/06/17 4/6/2017  2:00 PM   Dressing Status Clean, dry, & intact; Occlusive;New 4/6/2017  2:00 PM   External Catheter Length (cm) 6 centimeters 4/6/2017  2:00 PM   Dressing Type Disk with Chlorhexadine gluconate (CHG); Transparent 4/6/2017  2:00 PM   Hub Color/Line Status Purple 4/6/2017  2:00 PM   Action Taken Other (comment) 4/6/2017  2:00 PM   Positive Blood Return (Site #1) Yes 4/6/2017  2:00 PM       Double Lumen 6fr Gio 04/06/17 Right Subclavian (Active)        Opportunity for questions and clarification was provided.       Patient transported with:   Registered Nurse

## 2017-04-07 NOTE — PROGRESS NOTES
1915 During report pt's Antibiotic was finished,  But no discharge paper done. 2030 Pt was given the discharge papers and explained: medications, activities, and a box of other meds were given to the pt for home health nurse to use for pt treatment  but there was no prescription for Rifampin    2115 MD Shruthi Jett was called for prescription for Rifampin. MD Shruthi Jett said to call pt's [facility] pharmacy for. Pharmacy was called. 2145 Pt left with tech in wheelchair and grandson.

## 2017-04-07 NOTE — PROGRESS NOTES
OLIVER Note:  [facility] called and requested the following information be sent to them: order for PICC, PICC report and discharge information with med list.  All was sent in [facility] to the [facility] location.   LORENE Sykes

## 2017-04-09 LAB
BACTERIA SPEC CULT: NORMAL
BACTERIA SPEC CULT: NORMAL
SERVICE CMNT-IMP: NORMAL
SERVICE CMNT-IMP: NORMAL

## 2017-04-13 NOTE — PROGRESS NOTES
Pt's son called Rodrigo Tejada access team [facility] regarding how does he get his mother's Gio catheter removed. Advised his to continue with daily flushes as before until catheter can be removed.   Advised him to call [facility] Angio to make appointment for removal and transferred his call to CAth/ Angio

## 2017-04-17 ENCOUNTER — HOSPITAL ENCOUNTER (OUTPATIENT)
Dept: INTERVENTIONAL RADIOLOGY/VASCULAR | Age: 77
Discharge: HOME | End: 2017-04-17
Attending: INTERNAL MEDICINE | Admitting: INTERNAL MEDICINE
Payer: MEDICARE

## 2017-04-17 VITALS — WEIGHT: 155.87 LBS | BODY MASS INDEX: 25.97 KG/M2

## 2017-04-17 DIAGNOSIS — B99.9 INFECTION: ICD-10-CM

## 2017-04-17 PROCEDURE — 36589 REMOVAL TUNNELED CV CATH: CPT

## 2017-04-17 NOTE — IP AVS SNAPSHOT
Lissette Jessica 
 
 
 [address] 
[phone] Patient: Amna Osborne MRN: [id number] KFM:[birth year] You are allergic to the following Allergen Reactions [place] Angioedema Codeine Other (comments) \"Years ago gave me hives but lately I havetaken it without problem\" Dilaudid (Hydromorphone (Bulk)) Shortness of Breath Lyrica (Pregabalin) Nausea and Vomiting Recent Documentation Weight BMI OB Status Smoking Status 70.7 kg 25.97 kg/m2 Postmenopausal Former Smoker Emergency Contacts Name Discharge Info Relation Home Work Mobile Yoana Farris DISCHARGE CAREGIVER [3] Daughter [21] [phone] [facility]ARE[facility] [3] Son [22] [phone] Brenton,Adam  Child [2] [phone] About your hospitalization You were admitted on:  April 17, 2017 You last received care in the:  [facility] PACU You were discharged on:  April 17, 2017 Unit phone number:  [phone] Why you were hospitalized Your primary diagnosis was:  Not on File Providers Seen During Your Hospitalizations Provider Role Specialty Primary office phone Sadie Monge MD Attending Provider Infectious Diseases [phone] Your Primary Care Physician (PCP) Primary Care Physician Office Phone Office Fax OTHER, PHYS ** None ** ** None ** Follow-up Information None Your Appointments Monday April 17, 2017 10:00 AM EDT  
IR RMV TNL CVAD WO PRT/ with [facility] ANGIO 1  
[facility] RAD ANGIO IR ([address]) [address]  
[phone] DIET RESTRICTIONS NPO, do not eat or drink 8 hours prior to test. Take all medications not requiring food with sip of water, excluding blood thinners and diabetic medications. GENERAL INSTRUCTIONS 1.  Bring any non [employer] facility films/images pertaining to the area of interest with you on the day of appointment. 2. Bring a list of all medications you are currently taking, including over the counter medications. 3. A valid order with Physicians signature is required for all scheduled tests. 4. Blood thinners and platelet inhibitors must be stopped 3-5 days prior to procedure. Consult your ordering physician prior to stopping them. 5. Time given is ARRIVAL time, not procedure time. 6. You must have a  present before a procedure is started. 7. The procedure may last 1-1 ½ hours. You may be required to stay 4-6 hours after the procedure for observation. If you have any questions please direct them to your ordering physician. Park in designated visitor/patient parking. Enter through the main entrance, which is just to the left of the fountain. Once inside, go around the corner to the left. You will register in Outpatient Registration. Friday April 28, 2017 11:00 AM EDT HOSPITAL DISCHARGE with Erika Montero MD  
[facility] ([address] [place] [address] Road) [address] [address] 6[address] [facility]  
[phone] Monday May 08, 2017  1:00 PM EDT New Patient with Anna Marie Crandall MD  
[facility] at [address]  
[phone] Current Discharge Medication List  
  
ASK your doctor about these medications Dose & Instructions Dispensing Information Comments Morning Noon Evening Bedtime AMBIEN 5 mg tablet Generic drug:  zolpidem Your last dose was: Your next dose is:    
   
   
 Dose:  5 mg Take 5 mg by mouth nightly. Refills:  0  
     
   
   
   
  
 aspirin delayed-release 81 mg tablet Your last dose was:     
   
Your next dose is:    
   
   
 Dose:  81 mg  
 Take 81 mg by mouth daily. Refills:  0  
     
   
   
   
  
 atorvastatin 10 mg tablet Commonly known as:  LIPITOR Your last dose was: Your next dose is:    
   
   
 Dose:  10 mg Take 1 Tab by mouth daily. Quantity:  30 Tab Refills:  3  
     
   
   
   
  
 bisacodyl 10 mg suppository Commonly known as:  DULCOLAX Your last dose was: Your next dose is:    
   
   
 Dose:  10 mg Insert 10 mg into rectum daily as needed for Diarrhea. Refills:  0  
     
   
   
   
  
 carvedilol 6.25 mg tablet Commonly known as:  Chacho Joseph Your last dose was: Your next dose is:    
   
   
 Dose:  6.25 mg Take 1 Tab by mouth two (2) times daily (with meals). Quantity:  60 Tab Refills:  0  
     
   
   
   
  
 cyanocobalamin 1,000 mcg tablet Your last dose was: Your next dose is:    
   
   
 Dose:  1000 mcg Take 1,000 mcg by mouth daily. Refills:  0  
     
   
   
   
  
 cyclobenzaprine 5 mg tablet Commonly known as:  FLEXERIL Your last dose was: Your next dose is:    
   
   
 Dose:  5 mg Take 5 mg by mouth three (3) times daily as needed for Muscle Spasm(s). Refills:  0  
     
   
   
   
  
 ferrous sulfate 325 mg (65 mg iron) tablet Your last dose was: Your next dose is:    
   
   
 Dose:  325 mg Take 1 Tab by mouth daily (with breakfast). Quantity:  30 Tab Refills:  0  
     
   
   
   
  
 FISH OIL 1,000 mg Cap Generic drug:  omega-3 fatty acids-vitamin e Your last dose was: Your next dose is:    
   
   
 Dose:  1 Cap Take 1 Cap by mouth. Refills:  0 IMITREX 50 mg tablet Generic drug:  SUMAtriptan Your last dose was: Your next dose is:    
   
   
 Dose:  50 mg Take 50 mg by mouth once as needed for Migraine. Refills:  0  
     
   
   
   
  
 LASIX 20 mg tablet Generic drug:  furosemide Your last dose was: Your next dose is:    
   
   
 Dose:  20 mg Take 20 mg by mouth daily. Refills:  0 LINZESS 290 mcg Cap capsule Generic drug:  linaclotide Your last dose was: Your next dose is:    
   
   
 Dose:  290 mcg Take 290 mcg by mouth Daily (before breakfast). Refills:  0  
     
   
   
   
  
 lisinopril 5 mg tablet Commonly known as:  Frank Leaver Your last dose was: Your next dose is:    
   
   
 Dose:  5 mg Take 5 mg by mouth daily. Refills:  0  
     
   
   
   
  
 methadone 5 mg tablet Commonly known as:  DOLOPHINE Your last dose was: Your next dose is:    
   
   
 Dose:  5 mg Take 5 mg by mouth every eight (8) hours. Refills:  0 MIRALAX 17 gram packet Generic drug:  polyethylene glycol Your last dose was: Your next dose is:    
   
   
 Dose:  17 g Take 17 g by mouth every other day. Refills:  0 NexIUM 40 mg capsule Generic drug:  esomeprazole Your last dose was: Your next dose is:    
   
   
 Dose:  40 mg Take 40 mg by mouth daily. Refills:  0  
     
   
   
   
  
 ondansetron 8 mg disintegrating tablet Commonly known as:  ZOFRAN ODT Your last dose was: Your next dose is:    
   
   
 Dose:  4 mg Take 0.5 Tabs by mouth every eight (8) hours as needed for Nausea. Quantity:  30 Tab Refills:  0  
     
   
   
   
  
 oxyCODONE IR 15 mg immediate release tablet Commonly known as:  OXY-IR Your last dose was: Your next dose is:    
   
   
 Dose:  15 mg Take 1 Tab by mouth every three (3) hours as needed. Max Daily Amount: 120 mg.  
 Quantity:  75 Tab Refills:  0  
     
   
   
   
  
 potassium chloride SR 10 mEq tablet Commonly known as:  KLOR-CON 10 Your last dose was: Your next dose is:    
   
   
 Dose:  10 mEq Take 10 mEq by mouth daily. Refills:  0 promethazine 25 mg tablet Commonly known as:  PHENERGAN Your last dose was: Your next dose is:    
   
   
 Dose:  25 mg Take 25 mg by mouth every six (6) hours as needed for Nausea. Refills:  0 PROVENTIL HFA 90 mcg/actuation inhaler Generic drug:  albuterol Your last dose was: Your next dose is:    
   
   
 Dose:  2 Puff Take 2 Puffs by inhalation every six (6) hours as needed. Refills:  0  
     
   
   
   
  
 ROLAIDS EXTRA STRENGTH PO Your last dose was: Your next dose is:    
   
   
 Dose:  2 Tab Take 2 Tabs by mouth three (3) times daily (with meals). Refills:  0 SPIRIVA WITH HANDIHALER 18 mcg inhalation capsule Generic drug:  tiotropium Your last dose was: Your next dose is:    
   
   
 Dose:  1 Cap Take 1 Cap by inhalation daily. Refills:  0  
     
   
   
   
  
 traMADol 50 mg tablet Commonly known as:  ULTRAM  
   
Your last dose was: Your next dose is:    
   
   
 Dose:  50 mg Take 50 mg by mouth every eight (8) hours as needed for Pain. Refills:  0  
     
   
   
   
  
 VITAMIN D3 400 unit Cap Generic drug:  cholecalciferol (vitamin d3) Your last dose was: Your next dose is:    
   
   
 Dose:  1 Tab Take 1 Tab by mouth daily. Refills:  0 Discharge Instructions [address] [place] [address] DISCHARGE INSTRUCTIONS FOR:  REMOVAL OF PICC LINE Now that your PICC Line has been removed, please follow the instructions below regarding your site care: You will be kept resting in a supine position for 3060 minutes immediately after the PICC has been removed. Avoid heavy lifting or excessive use of the extremity for 24 hours. If drainage or bleeding is present, apply direct pressure until it has stopped. If bleeding persists, continue pressure and seek emergency medical care. The area under the dressing must be kept clean and dry for 3 days. Do not submerge the area in water for 3 days. You may shower, but cover the dressing with clear plastic wrap and apply tape around the edges. Remove the plastic wrap after showering. After 3 days, the dressing may be removed. Before removing the dressing, wash hands thoroughly with soap and water. Inspect the site. Gently wash with warm soapy water. Pat dry and re-cover with a band aid until a scab forms. Report to your physician any of the following: 
 
Chills and fever; 
Swelling, redness, pain or if site is warm to the touch; Any pus or unusual drainage from the site; Any questions or concerns. Dina Soria, Signature: ______________________________ 4/17/2017 Dakota Hagen RN Discharge Orders None General Information Please provide this summary of care documentation to your next provider. Patient Signature:  ____________________________________________________________ Date:  ____________________________________________________________  
  
[facility] Provider Signature:  ____________________________________________________________ Date:  ____________________________________________________________

## 2017-04-17 NOTE — DISCHARGE INSTRUCTIONS
OUTPATIENT INFUSION CENTER    DISCHARGE INSTRUCTIONS FOR:  REMOVAL OF PICC LINE    Now that your PICC Line has been removed, please follow the instructions below regarding your site care: You will be kept resting in a supine position for 30--60 minutes immediately after the PICC has been removed. Avoid heavy lifting or excessive use of the extremity for 24 hours. If drainage or bleeding is present, apply direct pressure until it has stopped. If bleeding persists, continue pressure and seek emergency medical care. The area under the dressing must be kept clean and dry for 3 days. Do not submerge the area in water for 3 days. You may shower, but cover the dressing with clear plastic wrap and apply tape around the edges. Remove the plastic wrap after showering. After 3 days, the dressing may be removed. Before removing the dressing, wash hands thoroughly with soap and water. Inspect the site. Gently wash with warm soapy water. Pat dry and re-cover with a band aid until a scab forms. Report to your physician any of the following:    Chills and fever;  Swelling, redness, pain or if site is warm to the touch; Any pus or unusual drainage from the site; Any questions or concerns.     Tessa Logan, Signature: ______________________________ 4/17/2017  Chevy Charles RN

## 2017-04-17 NOTE — IP AVS SNAPSHOT
Summary of Care Report The Summary of Care report has been created to help improve care coordination. Users with access to [email or web address] or [address] (Web-based application) may access additional patient information including the Discharge Summary. If you are not currently a [address] user and need more information, please call the number listed below in the [place] [address] section and ask to be connected with Medical Records. Facility Information Name Address Phone [address] [facility] [facility] [address] [phone] Patient Information Patient Name Sex DOB Merline Genet ([id number]) Female 1940 Discharge Information Admitting Provider Service Area Unit Michael Marrero MD / [facility] / [phone] Discharge Provider Discharge Date/Time Discharge Disposition Destination (none) (none) (none) (none) Patient Language Language ENGLISH [13] Hospital Problems as of 2017  Reviewed: 4/3/2017  1:55 PM by Julio César Jorge MD  
 None Non-Hospital Problems as of 2017  Reviewed: 4/3/2017  1:55 PM by Julio César Jorge MD  
  
  
  
 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  
  Entered by Fabian Tarango MD  
  NSTEMI (non-ST elevated myocardial infarction) [facility])  2017 - Present 2017 by Tom Whipple MD  
  Entered by Casandra Cardoso MD  
  HTN (hypertension)  2017 - Present 4/3/2017 by Julio César Jorge MD  
  Entered by Julio César Jorge MD  
  Anemia  2017 - Present 4/3/2017 by Julio César Jorge MD  
  Entered by Julio César Jorge MD  
 Thrombocytopenia ([facility]  2/2/2017 - Present 2/5/2017 by Francisca Goldsmith MD  
  Entered by Vani Ruiz MD  
  Systolic CHF, chronic ([facility]  2/2/2017 - Present 4/3/2017 by Vani Ruiz MD  
  Entered by Vani Ruiz MD  
  Sepsis ([facility]  4/3/2017 - Present 4/3/2017 by Vani Ruiz MD  
  Entered by Vani Ruiz MD  
  PICC line infection  4/3/2017 - Present 4/3/2017 by Vani Ruiz MD  
  Entered by Vani Ruiz MD  
  [facility]  4/3/2017 - Present 4/3/2017 by Vani Ruiz MD  
  Entered by Vani Ruiz MD  
  Bacteremia  4/3/2017 - Present 4/3/2017 by Vani Ruiz MD  
  Entered by Vani Ruiz MD  
  
You are allergic to the following Allergen Reactions [place] Angioedema Codeine Other (comments) \"Years ago gave me hives but lately I havetaken it without problem\" Dilaudid (Hydromorphone (Bulk)) Shortness of Breath Lyrica (Pregabalin) Nausea and Vomiting Current Discharge Medication List  
  
ASK your doctor about these medications Dose & Instructions Dispensing Information Comments AMBIEN 5 mg tablet Generic drug:  zolpidem Dose:  5 mg Take 5 mg by mouth nightly. Refills:  0  
   
 aspirin delayed-release 81 mg tablet Dose:  81 mg Take 81 mg by mouth daily. Refills:  0  
   
 atorvastatin 10 mg tablet Commonly known as:  LIPITOR Dose:  10 mg Take 1 Tab by mouth daily. Quantity:  30 Tab Refills:  3  
   
 bisacodyl 10 mg suppository Commonly known as:  DULCOLAX Dose:  10 mg Insert 10 mg into rectum daily as needed for Diarrhea. Refills:  0  
   
 carvedilol 6.25 mg tablet Commonly known as:  Estephanie Yevgeniy Dose:  6.25 mg Take 1 Tab by mouth two (2) times daily (with meals). Quantity:  60 Tab Refills:  0  
   
 cyanocobalamin 1,000 mcg tablet Dose:  1000 mcg Take 1,000 mcg by mouth daily. Refills:  0  
   
 cyclobenzaprine 5 mg tablet Commonly known as:  FLEXERIL Dose:  5 mg Take 5 mg by mouth three (3) times daily as needed for Muscle Spasm(s). Refills:  0  
   
 ferrous sulfate 325 mg (65 mg iron) tablet Dose:  325 mg Take 1 Tab by mouth daily (with breakfast). Quantity:  30 Tab Refills:  0  
   
 FISH OIL 1,000 mg Cap Generic drug:  omega-3 fatty acids-vitamin e Dose:  1 Cap Take 1 Cap by mouth. Refills:  0 IMITREX 50 mg tablet Generic drug:  SUMAtriptan Dose:  50 mg Take 50 mg by mouth once as needed for Migraine. Refills:  0  
   
 LASIX 20 mg tablet Generic drug:  furosemide Dose:  20 mg Take 20 mg by mouth daily. Refills:  0 LINZESS 290 mcg Cap capsule Generic drug:  linaclotide Dose:  290 mcg Take 290 mcg by mouth Daily (before breakfast). Refills:  0  
   
 lisinopril 5 mg tablet Commonly known as:  Jaime Bold Dose:  5 mg Take 5 mg by mouth daily. Refills:  0  
   
 methadone 5 mg tablet Commonly known as:  DOLOPHINE Dose:  5 mg Take 5 mg by mouth every eight (8) hours. Refills:  0 MIRALAX 17 gram packet Generic drug:  polyethylene glycol Dose:  17 g Take 17 g by mouth every other day. Refills:  0 NexIUM 40 mg capsule Generic drug:  esomeprazole Dose:  40 mg Take 40 mg by mouth daily. Refills:  0  
   
 ondansetron 8 mg disintegrating tablet Commonly known as:  ZOFRAN ODT Dose:  4 mg Take 0.5 Tabs by mouth every eight (8) hours as needed for Nausea. Quantity:  30 Tab Refills:  0  
   
 oxyCODONE IR 15 mg immediate release tablet Commonly known as:  OXY-IR Dose:  15 mg Take 1 Tab by mouth every three (3) hours as needed. Max Daily Amount: 120 mg.  
 Quantity:  75 Tab Refills:  0  
   
 potassium chloride SR 10 mEq tablet Commonly known as:  KLOR-CON 10 Dose:  10 mEq Take 10 mEq by mouth daily. Refills:  0  
   
 promethazine 25 mg tablet Commonly known as:  PHENERGAN  Dose:  25 mg  
 Take 25 mg by mouth every six (6) hours as needed for Nausea. Refills:  0 PROVENTIL HFA 90 mcg/actuation inhaler Generic drug:  albuterol Dose:  2 Puff Take 2 Puffs by inhalation every six (6) hours as needed. Refills:  0  
   
 ROLAIDS EXTRA STRENGTH PO Dose:  2 Tab Take 2 Tabs by mouth three (3) times daily (with meals). Refills:  0 SPIRIVA WITH HANDIHALER 18 mcg inhalation capsule Generic drug:  tiotropium Dose:  1 Cap Take 1 Cap by inhalation daily. Refills:  0  
   
 traMADol 50 mg tablet Commonly known as:  ULTRAM  
 Dose:  50 mg Take 50 mg by mouth every eight (8) hours as needed for Pain. Refills:  0  
   
 VITAMIN D3 400 unit Cap Generic drug:  cholecalciferol (vitamin d3) Dose:  1 Tab Take 1 Tab by mouth daily. Refills:  0 Follow-up Information None Discharge Instructions [address] [place] [address] DISCHARGE INSTRUCTIONS FOR:  REMOVAL OF PICC LINE Now that your PICC Line has been removed, please follow the instructions below regarding your site care: You will be kept resting in a supine position for 3060 minutes immediately after the PICC has been removed. Avoid heavy lifting or excessive use of the extremity for 24 hours. If drainage or bleeding is present, apply direct pressure until it has stopped. If bleeding persists, continue pressure and seek emergency medical care. The area under the dressing must be kept clean and dry for 3 days. Do not submerge the area in water for 3 days. You may shower, but cover the dressing with clear plastic wrap and apply tape around the edges. Remove the plastic wrap after showering. After 3 days, the dressing may be removed. Before removing the dressing, wash hands thoroughly with soap and water. Inspect the site. Gently wash with warm soapy water. Pat dry and re-cover with a band aid until a scab forms. Report to your physician any of the following: 
 
Chills and fever; 
Swelling, redness, pain or if site is warm to the touch; Any pus or unusual drainage from the site; Any questions or concerns. Bekah Traylor, Signature: ______________________________ 4/17/2017 Panfilo Galeano RN Chart Review Routing History Recipient Method Report Sent By Kelsey enamorado Fax: [phone] Fax [facility] RESULT REPORT IMAGING Popeye Tuttle [[id number]] 4/10/2017  1:16 PM 04/06/2017

## 2017-04-17 NOTE — PROGRESS NOTES
9:10 AM  Patient arrived. ID and allergies verified verbally with patient. Pt voices understanding of procedure to be performed. Consent obtained. Pt prepped for procedure. 9:20 AM  Cath lab team at bedside  For procedure    Discharge instructions reviewed with patient and family. Voiced understanding. Patient given copy of discharge instructions to take home.     9:35 AM  Pt discharged off unit via wheelchair into care of son

## 2017-05-08 ENCOUNTER — TELEPHONE (OUTPATIENT)
Dept: ONCOLOGY | Age: 77
End: 2017-05-08

## 2017-05-08 NOTE — TELEPHONE ENCOUNTER
Pt called and left a voicemail stating that her car broke down and she will not be able to make her appointment today. She stated she will call back to reschedule.

## 2022-03-08 NOTE — PROGRESS NOTES
Bedside and Verbal shift change report given to Reba Jerez RN (oncoming nurse) by Mary Grace Kaplan RN (offgoing nurse). Report included the following information SBAR, Kardex, Procedure Summary, Intake/Output, MAR, Accordion and Recent Results. Interval History: NAEO. AF VSS. After long discussion with IC, pt pursuing medical management of NSTEMI. General surgery consulted for line removal. Will watch cultures for 48 hours. Should get HD tomorrow prior to line removal.    Review of Systems   Constitutional:  Negative for chills and fever.   HENT:  Negative for sore throat.    Respiratory:  Negative for cough and shortness of breath.    Cardiovascular:  Negative for chest pain and leg swelling.   Gastrointestinal:  Negative for abdominal pain, constipation, diarrhea, nausea and vomiting.   Genitourinary:  Negative for dysuria.   Musculoskeletal:  Negative for myalgias.   Skin:  Negative for rash.   Neurological:  Negative for dizziness and headaches.   Psychiatric/Behavioral:  Negative for confusion. The patient is nervous/anxious.      Objective:     Vital Signs (Most Recent):  Temp: 96.3 °F (35.7 °C) (03/08/22 1127)  Pulse: 63 (03/08/22 1127)  Resp: 20 (03/08/22 1127)  BP: (!) 121/59 (03/08/22 1127)  SpO2: 98 % (03/08/22 1127)   Vital Signs (24h Range):  Temp:  [96.3 °F (35.7 °C)-97.5 °F (36.4 °C)] 96.3 °F (35.7 °C)  Pulse:  [63-86] 63  Resp:  [16-20] 20  SpO2:  [93 %-100 %] 98 %  BP: ()/(49-72) 121/59     Weight: 90 kg (198 lb 6.6 oz)  Body mass index is 37.49 kg/m².    Intake/Output Summary (Last 24 hours) at 3/8/2022 1237  Last data filed at 3/8/2022 0930  Gross per 24 hour   Intake 1130 ml   Output 3350 ml   Net -2220 ml      Physical Exam  Constitutional:       Appearance: Normal appearance.   HENT:      Head: Normocephalic and atraumatic.      Mouth/Throat:      Mouth: Mucous membranes are moist.      Pharynx: Oropharynx is clear.   Eyes:      Extraocular Movements: Extraocular movements intact.      Pupils: Pupils are equal, round, and reactive to light.   Neck:      Comments: No JVD  Cardiovascular:      Rate and Rhythm: Normal rate and regular rhythm.      Pulses: Normal pulses.      Heart sounds: Normal heart sounds.   Pulmonary:       Effort: Pulmonary effort is normal. No respiratory distress.      Breath sounds: Wheezing present. No rales.   Abdominal:      General: Abdomen is flat. Bowel sounds are normal. There is no distension.      Palpations: Abdomen is soft.      Tenderness: There is no abdominal tenderness. There is no guarding or rebound.   Musculoskeletal:         General: No swelling.      Cervical back: Neck supple.      Right lower leg: Edema (trace) present.      Left lower leg: Edema (trace) present.   Skin:     General: Skin is warm and dry.      Capillary Refill: Capillary refill takes less than 2 seconds.   Neurological:      General: No focal deficit present.      Mental Status: She is alert and oriented to person, place, and time. Mental status is at baseline.   Psychiatric:         Mood and Affect: Mood normal.         Behavior: Behavior normal.         Thought Content: Thought content normal.         Judgment: Judgment normal.       Significant Labs: All pertinent labs within the past 24 hours have been reviewed.  Recent Lab Results         03/08/22  1141   03/08/22  0714   03/07/22  2133   03/07/22  1640   03/07/22  1522        POCT Glucose 188   223   276   168   173                          03/07/22  1339        POCT Glucose 153               Significant Imaging:       I have reviewed all pertinent imaging results/findings within the past 24 hours.

## (undated) DEVICE — STRYKER PERFORMANCE SERIES SAGITTAL BLADE: Brand: STRYKER PERFORMANCE SERIES

## (undated) DEVICE — SUTURE ETHBND EXCEL SZ 5 L30IN NONABSORBABLE GRN L40MM V-37 MB66G

## (undated) DEVICE — DERMABOND SKIN ADH 0.7ML -- DERMABOND ADVANCED 12/BX

## (undated) DEVICE — SUTURE VCRL SZ 2-0 L36IN ABSRB UD L36MM CT-1 1/2 CIR J945H

## (undated) DEVICE — HOOK LOCK LATEX FREE ELASTIC BANDAGE 4INX5YD

## (undated) DEVICE — 3M™ IOBAN™ 2 ANTIMICROBIAL INCISE DRAPE 6651EZ: Brand: IOBAN™ 2

## (undated) DEVICE — Z CONVERTED USE 2271043 CONTAINER SPEC COLL 4OZ SCR ON LID PEEL PCH

## (undated) DEVICE — KIT INFECTION CTRL ST FRAN --

## (undated) DEVICE — 3000CC GUARDIAN II: Brand: GUARDIAN

## (undated) DEVICE — NEEDLE SUT SZ 4 MAYO CATGUT 1/2 CIR TAPR PNT DISP

## (undated) DEVICE — SUT ETHLN 3-0 18IN PS2 BLK --

## (undated) DEVICE — SUTURE MCRYL SZ 4-0 L27IN ABSRB UD L19MM PS-2 1/2 CIR PRIM Y426H

## (undated) DEVICE — DRAPE,REIN 53X77,STERILE: Brand: MEDLINE

## (undated) DEVICE — 3M™ IOBAN™ 2 ANTIMICROBIAL INCISE DRAPE 6648EZ: Brand: IOBAN™ 2

## (undated) DEVICE — DRAPE,EXTREMITY,89X128,STERILE: Brand: MEDLINE

## (undated) DEVICE — BUR SURG M L8MM DIA4MM EGG CUT FLUT ELITE TPS [51201540] [STRYKER INSTRUMENT DIV]

## (undated) DEVICE — CONVERTORS STOCKINETTE: Brand: CONVERTORS

## (undated) DEVICE — 3M™ COBAN™ SELF-ADHERENT WRAP, 1586S, STERILE, 6 IN X 5 YD (15 CM X 4,5 M), 12 ROLLS/CASE: Brand: 3M™ COBAN™

## (undated) DEVICE — SOLUTION IV 1000ML 0.9% SOD CHL

## (undated) DEVICE — SURGICAL PROCEDURE PACK BASIN MAJ SET CUST NO CAUT

## (undated) DEVICE — SWAB CULT LIQ STUART AGR AERB MOD IN BRK SGL RAYON TIP PLAS 220099] BECTON DICKINSON MICRO]

## (undated) DEVICE — (D)PREP SKN CHLRAPRP APPL 26ML -- CONVERT TO ITEM 371833

## (undated) DEVICE — STERILE POLYISOPRENE POWDER-FREE SURGICAL GLOVES WITH EMOLLIENT COATING: Brand: PROTEXIS

## (undated) DEVICE — DISPOSABLE TOURNIQUET CUFF SINGLE BLADDER, DUAL PORT AND QUICK CONNECT CONNECTOR: Brand: COLOR CUFF

## (undated) DEVICE — PADDING CST 4INX4YD --

## (undated) DEVICE — STERILE POLYISOPRENE POWDER-FREE SURGICAL GLOVES: Brand: PROTEXIS

## (undated) DEVICE — SPONGE LAP 18X18IN STRL -- 5/PK

## (undated) DEVICE — T5 HOOD WITH PEEL AWAY FACE SHIELD

## (undated) DEVICE — Device

## (undated) DEVICE — SOLUTION IRRIG 3000ML 0.9% SOD CHL FLX CONT 0797208] ICU MEDICAL INC]

## (undated) DEVICE — CULTURETTE SGL EVAC TUBE PALL -- 100/CA

## (undated) DEVICE — BASIC PACK: Brand: CONVERTORS

## (undated) DEVICE — SKIN MARKER,REGULAR TIP WITH RULER AND LABELS: Brand: DEVON

## (undated) DEVICE — SUTURE ETHLN SZ 2-0 L18IN NONABSORBABLE BLK L26MM FS 3/8 664G

## (undated) DEVICE — REM POLYHESIVE ADULT PATIENT RETURN ELECTRODE: Brand: VALLEYLAB

## (undated) DEVICE — 1010 S-DRAPE TOWEL DRAPE 10/BX: Brand: STERI-DRAPE™

## (undated) DEVICE — LIGHT HANDLE: Brand: DEVON

## (undated) DEVICE — SUTURE VCRL + SZ 1-0 L36IN ABSRB UD CTX 1/2 CIR TAPR PNT VCP977H

## (undated) DEVICE — 3M™ STERI-DRAPE™ U-DRAPE 1015: Brand: STERI-DRAPE™

## (undated) DEVICE — PLUS HANDPIECE WITH SUCTION TUBING AND TRAUMA TIP WITH SMALL SOFT CONE: Brand: SURGILAV

## (undated) DEVICE — DRAPE,HIP,W/POUCHES,STERILE: Brand: MEDLINE

## (undated) DEVICE — SUTURE PROL SZ 0 L30IN NONABSORBABLE BLU L40MM CT 1/2 CIR 8434H

## (undated) DEVICE — SUTURE MCRYL SZ 3-0 L27IN ABSRB UD L24MM PS-1 3/8 CIR PRIM Y936H

## (undated) DEVICE — SLIM BODY SKIN STAPLER: Brand: APPOSE ULC

## (undated) DEVICE — PATIENT PROTECTIVE PAD FOR IMP UNIVERSAL LATERAL HIP POSITIONER (ULP) (6/CASE): Brand: PATIENT PROTECTIVE PAD

## (undated) DEVICE — TELFA ADHESIVE ISLAND DRESSING: Brand: TELFA

## (undated) DEVICE — ROCKER SWITCH PENCIL BLADE ELECTRODE, HOLSTER: Brand: EDGE

## (undated) DEVICE — GOWN,SIRUS,NONRNF,SETINSLV,2XL,18/CS: Brand: MEDLINE

## (undated) DEVICE — YANKAUER OPEN TIP, NO VENT: Brand: ARGYLE

## (undated) DEVICE — GAUZE SPONGES,12 PLY: Brand: CURITY

## (undated) DEVICE — ABDOMINAL PAD: Brand: DERMACEA

## (undated) DEVICE — DRAPE,U/ SHT,SPLIT,PLAS,STERIL: Brand: MEDLINE

## (undated) DEVICE — COVER LT HNDL BLU PLAS